# Patient Record
Sex: FEMALE | Race: WHITE | NOT HISPANIC OR LATINO | Employment: FULL TIME | URBAN - METROPOLITAN AREA
[De-identification: names, ages, dates, MRNs, and addresses within clinical notes are randomized per-mention and may not be internally consistent; named-entity substitution may affect disease eponyms.]

---

## 2017-05-12 ENCOUNTER — OFFICE VISIT (OUTPATIENT)
Dept: ENDOCRINOLOGY | Facility: CLINIC | Age: 62
End: 2017-05-12

## 2017-05-12 ENCOUNTER — TELEPHONE (OUTPATIENT)
Dept: ENDOCRINOLOGY | Facility: CLINIC | Age: 62
End: 2017-05-12

## 2017-05-12 VITALS
BODY MASS INDEX: 33.46 KG/M2 | DIASTOLIC BLOOD PRESSURE: 80 MMHG | SYSTOLIC BLOOD PRESSURE: 158 MMHG | HEART RATE: 81 BPM | WEIGHT: 196 LBS | HEIGHT: 64 IN | OXYGEN SATURATION: 98 %

## 2017-05-12 DIAGNOSIS — I10 BENIGN ESSENTIAL HYPERTENSION: Primary | ICD-10-CM

## 2017-05-12 DIAGNOSIS — E78.00 HYPERCHOLESTEROLEMIA: ICD-10-CM

## 2017-05-12 DIAGNOSIS — N76.0 VAGINITIS AND VULVOVAGINITIS: ICD-10-CM

## 2017-05-12 DIAGNOSIS — I10 ESSENTIAL HYPERTENSION: Primary | ICD-10-CM

## 2017-05-12 LAB
25(OH)D3 SERPL-MCNC: 26.8 NG/ML
ALBUMIN SERPL-MCNC: 4.4 G/DL (ref 3.2–4.8)
ALBUMIN/GLOB SERPL: 1.5 G/DL (ref 1.5–2.5)
ALP SERPL-CCNC: 56 U/L (ref 25–100)
ALT SERPL W P-5'-P-CCNC: 21 U/L (ref 7–40)
ANION GAP SERPL CALCULATED.3IONS-SCNC: 4 MMOL/L (ref 3–11)
ARTICHOKE IGE QN: 116 MG/DL (ref 0–130)
AST SERPL-CCNC: 28 U/L (ref 0–33)
BILIRUB SERPL-MCNC: 0.7 MG/DL (ref 0.3–1.2)
BUN BLD-MCNC: 17 MG/DL (ref 9–23)
BUN/CREAT SERPL: 21.3 (ref 7–25)
CALCIUM SPEC-SCNC: 10.4 MG/DL (ref 8.7–10.4)
CHLORIDE SERPL-SCNC: 107 MMOL/L (ref 99–109)
CHOLEST SERPL-MCNC: 199 MG/DL (ref 0–200)
CO2 SERPL-SCNC: 29 MMOL/L (ref 20–31)
CREAT BLD-MCNC: 0.8 MG/DL (ref 0.6–1.3)
GFR SERPL CREATININE-BSD FRML MDRD: 73 ML/MIN/1.73
GLOBULIN UR ELPH-MCNC: 3 GM/DL
GLUCOSE BLD-MCNC: 92 MG/DL (ref 70–100)
HDLC SERPL-MCNC: 65 MG/DL (ref 40–60)
POTASSIUM BLD-SCNC: 4.2 MMOL/L (ref 3.5–5.5)
PROT SERPL-MCNC: 7.4 G/DL (ref 5.7–8.2)
SODIUM BLD-SCNC: 140 MMOL/L (ref 132–146)
T4 FREE SERPL-MCNC: 1.17 NG/DL (ref 0.89–1.76)
TRIGL SERPL-MCNC: 132 MG/DL (ref 0–150)
TSH SERPL DL<=0.05 MIU/L-ACNC: 0.74 MIU/ML (ref 0.35–5.35)

## 2017-05-12 PROCEDURE — 80053 COMPREHEN METABOLIC PANEL: CPT | Performed by: INTERNAL MEDICINE

## 2017-05-12 PROCEDURE — 82306 VITAMIN D 25 HYDROXY: CPT | Performed by: INTERNAL MEDICINE

## 2017-05-12 PROCEDURE — 99214 OFFICE O/P EST MOD 30 MIN: CPT | Performed by: INTERNAL MEDICINE

## 2017-05-12 PROCEDURE — 84443 ASSAY THYROID STIM HORMONE: CPT | Performed by: INTERNAL MEDICINE

## 2017-05-12 PROCEDURE — 80061 LIPID PANEL: CPT | Performed by: INTERNAL MEDICINE

## 2017-05-12 PROCEDURE — 84439 ASSAY OF FREE THYROXINE: CPT | Performed by: INTERNAL MEDICINE

## 2017-05-12 RX ORDER — FLUCONAZOLE 150 MG/1
150 TABLET ORAL DAILY
Qty: 10 TABLET | Refills: 3 | Status: SHIPPED | OUTPATIENT
Start: 2017-05-12 | End: 2017-11-21 | Stop reason: SDUPTHER

## 2017-05-12 RX ORDER — TRIMETHOPRIM 100 MG/1
100 TABLET ORAL DAILY
Qty: 90 TABLET | Refills: 1 | Status: SHIPPED | OUTPATIENT
Start: 2017-05-12 | End: 2017-11-21 | Stop reason: SDUPTHER

## 2017-05-12 RX ORDER — LEVOTHYROXINE SODIUM 0.03 MG/1
25 TABLET ORAL DAILY
Qty: 90 TABLET | Refills: 1 | Status: SHIPPED | OUTPATIENT
Start: 2017-05-12 | End: 2017-11-21 | Stop reason: SDUPTHER

## 2017-05-27 ENCOUNTER — TELEPHONE (OUTPATIENT)
Dept: ENDOCRINOLOGY | Facility: CLINIC | Age: 62
End: 2017-05-27

## 2017-09-12 ENCOUNTER — TELEPHONE (OUTPATIENT)
Dept: ENDOCRINOLOGY | Facility: CLINIC | Age: 62
End: 2017-09-12

## 2017-11-11 PROBLEM — C50.419 MALIGNANT NEOPLASM OF UPPER-OUTER QUADRANT OF BREAST IN FEMALE, ESTROGEN RECEPTOR POSITIVE (HCC): Status: ACTIVE | Noted: 2017-11-11

## 2017-11-11 PROBLEM — Z17.0 MALIGNANT NEOPLASM OF UPPER-OUTER QUADRANT OF BREAST IN FEMALE, ESTROGEN RECEPTOR POSITIVE: Status: ACTIVE | Noted: 2017-11-11

## 2017-11-21 ENCOUNTER — OFFICE VISIT (OUTPATIENT)
Dept: ENDOCRINOLOGY | Facility: CLINIC | Age: 62
End: 2017-11-21

## 2017-11-21 VITALS
SYSTOLIC BLOOD PRESSURE: 144 MMHG | BODY MASS INDEX: 32.95 KG/M2 | HEART RATE: 83 BPM | DIASTOLIC BLOOD PRESSURE: 82 MMHG | OXYGEN SATURATION: 98 % | WEIGHT: 193 LBS | HEIGHT: 64 IN

## 2017-11-21 DIAGNOSIS — N76.0 VAGINITIS AND VULVOVAGINITIS: ICD-10-CM

## 2017-11-21 DIAGNOSIS — I10 BENIGN ESSENTIAL HYPERTENSION: Primary | ICD-10-CM

## 2017-11-21 DIAGNOSIS — E03.9 ACQUIRED HYPOTHYROIDISM: ICD-10-CM

## 2017-11-21 DIAGNOSIS — E78.00 HYPERCHOLESTEROLEMIA: ICD-10-CM

## 2017-11-21 DIAGNOSIS — Z12.11 ENCOUNTER FOR SCREENING COLONOSCOPY: ICD-10-CM

## 2017-11-21 LAB
25(OH)D3 SERPL-MCNC: 34.2 NG/ML
ALBUMIN SERPL-MCNC: 4.3 G/DL (ref 3.2–4.8)
ALBUMIN/GLOB SERPL: 1.6 G/DL (ref 1.5–2.5)
ALP SERPL-CCNC: 72 U/L (ref 25–100)
ALT SERPL W P-5'-P-CCNC: 30 U/L (ref 7–40)
ANION GAP SERPL CALCULATED.3IONS-SCNC: 6 MMOL/L (ref 3–11)
ARTICHOKE IGE QN: 88 MG/DL (ref 0–130)
AST SERPL-CCNC: 30 U/L (ref 0–33)
BASOPHILS # BLD AUTO: 0.04 10*3/MM3 (ref 0–0.2)
BASOPHILS NFR BLD AUTO: 0.5 % (ref 0–1)
BILIRUB SERPL-MCNC: 0.3 MG/DL (ref 0.3–1.2)
BUN BLD-MCNC: 14 MG/DL (ref 9–23)
BUN/CREAT SERPL: 14 (ref 7–25)
CALCIUM SPEC-SCNC: 9.9 MG/DL (ref 8.7–10.4)
CHLORIDE SERPL-SCNC: 104 MMOL/L (ref 99–109)
CHOLEST SERPL-MCNC: 172 MG/DL (ref 0–200)
CO2 SERPL-SCNC: 32 MMOL/L (ref 20–31)
CREAT BLD-MCNC: 1 MG/DL (ref 0.6–1.3)
DEPRECATED RDW RBC AUTO: 45.1 FL (ref 37–54)
EOSINOPHIL # BLD AUTO: 0.21 10*3/MM3 (ref 0–0.3)
EOSINOPHIL NFR BLD AUTO: 2.8 % (ref 0–3)
ERYTHROCYTE [DISTWIDTH] IN BLOOD BY AUTOMATED COUNT: 12.9 % (ref 11.3–14.5)
FSH SERPL-ACNC: 55.1 MIU/ML
GFR SERPL CREATININE-BSD FRML MDRD: 56 ML/MIN/1.73
GLOBULIN UR ELPH-MCNC: 2.7 GM/DL
GLUCOSE BLD-MCNC: 93 MG/DL (ref 70–100)
HCT VFR BLD AUTO: 36.7 % (ref 34.5–44)
HCV AB SER DONR QL: NORMAL
HDLC SERPL-MCNC: 45 MG/DL (ref 40–60)
HGB BLD-MCNC: 11.8 G/DL (ref 11.5–15.5)
IMM GRANULOCYTES # BLD: 0.03 10*3/MM3 (ref 0–0.03)
IMM GRANULOCYTES NFR BLD: 0.4 % (ref 0–0.6)
LYMPHOCYTES # BLD AUTO: 2.28 10*3/MM3 (ref 0.6–4.8)
LYMPHOCYTES NFR BLD AUTO: 30.4 % (ref 24–44)
MCH RBC QN AUTO: 30.7 PG (ref 27–31)
MCHC RBC AUTO-ENTMCNC: 32.2 G/DL (ref 32–36)
MCV RBC AUTO: 95.6 FL (ref 80–99)
MONOCYTES # BLD AUTO: 0.7 10*3/MM3 (ref 0–1)
MONOCYTES NFR BLD AUTO: 9.3 % (ref 0–12)
NEUTROPHILS # BLD AUTO: 4.25 10*3/MM3 (ref 1.5–8.3)
NEUTROPHILS NFR BLD AUTO: 56.6 % (ref 41–71)
PLATELET # BLD AUTO: 289 10*3/MM3 (ref 150–450)
PMV BLD AUTO: 10.5 FL (ref 6–12)
POTASSIUM BLD-SCNC: 4.1 MMOL/L (ref 3.5–5.5)
PROT SERPL-MCNC: 7 G/DL (ref 5.7–8.2)
RBC # BLD AUTO: 3.84 10*6/MM3 (ref 3.89–5.14)
SODIUM BLD-SCNC: 142 MMOL/L (ref 132–146)
TRIGL SERPL-MCNC: 243 MG/DL (ref 0–150)
TSH SERPL DL<=0.05 MIU/L-ACNC: 0.7 MIU/ML (ref 0.35–5.35)
WBC NRBC COR # BLD: 7.51 10*3/MM3 (ref 3.5–10.8)

## 2017-11-21 PROCEDURE — 80053 COMPREHEN METABOLIC PANEL: CPT | Performed by: INTERNAL MEDICINE

## 2017-11-21 PROCEDURE — 86803 HEPATITIS C AB TEST: CPT | Performed by: INTERNAL MEDICINE

## 2017-11-21 PROCEDURE — 85025 COMPLETE CBC W/AUTO DIFF WBC: CPT | Performed by: INTERNAL MEDICINE

## 2017-11-21 PROCEDURE — 84443 ASSAY THYROID STIM HORMONE: CPT | Performed by: INTERNAL MEDICINE

## 2017-11-21 PROCEDURE — 83001 ASSAY OF GONADOTROPIN (FSH): CPT | Performed by: INTERNAL MEDICINE

## 2017-11-21 PROCEDURE — 99214 OFFICE O/P EST MOD 30 MIN: CPT | Performed by: INTERNAL MEDICINE

## 2017-11-21 PROCEDURE — 80061 LIPID PANEL: CPT | Performed by: INTERNAL MEDICINE

## 2017-11-21 PROCEDURE — 82306 VITAMIN D 25 HYDROXY: CPT | Performed by: INTERNAL MEDICINE

## 2017-11-21 RX ORDER — FLUCONAZOLE 150 MG/1
150 TABLET ORAL DAILY
Qty: 10 TABLET | Refills: 3 | Status: SHIPPED | OUTPATIENT
Start: 2017-11-21 | End: 2018-05-17 | Stop reason: SDUPTHER

## 2017-11-21 RX ORDER — TRIMETHOPRIM 100 MG/1
100 TABLET ORAL DAILY
Qty: 90 TABLET | Refills: 1 | Status: SHIPPED | OUTPATIENT
Start: 2017-11-21 | End: 2018-05-17 | Stop reason: SDUPTHER

## 2017-11-21 RX ORDER — INFLUENZA A VIRUS A/SINGAPORE/GP1908/2015 IVR-180 (H1N1) ANTIGEN (MDCK CELL DERIVED, PROPIOLACTONE INACTIVATED), INFLUENZA A VIRUS A/NORTH CAROLINA/04/2016 (H3N2) HEMAGGLUTININ ANTIGEN (MDCK CELL DERIVED, PROPIOLACTONE INACTIVATED), INFLUENZA B VIRUS B/IOWA/06/2017 HEMAGGLUTININ ANTIGEN (MDCK CELL DERIVED, PROPIOLACTONE INACTIVATED), INFLUENZA B VIRUS B/SINGAPORE/INFTT-16-0610/2016 HEMAGGLUTININ ANTIGEN (MDCK CELL DERIVED, PROPIOLACTONE INACTIVATED) 15; 15; 15; 15 UG/.5ML; UG/.5ML; UG/.5ML; UG/.5ML
INJECTION, SUSPENSION INTRAMUSCULAR
Refills: 0 | COMMUNITY
Start: 2017-10-12 | End: 2021-05-25

## 2017-11-21 RX ORDER — IBUPROFEN 200 MG
1 CAPSULE ORAL 2 TIMES DAILY
Qty: 60 TABLET | Refills: 5
Start: 2017-11-21 | End: 2018-05-17 | Stop reason: ALTCHOICE

## 2017-11-21 RX ORDER — CHOLECALCIFEROL (VITAMIN D3) 125 MCG
1 TABLET ORAL DAILY
Qty: 30 TABLET | Refills: 5
Start: 2017-11-21 | End: 2020-05-19

## 2017-11-21 RX ORDER — LEVOTHYROXINE SODIUM 0.03 MG/1
25 TABLET ORAL DAILY
Qty: 90 TABLET | Refills: 1 | Status: SHIPPED | OUTPATIENT
Start: 2017-11-21 | End: 2018-05-17 | Stop reason: SDUPTHER

## 2017-11-21 RX ORDER — EXEMESTANE 25 MG/1
TABLET ORAL DAILY
COMMUNITY
End: 2018-05-17

## 2017-11-21 NOTE — PROGRESS NOTES
Siria JACQUES Radha 61 y.o.  CC:Follow-up; Hypertension; Hypothyroidism; and Heartburn    [place]:Follow-up; Hypertension; Hypothyroidism; and Heartburn    bp is good   Energy good overall   bp is good at home   Higher in MD office  Healthy overall   Had lumpectomy and radiation for breast cancer - did not have chemo  Did get onco dx test (about 4000$)  Saw Dr Fofana oncologist (looks at cells)- score was 33- recommended chemo and she is still considering this   Is on aromasin and recommended avoid soy   Stopped hormones   Lizette Delgado MD is oncologist  Also saw Dr South for podiatry - dx tarsal tunnel and had therapy   Also has soft tissue damage lateral foot dx Dr Vergara    Allergies   Allergen Reactions   • Terbinafine Itching       Current Outpatient Prescriptions:   •  exemestane (AROMASIN) 25 MG chemo tablet, Take  by mouth Daily., Disp: , Rfl:   •  Glucosamine-Chondroitin (MOVE FREE PO), Take  by mouth Daily., Disp: , Rfl:   •  levothyroxine (SYNTHROID, LEVOTHROID) 25 MCG tablet, Take 1 tablet by mouth Daily., Disp: 90 tablet, Rfl: 1  •  loperamide (IMODIUM) 1 MG/5ML solution, Take  by mouth. Use as directed, Disp: , Rfl:   •  loratadine (CLARITIN) 10 MG tablet, Take 10 mg by mouth daily., Disp: , Rfl:   •  FLUARIX QUADRIVALENT 0.5 ML suspension prefilled syringe injection, inject 0.5 milliliter intramuscularly, Disp: , Rfl: 0  •  FLUCELVAX QUADRIVALENT 0.5 ML suspension prefilled syringe injection, ADM 0.5ML IM UTD, Disp: , Rfl: 0  •  fluconazole (DIFLUCAN) 150 MG tablet, Take 1 tablet by mouth Daily., Disp: 10 tablet, Rfl: 3  •  trimethoprim (TRIMPEX) 100 MG tablet, Take 1 tablet by mouth Daily., Disp: 90 tablet, Rfl: 1  •  ZOSTAVAX 72237 UNT/0.65ML injection, inject contents of 1 vial subcutaneously, Disp: , Rfl: 0  Patient Active Problem List    Diagnosis   • Malignant neoplasm of upper-outer quadrant of breast in female, estrogen receptor positive [C50.419, Z17.0]   • Vaginitis and vulvovaginitis [N76.0]   •  Sore throat [J02.9]   • Acute low back pain [M54.5]   • Benign essential hypertension [I10]     Overview Note:     Impression: 11/24/2015 - bp is good  Impression: 05/08/2015 - bp is good  bp is good at home as well - continue to monitor  Impression: 05/08/2015 - bp is good  bp is good at home as well - continue to test  Impression: 11/25/2014 - bp is good overall  Impression: 05/20/2014 - bp is higher here, better at home;      • Gastroesophageal reflux disease [K21.9]   • Fatigue [R53.83]   • Foot pain [M79.673]     Overview Note:     Impression: 11/24/2015 - continue hard soled shoe- ok topical and likely arthritis;      • Hypercholesterolemia [E78.00]     Overview Note:     Impression: 11/24/2015 - check flp  Impression: 05/08/2015 - check flp today  Impression: 11/25/2014 - check flp  Impression: 05/20/2014 - check flp;      • Hypothyroidism [E03.9]     Overview Note:     Impression: 11/24/2015 - check tfts  Impression: 05/08/2015 - check tsh  Impression: 11/25/2014 - update tft  Impression: 05/20/2014 - check tsh;      • Knee pain [M25.569]     Overview Note:     Impression: 11/24/2015 - ibuprofen 600 mg bid    knee brace   ortho referral when she is ready  Impression: 11/25/2014 - twisted left knee- swelling- better now- continue nsaid and try brace;      • Osteoarthritis of hand [M19.049]     Overview Note:     Impression: 05/08/2015 - continue ibuprofen- cautioned her about overlapping alleve and ibuprofen  Impression: 11/25/2014 - taking advil, also knee pain;      • Candidiasis of vagina [B37.3]     Overview Note:     Impression: 11/24/2015 - refill diflucan    rx provided per patient request;        Review of Systems   Constitutional: Negative for activity change, appetite change, chills, diaphoresis, fatigue, fever and unexpected weight change.   HENT: Negative for congestion, dental problem, drooling, ear discharge, ear pain, facial swelling, hearing loss, mouth sores, nosebleeds, postnasal drip,  rhinorrhea, sinus pressure, sneezing, sore throat, tinnitus, trouble swallowing and voice change.    Eyes: Negative for photophobia, pain, discharge, redness, itching and visual disturbance.   Respiratory: Negative for apnea, cough, choking, chest tightness, shortness of breath, wheezing and stridor.    Cardiovascular: Negative for chest pain, palpitations and leg swelling.   Gastrointestinal: Negative for abdominal distention, abdominal pain, anal bleeding, blood in stool, constipation, diarrhea, nausea, rectal pain and vomiting.   Endocrine: Negative for cold intolerance, heat intolerance, polydipsia, polyphagia and polyuria.   Genitourinary: Negative for decreased urine volume, difficulty urinating, dysuria, enuresis, flank pain, frequency, genital sores, hematuria and urgency.        Breast cancer s/p lumpectomy    Musculoskeletal: Negative for arthralgias, back pain, gait problem, joint swelling, myalgias, neck pain and neck stiffness.        Right lateral foot pain    Skin: Negative for color change, pallor, rash and wound.   Allergic/Immunologic: Negative for environmental allergies, food allergies and immunocompromised state.   Neurological: Negative for dizziness, tremors, seizures, syncope, facial asymmetry, speech difficulty, weakness, light-headedness, numbness and headaches.   Hematological: Negative for adenopathy. Does not bruise/bleed easily.   Psychiatric/Behavioral: Negative for agitation, behavioral problems, confusion, decreased concentration, dysphoric mood, hallucinations, self-injury, sleep disturbance and suicidal ideas. The patient is not nervous/anxious and is not hyperactive.      Social History     Social History   • Marital status:      Spouse name: N/A   • Number of children: N/A   • Years of education: N/A     Occupational History   • Not on file.     Social History Main Topics   • Smoking status: Never Smoker   • Smokeless tobacco: Never Used   • Alcohol use No   • Drug use: No  "  • Sexual activity: Not on file     Other Topics Concern   • Not on file     Social History Narrative     Family History   Problem Relation Age of Onset   • Diabetes Father    • Hypertension Father    • Breast cancer Mother      /82  Pulse 83  Ht 63.5\" (161.3 cm)  Wt 193 lb (87.5 kg)  SpO2 98%  BMI 33.65 kg/m2  Physical Exam   Constitutional: She is oriented to person, place, and time. She appears well-developed and well-nourished.   HENT:   Head: Normocephalic and atraumatic.   Nose: Nose normal.   Mouth/Throat: Oropharynx is clear and moist.   Eyes: Conjunctivae, EOM and lids are normal. Pupils are equal, round, and reactive to light.   Neck: Trachea normal and normal range of motion. Neck supple. Carotid bruit is not present. No tracheal deviation present. No thyroid mass and no thyromegaly present.   Cardiovascular: Normal rate, regular rhythm, normal heart sounds and intact distal pulses.  Exam reveals no gallop and no friction rub.    No murmur heard.  Pulmonary/Chest: Effort normal and breath sounds normal. No respiratory distress. She has no wheezes.   Musculoskeletal: Normal range of motion. She exhibits no edema or deformity.   Lymphadenopathy:     She has no cervical adenopathy.   Neurological: She is alert and oriented to person, place, and time. She has normal reflexes. She displays normal reflexes. No cranial nerve deficit.   Skin: Skin is warm and dry. No rash noted. No cyanosis or erythema. Nails show no clubbing.   Psychiatric: She has a normal mood and affect. Her speech is normal and behavior is normal. Judgment and thought content normal. Cognition and memory are normal.   Nursing note and vitals reviewed.    Results for orders placed or performed in visit on 05/12/17   TSH   Result Value Ref Range    TSH 0.739 0.350 - 5.350 mIU/mL   T4, Free   Result Value Ref Range    Free T4 1.17 0.89 - 1.76 ng/dL   Comprehensive Metabolic Panel   Result Value Ref Range    Glucose 92 70 - 100 mg/dL "    BUN 17 9 - 23 mg/dL    Creatinine 0.80 0.60 - 1.30 mg/dL    Sodium 140 132 - 146 mmol/L    Potassium 4.2 3.5 - 5.5 mmol/L    Chloride 107 99 - 109 mmol/L    CO2 29.0 20.0 - 31.0 mmol/L    Calcium 10.4 8.7 - 10.4 mg/dL    Total Protein 7.4 5.7 - 8.2 g/dL    Albumin 4.40 3.20 - 4.80 g/dL    ALT (SGPT) 21 7 - 40 U/L    AST (SGOT) 28 0 - 33 U/L    Alkaline Phosphatase 56 25 - 100 U/L    Total Bilirubin 0.7 0.3 - 1.2 mg/dL    eGFR Non African Amer 73 >60 mL/min/1.73    Globulin 3.0 gm/dL    A/G Ratio 1.5 1.5 - 2.5 g/dL    BUN/Creatinine Ratio 21.3 7.0 - 25.0    Anion Gap 4.0 3.0 - 11.0 mmol/L   Lipid Panel   Result Value Ref Range    Total Cholesterol 199 0 - 200 mg/dL    Triglycerides 132 0 - 150 mg/dL    HDL Cholesterol 65 (H) 40 - 60 mg/dL    LDL Cholesterol  116 0 - 130 mg/dL   Vitamin D 25 Hydroxy   Result Value Ref Range    25 Hydroxy, Vitamin D 26.8 ng/ml     Problem List Items Addressed This Visit        Cardiovascular and Mediastinum    Benign essential hypertension - Primary     bp is good   Continue current medications          Relevant Orders    Comprehensive Metabolic Panel (Completed)    CBC & Differential (Completed)    Vitamin D 25 Hydroxy (Completed)    Hepatitis C Antibody (Completed)    CBC Auto Differential (Completed)    Hypercholesterolemia     Check flp          Relevant Orders    Comprehensive Metabolic Panel (Completed)    Lipid Panel (Completed)    Vitamin D 25 Hydroxy (Completed)    Follicle Stimulating Hormone (Completed)    Hepatitis C Antibody (Completed)       Endocrine    Hypothyroidism     Check tfts          Relevant Medications    levothyroxine (SYNTHROID, LEVOTHROID) 25 MCG tablet    Other Relevant Orders    TSH (Completed)    Comprehensive Metabolic Panel (Completed)    Vitamin D 25 Hydroxy (Completed)    Follicle Stimulating Hormone (Completed)    Hepatitis C Antibody (Completed)       Genitourinary    Vaginitis and vulvovaginitis     Stable without recurrent symptoms           Relevant Medications    fluconazole (DIFLUCAN) 150 MG tablet    Other Relevant Orders    Follicle Stimulating Hormone (Completed)    Hepatitis C Antibody (Completed)      Other Visit Diagnoses     Encounter for screening colonoscopy        Relevant Orders    Ambulatory Referral to Gastroenterology        Return in about 6 months (around 5/21/2018) for Recheck 30 min .    Reviewed preventive care  Discussed colonoscopy - Dr Bolton recommended by Dr Weiss and I agreed   Lashell Saucedo MA  Signed Sofia Hong MD

## 2017-11-28 ENCOUNTER — TELEPHONE (OUTPATIENT)
Dept: INTERNAL MEDICINE | Facility: CLINIC | Age: 62
End: 2017-11-28

## 2018-01-30 PROBLEM — D36.9 TUBULAR ADENOMA: Status: ACTIVE | Noted: 2018-01-30

## 2018-05-17 ENCOUNTER — OFFICE VISIT (OUTPATIENT)
Dept: ENDOCRINOLOGY | Facility: CLINIC | Age: 63
End: 2018-05-17

## 2018-05-17 VITALS — OXYGEN SATURATION: 99 % | HEIGHT: 64 IN | BODY MASS INDEX: 32.1 KG/M2 | WEIGHT: 188 LBS | HEART RATE: 78 BPM

## 2018-05-17 DIAGNOSIS — B37.31 VAGINAL YEAST INFECTION: ICD-10-CM

## 2018-05-17 DIAGNOSIS — E03.9 ACQUIRED HYPOTHYROIDISM: ICD-10-CM

## 2018-05-17 DIAGNOSIS — N76.0 VAGINITIS AND VULVOVAGINITIS: ICD-10-CM

## 2018-05-17 DIAGNOSIS — E78.00 HYPERCHOLESTEROLEMIA: Primary | ICD-10-CM

## 2018-05-17 DIAGNOSIS — I10 BENIGN ESSENTIAL HYPERTENSION: ICD-10-CM

## 2018-05-17 LAB
25(OH)D3 SERPL-MCNC: 25.1 NG/ML
ALBUMIN SERPL-MCNC: 4.6 G/DL (ref 3.2–4.8)
ALBUMIN/GLOB SERPL: 1.5 G/DL (ref 1.5–2.5)
ALP SERPL-CCNC: 91 U/L (ref 25–100)
ALT SERPL W P-5'-P-CCNC: 27 U/L (ref 7–40)
ANION GAP SERPL CALCULATED.3IONS-SCNC: 11 MMOL/L (ref 3–11)
ARTICHOKE IGE QN: 106 MG/DL (ref 0–130)
AST SERPL-CCNC: 28 U/L (ref 0–33)
BILIRUB SERPL-MCNC: 0.4 MG/DL (ref 0.3–1.2)
BUN BLD-MCNC: 15 MG/DL (ref 9–23)
BUN/CREAT SERPL: 13.6 (ref 7–25)
CALCIUM SPEC-SCNC: 10.2 MG/DL (ref 8.7–10.4)
CHLORIDE SERPL-SCNC: 106 MMOL/L (ref 99–109)
CHOLEST SERPL-MCNC: 192 MG/DL (ref 0–200)
CO2 SERPL-SCNC: 28 MMOL/L (ref 20–31)
CREAT BLD-MCNC: 1.1 MG/DL (ref 0.6–1.3)
GFR SERPL CREATININE-BSD FRML MDRD: 50 ML/MIN/1.73
GLOBULIN UR ELPH-MCNC: 3 GM/DL
GLUCOSE BLD-MCNC: 104 MG/DL (ref 70–100)
HDLC SERPL-MCNC: 54 MG/DL (ref 40–60)
POTASSIUM BLD-SCNC: 4.8 MMOL/L (ref 3.5–5.5)
PROT SERPL-MCNC: 7.6 G/DL (ref 5.7–8.2)
SODIUM BLD-SCNC: 145 MMOL/L (ref 132–146)
TRIGL SERPL-MCNC: 176 MG/DL (ref 0–150)
TSH SERPL DL<=0.05 MIU/L-ACNC: 0.34 MIU/ML (ref 0.35–5.35)

## 2018-05-17 PROCEDURE — 80053 COMPREHEN METABOLIC PANEL: CPT | Performed by: INTERNAL MEDICINE

## 2018-05-17 PROCEDURE — 80061 LIPID PANEL: CPT | Performed by: INTERNAL MEDICINE

## 2018-05-17 PROCEDURE — 84443 ASSAY THYROID STIM HORMONE: CPT | Performed by: INTERNAL MEDICINE

## 2018-05-17 PROCEDURE — 82306 VITAMIN D 25 HYDROXY: CPT | Performed by: INTERNAL MEDICINE

## 2018-05-17 PROCEDURE — 99214 OFFICE O/P EST MOD 30 MIN: CPT | Performed by: INTERNAL MEDICINE

## 2018-05-17 RX ORDER — TRIMETHOPRIM 100 MG/1
100 TABLET ORAL DAILY
Qty: 30 TABLET | Refills: 1 | Status: SHIPPED | OUTPATIENT
Start: 2018-05-17 | End: 2019-05-21 | Stop reason: SDUPTHER

## 2018-05-17 RX ORDER — FLUCONAZOLE 150 MG/1
150 TABLET ORAL DAILY
Qty: 10 TABLET | Refills: 3 | Status: SHIPPED | OUTPATIENT
Start: 2018-05-17 | End: 2019-05-21 | Stop reason: SDUPTHER

## 2018-05-17 RX ORDER — LEVOTHYROXINE SODIUM 0.03 MG/1
25 TABLET ORAL DAILY
Qty: 90 TABLET | Refills: 1 | Status: SHIPPED | OUTPATIENT
Start: 2018-05-17 | End: 2019-05-21 | Stop reason: SDUPTHER

## 2018-05-17 RX ORDER — PHENOL 1.4 %
600 AEROSOL, SPRAY (ML) MUCOUS MEMBRANE 2 TIMES DAILY WITH MEALS
COMMUNITY
End: 2019-11-26

## 2018-05-17 NOTE — PROGRESS NOTES
Siria JACQUES Radha 62 y.o.  CC:Follow-up; Hypertension; Heartburn; and Hypothyroidism    [place]: Follow-up; Hypertension; Heartburn; and Hypothyroidism    bp is good today - 128/64  gerd is stable   Energy is good overall   Is utd with preventive care- on aromasin for breast cancer   Printed prescriptions for refills   She is due for lab work today   Gums bother her with a lot of acidic fluids  Has seen dentist   bp at home has been good overall- 2 systolic bp over 150     Allergies   Allergen Reactions   • Terbinafine Itching       Current Outpatient Prescriptions:   •  calcium carbonate (OS-NICK) 600 MG tablet, Take 600 mg by mouth 2 (Two) Times a Day With Meals., Disp: , Rfl:   •  Exemestane (AROMASIN PO), Take 25 mg by mouth Daily., Disp: , Rfl:   •  Glucos-Chond-Hyal Ac-Ca Fructo (MOVE FREE JOINT HEALTH ADVANCE PO), Take  by mouth., Disp: , Rfl:   •  Glucosamine-Chondroitin (MOVE FREE PO), Take  by mouth., Disp: , Rfl:   •  Loperamide HCl (IMODIUM PO), Take 1 tablet by mouth 2 (Two) Times a Day As Needed., Disp: , Rfl:   •  Ergocalciferol (VITAMIN D2) 2000 units tablet, Take 1 tablet by mouth Daily., Disp: 30 tablet, Rfl: 5  •  FLUARIX QUADRIVALENT 0.5 ML suspension prefilled syringe injection, inject 0.5 milliliter intramuscularly, Disp: , Rfl: 0  •  FLUCELVAX QUADRIVALENT 0.5 ML suspension prefilled syringe injection, ADM 0.5ML IM UTD, Disp: , Rfl: 0  •  fluconazole (DIFLUCAN) 150 MG tablet, Take 1 tablet by mouth Daily., Disp: 10 tablet, Rfl: 3  •  Glucosamine-Chondroitin (MOVE FREE PO), Take  by mouth Daily., Disp: , Rfl:   •  levothyroxine (SYNTHROID, LEVOTHROID) 25 MCG tablet, Take 1 tablet by mouth Daily., Disp: 90 tablet, Rfl: 1  •  loratadine (CLARITIN) 10 MG tablet, Take 10 mg by mouth daily., Disp: , Rfl:   •  trimethoprim (TRIMPEX) 100 MG tablet, Take 1 tablet by mouth Daily. (Patient taking differently: Take 100 mg by mouth Daily. PRN), Disp: 90 tablet, Rfl: 1  •  ZOSTAVAX 25251 UNT/0.65ML injection, inject  contents of 1 vial subcutaneously, Disp: , Rfl: 0  Patient Active Problem List    Diagnosis   • Tubular adenoma [D36.9]     Overview Note:     2018- repeat 5 years      • Malignant neoplasm of upper-outer quadrant of breast in female, estrogen receptor positive [C50.419, Z17.0]   • Vaginitis and vulvovaginitis [N76.0]   • Sore throat [J02.9]   • Acute low back pain [M54.5]   • Benign essential hypertension [I10]     Overview Note:     Impression: 11/24/2015 - bp is good  Impression: 05/08/2015 - bp is good  bp is good at home as well - continue to monitor  Impression: 05/08/2015 - bp is good  bp is good at home as well - continue to test  Impression: 11/25/2014 - bp is good overall  Impression: 05/20/2014 - bp is higher here, better at home;      • Gastroesophageal reflux disease [K21.9]   • Fatigue [R53.83]   • Foot pain [M79.673]     Overview Note:     Impression: 11/24/2015 - continue hard soled shoe- ok topical and likely arthritis;      • Hypercholesterolemia [E78.00]     Overview Note:     Impression: 11/24/2015 - check flp  Impression: 05/08/2015 - check flp today  Impression: 11/25/2014 - check flp  Impression: 05/20/2014 - check flp;      • Hypothyroidism [E03.9]     Overview Note:     Impression: 11/24/2015 - check tfts  Impression: 05/08/2015 - check tsh  Impression: 11/25/2014 - update tft  Impression: 05/20/2014 - check tsh;      • Knee pain [M25.569]     Overview Note:     Impression: 11/24/2015 - ibuprofen 600 mg bid    knee brace   ortho referral when she is ready  Impression: 11/25/2014 - twisted left knee- swelling- better now- continue nsaid and try brace;      • Osteoarthritis of hand [M19.049]     Overview Note:     Impression: 05/08/2015 - continue ibuprofen- cautioned her about overlapping alleve and ibuprofen  Impression: 11/25/2014 - taking advil, also knee pain;      • Candidiasis of vagina [B37.3]     Overview Note:     Impression: 11/24/2015 - refill diflucan    rx provided per patient  request;        Review of Systems   Constitutional: Negative for activity change, appetite change, chills, diaphoresis, fatigue, fever and unexpected weight change.   HENT: Negative for congestion, dental problem, drooling, ear discharge, ear pain, facial swelling, hearing loss, mouth sores, nosebleeds, postnasal drip, rhinorrhea, sinus pressure, sneezing, sore throat, tinnitus, trouble swallowing and voice change.    Eyes: Negative for photophobia, pain, discharge, redness, itching and visual disturbance.   Respiratory: Negative for apnea, cough, choking, chest tightness, shortness of breath, wheezing and stridor.    Cardiovascular: Negative for chest pain, palpitations and leg swelling.   Gastrointestinal: Negative for abdominal distention, abdominal pain, anal bleeding, blood in stool, constipation, diarrhea, nausea, rectal pain and vomiting.   Endocrine: Negative for cold intolerance, heat intolerance, polydipsia, polyphagia and polyuria.   Genitourinary: Negative for decreased urine volume, difficulty urinating, dysuria, enuresis, flank pain, frequency, genital sores, hematuria and urgency.   Musculoskeletal: Negative for arthralgias, back pain, gait problem, joint swelling, myalgias, neck pain and neck stiffness.   Skin: Negative for color change, pallor, rash and wound.   Allergic/Immunologic: Negative for environmental allergies, food allergies and immunocompromised state.   Neurological: Negative for dizziness, tremors, seizures, syncope, facial asymmetry, speech difficulty, weakness, light-headedness, numbness and headaches.   Hematological: Negative for adenopathy. Does not bruise/bleed easily.   Psychiatric/Behavioral: Negative for agitation, behavioral problems, confusion, decreased concentration, dysphoric mood, hallucinations, self-injury, sleep disturbance and suicidal ideas. The patient is not nervous/anxious and is not hyperactive.      Social History     Social History   • Marital status:   "    Spouse name: N/A   • Number of children: N/A   • Years of education: N/A     Occupational History   • Not on file.     Social History Main Topics   • Smoking status: Never Smoker   • Smokeless tobacco: Never Used   • Alcohol use No   • Drug use: No   • Sexual activity: Not on file     Other Topics Concern   • Not on file     Social History Narrative   • No narrative on file     Family History   Problem Relation Age of Onset   • Diabetes Father    • Hypertension Father    • Breast cancer Mother      Pulse 78   Ht 161.3 cm (63.5\")   Wt 85.3 kg (188 lb)   SpO2 99%   BMI 32.78 kg/m²   Physical Exam   Constitutional: She is oriented to person, place, and time. She appears well-developed and well-nourished.   HENT:   Head: Normocephalic and atraumatic.   Nose: Nose normal.   Mouth/Throat: Oropharynx is clear and moist.   Eyes: Conjunctivae, EOM and lids are normal. Pupils are equal, round, and reactive to light.   Neck: Trachea normal and normal range of motion. Neck supple. Carotid bruit is not present. No tracheal deviation present. No thyroid mass and no thyromegaly present.   Cardiovascular: Normal rate, regular rhythm, normal heart sounds and intact distal pulses.  Exam reveals no gallop and no friction rub.    No murmur heard.  Pulmonary/Chest: Effort normal and breath sounds normal. No respiratory distress. She has no wheezes.   Musculoskeletal: Normal range of motion. She exhibits no edema or deformity.   Lymphadenopathy:     She has no cervical adenopathy.   Neurological: She is alert and oriented to person, place, and time. She has normal reflexes. She displays normal reflexes. No cranial nerve deficit.   Skin: Skin is warm and dry. No rash noted. No cyanosis or erythema. Nails show no clubbing.   Psychiatric: She has a normal mood and affect. Her speech is normal and behavior is normal. Judgment and thought content normal. Cognition and memory are normal.   Nursing note and vitals reviewed.    Results " for orders placed or performed in visit on 11/21/17   TSH   Result Value Ref Range    TSH 0.703 0.350 - 5.350 mIU/mL   Comprehensive Metabolic Panel   Result Value Ref Range    Glucose 93 70 - 100 mg/dL    BUN 14 9 - 23 mg/dL    Creatinine 1.00 0.60 - 1.30 mg/dL    Sodium 142 132 - 146 mmol/L    Potassium 4.1 3.5 - 5.5 mmol/L    Chloride 104 99 - 109 mmol/L    CO2 32.0 (H) 20.0 - 31.0 mmol/L    Calcium 9.9 8.7 - 10.4 mg/dL    Total Protein 7.0 5.7 - 8.2 g/dL    Albumin 4.30 3.20 - 4.80 g/dL    ALT (SGPT) 30 7 - 40 U/L    AST (SGOT) 30 0 - 33 U/L    Alkaline Phosphatase 72 25 - 100 U/L    Total Bilirubin 0.3 0.3 - 1.2 mg/dL    eGFR Non African Amer 56 (L) >60 mL/min/1.73    Globulin 2.7 gm/dL    A/G Ratio 1.6 1.5 - 2.5 g/dL    BUN/Creatinine Ratio 14.0 7.0 - 25.0    Anion Gap 6.0 3.0 - 11.0 mmol/L   Lipid Panel   Result Value Ref Range    Total Cholesterol 172 0 - 200 mg/dL    Triglycerides 243 (H) 0 - 150 mg/dL    HDL Cholesterol 45 40 - 60 mg/dL    LDL Cholesterol  88 0 - 130 mg/dL   Vitamin D 25 Hydroxy   Result Value Ref Range    25 Hydroxy, Vitamin D 34.2 ng/ml   Follicle Stimulating Hormone   Result Value Ref Range    FSH 55.10 mIU/mL   Hepatitis C Antibody   Result Value Ref Range    Hepatitis C Ab Non-Reactive Non-Reactive   CBC Auto Differential   Result Value Ref Range    WBC 7.51 3.50 - 10.80 10*3/mm3    RBC 3.84 (L) 3.89 - 5.14 10*6/mm3    Hemoglobin 11.8 11.5 - 15.5 g/dL    Hematocrit 36.7 34.5 - 44.0 %    MCV 95.6 80.0 - 99.0 fL    MCH 30.7 27.0 - 31.0 pg    MCHC 32.2 32.0 - 36.0 g/dL    RDW 12.9 11.3 - 14.5 %    RDW-SD 45.1 37.0 - 54.0 fl    MPV 10.5 6.0 - 12.0 fL    Platelets 289 150 - 450 10*3/mm3    Neutrophil % 56.6 41.0 - 71.0 %    Lymphocyte % 30.4 24.0 - 44.0 %    Monocyte % 9.3 0.0 - 12.0 %    Eosinophil % 2.8 0.0 - 3.0 %    Basophil % 0.5 0.0 - 1.0 %    Immature Grans % 0.4 0.0 - 0.6 %    Neutrophils, Absolute 4.25 1.50 - 8.30 10*3/mm3    Lymphocytes, Absolute 2.28 0.60 - 4.80 10*3/mm3     Monocytes, Absolute 0.70 0.00 - 1.00 10*3/mm3    Eosinophils, Absolute 0.21 0.00 - 0.30 10*3/mm3    Basophils, Absolute 0.04 0.00 - 0.20 10*3/mm3    Immature Grans, Absolute 0.03 0.00 - 0.03 10*3/mm3     Problem List Items Addressed This Visit        Cardiovascular and Mediastinum    Hypercholesterolemia - Primary     Check flp          Relevant Orders    Comprehensive Metabolic Panel    Lipid Panel    Vitamin D 25 Hydroxy    Benign essential hypertension     bp is good today -continue to monitor          Relevant Orders    Vitamin D 25 Hydroxy       Endocrine    Hypothyroidism     Check tfts          Relevant Medications    levothyroxine (SYNTHROID, LEVOTHROID) 25 MCG tablet    Other Relevant Orders    TSH    Vitamin D 25 Hydroxy       Genitourinary    Vaginitis and vulvovaginitis     Refill diflucan          Relevant Medications    fluconazole (DIFLUCAN) 150 MG tablet    Vaginal yeast infection    Relevant Medications    fluconazole (DIFLUCAN) 150 MG tablet        Return in about 6 months (around 11/17/2018) for Recheck 30 min .    Lashell Saucedo MA  Signed Sofia Hong MD

## 2018-11-20 ENCOUNTER — OFFICE VISIT (OUTPATIENT)
Dept: ENDOCRINOLOGY | Facility: CLINIC | Age: 63
End: 2018-11-20

## 2018-11-20 VITALS
BODY MASS INDEX: 32.66 KG/M2 | DIASTOLIC BLOOD PRESSURE: 80 MMHG | WEIGHT: 191.3 LBS | OXYGEN SATURATION: 98 % | SYSTOLIC BLOOD PRESSURE: 158 MMHG | HEIGHT: 64 IN | HEART RATE: 83 BPM

## 2018-11-20 DIAGNOSIS — E03.9 ACQUIRED HYPOTHYROIDISM: ICD-10-CM

## 2018-11-20 DIAGNOSIS — M19.041 PRIMARY OSTEOARTHRITIS OF BOTH HANDS: ICD-10-CM

## 2018-11-20 DIAGNOSIS — E55.9 VITAMIN D DEFICIENCY: ICD-10-CM

## 2018-11-20 DIAGNOSIS — R73.09 ELEVATED GLUCOSE: ICD-10-CM

## 2018-11-20 DIAGNOSIS — E78.00 HYPERCHOLESTEROLEMIA: ICD-10-CM

## 2018-11-20 DIAGNOSIS — I10 BENIGN ESSENTIAL HYPERTENSION: Primary | ICD-10-CM

## 2018-11-20 DIAGNOSIS — M19.042 PRIMARY OSTEOARTHRITIS OF BOTH HANDS: ICD-10-CM

## 2018-11-20 LAB
25(OH)D3 SERPL-MCNC: 34.3 NG/ML
ALBUMIN SERPL-MCNC: 4.6 G/DL (ref 3.2–4.8)
ALBUMIN/GLOB SERPL: 2 G/DL (ref 1.5–2.5)
ALP SERPL-CCNC: 88 U/L (ref 25–100)
ALT SERPL W P-5'-P-CCNC: 60 U/L (ref 7–40)
ANION GAP SERPL CALCULATED.3IONS-SCNC: 9 MMOL/L (ref 3–11)
ARTICHOKE IGE QN: 91 MG/DL (ref 0–130)
AST SERPL-CCNC: 84 U/L (ref 0–33)
BILIRUB SERPL-MCNC: 0.5 MG/DL (ref 0.3–1.2)
BUN BLD-MCNC: 15 MG/DL (ref 9–23)
BUN/CREAT SERPL: 15.3 (ref 7–25)
CALCIUM SPEC-SCNC: 9.8 MG/DL (ref 8.7–10.4)
CHLORIDE SERPL-SCNC: 104 MMOL/L (ref 99–109)
CHOLEST SERPL-MCNC: 181 MG/DL (ref 0–200)
CO2 SERPL-SCNC: 28 MMOL/L (ref 20–31)
CREAT BLD-MCNC: 0.98 MG/DL (ref 0.6–1.3)
GFR SERPL CREATININE-BSD FRML MDRD: 57 ML/MIN/1.73
GLOBULIN UR ELPH-MCNC: 2.3 GM/DL
GLUCOSE BLD-MCNC: 89 MG/DL (ref 70–100)
HBA1C MFR BLD: 6.2 % (ref 4.8–5.6)
HDLC SERPL-MCNC: 55 MG/DL (ref 40–60)
POTASSIUM BLD-SCNC: 4.9 MMOL/L (ref 3.5–5.5)
PROT SERPL-MCNC: 6.9 G/DL (ref 5.7–8.2)
SODIUM BLD-SCNC: 141 MMOL/L (ref 132–146)
T4 FREE SERPL-MCNC: 1.23 NG/DL (ref 0.89–1.76)
TRIGL SERPL-MCNC: 216 MG/DL (ref 0–150)
TSH SERPL DL<=0.05 MIU/L-ACNC: 0.53 MIU/ML (ref 0.35–5.35)

## 2018-11-20 PROCEDURE — 80061 LIPID PANEL: CPT | Performed by: INTERNAL MEDICINE

## 2018-11-20 PROCEDURE — 80053 COMPREHEN METABOLIC PANEL: CPT | Performed by: INTERNAL MEDICINE

## 2018-11-20 PROCEDURE — 84443 ASSAY THYROID STIM HORMONE: CPT | Performed by: INTERNAL MEDICINE

## 2018-11-20 PROCEDURE — 84439 ASSAY OF FREE THYROXINE: CPT | Performed by: INTERNAL MEDICINE

## 2018-11-20 PROCEDURE — 82306 VITAMIN D 25 HYDROXY: CPT | Performed by: INTERNAL MEDICINE

## 2018-11-20 PROCEDURE — 83036 HEMOGLOBIN GLYCOSYLATED A1C: CPT | Performed by: INTERNAL MEDICINE

## 2018-11-20 PROCEDURE — 99214 OFFICE O/P EST MOD 30 MIN: CPT | Performed by: INTERNAL MEDICINE

## 2018-11-20 RX ORDER — EXEMESTANE 25 MG/1
25 TABLET ORAL DAILY
COMMUNITY
Start: 2018-11-14

## 2018-11-20 RX ORDER — HEPATITIS A VACCINE 1440 [IU]/ML
INJECTION, SUSPENSION INTRAMUSCULAR
Refills: 0 | COMMUNITY
Start: 2018-09-20 | End: 2021-05-25

## 2018-11-20 NOTE — PROGRESS NOTES
Siria Garcia 62 y.o.  CC:Follow-up; Hypertension; Hypothyroidism; and Heartburn      [place]: Follow-up; Hypertension; Hypothyroidism; and Heartburn    Is monitoring bp at home- levels good  bp here is higher- recheck   Left wrist splint- carpal tunnel   Options for treatment discussed  Is on aromasin for br cancer  Some oa hands  Discussed bone density 9/17 normal dexa- seeing Dr Weiss    Allergies   Allergen Reactions   • Terbinafine Itching       Current Outpatient Medications:   •  Multiple Vitamins-Minerals (YARI MULTIVITAMIN FOR WOMEN PO), Take  by mouth., Disp: , Rfl:   •  calcium carbonate (OS-NICK) 600 MG tablet, Take 600 mg by mouth 2 (Two) Times a Day With Meals., Disp: , Rfl:   •  Ergocalciferol (VITAMIN D2) 2000 units tablet, Take 1 tablet by mouth Daily., Disp: 30 tablet, Rfl: 5  •  Exemestane (AROMASIN PO), Take 25 mg by mouth Daily., Disp: , Rfl:   •  FLUARIX QUADRIVALENT 0.5 ML suspension prefilled syringe injection, inject 0.5 milliliter intramuscularly, Disp: , Rfl: 0  •  FLUCELVAX QUADRIVALENT 0.5 ML suspension prefilled syringe injection, ADM 0.5ML IM UTD, Disp: , Rfl: 0  •  fluconazole (DIFLUCAN) 150 MG tablet, Take 1 tablet by mouth Daily., Disp: 10 tablet, Rfl: 3  •  Glucos-Chond-Hyal Ac-Ca Fructo (MOVE FREE JOINT HEALTH ADVANCE PO), Take  by mouth., Disp: , Rfl:   •  levothyroxine (SYNTHROID, LEVOTHROID) 25 MCG tablet, Take 1 tablet by mouth Daily. (Patient taking differently: Take 12.5 mcg by mouth Daily.), Disp: 90 tablet, Rfl: 1  •  Loperamide HCl (IMODIUM PO), Take 1 tablet by mouth 2 (Two) Times a Day As Needed., Disp: , Rfl:   •  loratadine (CLARITIN) 10 MG tablet, Take 10 mg by mouth daily., Disp: , Rfl:   •  trimethoprim (TRIMPEX) 100 MG tablet, Take 1 tablet by mouth Daily. PRN, Disp: 30 tablet, Rfl: 1  •  ZOSTAVAX 34269 UNT/0.65ML injection, inject contents of 1 vial subcutaneously, Disp: , Rfl: 0  Patient Active Problem List    Diagnosis   • Tubular adenoma [D36.9]   • Malignant  neoplasm of upper-outer quadrant of breast in female, estrogen receptor positive (CMS/HCC) [C50.419, Z17.0]   • Vaginitis and vulvovaginitis [N76.0]   • Sore throat [J02.9]   • Acute low back pain [M54.5]   • Benign essential hypertension [I10]   • Gastroesophageal reflux disease [K21.9]   • Fatigue [R53.83]   • Foot pain [M79.673]   • Hypercholesterolemia [E78.00]   • Hypothyroidism [E03.9]   • Knee pain [M25.569]   • Osteoarthritis of hand [M19.049]   • Vaginal yeast infection [B37.3]     Review of Systems   Constitutional: Negative for activity change, appetite change, chills, diaphoresis, fatigue, fever and unexpected weight change.   HENT: Negative for congestion, dental problem, drooling, ear discharge, ear pain, facial swelling, hearing loss, mouth sores, nosebleeds, postnasal drip, rhinorrhea, sinus pressure, sneezing, sore throat, tinnitus, trouble swallowing and voice change.    Eyes: Negative for photophobia, pain, discharge, redness, itching and visual disturbance.   Respiratory: Negative for apnea, cough, choking, chest tightness, shortness of breath, wheezing and stridor.    Cardiovascular: Negative for chest pain, palpitations and leg swelling.   Gastrointestinal: Negative for abdominal distention, abdominal pain, anal bleeding, blood in stool, constipation, diarrhea, nausea, rectal pain and vomiting.   Endocrine: Negative for cold intolerance, heat intolerance, polydipsia, polyphagia and polyuria.   Genitourinary: Negative for decreased urine volume, difficulty urinating, dysuria, enuresis, flank pain, frequency, genital sores, hematuria and urgency.   Musculoskeletal: Negative for arthralgias, back pain, gait problem, joint swelling, myalgias, neck pain and neck stiffness.   Skin: Negative for color change, pallor, rash and wound.   Allergic/Immunologic: Negative for environmental allergies, food allergies and immunocompromised state.   Neurological: Negative for dizziness, tremors, seizures,  "syncope, facial asymmetry, speech difficulty, weakness, light-headedness, numbness and headaches.   Hematological: Negative for adenopathy. Does not bruise/bleed easily.   Psychiatric/Behavioral: Negative for agitation, behavioral problems, confusion, decreased concentration, dysphoric mood, hallucinations, self-injury, sleep disturbance and suicidal ideas. The patient is not nervous/anxious and is not hyperactive.      Social History     Socioeconomic History   • Marital status:      Spouse name: Not on file   • Number of children: Not on file   • Years of education: Not on file   • Highest education level: Not on file   Social Needs   • Financial resource strain: Not on file   • Food insecurity - worry: Not on file   • Food insecurity - inability: Not on file   • Transportation needs - medical: Not on file   • Transportation needs - non-medical: Not on file   Occupational History   • Not on file   Tobacco Use   • Smoking status: Never Smoker   • Smokeless tobacco: Never Used   Substance and Sexual Activity   • Alcohol use: No   • Drug use: No   • Sexual activity: Not on file   Other Topics Concern   • Not on file   Social History Narrative   • Not on file     Family History   Problem Relation Age of Onset   • Diabetes Father    • Hypertension Father    • Breast cancer Mother      /80   Pulse 83   Ht 162.6 cm (64\")   Wt 86.8 kg (191 lb 4.8 oz)   SpO2 98%   BMI 32.84 kg/m²   Physical Exam   Constitutional: She is oriented to person, place, and time. She appears well-developed and well-nourished.   HENT:   Head: Normocephalic and atraumatic.   Nose: Nose normal.   Mouth/Throat: Oropharynx is clear and moist.   Eyes: Conjunctivae, EOM and lids are normal. Pupils are equal, round, and reactive to light.   Neck: Trachea normal and normal range of motion. Neck supple. Carotid bruit is not present. No tracheal deviation present. No thyroid mass and no thyromegaly present.   Cardiovascular: Normal rate, " regular rhythm, normal heart sounds and intact distal pulses. Exam reveals no gallop and no friction rub.   No murmur heard.  Pulmonary/Chest: Effort normal and breath sounds normal. No respiratory distress. She has no wheezes.   Musculoskeletal: Normal range of motion. She exhibits no edema or deformity.   Lymphadenopathy:     She has no cervical adenopathy.   Neurological: She is alert and oriented to person, place, and time. She has normal reflexes. She displays normal reflexes. No cranial nerve deficit.   Skin: Skin is warm and dry. No rash noted. No cyanosis or erythema. Nails show no clubbing.   Psychiatric: She has a normal mood and affect. Her speech is normal and behavior is normal. Judgment and thought content normal. Cognition and memory are normal.   Nursing note and vitals reviewed.    Results for orders placed or performed in visit on 05/17/18   Comprehensive Metabolic Panel   Result Value Ref Range    Glucose 104 (H) 70 - 100 mg/dL    BUN 15 9 - 23 mg/dL    Creatinine 1.10 0.60 - 1.30 mg/dL    Sodium 145 132 - 146 mmol/L    Potassium 4.8 3.5 - 5.5 mmol/L    Chloride 106 99 - 109 mmol/L    CO2 28.0 20.0 - 31.0 mmol/L    Calcium 10.2 8.7 - 10.4 mg/dL    Total Protein 7.6 5.7 - 8.2 g/dL    Albumin 4.60 3.20 - 4.80 g/dL    ALT (SGPT) 27 7 - 40 U/L    AST (SGOT) 28 0 - 33 U/L    Alkaline Phosphatase 91 25 - 100 U/L    Total Bilirubin 0.4 0.3 - 1.2 mg/dL    eGFR Non African Amer 50 (L) >60 mL/min/1.73    Globulin 3.0 gm/dL    A/G Ratio 1.5 1.5 - 2.5 g/dL    BUN/Creatinine Ratio 13.6 7.0 - 25.0    Anion Gap 11.0 3.0 - 11.0 mmol/L   TSH   Result Value Ref Range    TSH 0.341 (L) 0.350 - 5.350 mIU/mL   Lipid Panel   Result Value Ref Range    Total Cholesterol 192 0 - 200 mg/dL    Triglycerides 176 (H) 0 - 150 mg/dL    HDL Cholesterol 54 40 - 60 mg/dL    LDL Cholesterol  106 0 - 130 mg/dL   Vitamin D 25 Hydroxy   Result Value Ref Range    25 Hydroxy, Vitamin D 25.1 ng/ml     Problem List Items Addressed This  Visit        Cardiovascular and Mediastinum    Hypercholesterolemia     Check flp          Relevant Orders    Lipid Panel (Completed)    TSH (Completed)    T4, Free (Completed)    Benign essential hypertension - Primary     bp is high- recheck 144/78  Is testing at home and bp are normal   Continue current measures and monitoring          Relevant Orders    Comprehensive Metabolic Panel (Completed)       Endocrine    Hypothyroidism       Musculoskeletal and Integument    Osteoarthritis of hand     Continue prn nsaid             Other    Elevated glucose     Blood sugar and 90 day average sugar ordered  Will monitor- known blood sugar elevation - should have a1c q 3 years if normal today          Relevant Orders    Hemoglobin A1c (Completed)      Other Visit Diagnoses     Vitamin D deficiency        Relevant Orders    Vitamin D 25 Hydroxy (Completed)        Return in about 1 year (around 11/20/2019) for Recheck 30 min .    Lashell Saucedo MA  Signed Sofia Hong MD

## 2018-11-21 NOTE — ASSESSMENT & PLAN NOTE
bp is high- recheck 144/78  Is testing at home and bp are normal   Continue current measures and monitoring

## 2019-05-21 ENCOUNTER — OFFICE VISIT (OUTPATIENT)
Dept: ENDOCRINOLOGY | Facility: CLINIC | Age: 64
End: 2019-05-21

## 2019-05-21 VITALS
HEART RATE: 78 BPM | OXYGEN SATURATION: 95 % | DIASTOLIC BLOOD PRESSURE: 74 MMHG | BODY MASS INDEX: 29.81 KG/M2 | SYSTOLIC BLOOD PRESSURE: 138 MMHG | WEIGHT: 174.6 LBS | HEIGHT: 64 IN

## 2019-05-21 DIAGNOSIS — I10 BENIGN ESSENTIAL HYPERTENSION: Primary | ICD-10-CM

## 2019-05-21 DIAGNOSIS — N76.0 VAGINITIS AND VULVOVAGINITIS: ICD-10-CM

## 2019-05-21 DIAGNOSIS — R73.09 ELEVATED GLUCOSE: ICD-10-CM

## 2019-05-21 DIAGNOSIS — E03.9 ACQUIRED HYPOTHYROIDISM: ICD-10-CM

## 2019-05-21 DIAGNOSIS — E78.00 HYPERCHOLESTEROLEMIA: ICD-10-CM

## 2019-05-21 PROCEDURE — 99214 OFFICE O/P EST MOD 30 MIN: CPT | Performed by: INTERNAL MEDICINE

## 2019-05-21 PROCEDURE — 84443 ASSAY THYROID STIM HORMONE: CPT | Performed by: INTERNAL MEDICINE

## 2019-05-21 PROCEDURE — 83036 HEMOGLOBIN GLYCOSYLATED A1C: CPT | Performed by: INTERNAL MEDICINE

## 2019-05-21 PROCEDURE — 84439 ASSAY OF FREE THYROXINE: CPT | Performed by: INTERNAL MEDICINE

## 2019-05-21 PROCEDURE — 80053 COMPREHEN METABOLIC PANEL: CPT | Performed by: INTERNAL MEDICINE

## 2019-05-21 PROCEDURE — 80061 LIPID PANEL: CPT | Performed by: INTERNAL MEDICINE

## 2019-05-21 RX ORDER — LEVOTHYROXINE SODIUM 0.03 MG/1
12.5 TABLET ORAL DAILY
Qty: 45 TABLET | Refills: 1 | Status: SHIPPED | OUTPATIENT
Start: 2019-05-21 | End: 2019-11-26 | Stop reason: SDUPTHER

## 2019-05-21 RX ORDER — TRIMETHOPRIM 100 MG/1
100 TABLET ORAL DAILY
Qty: 30 TABLET | Refills: 1 | Status: SHIPPED | OUTPATIENT
Start: 2019-05-21 | End: 2019-11-26 | Stop reason: SDUPTHER

## 2019-05-21 RX ORDER — FLUCONAZOLE 150 MG/1
150 TABLET ORAL DAILY
Qty: 10 TABLET | Refills: 3 | Status: SHIPPED | OUTPATIENT
Start: 2019-05-21 | End: 2019-11-26 | Stop reason: SDUPTHER

## 2019-05-21 NOTE — PROGRESS NOTES
Siria GEORGIE Garcia 63 y.o.  CC:Follow-up; Hypertension; Hypothyroidism; and Heartburn      [place]: Follow-up; Hypertension; Hypothyroidism; and Heartburn    bp is good   Is on low fat diet   GERD- stable   On aromasin for breast cancer- dexa normal 5/8/17 via Dr Weiss   Energy good on current thyroid supplement  Occ shaky if skip meal   Is checking bp at home - occ systolic < 100   No dizziness or lightheadedness  Has lost about 17 lbs  Was walking on treadmill  Problems with plantar fasciitis- is not walking now - discussed stationary bicycle or swimming   Has not made appt with [place] or Dr Haddad yet  Noting heberdens nodes hands- soaking hands with epsom salt      Allergies   Allergen Reactions   • Terbinafine Itching       Current Outpatient Medications:   •  Omega-3 Fatty Acids (OMEGA-3 1450 PO), Take  by mouth Daily., Disp: , Rfl:   •  calcium carbonate (OS-NICK) 600 MG tablet, Take 600 mg by mouth 2 (Two) Times a Day With Meals., Disp: , Rfl:   •  Ergocalciferol (VITAMIN D2) 2000 units tablet, Take 1 tablet by mouth Daily., Disp: 30 tablet, Rfl: 5  •  exemestane (AROMASIN) 25 MG chemo tablet, , Disp: , Rfl:   •  FLUARIX QUADRIVALENT 0.5 ML suspension prefilled syringe injection, inject 0.5 milliliter intramuscularly, Disp: , Rfl: 0  •  FLUCELVAX QUADRIVALENT 0.5 ML suspension prefilled syringe injection, ADM 0.5ML IM UTD, Disp: , Rfl: 0  •  fluconazole (DIFLUCAN) 150 MG tablet, Take 1 tablet by mouth Daily., Disp: 10 tablet, Rfl: 3  •  Glucos-Chond-Hyal Ac-Ca Fructo (MOVE FREE JOINT HEALTH ADVANCE PO), Take  by mouth., Disp: , Rfl:   •  HAVRIX 1440 EL U/ML vaccine, ADM 1ML IM UTD, Disp: , Rfl: 0  •  levothyroxine (SYNTHROID, LEVOTHROID) 25 MCG tablet, Take 1 tablet by mouth Daily. (Patient taking differently: Take 12.5 mcg by mouth Daily.), Disp: 90 tablet, Rfl: 1  •  Loperamide HCl (IMODIUM PO), Take 1 tablet by mouth 2 (Two) Times a Day As Needed., Disp: , Rfl:   •  loratadine (CLARITIN) 10 MG tablet,  Take 10 mg by mouth daily., Disp: , Rfl:   •  Multiple Vitamins-Minerals (YARI MULTIVITAMIN FOR WOMEN PO), Take  by mouth., Disp: , Rfl:   •  trimethoprim (TRIMPEX) 100 MG tablet, Take 1 tablet by mouth Daily. PRN, Disp: 30 tablet, Rfl: 1  •  ZOSTAVAX 15079 UNT/0.65ML injection, inject contents of 1 vial subcutaneously, Disp: , Rfl: 0  Patient Active Problem List    Diagnosis   • Elevated glucose [R73.09]   • Tubular adenoma [D36.9]   • Malignant neoplasm of upper-outer quadrant of breast in female, estrogen receptor positive (CMS/HCC) [C50.419, Z17.0]   • Vaginitis and vulvovaginitis [N76.0]   • Sore throat [J02.9]   • Acute low back pain [M54.5]   • Benign essential hypertension [I10]   • Gastroesophageal reflux disease [K21.9]   • Fatigue [R53.83]   • Foot pain [M79.673]   • Hypercholesterolemia [E78.00]   • Hypothyroidism [E03.9]   • Knee pain [M25.569]   • Osteoarthritis of hand [M19.049]   • Vaginal yeast infection [B37.3]     Review of Systems   Constitutional: Negative for activity change, appetite change, chills, diaphoresis, fatigue, fever and unexpected weight change.   HENT: Negative for congestion, dental problem, drooling, ear discharge, ear pain, facial swelling, hearing loss, mouth sores, nosebleeds, postnasal drip, rhinorrhea, sinus pressure, sneezing, sore throat, tinnitus, trouble swallowing and voice change.    Eyes: Negative for photophobia, pain, discharge, redness, itching and visual disturbance.   Respiratory: Negative for apnea, cough, choking, chest tightness, shortness of breath, wheezing and stridor.    Cardiovascular: Negative for chest pain, palpitations and leg swelling.   Gastrointestinal: Negative for abdominal distention, abdominal pain, anal bleeding, blood in stool, constipation, diarrhea, nausea, rectal pain and vomiting.   Endocrine: Negative for cold intolerance, heat intolerance, polydipsia, polyphagia and polyuria.   Genitourinary: Negative for decreased urine volume,  "difficulty urinating, dysuria, enuresis, flank pain, frequency, genital sores, hematuria and urgency.   Musculoskeletal: Negative for arthralgias, back pain, gait problem, joint swelling, myalgias, neck pain and neck stiffness.   Skin: Negative for color change, pallor, rash and wound.   Allergic/Immunologic: Negative for environmental allergies, food allergies and immunocompromised state.   Neurological: Negative for dizziness, tremors, seizures, syncope, facial asymmetry, speech difficulty, weakness, light-headedness, numbness and headaches.   Hematological: Negative for adenopathy. Does not bruise/bleed easily.   Psychiatric/Behavioral: Negative for agitation, behavioral problems, confusion, decreased concentration, dysphoric mood, hallucinations, self-injury, sleep disturbance and suicidal ideas. The patient is not nervous/anxious and is not hyperactive.      Social History     Socioeconomic History   • Marital status:      Spouse name: Not on file   • Number of children: Not on file   • Years of education: Not on file   • Highest education level: Not on file   Tobacco Use   • Smoking status: Never Smoker   • Smokeless tobacco: Never Used   Substance and Sexual Activity   • Alcohol use: No   • Drug use: No     Family History   Problem Relation Age of Onset   • Diabetes Father    • Hypertension Father    • Breast cancer Mother      /74   Pulse 78   Ht 162.6 cm (64\")   Wt 79.2 kg (174 lb 9.6 oz)   SpO2 95%   Breastfeeding? No   BMI 29.97 kg/m²   Physical Exam   Constitutional: She is oriented to person, place, and time. She appears well-developed and well-nourished.   HENT:   Head: Normocephalic and atraumatic.   Nose: Nose normal.   Mouth/Throat: Oropharynx is clear and moist.   Eyes: Conjunctivae, EOM and lids are normal. Pupils are equal, round, and reactive to light.   Neck: Trachea normal and normal range of motion. Neck supple. Carotid bruit is not present. No tracheal deviation present. " No thyroid mass and no thyromegaly present.   Cardiovascular: Normal rate, regular rhythm, normal heart sounds and intact distal pulses. Exam reveals no gallop and no friction rub.   No murmur heard.  Pulmonary/Chest: Effort normal and breath sounds normal. No respiratory distress. She has no wheezes.   Musculoskeletal: Normal range of motion. She exhibits no edema or deformity.   Lymphadenopathy:     She has no cervical adenopathy.   Neurological: She is alert and oriented to person, place, and time. She has normal reflexes. She displays normal reflexes. No cranial nerve deficit.   Skin: Skin is warm and dry. No rash noted. No cyanosis or erythema. Nails show no clubbing.   Psychiatric: She has a normal mood and affect. Her speech is normal and behavior is normal. Judgment and thought content normal. Cognition and memory are normal.   Nursing note and vitals reviewed.    Results for orders placed or performed in visit on 11/20/18   Comprehensive Metabolic Panel   Result Value Ref Range    Glucose 89 70 - 100 mg/dL    BUN 15 9 - 23 mg/dL    Creatinine 0.98 0.60 - 1.30 mg/dL    Sodium 141 132 - 146 mmol/L    Potassium 4.9 3.5 - 5.5 mmol/L    Chloride 104 99 - 109 mmol/L    CO2 28.0 20.0 - 31.0 mmol/L    Calcium 9.8 8.7 - 10.4 mg/dL    Total Protein 6.9 5.7 - 8.2 g/dL    Albumin 4.60 3.20 - 4.80 g/dL    ALT (SGPT) 60 (H) 7 - 40 U/L    AST (SGOT) 84 (H) 0 - 33 U/L    Alkaline Phosphatase 88 25 - 100 U/L    Total Bilirubin 0.5 0.3 - 1.2 mg/dL    eGFR Non African Amer 57 (L) >60 mL/min/1.73    Globulin 2.3 gm/dL    A/G Ratio 2.0 1.5 - 2.5 g/dL    BUN/Creatinine Ratio 15.3 7.0 - 25.0    Anion Gap 9.0 3.0 - 11.0 mmol/L   Lipid Panel   Result Value Ref Range    Total Cholesterol 181 0 - 200 mg/dL    Triglycerides 216 (H) 0 - 150 mg/dL    HDL Cholesterol 55 40 - 60 mg/dL    LDL Cholesterol  91 0 - 130 mg/dL   TSH   Result Value Ref Range    TSH 0.527 0.350 - 5.350 mIU/mL   T4, Free   Result Value Ref Range    Free T4 1.23  0.89 - 1.76 ng/dL   Vitamin D 25 Hydroxy   Result Value Ref Range    25 Hydroxy, Vitamin D 34.3 ng/ml   Hemoglobin A1c   Result Value Ref Range    Hemoglobin A1C 6.20 (H) 4.80 - 5.60 %     Problem List Items Addressed This Visit        Cardiovascular and Mediastinum    Hypercholesterolemia     Check flp          Relevant Orders    Lipid Panel (Completed)    Benign essential hypertension - Primary     Current bp is well controlled   Continue medications and monitoring          Relevant Orders    Comprehensive Metabolic Panel (Completed)       Endocrine    Hypothyroidism     Update tfts          Relevant Medications    levothyroxine (SYNTHROID, LEVOTHROID) 25 MCG tablet    Other Relevant Orders    TSH (Completed)    T4, Free (Completed)       Genitourinary    Vaginitis and vulvovaginitis     Diflucan rx sent   Also recurrent uti- uses tmp prn symptoms for 5 days   Gets yeast infection with antibiotics as well          Relevant Medications    fluconazole (DIFLUCAN) 150 MG tablet       Other    Elevated glucose     Continue to follow a low carb diet, stay active  Alternatives to walking discussed   Check hgn a1c today   F/u 6 months or sooner prn          Relevant Orders    Hemoglobin A1c (Completed)        Return in about 6 months (around 11/21/2019).    Lashell Saucedo MA  Signed Sofia Hong MD

## 2019-05-22 LAB
ALBUMIN SERPL-MCNC: 4.9 G/DL (ref 3.5–5.2)
ALBUMIN/GLOB SERPL: 1.7 G/DL
ALP SERPL-CCNC: 101 U/L (ref 39–117)
ALT SERPL W P-5'-P-CCNC: 25 U/L (ref 1–33)
ANION GAP SERPL CALCULATED.3IONS-SCNC: 13.4 MMOL/L
AST SERPL-CCNC: 33 U/L (ref 1–32)
BILIRUB SERPL-MCNC: 0.3 MG/DL (ref 0.2–1.2)
BUN BLD-MCNC: 20 MG/DL (ref 8–23)
BUN/CREAT SERPL: 18.7 (ref 7–25)
CALCIUM SPEC-SCNC: 10.6 MG/DL (ref 8.6–10.5)
CHLORIDE SERPL-SCNC: 97 MMOL/L (ref 98–107)
CHOLEST SERPL-MCNC: 161 MG/DL (ref 0–200)
CO2 SERPL-SCNC: 26.6 MMOL/L (ref 22–29)
CREAT BLD-MCNC: 1.07 MG/DL (ref 0.57–1)
GFR SERPL CREATININE-BSD FRML MDRD: 52 ML/MIN/1.73
GLOBULIN UR ELPH-MCNC: 2.9 GM/DL
GLUCOSE BLD-MCNC: 101 MG/DL (ref 65–99)
HBA1C MFR BLD: 5.4 % (ref 4.8–5.6)
HDLC SERPL-MCNC: 50 MG/DL (ref 40–60)
LDLC SERPL CALC-MCNC: 84 MG/DL (ref 0–100)
LDLC/HDLC SERPL: 1.68 {RATIO}
POTASSIUM BLD-SCNC: 4.4 MMOL/L (ref 3.5–5.2)
PROT SERPL-MCNC: 7.8 G/DL (ref 6–8.5)
SODIUM BLD-SCNC: 137 MMOL/L (ref 136–145)
T4 FREE SERPL-MCNC: 1.49 NG/DL (ref 0.93–1.7)
TRIGL SERPL-MCNC: 135 MG/DL (ref 0–150)
TSH SERPL DL<=0.05 MIU/L-ACNC: 0.43 MIU/ML (ref 0.27–4.2)
VLDLC SERPL-MCNC: 27 MG/DL (ref 5–40)

## 2019-05-23 NOTE — ASSESSMENT & PLAN NOTE
Diflucan rx sent   Also recurrent uti- uses tmp prn symptoms for 5 days   Gets yeast infection with antibiotics as well

## 2019-05-23 NOTE — ASSESSMENT & PLAN NOTE
Continue to follow a low carb diet, stay active  Alternatives to walking discussed   Check hgn a1c today   F/u 6 months or sooner prn

## 2019-05-31 ENCOUNTER — TELEPHONE (OUTPATIENT)
Dept: ENDOCRINOLOGY | Facility: CLINIC | Age: 64
End: 2019-05-31

## 2019-06-03 NOTE — TELEPHONE ENCOUNTER
She can continue vitamin D but calcium supplements have not been proven to prevent bone loss beyond the recommended daily allowance of 1200 mg daily

## 2019-06-03 NOTE — TELEPHONE ENCOUNTER
Patient was advised and states she has been told by the oncologist she has to take the calcium because the aromasin can cause bone loss.  She is taking about 1300 mg per day.  She was advised to call the oncologist to ask if the calcium can be stopped due to high abnormal level and to call back with the response

## 2019-06-05 NOTE — TELEPHONE ENCOUNTER
PATIENT WAS INFORMED BY PA,  IT WAS OK TO STOP THE CALCIUM, WHICH SHE STOPPED TODAY. SHE WAS TOLD THAT SHE HAS A BONE DENSITY TEST IN September, THEY WILL REEVALUATE THEN. SHE WANTED TO CORRECT THE DOSE OF CALCIUM TAKEN, SHE WAS TAKING 1450 MG TO 1700MG PER DAY. IS IT OK TO CONTINUE TAKING HER VITAMIN THAT  MG IN IT. PLEASE CALL BACK [phone]

## 2019-06-05 NOTE — TELEPHONE ENCOUNTER
PT is taking a multivitamin that has 250 mg calcium she is supposed to be taking two daily. She would like to know if she should continue or cut down to once daily or should she not be taking any supplements with calcium

## 2019-06-07 NOTE — TELEPHONE ENCOUNTER
Ok to get the recommended daily allowance of calcium (1000 mg daily) either via diet or supplement  Also study shows vitamin d supplement helps prevent bone loss but not calcium (above the recommended daily allowance).  Thanks, sahil

## 2019-09-10 ENCOUNTER — TELEPHONE (OUTPATIENT)
Dept: INTERNAL MEDICINE | Facility: CLINIC | Age: 64
End: 2019-09-10

## 2019-09-10 NOTE — TELEPHONE ENCOUNTER
Whooping cough vaccine is usually given as a tetanus booster.  It is ok for her to have this if she has not had updated tetanus vaccine and it is ok for her to get this along with her flu shot.  Thanks, jony

## 2019-09-10 NOTE — TELEPHONE ENCOUNTER
PATIENT WAS GOING TO GET A WHOOPING COUGH SHOT AND THE FLU SHOT AT THE SAME TIME BUT SHE WOULD LIKE TO KNOW IF IT IS OKAY. PLEASE CONTACT [phone]

## 2019-09-10 NOTE — TELEPHONE ENCOUNTER
Patient would like to know if you recommend for her to get the whooping cough shot.    Dr. Hong, please advice.  Thank you.

## 2019-09-30 ENCOUNTER — TELEPHONE (OUTPATIENT)
Dept: INTERNAL MEDICINE | Facility: CLINIC | Age: 64
End: 2019-09-30

## 2019-11-26 ENCOUNTER — OFFICE VISIT (OUTPATIENT)
Dept: ENDOCRINOLOGY | Facility: CLINIC | Age: 64
End: 2019-11-26

## 2019-11-26 VITALS
SYSTOLIC BLOOD PRESSURE: 132 MMHG | DIASTOLIC BLOOD PRESSURE: 76 MMHG | HEIGHT: 64 IN | WEIGHT: 176.6 LBS | HEART RATE: 87 BPM | OXYGEN SATURATION: 98 % | BODY MASS INDEX: 30.15 KG/M2

## 2019-11-26 DIAGNOSIS — E03.9 ACQUIRED HYPOTHYROIDISM: ICD-10-CM

## 2019-11-26 DIAGNOSIS — I10 BENIGN ESSENTIAL HYPERTENSION: ICD-10-CM

## 2019-11-26 DIAGNOSIS — N76.0 VAGINITIS AND VULVOVAGINITIS: ICD-10-CM

## 2019-11-26 DIAGNOSIS — E78.00 HYPERCHOLESTEROLEMIA: Primary | ICD-10-CM

## 2019-11-26 DIAGNOSIS — R73.09 ELEVATED GLUCOSE: ICD-10-CM

## 2019-11-26 PROCEDURE — 84439 ASSAY OF FREE THYROXINE: CPT | Performed by: INTERNAL MEDICINE

## 2019-11-26 PROCEDURE — 83970 ASSAY OF PARATHORMONE: CPT | Performed by: INTERNAL MEDICINE

## 2019-11-26 PROCEDURE — 82306 VITAMIN D 25 HYDROXY: CPT | Performed by: INTERNAL MEDICINE

## 2019-11-26 PROCEDURE — 80061 LIPID PANEL: CPT | Performed by: INTERNAL MEDICINE

## 2019-11-26 PROCEDURE — 84443 ASSAY THYROID STIM HORMONE: CPT | Performed by: INTERNAL MEDICINE

## 2019-11-26 PROCEDURE — 99214 OFFICE O/P EST MOD 30 MIN: CPT | Performed by: INTERNAL MEDICINE

## 2019-11-26 PROCEDURE — 83036 HEMOGLOBIN GLYCOSYLATED A1C: CPT | Performed by: INTERNAL MEDICINE

## 2019-11-26 PROCEDURE — 82330 ASSAY OF CALCIUM: CPT | Performed by: INTERNAL MEDICINE

## 2019-11-26 PROCEDURE — 80053 COMPREHEN METABOLIC PANEL: CPT | Performed by: INTERNAL MEDICINE

## 2019-11-26 RX ORDER — TETANUS TOXOID, REDUCED DIPHTHERIA TOXOID AND ACELLULAR PERTUSSIS VACCINE, ADSORBED 5; 2.5; 8; 8; 2.5 [IU]/.5ML; [IU]/.5ML; UG/.5ML; UG/.5ML; UG/.5ML
SUSPENSION INTRAMUSCULAR
Refills: 0 | COMMUNITY
Start: 2019-09-12 | End: 2021-11-22

## 2019-11-26 RX ORDER — LEVOTHYROXINE SODIUM 0.03 MG/1
12.5 TABLET ORAL DAILY
Qty: 45 TABLET | Refills: 1 | Status: SHIPPED | OUTPATIENT
Start: 2019-11-26 | End: 2020-05-19 | Stop reason: SDUPTHER

## 2019-11-26 RX ORDER — TRIMETHOPRIM 100 MG/1
100 TABLET ORAL DAILY
Qty: 30 TABLET | Refills: 1 | Status: SHIPPED | OUTPATIENT
Start: 2019-11-26 | End: 2020-05-19 | Stop reason: SDUPTHER

## 2019-11-26 RX ORDER — FLUCONAZOLE 150 MG/1
150 TABLET ORAL DAILY
Qty: 10 TABLET | Refills: 3 | Status: SHIPPED | OUTPATIENT
Start: 2019-11-26 | End: 2020-05-19 | Stop reason: SDUPTHER

## 2019-11-26 NOTE — PROGRESS NOTES
Siria GEORGIE Garcia 63 y.o.  CC:Follow-up; Hypertension; Hypothyroidism; and Heartburn      [place]: Follow-up; Hypertension; Hypothyroidism; and Heartburn    bp is good   Is on synthroid 12.5 mcg daily   GERD- on no ppi for now   Low bp - no medication currently   Working on weight loss  Has not been on treadmill since May- has been in boot, seeing podiatry (Dr South)   Left jaw popped and swollen- some calcification (told antibiotic would help, recommended smaller bites and chewing slowly)      Allergies   Allergen Reactions   • Terbinafine Itching       Current Outpatient Medications:   •  BOOSTRIX 5-2.5-18.5 LF-MCG/0.5 injection, ADM 0.5ML IM UTD, Disp: , Rfl: 0  •  calcium carbonate (OS-NICK) 600 MG tablet, Take 600 mg by mouth 2 (Two) Times a Day With Meals., Disp: , Rfl:   •  diclofenac (VOLTAREN) 1 % gel gel, , Disp: , Rfl:   •  Ergocalciferol (VITAMIN D2) 2000 units tablet, Take 1 tablet by mouth Daily., Disp: 30 tablet, Rfl: 5  •  exemestane (AROMASIN) 25 MG chemo tablet, , Disp: , Rfl:   •  FLUARIX QUADRIVALENT 0.5 ML suspension prefilled syringe injection, inject 0.5 milliliter intramuscularly, Disp: , Rfl: 0  •  FLUCELVAX QUADRIVALENT 0.5 ML suspension prefilled syringe injection, ADM 0.5ML IM UTD, Disp: , Rfl: 0  •  fluconazole (DIFLUCAN) 150 MG tablet, Take 1 tablet by mouth Daily., Disp: 10 tablet, Rfl: 3  •  Glucos-Chond-Hyal Ac-Ca Fructo (MOVE FREE JOINT HEALTH ADVANCE PO), Take  by mouth., Disp: , Rfl:   •  HAVRIX 1440 EL U/ML vaccine, ADM 1ML IM UTD, Disp: , Rfl: 0  •  levothyroxine (SYNTHROID, LEVOTHROID) 25 MCG tablet, Take 0.5 tablets by mouth Daily., Disp: 45 tablet, Rfl: 1  •  Loperamide HCl (IMODIUM PO), Take 1 tablet by mouth 2 (Two) Times a Day As Needed., Disp: , Rfl:   •  loratadine (CLARITIN) 10 MG tablet, Take 10 mg by mouth daily., Disp: , Rfl:   •  Multiple Vitamins-Minerals (YARI MULTIVITAMIN FOR WOMEN PO), Take  by mouth., Disp: , Rfl:   •  Omega-3 Fatty Acids (OMEGA-3 1450 PO), Take  by  mouth Daily., Disp: , Rfl:   •  trimethoprim (TRIMPEX) 100 MG tablet, Take 1 tablet by mouth Daily. PRN, Disp: 30 tablet, Rfl: 1  •  ZOSTAVAX 49006 UNT/0.65ML injection, inject contents of 1 vial subcutaneously, Disp: , Rfl: 0  Patient Active Problem List    Diagnosis   • Elevated glucose [R73.09]   • Tubular adenoma [D36.9]   • Malignant neoplasm of upper-outer quadrant of breast in female, estrogen receptor positive (CMS/HCC) [C50.419, Z17.0]   • Vaginitis and vulvovaginitis [N76.0]   • Sore throat [J02.9]   • Acute low back pain [M54.5]   • Benign essential hypertension [I10]   • Gastroesophageal reflux disease [K21.9]   • Fatigue [R53.83]   • Foot pain [M79.673]   • Hypercholesterolemia [E78.00]   • Hypothyroidism [E03.9]   • Knee pain [M25.569]   • Osteoarthritis of hand [M19.049]   • Vaginal yeast infection [B37.3]     Review of Systems   Constitutional: Positive for activity change. Negative for appetite change, chills, diaphoresis, fatigue, fever and unexpected weight change.   HENT: Negative for congestion, dental problem, drooling, ear discharge, ear pain, facial swelling, hearing loss, mouth sores, nosebleeds, postnasal drip, rhinorrhea, sinus pressure, sneezing, sore throat, tinnitus, trouble swallowing and voice change.    Eyes: Negative for photophobia, pain, discharge, redness, itching and visual disturbance.   Respiratory: Negative for apnea, cough, choking, chest tightness, shortness of breath, wheezing and stridor.    Cardiovascular: Negative for chest pain, palpitations and leg swelling.   Gastrointestinal: Negative for abdominal distention, abdominal pain, anal bleeding, blood in stool, constipation, diarrhea, nausea, rectal pain and vomiting.   Endocrine: Negative for cold intolerance, heat intolerance, polydipsia, polyphagia and polyuria.   Genitourinary: Negative for decreased urine volume, difficulty urinating, dysuria, enuresis, flank pain, frequency, genital sores, hematuria and urgency.  "  Musculoskeletal: Positive for arthralgias. Negative for back pain, gait problem, joint swelling, myalgias, neck pain and neck stiffness.   Skin: Negative for color change, pallor, rash and wound.   Allergic/Immunologic: Negative for environmental allergies, food allergies and immunocompromised state.   Neurological: Negative for dizziness, tremors, seizures, syncope, facial asymmetry, speech difficulty, weakness, light-headedness, numbness and headaches.   Hematological: Negative for adenopathy. Does not bruise/bleed easily.   Psychiatric/Behavioral: Negative for agitation, behavioral problems, confusion, decreased concentration, dysphoric mood, hallucinations, self-injury, sleep disturbance and suicidal ideas. The patient is not nervous/anxious and is not hyperactive.      Social History     Socioeconomic History   • Marital status:      Spouse name: Not on file   • Number of children: Not on file   • Years of education: Not on file   • Highest education level: Not on file   Tobacco Use   • Smoking status: Never Smoker   • Smokeless tobacco: Never Used   Substance and Sexual Activity   • Alcohol use: No   • Drug use: No     Family History   Problem Relation Age of Onset   • Diabetes Father    • Hypertension Father    • Breast cancer Mother      /76   Pulse 87   Ht 162.6 cm (64\")   Wt 80.1 kg (176 lb 9.6 oz)   SpO2 98%   BMI 30.31 kg/m²   Physical Exam   Constitutional: She is oriented to person, place, and time. She appears well-developed and well-nourished.   HENT:   Head: Normocephalic and atraumatic.   Nose: Nose normal.   Mouth/Throat: Oropharynx is clear and moist.   Eyes: Conjunctivae, EOM and lids are normal. Pupils are equal, round, and reactive to light.   Neck: Trachea normal and normal range of motion. Neck supple. Carotid bruit is not present. No tracheal deviation present. No thyroid mass and no thyromegaly present.   Cardiovascular: Normal rate, regular rhythm, normal heart sounds " and intact distal pulses. Exam reveals no gallop and no friction rub.   No murmur heard.  Pulmonary/Chest: Effort normal and breath sounds normal. No respiratory distress. She has no wheezes.   Musculoskeletal: Normal range of motion. She exhibits no edema or deformity.   Lymphadenopathy:     She has no cervical adenopathy.   Neurological: She is alert and oriented to person, place, and time. She has normal reflexes. She displays normal reflexes. No cranial nerve deficit.   Skin: Skin is warm and dry. No rash noted. No cyanosis or erythema. Nails show no clubbing.   Psychiatric: She has a normal mood and affect. Her speech is normal and behavior is normal. Judgment and thought content normal. Cognition and memory are normal.   Nursing note and vitals reviewed.    Results for orders placed or performed in visit on 05/21/19   Comprehensive Metabolic Panel   Result Value Ref Range    Glucose 101 (H) 65 - 99 mg/dL    BUN 20 8 - 23 mg/dL    Creatinine 1.07 (H) 0.57 - 1.00 mg/dL    Sodium 137 136 - 145 mmol/L    Potassium 4.4 3.5 - 5.2 mmol/L    Chloride 97 (L) 98 - 107 mmol/L    CO2 26.6 22.0 - 29.0 mmol/L    Calcium 10.6 (H) 8.6 - 10.5 mg/dL    Total Protein 7.8 6.0 - 8.5 g/dL    Albumin 4.90 3.50 - 5.20 g/dL    ALT (SGPT) 25 1 - 33 U/L    AST (SGOT) 33 (H) 1 - 32 U/L    Alkaline Phosphatase 101 39 - 117 U/L    Total Bilirubin 0.3 0.2 - 1.2 mg/dL    eGFR Non African Amer 52 (L) >60 mL/min/1.73    Globulin 2.9 gm/dL    A/G Ratio 1.7 g/dL    BUN/Creatinine Ratio 18.7 7.0 - 25.0    Anion Gap 13.4 mmol/L   Lipid Panel   Result Value Ref Range    Total Cholesterol 161 0 - 200 mg/dL    Triglycerides 135 0 - 150 mg/dL    HDL Cholesterol 50 40 - 60 mg/dL    LDL Cholesterol  84 0 - 100 mg/dL    VLDL Cholesterol 27 5 - 40 mg/dL    LDL/HDL Ratio 1.68    TSH   Result Value Ref Range    TSH 0.429 0.270 - 4.200 mIU/mL   T4, Free   Result Value Ref Range    Free T4 1.49 0.93 - 1.70 ng/dL   Hemoglobin A1c   Result Value Ref Range     Hemoglobin A1C 5.40 4.80 - 5.60 %     Problem List Items Addressed This Visit        Cardiovascular and Mediastinum    Hypercholesterolemia - Primary     Is on low fat diet  Add exercise            Relevant Orders    Comprehensive Metabolic Panel    Lipid Panel    Benign essential hypertension     bp is good - continue monitoring and medication          Relevant Orders    Calcium, Ionized    PTH, Intact    Vitamin D 25 Hydroxy       Endocrine    Hypothyroidism     Check tfts   No missed doses or problems with dosing          Relevant Medications    levothyroxine (SYNTHROID, LEVOTHROID) 25 MCG tablet    Other Relevant Orders    TSH    T4, Free       Genitourinary    Vaginitis and vulvovaginitis     Refill fluconazole          Relevant Medications    fluconazole (DIFLUCAN) 150 MG tablet       Other    Elevated glucose     Update average sugar   Continue monitoring diet, medication and add exercise (in boot so we discussed stationary bicycle, etc)  Check yearly          Relevant Orders    Hemoglobin A1c        Return in about 6 months (around 5/26/2020) for Recheck 30 min .    Lashell Saucedo MA  Signed Sofia Hong MD

## 2019-11-27 LAB
25(OH)D3 SERPL-MCNC: 45.7 NG/ML (ref 30–100)
ALBUMIN SERPL-MCNC: 4.7 G/DL (ref 3.5–5.2)
ALBUMIN/GLOB SERPL: 1.5 G/DL
ALP SERPL-CCNC: 108 U/L (ref 39–117)
ALT SERPL W P-5'-P-CCNC: 24 U/L (ref 1–33)
ANION GAP SERPL CALCULATED.3IONS-SCNC: 11.9 MMOL/L (ref 5–15)
AST SERPL-CCNC: 28 U/L (ref 1–32)
BILIRUB SERPL-MCNC: 0.2 MG/DL (ref 0.2–1.2)
BUN BLD-MCNC: 21 MG/DL (ref 8–23)
BUN/CREAT SERPL: 20.6 (ref 7–25)
CA-I BLD-MCNC: 5.6 MG/DL (ref 4.6–5.4)
CA-I SERPL ISE-MCNC: 1.4 MMOL/L (ref 1.15–1.35)
CALCIUM SPEC-SCNC: 9.6 MG/DL (ref 8.6–10.5)
CHLORIDE SERPL-SCNC: 106 MMOL/L (ref 98–107)
CHOLEST SERPL-MCNC: 168 MG/DL (ref 0–200)
CO2 SERPL-SCNC: 25.1 MMOL/L (ref 22–29)
CREAT BLD-MCNC: 1.02 MG/DL (ref 0.57–1)
GFR SERPL CREATININE-BSD FRML MDRD: 55 ML/MIN/1.73
GLOBULIN UR ELPH-MCNC: 3.1 GM/DL
GLUCOSE BLD-MCNC: 98 MG/DL (ref 65–99)
HBA1C MFR BLD: 6 % (ref 4.8–5.6)
HDLC SERPL-MCNC: 51 MG/DL (ref 40–60)
LDLC SERPL CALC-MCNC: 93 MG/DL (ref 0–100)
LDLC/HDLC SERPL: 1.83 {RATIO}
POTASSIUM BLD-SCNC: 4.9 MMOL/L (ref 3.5–5.2)
PROT SERPL-MCNC: 7.8 G/DL (ref 6–8.5)
PTH-INTACT SERPL-MCNC: 29.7 PG/ML (ref 15–65)
SODIUM BLD-SCNC: 143 MMOL/L (ref 136–145)
T4 FREE SERPL-MCNC: 1.41 NG/DL (ref 0.93–1.7)
TRIGL SERPL-MCNC: 119 MG/DL (ref 0–150)
TSH SERPL DL<=0.05 MIU/L-ACNC: 0.47 UIU/ML (ref 0.27–4.2)
VLDLC SERPL-MCNC: 23.8 MG/DL (ref 5–40)

## 2019-11-27 NOTE — ASSESSMENT & PLAN NOTE
Update average sugar   Continue monitoring diet, medication and add exercise (in boot so we discussed stationary bicycle, etc)  Check yearly

## 2020-05-19 ENCOUNTER — OFFICE VISIT (OUTPATIENT)
Dept: ENDOCRINOLOGY | Facility: CLINIC | Age: 65
End: 2020-05-19

## 2020-05-19 VITALS
WEIGHT: 169.4 LBS | SYSTOLIC BLOOD PRESSURE: 134 MMHG | BODY MASS INDEX: 28.92 KG/M2 | HEART RATE: 80 BPM | HEIGHT: 64 IN | DIASTOLIC BLOOD PRESSURE: 80 MMHG | OXYGEN SATURATION: 99 %

## 2020-05-19 DIAGNOSIS — R73.09 ELEVATED GLUCOSE: ICD-10-CM

## 2020-05-19 DIAGNOSIS — N76.0 VAGINITIS AND VULVOVAGINITIS: ICD-10-CM

## 2020-05-19 DIAGNOSIS — I10 BENIGN ESSENTIAL HYPERTENSION: Primary | ICD-10-CM

## 2020-05-19 DIAGNOSIS — E78.00 HYPERCHOLESTEROLEMIA: ICD-10-CM

## 2020-05-19 DIAGNOSIS — E03.9 ACQUIRED HYPOTHYROIDISM: ICD-10-CM

## 2020-05-19 PROCEDURE — 99214 OFFICE O/P EST MOD 30 MIN: CPT | Performed by: INTERNAL MEDICINE

## 2020-05-19 RX ORDER — ASCORBIC ACID 500 MG
1000 TABLET ORAL DAILY
COMMUNITY
End: 2021-11-22 | Stop reason: SINTOL

## 2020-05-19 RX ORDER — TRIMETHOPRIM 100 MG/1
100 TABLET ORAL DAILY
Qty: 30 TABLET | Refills: 1 | Status: SHIPPED | OUTPATIENT
Start: 2020-05-19 | End: 2020-11-24 | Stop reason: SDUPTHER

## 2020-05-19 RX ORDER — FLUCONAZOLE 150 MG/1
150 TABLET ORAL DAILY PRN
Qty: 30 TABLET | Refills: 3 | Status: SHIPPED | OUTPATIENT
Start: 2020-05-19 | End: 2021-05-25 | Stop reason: SDUPTHER

## 2020-05-19 RX ORDER — LEVOTHYROXINE SODIUM 0.03 MG/1
12.5 TABLET ORAL DAILY
Qty: 45 TABLET | Refills: 3 | Status: SHIPPED | OUTPATIENT
Start: 2020-05-19 | End: 2021-05-25 | Stop reason: SDUPTHER

## 2020-05-19 NOTE — PROGRESS NOTES
Siria Garcia 64 y.o.  CC:Follow-up; Hypertension; Hypothyroidism; and Heartburn      [place]: Follow-up; Hypertension; Hypothyroidism; and Heartburn    bp is good   Is on synthroid 25 mcg daily - 1/2 daily   Is on vitamin D supplement   Reviewed recent lab work   GERD stable on current medications     Allergies   Allergen Reactions   • Terbinafine Itching       Current Outpatient Medications:   •  exemestane (AROMASIN) 25 MG chemo tablet, , Disp: , Rfl:   •  fluconazole (DIFLUCAN) 150 MG tablet, Take 1 tablet by mouth Daily., Disp: 10 tablet, Rfl: 3  •  Glucos-Chond-Hyal Ac-Ca Fructo (MOVE FREE JOINT HEALTH ADVANCE PO), Take  by mouth., Disp: , Rfl:   •  levothyroxine (SYNTHROID, LEVOTHROID) 25 MCG tablet, Take 0.5 tablets by mouth Daily., Disp: 45 tablet, Rfl: 1  •  Loperamide HCl (IMODIUM PO), Take 1 tablet by mouth 2 (Two) Times a Day As Needed., Disp: , Rfl:   •  loratadine (CLARITIN) 10 MG tablet, Take 10 mg by mouth daily., Disp: , Rfl:   •  Multiple Vitamins-Minerals (YARI MULTIVITAMIN FOR WOMEN PO), Take  by mouth., Disp: , Rfl:   •  Omega-3 Fatty Acids (OMEGA-3 1450 PO), Take  by mouth Daily., Disp: , Rfl:   •  trimethoprim (TRIMPEX) 100 MG tablet, Take 1 tablet by mouth Daily. PRN, Disp: 30 tablet, Rfl: 1  •  vitamin C (ASCORBIC ACID) 500 MG tablet, Take 500 mg by mouth Daily., Disp: , Rfl:   •  BOOSTRIX 5-2.5-18.5 LF-MCG/0.5 injection, ADM 0.5ML IM UTD, Disp: , Rfl: 0  •  diclofenac (VOLTAREN) 1 % gel gel, , Disp: , Rfl:   •  FLUARIX QUADRIVALENT 0.5 ML suspension prefilled syringe injection, inject 0.5 milliliter intramuscularly, Disp: , Rfl: 0  •  FLUCELVAX QUADRIVALENT 0.5 ML suspension prefilled syringe injection, ADM 0.5ML IM UTD, Disp: , Rfl: 0  •  HAVRIX 1440 EL U/ML vaccine, ADM 1ML IM UTD, Disp: , Rfl: 0  •  ZOSTAVAX 43169 UNT/0.65ML injection, inject contents of 1 vial subcutaneously, Disp: , Rfl: 0  Patient Active Problem List    Diagnosis   • Elevated glucose [R73.09]   • Tubular adenoma [D36.9]    • Malignant neoplasm of upper-outer quadrant of breast in female, estrogen receptor positive (CMS/HCC) [C50.419, Z17.0]   • Vaginitis and vulvovaginitis [N76.0]   • Sore throat [J02.9]   • Acute low back pain [M54.5]   • Benign essential hypertension [I10]   • Gastroesophageal reflux disease [K21.9]   • Fatigue [R53.83]   • Foot pain [M79.673]   • Hypercholesterolemia [E78.00]   • Hypothyroidism [E03.9]   • Knee pain [M25.569]   • Osteoarthritis of hand [M19.049]   • Vaginal yeast infection [B37.3]     Review of Systems   Constitutional: Negative for activity change, appetite change, chills, diaphoresis, fatigue, fever and unexpected weight change.   HENT: Negative for congestion, dental problem, drooling, ear discharge, ear pain, facial swelling, hearing loss, mouth sores, nosebleeds, postnasal drip, rhinorrhea, sinus pressure, sneezing, sore throat, tinnitus, trouble swallowing and voice change.    Eyes: Negative for photophobia, pain, discharge, redness, itching and visual disturbance.   Respiratory: Negative for apnea, cough, choking, chest tightness, shortness of breath, wheezing and stridor.    Cardiovascular: Negative for chest pain, palpitations and leg swelling.   Gastrointestinal: Negative for abdominal distention, abdominal pain, anal bleeding, blood in stool, constipation, diarrhea, nausea, rectal pain and vomiting.   Endocrine: Negative for cold intolerance, heat intolerance, polydipsia, polyphagia and polyuria.   Genitourinary: Negative for decreased urine volume, difficulty urinating, dysuria, enuresis, flank pain, frequency, genital sores, hematuria and urgency.   Musculoskeletal: Negative for arthralgias, back pain, gait problem, joint swelling, myalgias, neck pain and neck stiffness.   Skin: Negative for color change, pallor, rash and wound.   Allergic/Immunologic: Negative for environmental allergies, food allergies and immunocompromised state.   Neurological: Negative for dizziness, tremors,  "seizures, syncope, facial asymmetry, speech difficulty, weakness, light-headedness, numbness and headaches.   Hematological: Negative for adenopathy. Does not bruise/bleed easily.   Psychiatric/Behavioral: Negative for agitation, behavioral problems, confusion, decreased concentration, dysphoric mood, hallucinations, self-injury, sleep disturbance and suicidal ideas. The patient is not nervous/anxious and is not hyperactive.      Social History     Socioeconomic History   • Marital status:      Spouse name: Not on file   • Number of children: Not on file   • Years of education: Not on file   • Highest education level: Not on file   Tobacco Use   • Smoking status: Never Smoker   • Smokeless tobacco: Never Used   Substance and Sexual Activity   • Alcohol use: No   • Drug use: No     Family History   Problem Relation Age of Onset   • Diabetes Father    • Hypertension Father    • Breast cancer Mother      /80   Pulse 80   Ht 162.6 cm (64\")   Wt 76.8 kg (169 lb 6.4 oz)   SpO2 99%   BMI 29.08 kg/m²   Physical Exam   Constitutional: She is oriented to person, place, and time. She appears well-developed and well-nourished.   HENT:   Head: Normocephalic and atraumatic.   Nose: Nose normal.   Mouth/Throat: Oropharynx is clear and moist.   Eyes: Pupils are equal, round, and reactive to light. Conjunctivae, EOM and lids are normal.   Neck: Trachea normal and normal range of motion. Neck supple. Carotid bruit is not present. No tracheal deviation present. No thyroid mass and no thyromegaly present.   Cardiovascular: Normal rate, regular rhythm, normal heart sounds and intact distal pulses. Exam reveals no gallop and no friction rub.   No murmur heard.  Pulmonary/Chest: Effort normal and breath sounds normal. No respiratory distress. She has no wheezes.   Musculoskeletal: Normal range of motion. She exhibits no edema or deformity.   Lymphadenopathy:     She has no cervical adenopathy.   Neurological: She is " alert and oriented to person, place, and time. She has normal reflexes. She displays normal reflexes. No cranial nerve deficit.   Skin: Skin is warm and dry. No rash noted. No cyanosis or erythema. Nails show no clubbing.   Psychiatric: She has a normal mood and affect. Her speech is normal and behavior is normal. Judgment and thought content normal. Cognition and memory are normal.   Nursing note and vitals reviewed.    Results for orders placed or performed in visit on 11/26/19   Comprehensive Metabolic Panel   Result Value Ref Range    Glucose 98 65 - 99 mg/dL    BUN 21 8 - 23 mg/dL    Creatinine 1.02 (H) 0.57 - 1.00 mg/dL    Sodium 143 136 - 145 mmol/L    Potassium 4.9 3.5 - 5.2 mmol/L    Chloride 106 98 - 107 mmol/L    CO2 25.1 22.0 - 29.0 mmol/L    Calcium 9.6 8.6 - 10.5 mg/dL    Total Protein 7.8 6.0 - 8.5 g/dL    Albumin 4.70 3.50 - 5.20 g/dL    ALT (SGPT) 24 1 - 33 U/L    AST (SGOT) 28 1 - 32 U/L    Alkaline Phosphatase 108 39 - 117 U/L    Total Bilirubin 0.2 0.2 - 1.2 mg/dL    eGFR Non African Amer 55 (L) >60 mL/min/1.73    Globulin 3.1 gm/dL    A/G Ratio 1.5 g/dL    BUN/Creatinine Ratio 20.6 7.0 - 25.0    Anion Gap 11.9 5.0 - 15.0 mmol/L   Lipid Panel   Result Value Ref Range    Total Cholesterol 168 0 - 200 mg/dL    Triglycerides 119 0 - 150 mg/dL    HDL Cholesterol 51 40 - 60 mg/dL    LDL Cholesterol  93 0 - 100 mg/dL    VLDL Cholesterol 23.8 5 - 40 mg/dL    LDL/HDL Ratio 1.83    TSH   Result Value Ref Range    TSH 0.469 0.270 - 4.200 uIU/mL   T4, Free   Result Value Ref Range    Free T4 1.41 0.93 - 1.70 ng/dL   Calcium, Ionized   Result Value Ref Range    Ionized Calcium 1.40 (H) 1.15 - 1.35 mmol/L    Ionized Calcium 5.6 (H) 4.6 - 5.4 mg/dL   PTH, Intact   Result Value Ref Range    PTH, Intact 29.7 15.0 - 65.0 pg/mL   Vitamin D 25 Hydroxy   Result Value Ref Range    25 Hydroxy, Vitamin D 45.7 30.0 - 100.0 ng/ml   Hemoglobin A1c   Result Value Ref Range    Hemoglobin A1C 6.00 (H) 4.80 - 5.60 %      Problem List Items Addressed This Visit        Cardiovascular and Mediastinum    Hypercholesterolemia     Is on fatty acids   Check flp          Relevant Orders    Lipid Panel    Benign essential hypertension - Primary     bp is good overall   No changes recommended          Relevant Orders    Comprehensive Metabolic Panel    Calcium, Ionized       Endocrine    Hypothyroidism     Update tfts          Relevant Medications    levothyroxine (SYNTHROID, LEVOTHROID) 25 MCG tablet    Other Relevant Orders    TSH       Genitourinary    Vaginitis and vulvovaginitis     Refill fluconazole         Relevant Medications    fluconazole (DIFLUCAN) 150 MG tablet       Other    Elevated glucose     Update hgn a1c          Relevant Orders    Hemoglobin A1c        Return in about 6 months (around 11/19/2020) for Recheck.    Lashell Saucedo MA  Signed Sofia Hong MD

## 2020-05-27 ENCOUNTER — APPOINTMENT (OUTPATIENT)
Dept: LAB | Facility: HOSPITAL | Age: 65
End: 2020-05-27

## 2020-05-27 LAB
ALBUMIN SERPL-MCNC: 4.7 G/DL (ref 3.5–5.2)
ALBUMIN/GLOB SERPL: 1.7 G/DL
ALP SERPL-CCNC: 113 U/L (ref 39–117)
ALT SERPL W P-5'-P-CCNC: 25 U/L (ref 1–33)
ANION GAP SERPL CALCULATED.3IONS-SCNC: 9.1 MMOL/L (ref 5–15)
AST SERPL-CCNC: 24 U/L (ref 1–32)
BILIRUB SERPL-MCNC: 0.3 MG/DL (ref 0.2–1.2)
BUN BLD-MCNC: 27 MG/DL (ref 8–23)
BUN/CREAT SERPL: 31.8 (ref 7–25)
CA-I BLD-MCNC: 5.6 MG/DL (ref 4.6–5.4)
CA-I SERPL ISE-MCNC: 1.39 MMOL/L (ref 1.15–1.35)
CALCIUM SPEC-SCNC: 9.9 MG/DL (ref 8.6–10.5)
CHLORIDE SERPL-SCNC: 103 MMOL/L (ref 98–107)
CHOLEST SERPL-MCNC: 167 MG/DL (ref 0–200)
CO2 SERPL-SCNC: 25.9 MMOL/L (ref 22–29)
CREAT BLD-MCNC: 0.85 MG/DL (ref 0.57–1)
GFR SERPL CREATININE-BSD FRML MDRD: 67 ML/MIN/1.73
GLOBULIN UR ELPH-MCNC: 2.7 GM/DL
GLUCOSE BLD-MCNC: 99 MG/DL (ref 65–99)
HBA1C MFR BLD: 5.8 % (ref 4.8–5.6)
HDLC SERPL-MCNC: 48 MG/DL (ref 40–60)
LDLC SERPL CALC-MCNC: 98 MG/DL (ref 0–100)
LDLC/HDLC SERPL: 2.05 {RATIO}
POTASSIUM BLD-SCNC: 4.5 MMOL/L (ref 3.5–5.2)
PROT SERPL-MCNC: 7.4 G/DL (ref 6–8.5)
SODIUM BLD-SCNC: 138 MMOL/L (ref 136–145)
TRIGL SERPL-MCNC: 103 MG/DL (ref 0–150)
VLDLC SERPL-MCNC: 20.6 MG/DL (ref 5–40)

## 2020-05-27 PROCEDURE — 84443 ASSAY THYROID STIM HORMONE: CPT | Performed by: INTERNAL MEDICINE

## 2020-05-27 PROCEDURE — 82330 ASSAY OF CALCIUM: CPT | Performed by: INTERNAL MEDICINE

## 2020-05-27 PROCEDURE — 80053 COMPREHEN METABOLIC PANEL: CPT | Performed by: INTERNAL MEDICINE

## 2020-05-27 PROCEDURE — 80061 LIPID PANEL: CPT | Performed by: INTERNAL MEDICINE

## 2020-05-27 PROCEDURE — 83036 HEMOGLOBIN GLYCOSYLATED A1C: CPT | Performed by: INTERNAL MEDICINE

## 2020-05-28 LAB — TSH SERPL DL<=0.05 MIU/L-ACNC: 0.32 UIU/ML (ref 0.27–4.2)

## 2020-10-05 ENCOUNTER — TELEPHONE (OUTPATIENT)
Dept: ENDOCRINOLOGY | Facility: CLINIC | Age: 65
End: 2020-10-05

## 2020-10-06 ENCOUNTER — TELEPHONE (OUTPATIENT)
Dept: ENDOCRINOLOGY | Facility: CLINIC | Age: 65
End: 2020-10-06

## 2020-10-16 ENCOUNTER — TELEPHONE (OUTPATIENT)
Dept: ENDOCRINOLOGY | Facility: CLINIC | Age: 65
End: 2020-10-16

## 2020-10-16 NOTE — TELEPHONE ENCOUNTER
Pt called concerned, she recently had the flu-shot at [facility] on 10/8/2020. She is now experiencing a low grade fever and she hasn't been sleeping well. She is concerned and was needing consultation on what she should do and if any nurses recommend any clinics?

## 2020-10-16 NOTE — TELEPHONE ENCOUNTER
"patient states she had a low grade fever, but took tylenol and ibuprofen and it is now 96.3.  Patient states that is the only symptom she had today.     Patient states in the past days \"I felt kind of flushed.\"  But has no fever.    Patient states, today she took her temperature under her arm.    1pm - 99.5  1:24 pm - 99  1:34pm - 100.3  1:44 pm - 100.6    Patient took Tylenol Ibuprofen and at 2:49pm took her temp.  It was 98.6    Patient took her temp at 3:37 pm whiled we were taking and her temp was 96.3.    Recommended to patient to go to the [facility] if her fever comes back and taking Tylenol and Ibuprofen does not help, or if she develops any other symptoms.  Informed patient of [facility] on [facility], as an example.    Patient voiced understanding.  "

## 2020-10-19 ENCOUNTER — TELEPHONE (OUTPATIENT)
Dept: ENDOCRINOLOGY | Facility: CLINIC | Age: 65
End: 2020-10-19

## 2020-10-19 DIAGNOSIS — R50.9 FEVER, UNSPECIFIED FEVER CAUSE: Primary | ICD-10-CM

## 2020-10-19 PROCEDURE — U0003 INFECTIOUS AGENT DETECTION BY NUCLEIC ACID (DNA OR RNA); SEVERE ACUTE RESPIRATORY SYNDROME CORONAVIRUS 2 (SARS-COV-2) (CORONAVIRUS DISEASE [COVID-19]), AMPLIFIED PROBE TECHNIQUE, MAKING USE OF HIGH THROUGHPUT TECHNOLOGIES AS DESCRIBED BY CMS-2020-01-R: HCPCS | Performed by: INTERNAL MEDICINE

## 2020-10-19 NOTE — TELEPHONE ENCOUNTER
Pt states she ran a fever on Friday for about 12-15 hours and now today feels warm again and has not felt well all weekend and she takes her dog to the vet weekly for laser treatments and thinks it would be best to have a COVID 10 test  She called [facility] and they advised her to have the order put in so she can do a drive through

## 2020-10-19 NOTE — TELEPHONE ENCOUNTER
Pt said she contacted the [facility] and they recommended she get tested for COVID but needs Dr. Hong to put an order in for her. Please give pt a call.

## 2020-11-24 ENCOUNTER — LAB (OUTPATIENT)
Dept: LAB | Facility: HOSPITAL | Age: 65
End: 2020-11-24

## 2020-11-24 ENCOUNTER — OFFICE VISIT (OUTPATIENT)
Dept: ENDOCRINOLOGY | Facility: CLINIC | Age: 65
End: 2020-11-24

## 2020-11-24 VITALS
DIASTOLIC BLOOD PRESSURE: 80 MMHG | HEART RATE: 78 BPM | HEIGHT: 64 IN | BODY MASS INDEX: 28.68 KG/M2 | OXYGEN SATURATION: 98 % | SYSTOLIC BLOOD PRESSURE: 140 MMHG | WEIGHT: 168 LBS

## 2020-11-24 DIAGNOSIS — I10 BENIGN ESSENTIAL HYPERTENSION: Primary | ICD-10-CM

## 2020-11-24 DIAGNOSIS — R73.09 ELEVATED GLUCOSE: ICD-10-CM

## 2020-11-24 DIAGNOSIS — E78.00 HYPERCHOLESTEROLEMIA: ICD-10-CM

## 2020-11-24 DIAGNOSIS — E83.52 HYPERCALCEMIA: ICD-10-CM

## 2020-11-24 PROCEDURE — 83036 HEMOGLOBIN GLYCOSYLATED A1C: CPT | Performed by: INTERNAL MEDICINE

## 2020-11-24 PROCEDURE — 83970 ASSAY OF PARATHORMONE: CPT | Performed by: INTERNAL MEDICINE

## 2020-11-24 PROCEDURE — 80053 COMPREHEN METABOLIC PANEL: CPT | Performed by: INTERNAL MEDICINE

## 2020-11-24 PROCEDURE — 82306 VITAMIN D 25 HYDROXY: CPT | Performed by: INTERNAL MEDICINE

## 2020-11-24 PROCEDURE — 80061 LIPID PANEL: CPT | Performed by: INTERNAL MEDICINE

## 2020-11-24 PROCEDURE — 82330 ASSAY OF CALCIUM: CPT | Performed by: INTERNAL MEDICINE

## 2020-11-24 PROCEDURE — 99214 OFFICE O/P EST MOD 30 MIN: CPT | Performed by: INTERNAL MEDICINE

## 2020-11-24 PROCEDURE — 84443 ASSAY THYROID STIM HORMONE: CPT | Performed by: INTERNAL MEDICINE

## 2020-11-24 RX ORDER — MENTHOL AND METHYL SALICYLATE 10; 30 G/100G; G/100G
CREAM TOPICAL 2 TIMES DAILY PRN
COMMUNITY

## 2020-11-24 RX ORDER — TRIMETHOPRIM 100 MG/1
100 TABLET ORAL DAILY
Qty: 30 TABLET | Refills: 1 | Status: SHIPPED | OUTPATIENT
Start: 2020-11-24 | End: 2020-11-30 | Stop reason: SDUPTHER

## 2020-11-24 NOTE — PROGRESS NOTES
Siria JACQUES Radha 64 y.o.  CC: Follow-up, Hypothyroidism, Hypertension, and Hyperglycemia      [place]: Follow-up, Hypothyroidism, Hypertension, and Hyperglycemia    Blood pressure is good  Is testing at home and levels are good  Seeing chiropracter for lower back pain - scoliosis  Having more pain   Is on low fat diet   Energy is good overall   Is on synthroid 25 mcg daily   Is considering surgery for lower back pain   Chronic pain without focal deficit   Getting regular treatment   occ sciatica radiating down legs   No weakness   Is watching diet - has lost about 8 lbs  A lot more lifting and pulling with caring for dog     Allergies   Allergen Reactions   • Terbinafine Itching       Current Outpatient Medications:   •  BOOSTRIX 5-2.5-18.5 LF-MCG/0.5 injection, ADM 0.5ML IM UTD, Disp: , Rfl: 0  •  diclofenac (VOLTAREN) 1 % gel gel, , Disp: , Rfl:   •  exemestane (AROMASIN) 25 MG chemo tablet, , Disp: , Rfl:   •  FLUARIX QUADRIVALENT 0.5 ML suspension prefilled syringe injection, inject 0.5 milliliter intramuscularly, Disp: , Rfl: 0  •  FLUCELVAX QUADRIVALENT 0.5 ML suspension prefilled syringe injection, ADM 0.5ML IM UTD, Disp: , Rfl: 0  •  fluconazole (DIFLUCAN) 150 MG tablet, Take 1 tablet by mouth Daily As Needed (yeast symptoms)., Disp: 30 tablet, Rfl: 3  •  Glucos-Chond-Hyal Ac-Ca Fructo (MOVE FREE JOINT HEALTH ADVANCE PO), Take  by mouth., Disp: , Rfl:   •  HAVRIX 1440 EL U/ML vaccine, ADM 1ML IM UTD, Disp: , Rfl: 0  •  Histamine Dihydrochloride (AUSTRALIAN DREAM ARTHRITIS EX), Apply  topically 2 (two) times a day., Disp: , Rfl:   •  levothyroxine (SYNTHROID, LEVOTHROID) 25 MCG tablet, Take 0.5 tablets by mouth Daily., Disp: 45 tablet, Rfl: 3  •  Loperamide HCl (IMODIUM PO), Take 1 tablet by mouth 2 (Two) Times a Day As Needed., Disp: , Rfl:   •  loratadine (CLARITIN) 10 MG tablet, Take 10 mg by mouth daily., Disp: , Rfl:   •  Multiple Vitamins-Minerals (YARI MULTIVITAMIN FOR WOMEN PO), Take  by mouth., Disp: , Rfl:    •  Omega-3 Fatty Acids (OMEGA-3 1450 PO), Take  by mouth Daily., Disp: , Rfl:   •  sore muscle (ICY HOT EXTRA STRENGTH) 10-30 % cream cream, Apply  topically to the appropriate area as directed 2 (Two) Times a Day As Needed. With lido, Disp: , Rfl:   •  trimethoprim (TRIMPEX) 100 MG tablet, Take 1 tablet by mouth Daily. PRN, Disp: 30 tablet, Rfl: 1  •  vitamin C (ASCORBIC ACID) 500 MG tablet, Take 1,000 mg by mouth Daily., Disp: , Rfl:   •  ZOSTAVAX 09933 UNT/0.65ML injection, inject contents of 1 vial subcutaneously, Disp: , Rfl: 0  Patient Active Problem List    Diagnosis   • Elevated glucose [R73.09]   • Tubular adenoma [D36.9]   • Malignant neoplasm of upper-outer quadrant of breast in female, estrogen receptor positive (CMS/HCC) [C50.419, Z17.0]   • Vaginitis and vulvovaginitis [N76.0]   • Sore throat [J02.9]   • Acute low back pain [M54.5]   • Benign essential hypertension [I10]   • Gastroesophageal reflux disease [K21.9]   • Fatigue [R53.83]   • Foot pain [M79.673]   • Hypercholesterolemia [E78.00]   • Hypothyroidism [E03.9]   • Knee pain [M25.569]   • Osteoarthritis of hand [M19.049]   • Vaginal yeast infection [B37.3]     Review of Systems   Constitutional: Negative for activity change, appetite change, chills, diaphoresis, fatigue, fever and unexpected weight change.   HENT: Negative for congestion, dental problem, drooling, ear discharge, ear pain, facial swelling, hearing loss, mouth sores, nosebleeds, postnasal drip, rhinorrhea, sinus pressure, sneezing, sore throat, tinnitus, trouble swallowing and voice change.    Eyes: Negative for photophobia, pain, discharge, redness, itching and visual disturbance.   Respiratory: Negative for apnea, cough, choking, chest tightness, shortness of breath, wheezing and stridor.    Cardiovascular: Negative for chest pain, palpitations and leg swelling.   Gastrointestinal: Negative for abdominal distention, abdominal pain, anal bleeding, blood in stool, constipation,  "diarrhea, nausea, rectal pain and vomiting.   Endocrine: Negative for cold intolerance, heat intolerance, polydipsia, polyphagia and polyuria.   Genitourinary: Negative for decreased urine volume, difficulty urinating, dysuria, enuresis, flank pain, frequency, genital sores, hematuria and urgency.   Musculoskeletal: Positive for arthralgias and back pain. Negative for gait problem, joint swelling, myalgias, neck pain and neck stiffness.   Skin: Negative for color change, pallor, rash and wound.   Allergic/Immunologic: Negative for environmental allergies, food allergies and immunocompromised state.   Neurological: Negative for dizziness, tremors, seizures, syncope, facial asymmetry, speech difficulty, weakness, light-headedness, numbness and headaches.   Hematological: Negative for adenopathy. Does not bruise/bleed easily.   Psychiatric/Behavioral: Negative for agitation, behavioral problems, confusion, decreased concentration, dysphoric mood, hallucinations, self-injury, sleep disturbance and suicidal ideas. The patient is not nervous/anxious and is not hyperactive.      Social History     Socioeconomic History   • Marital status:      Spouse name: Not on file   • Number of children: Not on file   • Years of education: Not on file   • Highest education level: Not on file   Tobacco Use   • Smoking status: Never Smoker   • Smokeless tobacco: Never Used   Substance and Sexual Activity   • Alcohol use: No   • Drug use: No     Family History   Problem Relation Age of Onset   • Diabetes Father    • Hypertension Father    • Breast cancer Mother      /80   Pulse 78   Ht 162.6 cm (64\")   Wt 76.2 kg (168 lb)   SpO2 98%   BMI 28.84 kg/m²   Physical Exam  Vitals signs and nursing note reviewed.   Constitutional:       Appearance: Normal appearance. She is well-developed.   HENT:      Head: Normocephalic and atraumatic.   Eyes:      General: Lids are normal.      Extraocular Movements: Extraocular movements " intact.      Conjunctiva/sclera: Conjunctivae normal.      Pupils: Pupils are equal, round, and reactive to light.   Neck:      Musculoskeletal: Normal range of motion and neck supple.      Thyroid: No thyroid mass or thyromegaly.      Vascular: No carotid bruit.      Trachea: Trachea normal. No tracheal deviation.   Cardiovascular:      Rate and Rhythm: Normal rate and regular rhythm.      Heart sounds: Normal heart sounds. No murmur. No friction rub. No gallop.    Pulmonary:      Effort: Pulmonary effort is normal. No respiratory distress.      Breath sounds: Normal breath sounds. No wheezing.   Musculoskeletal: Normal range of motion.         General: No deformity.   Lymphadenopathy:      Cervical: No cervical adenopathy.   Skin:     General: Skin is warm and dry.      Findings: No erythema or rash.      Nails: There is no clubbing.     Neurological:      Mental Status: She is alert and oriented to person, place, and time.      Cranial Nerves: No cranial nerve deficit.      Deep Tendon Reflexes: Reflexes are normal and symmetric. Reflexes normal.   Psychiatric:         Speech: Speech normal.         Behavior: Behavior normal.         Thought Content: Thought content normal.         Judgment: Judgment normal.       Results for orders placed or performed during the hospital encounter of 10/19/20   COVID-19,[facility] ROUTINE, NP/OP SWAB IN TRANSPORT MEDIA OR ESWAB 72 HR TAT - Swab, Nasopharynx    Specimen: Nasopharynx; Swab   Result Value Ref Range    SARS-CoV-2, PADMINI Not Detected Not Detected     Diagnoses and all orders for this visit:    1. Benign essential hypertension (Primary)  -     Comprehensive Metabolic Panel    2. Hypercholesterolemia  -     Lipid Panel  -     TSH    3. Elevated glucose  -     Hemoglobin A1c    4. Hypercalcemia  -     Calcium, Ionized  -     PTH, Intact  -     Vitamin D 25 Hydroxy      Problems Addressed this Visit        Cardiovascular and Mediastinum    Hypercholesterolemia     Is on low  fat diet and omega 3 fa  No change         Relevant Orders    Lipid Panel (Completed)    TSH (Completed)    Benign essential hypertension - Primary     bp is good  Continue monitoring at home and call if over 145/85         Relevant Orders    Comprehensive Metabolic Panel (Completed)       Other    Hypercalcemia     Update ion Ca, vitamin D and PTH          Relevant Orders    Calcium, Ionized (Completed)    PTH, Intact (Completed)    Vitamin D 25 Hydroxy (Completed)    Elevated glucose     Check 90 day average sugar   Continue to stay active, watch diet          Relevant Orders    Hemoglobin A1c (Completed)      Diagnoses       Codes Comments    Benign essential hypertension    -  Primary ICD-10-CM: I10  ICD-9-CM: 401.1     Hypercholesterolemia     ICD-10-CM: E78.00  ICD-9-CM: 272.0     Elevated glucose     ICD-10-CM: R73.09  ICD-9-CM: 790.29     Hypercalcemia     ICD-10-CM: E83.52  ICD-9-CM: 275.42         Return in about 6 months (around 5/24/2021) for Recheck.    Sofia Hong MD  Signed Sofia Hong MD

## 2020-11-25 LAB
25(OH)D3 SERPL-MCNC: 35.4 NG/ML (ref 30–100)
ALBUMIN SERPL-MCNC: 4.7 G/DL (ref 3.5–5.2)
ALBUMIN/GLOB SERPL: 1.6 G/DL
ALP SERPL-CCNC: 112 U/L (ref 39–117)
ALT SERPL W P-5'-P-CCNC: 23 U/L (ref 1–33)
ANION GAP SERPL CALCULATED.3IONS-SCNC: 12.2 MMOL/L (ref 5–15)
AST SERPL-CCNC: 27 U/L (ref 1–32)
BILIRUB SERPL-MCNC: 0.2 MG/DL (ref 0–1.2)
BUN SERPL-MCNC: 29 MG/DL (ref 8–23)
BUN/CREAT SERPL: 33.7 (ref 7–25)
CA-I BLD-MCNC: 5.3 MG/DL (ref 4.6–5.4)
CA-I SERPL ISE-MCNC: 1.32 MMOL/L (ref 1.15–1.35)
CALCIUM SPEC-SCNC: 9.8 MG/DL (ref 8.6–10.5)
CHLORIDE SERPL-SCNC: 104 MMOL/L (ref 98–107)
CHOLEST SERPL-MCNC: 177 MG/DL (ref 0–200)
CO2 SERPL-SCNC: 24.8 MMOL/L (ref 22–29)
CREAT SERPL-MCNC: 0.86 MG/DL (ref 0.57–1)
GFR SERPL CREATININE-BSD FRML MDRD: 66 ML/MIN/1.73
GLOBULIN UR ELPH-MCNC: 2.9 GM/DL
GLUCOSE SERPL-MCNC: 90 MG/DL (ref 65–99)
HBA1C MFR BLD: 5.4 % (ref 4.8–5.6)
HDLC SERPL-MCNC: 57 MG/DL (ref 40–60)
LDLC SERPL CALC-MCNC: 103 MG/DL (ref 0–100)
LDLC/HDLC SERPL: 1.78 {RATIO}
POTASSIUM SERPL-SCNC: 4.3 MMOL/L (ref 3.5–5.2)
PROT SERPL-MCNC: 7.6 G/DL (ref 6–8.5)
PTH-INTACT SERPL-MCNC: 35.8 PG/ML (ref 15–65)
SODIUM SERPL-SCNC: 141 MMOL/L (ref 136–145)
TRIGL SERPL-MCNC: 92 MG/DL (ref 0–150)
TSH SERPL DL<=0.05 MIU/L-ACNC: 0.43 UIU/ML (ref 0.27–4.2)
VLDLC SERPL-MCNC: 17 MG/DL (ref 5–40)

## 2020-11-30 ENCOUNTER — TELEPHONE (OUTPATIENT)
Dept: ENDOCRINOLOGY | Facility: CLINIC | Age: 65
End: 2020-11-30

## 2020-11-30 PROBLEM — E83.52 HYPERCALCEMIA: Status: ACTIVE | Noted: 2020-11-30

## 2020-11-30 RX ORDER — TRIMETHOPRIM 100 MG/1
100 TABLET ORAL DAILY
Qty: 30 TABLET | Refills: 5 | Status: SHIPPED | OUTPATIENT
Start: 2020-11-30 | End: 2021-05-25 | Stop reason: SDUPTHER

## 2020-11-30 RX ORDER — TRIMETHOPRIM 100 MG/1
100 TABLET ORAL DAILY
Qty: 30 TABLET | Refills: 5 | Status: SHIPPED | OUTPATIENT
Start: 2020-11-30 | End: 2020-11-30 | Stop reason: SDUPTHER

## 2021-01-11 PROCEDURE — U0004 COV-19 TEST NON-CDC HGH THRU: HCPCS | Performed by: FAMILY MEDICINE

## 2021-01-13 ENCOUNTER — TELEPHONE (OUTPATIENT)
Dept: URGENT CARE | Facility: CLINIC | Age: 66
End: 2021-01-13

## 2021-01-13 NOTE — TELEPHONE ENCOUNTER
Pt notified she is COVID postive. Pt is very surprised she has COVID and Strep. Pt is doing ok and was not having any respiratory distress. Her meds for Strep is helping her feel better. Pt was informed to monitor symptoms for any respiratory distress or disorientation, to go to ER. Pt was told Health Dept will f/u with her. Pt understood.    ----- Message from ANGIE Weber sent at 1/13/2021  8:47 AM EST -----  Patient positive for COVID-19.  Please send paperwork to health department, notify patient and educate them on CDC guidelines for COVID-19.

## 2021-01-15 ENCOUNTER — TELEPHONE (OUTPATIENT)
Dept: ENDOCRINOLOGY | Facility: CLINIC | Age: 66
End: 2021-01-15

## 2021-01-15 NOTE — TELEPHONE ENCOUNTER
Informed patient of Dr. Hong recommendations, regarding covid and strep.  patient voiced understanding, and states she still coughing, shaky and feels like her throat is raw.    Patient states she will finish her course of antibiotics she was given.

## 2021-01-15 NOTE — TELEPHONE ENCOUNTER
PT STATED THAT SHE HAS TESTED POSITIVE FOR COVID AND STREP AND WAS PRESCRIBED    amoxicillin (AMOXIL) 875 MG tablet   PT IS CONCERNED THAT SHE HAS BEEN TAKING IT SINCE 01/11/2021 AND IS NOT FEELING ANY BETTER. PT WOULD LIKE TO REQUEST A DIFFERENT ANTIBIOTIC TO HELP WITH EXISTING SYMPTOMS WITH NO NEW SYMPTOMS PRESENT.    CALL BACK [phone]    [employer] [id number] [address] - [phone] [facility] [phone]   [phone]

## 2021-01-15 NOTE — TELEPHONE ENCOUNTER
Symptoms are likely covid related and I would recommend she complete the course of amoxil to treat the strep but would expect continued cough and congestion  I would recommend purchasing a pulse oximeter to monitor oxygen levels and would go back to hospital if oxygen saturation below 92%  If she is worse she could also go back to ER to see if she can get an infusion of redesmevir (drug used to treat mild to mod covid) along with steroids  She may feel bad for months due to covid virus and there is no other specific treatment for it

## 2021-01-19 ENCOUNTER — TELEPHONE (OUTPATIENT)
Dept: ENDOCRINOLOGY | Facility: CLINIC | Age: 66
End: 2021-01-19

## 2021-01-19 NOTE — TELEPHONE ENCOUNTER
The amoxicillan is working for strep  Her symptoms are likely due to the covid  Symptoms may last for 1-2 months   I would use tylenol or motrin for body aches and be sure she stays well hydrated  She can go back to [facility] if persistent and she would like to have infusion (redesmivir)   Thanks, [facility]

## 2021-01-19 NOTE — TELEPHONE ENCOUNTER
Pt tested positive for Strep and Covid-19. Rx Amoxicillin 875 MG twice a day are not working for her strep. She was wondering if there is any other recommendations? Please advise

## 2021-02-02 PROCEDURE — 87081 CULTURE SCREEN ONLY: CPT | Performed by: NURSE PRACTITIONER

## 2021-02-02 PROCEDURE — 87086 URINE CULTURE/COLONY COUNT: CPT | Performed by: NURSE PRACTITIONER

## 2021-05-25 ENCOUNTER — LAB (OUTPATIENT)
Dept: LAB | Facility: HOSPITAL | Age: 66
End: 2021-05-25

## 2021-05-25 ENCOUNTER — OFFICE VISIT (OUTPATIENT)
Dept: ENDOCRINOLOGY | Facility: CLINIC | Age: 66
End: 2021-05-25

## 2021-05-25 VITALS
WEIGHT: 168.8 LBS | HEIGHT: 64 IN | SYSTOLIC BLOOD PRESSURE: 162 MMHG | BODY MASS INDEX: 28.82 KG/M2 | OXYGEN SATURATION: 98 % | HEART RATE: 82 BPM | DIASTOLIC BLOOD PRESSURE: 80 MMHG

## 2021-05-25 DIAGNOSIS — E03.9 ACQUIRED HYPOTHYROIDISM: ICD-10-CM

## 2021-05-25 DIAGNOSIS — I10 BENIGN ESSENTIAL HYPERTENSION: ICD-10-CM

## 2021-05-25 DIAGNOSIS — R73.09 ELEVATED GLUCOSE: Primary | ICD-10-CM

## 2021-05-25 DIAGNOSIS — E78.00 HYPERCHOLESTEROLEMIA: ICD-10-CM

## 2021-05-25 DIAGNOSIS — N76.0 VAGINITIS AND VULVOVAGINITIS: ICD-10-CM

## 2021-05-25 LAB
EXPIRATION DATE: NORMAL
EXPIRATION DATE: NORMAL
GLUCOSE BLDC GLUCOMTR-MCNC: 112 MG/DL (ref 70–130)
HBA1C MFR BLD: 5.8 %
Lab: NORMAL
Lab: NORMAL

## 2021-05-25 PROCEDURE — 82947 ASSAY GLUCOSE BLOOD QUANT: CPT | Performed by: INTERNAL MEDICINE

## 2021-05-25 PROCEDURE — 80061 LIPID PANEL: CPT | Performed by: INTERNAL MEDICINE

## 2021-05-25 PROCEDURE — 83036 HEMOGLOBIN GLYCOSYLATED A1C: CPT | Performed by: INTERNAL MEDICINE

## 2021-05-25 PROCEDURE — 99214 OFFICE O/P EST MOD 30 MIN: CPT | Performed by: INTERNAL MEDICINE

## 2021-05-25 PROCEDURE — 80053 COMPREHEN METABOLIC PANEL: CPT | Performed by: INTERNAL MEDICINE

## 2021-05-25 PROCEDURE — 84443 ASSAY THYROID STIM HORMONE: CPT | Performed by: INTERNAL MEDICINE

## 2021-05-25 RX ORDER — TRIMETHOPRIM 100 MG/1
100 TABLET ORAL DAILY
Qty: 30 TABLET | Refills: 5 | Status: SHIPPED | OUTPATIENT
Start: 2021-05-25 | End: 2021-11-22 | Stop reason: SDUPTHER

## 2021-05-25 RX ORDER — FLUCONAZOLE 150 MG/1
150 TABLET ORAL DAILY PRN
Qty: 10 TABLET | Refills: 3 | Status: SHIPPED | OUTPATIENT
Start: 2021-05-25 | End: 2022-05-17 | Stop reason: SDUPTHER

## 2021-05-25 RX ORDER — LEVOTHYROXINE SODIUM 0.03 MG/1
12.5 TABLET ORAL DAILY
Qty: 45 TABLET | Refills: 3 | Status: SHIPPED | OUTPATIENT
Start: 2021-05-25 | End: 2022-05-17 | Stop reason: SDUPTHER

## 2021-05-25 RX ORDER — TRIMETHOPRIM 100 MG/1
100 TABLET ORAL DAILY
Qty: 30 TABLET | Refills: 5 | Status: CANCELLED | OUTPATIENT
Start: 2021-05-25

## 2021-05-25 RX ORDER — FLUCONAZOLE 150 MG/1
150 TABLET ORAL DAILY PRN
Qty: 30 TABLET | Refills: 3 | Status: CANCELLED | OUTPATIENT
Start: 2021-05-25

## 2021-05-25 RX ORDER — LEVOTHYROXINE SODIUM 0.03 MG/1
12.5 TABLET ORAL DAILY
Qty: 45 TABLET | Refills: 3 | Status: CANCELLED | OUTPATIENT
Start: 2021-05-25

## 2021-05-25 NOTE — PROGRESS NOTES
Siria GEORGIE Garcia 65 y.o.  CC: Follow-up, Hypothyroidism, Hypertension, and Hyperglycemia      [place]: Follow-up, Hypothyroidism, Hypertension, and Hyperglycemia    Is on low fat diet   Is on synthroid 25 mcg - 1/2 pill daily   Is on fish oil   bp is higher- recheck 148/82  Having problems with tarsal tunnel right foot- seeing podiatry   Recurrent uti- cannot take estrogen (br cancer)  On exemestine   Has dexa in September via Dr Weiss    Results for orders placed or performed in visit on 05/25/21   POC Glucose, Blood    Specimen: Blood   Result Value Ref Range    Glucose 112 70 - 130 mg/dL    Lot Number [id number]     Expiration Date 12/16/2021    POC Glycosylated Hemoglobin (Hb A1C)    Specimen: Blood   Result Value Ref Range    Hemoglobin A1C 5.8 %    Lot Number [id number]     Expiration Date 03/05/2023        Allergies   Allergen Reactions   • Bactrim [Sulfamethoxazole-Trimethoprim] Itching   • Terbinafine Itching       Current Outpatient Medications:   •  Glucos-Chond-Hyal Ac-Ca Fructo (MOVE FREE JOINT HEALTH ADVANCE PO), Take  by mouth., Disp: , Rfl:   •  BOOSTRIX 5-2.5-18.5 LF-MCG/0.5 injection, ADM 0.5ML IM UTD, Disp: , Rfl: 0  •  diclofenac (VOLTAREN) 1 % gel gel, , Disp: , Rfl:   •  exemestane (AROMASIN) 25 MG chemo tablet, Take 25 mg by mouth Daily., Disp: , Rfl:   •  fluconazole (DIFLUCAN) 150 MG tablet, Take 1 tablet by mouth Daily As Needed (yeast symptoms)., Disp: 10 tablet, Rfl: 3  •  levothyroxine (SYNTHROID, LEVOTHROID) 25 MCG tablet, Take 0.5 tablets by mouth Daily., Disp: 45 tablet, Rfl: 3  •  Loperamide HCl (IMODIUM PO), Take 1 tablet by mouth 2 (Two) Times a Day As Needed., Disp: , Rfl:   •  loratadine (CLARITIN) 10 MG tablet, Take 10 mg by mouth daily., Disp: , Rfl:   •  Multiple Vitamins-Minerals (YARI MULTIVITAMIN FOR WOMEN PO), Take  by mouth., Disp: , Rfl:   •  Omega-3 Fatty Acids (OMEGA-3 1450 PO), Take  by mouth Daily., Disp: , Rfl:   •  sore muscle (ICY HOT EXTRA STRENGTH) 10-30 % cream  cream, Apply  topically to the appropriate area as directed 2 (Two) Times a Day As Needed. With lido, Disp: , Rfl:   •  trimethoprim (TRIMPEX) 100 MG tablet, Take 1 tablet by mouth Daily. PRN, Disp: 30 tablet, Rfl: 5  •  vitamin C (ASCORBIC ACID) 500 MG tablet, Take 1,000 mg by mouth Daily., Disp: , Rfl:   Patient Active Problem List    Diagnosis    • Hypercalcemia [E83.52]    • Elevated glucose [R73.09]    • Tubular adenoma [D36.9]    • Malignant neoplasm of upper-outer quadrant of breast in female, estrogen receptor positive (CMS/HCC) [C50.419, Z17.0]    • Vaginitis and vulvovaginitis [N76.0]    • Sore throat [J02.9]    • Acute low back pain [M54.5]    • Benign essential hypertension [I10]    • Gastroesophageal reflux disease [K21.9]    • Fatigue [R53.83]    • Foot pain [M79.673]    • Hypercholesterolemia [E78.00]    • Hypothyroidism [E03.9]    • Knee pain [M25.569]    • Osteoarthritis of hand [M19.049]    • Vaginal yeast infection [B37.3]      Review of Systems   Constitutional: Negative for activity change, appetite change and unexpected weight change.   HENT: Negative for congestion and rhinorrhea.    Eyes: Negative for visual disturbance.   Respiratory: Negative for cough and shortness of breath.    Cardiovascular: Negative for palpitations and leg swelling.   Gastrointestinal: Negative for constipation, diarrhea and nausea.   Genitourinary: Negative for hematuria.   Musculoskeletal: Negative for arthralgias, back pain, gait problem, joint swelling and myalgias.   Skin: Negative for color change, rash and wound.   Allergic/Immunologic: Negative for environmental allergies, food allergies and immunocompromised state.   Neurological: Negative for dizziness, weakness and light-headedness.   Psychiatric/Behavioral: Negative for confusion, decreased concentration, dysphoric mood and sleep disturbance. The patient is not nervous/anxious.      Social History     Socioeconomic History   • Marital status:       "Spouse name: Not on file   • Number of children: Not on file   • Years of education: Not on file   • Highest education level: Not on file   Tobacco Use   • Smoking status: Never Smoker   • Smokeless tobacco: Never Used   Substance and Sexual Activity   • Alcohol use: No   • Drug use: No     Family History   Problem Relation Age of Onset   • Diabetes Father    • Hypertension Father    • Breast cancer Mother      /80   Pulse 82   Ht 162.6 cm (64\")   Wt 76.6 kg (168 lb 12.8 oz)   SpO2 98%   BMI 28.97 kg/m²   Physical Exam  Vitals and nursing note reviewed.   Constitutional:       Appearance: Normal appearance. She is well-developed.   HENT:      Head: Normocephalic and atraumatic.   Eyes:      General: Lids are normal.      Extraocular Movements: Extraocular movements intact.      Conjunctiva/sclera: Conjunctivae normal.      Pupils: Pupils are equal, round, and reactive to light.   Neck:      Thyroid: No thyroid mass or thyromegaly.      Vascular: No carotid bruit.      Trachea: Trachea normal. No tracheal deviation.   Cardiovascular:      Rate and Rhythm: Normal rate and regular rhythm.      Pulses: Normal pulses.      Heart sounds: Normal heart sounds. No murmur heard.   No friction rub. No gallop.    Pulmonary:      Effort: Pulmonary effort is normal. No respiratory distress.      Breath sounds: Normal breath sounds. No wheezing.   Musculoskeletal:         General: No deformity. Normal range of motion.      Cervical back: Normal range of motion and neck supple.   Lymphadenopathy:      Cervical: No cervical adenopathy.   Skin:     General: Skin is warm and dry.      Findings: No erythema or rash.      Nails: There is no clubbing.   Neurological:      Mental Status: She is alert and oriented to person, place, and time.      Cranial Nerves: No cranial nerve deficit.      Deep Tendon Reflexes: Reflexes are normal and symmetric. Reflexes normal.   Psychiatric:         Speech: Speech normal.         Behavior: " Behavior normal.         Thought Content: Thought content normal.         Judgment: Judgment normal.       Results for orders placed or performed in visit on 05/25/21   POC Glucose, Blood    Specimen: Blood   Result Value Ref Range    Glucose 112 70 - 130 mg/dL    Lot Number [id number]     Expiration Date 12/16/2021    POC Glycosylated Hemoglobin (Hb A1C)    Specimen: Blood   Result Value Ref Range    Hemoglobin A1C 5.8 %    Lot Number [id number]     Expiration Date 03/05/2023      Diagnoses and all orders for this visit:    1. Elevated glucose (Primary)  Assessment & Plan:  Blood sugar and 90 day average sugar reviewed  Results for orders placed or performed in visit on 05/25/21   POC Glucose, Blood    Specimen: Blood   Result Value Ref Range    Glucose 112 70 - 130 mg/dL    Lot Number [id number]     Expiration Date 12/16/2021    POC Glycosylated Hemoglobin (Hb A1C)    Specimen: Blood   Result Value Ref Range    Hemoglobin A1C 5.8 %    Lot Number [id number]     Expiration Date 03/05/2023      Average sugar is 110   This is in prediabetic range   Continue to stay active and watch diet   F/u 3-4 months     Orders:  -     POC Glucose, Blood  -     POC Glycosylated Hemoglobin (Hb A1C)    2. Vaginitis and vulvovaginitis  Assessment & Plan:  Refill fluconazole     Orders:  -     fluconazole (DIFLUCAN) 150 MG tablet; Take 1 tablet by mouth Daily As Needed (yeast symptoms).  Dispense: 10 tablet; Refill: 3    3. Acquired hypothyroidism  Assessment & Plan:  Update tfts- continue current supplement     Orders:  -     Comprehensive Metabolic Panel  -     TSH    4. Hypercholesterolemia  Assessment & Plan:  On low fat diet and fish oil   Check flp     Orders:  -     Lipid Panel    5. Benign essential hypertension  Assessment & Plan:  bp is high here but good at home  Reviewed home monitoring       Other orders  -     Cancel: levothyroxine (SYNTHROID, LEVOTHROID) 25 MCG tablet; Take 0.5 tablets by mouth Daily.  Dispense: 45 tablet;  Refill: 3  -     Cancel: trimethoprim (TRIMPEX) 100 MG tablet; Take 1 tablet by mouth Daily. PRN  Dispense: 30 tablet; Refill: 5  -     Cancel: fluconazole (DIFLUCAN) 150 MG tablet; Take 1 tablet by mouth Daily As Needed (yeast symptoms).  Dispense: 30 tablet; Refill: 3  -     levothyroxine (SYNTHROID, LEVOTHROID) 25 MCG tablet; Take 0.5 tablets by mouth Daily.  Dispense: 45 tablet; Refill: 3  -     trimethoprim (TRIMPEX) 100 MG tablet; Take 1 tablet by mouth Daily. PRN  Dispense: 30 tablet; Refill: 5  Return in about 6 months (around 11/25/2021) for Recheck.    Sofia Hong MD  Signed Sofia Hong MD

## 2021-05-26 LAB
ALBUMIN SERPL-MCNC: 4.7 G/DL (ref 3.5–5.2)
ALBUMIN/GLOB SERPL: 1.6 G/DL
ALP SERPL-CCNC: 105 U/L (ref 39–117)
ALT SERPL W P-5'-P-CCNC: 20 U/L (ref 1–33)
ANION GAP SERPL CALCULATED.3IONS-SCNC: 10.4 MMOL/L (ref 5–15)
AST SERPL-CCNC: 24 U/L (ref 1–32)
BILIRUB SERPL-MCNC: 0.2 MG/DL (ref 0–1.2)
BUN SERPL-MCNC: 26 MG/DL (ref 8–23)
BUN/CREAT SERPL: 26.3 (ref 7–25)
CALCIUM SPEC-SCNC: 10.4 MG/DL (ref 8.6–10.5)
CHLORIDE SERPL-SCNC: 107 MMOL/L (ref 98–107)
CHOLEST SERPL-MCNC: 196 MG/DL (ref 0–200)
CO2 SERPL-SCNC: 24.6 MMOL/L (ref 22–29)
CREAT SERPL-MCNC: 0.99 MG/DL (ref 0.57–1)
GFR SERPL CREATININE-BSD FRML MDRD: 56 ML/MIN/1.73
GLOBULIN UR ELPH-MCNC: 3 GM/DL
GLUCOSE SERPL-MCNC: 101 MG/DL (ref 65–99)
HDLC SERPL-MCNC: 55 MG/DL (ref 40–60)
LDLC SERPL CALC-MCNC: 118 MG/DL (ref 0–100)
LDLC/HDLC SERPL: 2.09 {RATIO}
POTASSIUM SERPL-SCNC: 4.9 MMOL/L (ref 3.5–5.2)
PROT SERPL-MCNC: 7.7 G/DL (ref 6–8.5)
SODIUM SERPL-SCNC: 142 MMOL/L (ref 136–145)
TRIGL SERPL-MCNC: 131 MG/DL (ref 0–150)
TSH SERPL DL<=0.05 MIU/L-ACNC: 0.47 UIU/ML (ref 0.27–4.2)
VLDLC SERPL-MCNC: 23 MG/DL (ref 5–40)

## 2021-05-26 NOTE — ASSESSMENT & PLAN NOTE
Blood sugar and 90 day average sugar reviewed  Results for orders placed or performed in visit on 05/25/21   POC Glucose, Blood    Specimen: Blood   Result Value Ref Range    Glucose 112 70 - 130 mg/dL    Lot Number [id number]     Expiration Date 12/16/2021    POC Glycosylated Hemoglobin (Hb A1C)    Specimen: Blood   Result Value Ref Range    Hemoglobin A1C 5.8 %    Lot Number [id number]     Expiration Date 03/05/2023      Average sugar is 110   This is in prediabetic range   Continue to stay active and watch diet   F/u 3-4 months

## 2021-10-26 ENCOUNTER — TELEPHONE (OUTPATIENT)
Dept: ENDOCRINOLOGY | Facility: CLINIC | Age: 66
End: 2021-10-26

## 2021-10-26 NOTE — TELEPHONE ENCOUNTER
Pt is calling to get advice on when to get the covid booster for patients 65 and older.     Pt is considering waiting 7-8 months instead of the recommended 6.     Pt had second Pfizer vaccine on 04/09.

## 2021-10-26 NOTE — TELEPHONE ENCOUNTER
Pt was advised it will be ok to have the COVID booster anywhere from 6-8 months post the last vaccine  Pt voiced understanding

## 2021-11-22 ENCOUNTER — LAB (OUTPATIENT)
Dept: LAB | Facility: HOSPITAL | Age: 66
End: 2021-11-22

## 2021-11-22 ENCOUNTER — OFFICE VISIT (OUTPATIENT)
Dept: ENDOCRINOLOGY | Facility: CLINIC | Age: 66
End: 2021-11-22

## 2021-11-22 VITALS
BODY MASS INDEX: 29.06 KG/M2 | DIASTOLIC BLOOD PRESSURE: 80 MMHG | WEIGHT: 170.2 LBS | HEIGHT: 64 IN | OXYGEN SATURATION: 99 % | HEART RATE: 105 BPM | SYSTOLIC BLOOD PRESSURE: 148 MMHG

## 2021-11-22 DIAGNOSIS — I10 BENIGN ESSENTIAL HYPERTENSION: ICD-10-CM

## 2021-11-22 DIAGNOSIS — E03.9 ACQUIRED HYPOTHYROIDISM: ICD-10-CM

## 2021-11-22 DIAGNOSIS — E78.00 HYPERCHOLESTEROLEMIA: ICD-10-CM

## 2021-11-22 DIAGNOSIS — E83.52 HYPERCALCEMIA: ICD-10-CM

## 2021-11-22 DIAGNOSIS — R73.09 ELEVATED GLUCOSE: Primary | ICD-10-CM

## 2021-11-22 LAB
BASOPHILS # BLD AUTO: 0.05 10*3/MM3 (ref 0–0.2)
BASOPHILS NFR BLD AUTO: 0.7 % (ref 0–1.5)
DEPRECATED RDW RBC AUTO: 40.5 FL (ref 37–54)
EOSINOPHIL # BLD AUTO: 0.06 10*3/MM3 (ref 0–0.4)
EOSINOPHIL NFR BLD AUTO: 0.9 % (ref 0.3–6.2)
ERYTHROCYTE [DISTWIDTH] IN BLOOD BY AUTOMATED COUNT: 12.2 % (ref 12.3–15.4)
EXPIRATION DATE: NORMAL
EXPIRATION DATE: NORMAL
GLUCOSE BLDC GLUCOMTR-MCNC: 112 MG/DL (ref 70–130)
HBA1C MFR BLD: 5.8 %
HCT VFR BLD AUTO: 40.2 % (ref 34–46.6)
HGB BLD-MCNC: 13.6 G/DL (ref 12–15.9)
IMM GRANULOCYTES # BLD AUTO: 0.01 10*3/MM3 (ref 0–0.05)
IMM GRANULOCYTES NFR BLD AUTO: 0.1 % (ref 0–0.5)
LYMPHOCYTES # BLD AUTO: 2.13 10*3/MM3 (ref 0.7–3.1)
LYMPHOCYTES NFR BLD AUTO: 31.9 % (ref 19.6–45.3)
Lab: NORMAL
Lab: NORMAL
MCH RBC QN AUTO: 30.6 PG (ref 26.6–33)
MCHC RBC AUTO-ENTMCNC: 33.8 G/DL (ref 31.5–35.7)
MCV RBC AUTO: 90.5 FL (ref 79–97)
MONOCYTES # BLD AUTO: 0.49 10*3/MM3 (ref 0.1–0.9)
MONOCYTES NFR BLD AUTO: 7.3 % (ref 5–12)
NEUTROPHILS NFR BLD AUTO: 3.94 10*3/MM3 (ref 1.7–7)
NEUTROPHILS NFR BLD AUTO: 59.1 % (ref 42.7–76)
NRBC BLD AUTO-RTO: 0 /100 WBC (ref 0–0.2)
PLATELET # BLD AUTO: 334 10*3/MM3 (ref 140–450)
PMV BLD AUTO: 10.3 FL (ref 6–12)
RBC # BLD AUTO: 4.44 10*6/MM3 (ref 3.77–5.28)
WBC NRBC COR # BLD: 6.68 10*3/MM3 (ref 3.4–10.8)

## 2021-11-22 PROCEDURE — 83036 HEMOGLOBIN GLYCOSYLATED A1C: CPT | Performed by: INTERNAL MEDICINE

## 2021-11-22 PROCEDURE — 82306 VITAMIN D 25 HYDROXY: CPT | Performed by: INTERNAL MEDICINE

## 2021-11-22 PROCEDURE — 80061 LIPID PANEL: CPT | Performed by: INTERNAL MEDICINE

## 2021-11-22 PROCEDURE — 99214 OFFICE O/P EST MOD 30 MIN: CPT | Performed by: INTERNAL MEDICINE

## 2021-11-22 PROCEDURE — 84443 ASSAY THYROID STIM HORMONE: CPT | Performed by: INTERNAL MEDICINE

## 2021-11-22 PROCEDURE — 3044F HG A1C LEVEL LT 7.0%: CPT | Performed by: INTERNAL MEDICINE

## 2021-11-22 PROCEDURE — 85025 COMPLETE CBC W/AUTO DIFF WBC: CPT | Performed by: INTERNAL MEDICINE

## 2021-11-22 PROCEDURE — 80053 COMPREHEN METABOLIC PANEL: CPT | Performed by: INTERNAL MEDICINE

## 2021-11-22 PROCEDURE — 82947 ASSAY GLUCOSE BLOOD QUANT: CPT | Performed by: INTERNAL MEDICINE

## 2021-11-22 RX ORDER — TRIMETHOPRIM 100 MG/1
100 TABLET ORAL DAILY
Qty: 30 TABLET | Refills: 5 | Status: SHIPPED | OUTPATIENT
Start: 2021-11-22 | End: 2022-02-25 | Stop reason: SDUPTHER

## 2021-11-22 NOTE — PROGRESS NOTES
Siria Garcia 65 y.o.  CC:Follow-up, Hypothyroidism, Hypertension, and Hyperglycemia      [place]: Follow-up, Hypothyroidism, Hypertension, and Hyperglycemia    Blood sugar and 90 day average sugar reviewed  Results for orders placed or performed in visit on 11/22/21   Comprehensive Metabolic Panel    Specimen: Blood   Result Value Ref Range    Glucose 107 (H) 65 - 99 mg/dL    BUN 24 (H) 8 - 23 mg/dL    Creatinine 0.93 0.57 - 1.00 mg/dL    Sodium 142 136 - 145 mmol/L    Potassium 4.4 3.5 - 5.2 mmol/L    Chloride 106 98 - 107 mmol/L    CO2 24.0 22.0 - 29.0 mmol/L    Calcium 10.1 8.6 - 10.5 mg/dL    Total Protein 8.1 6.0 - 8.5 g/dL    Albumin 5.10 3.50 - 5.20 g/dL    ALT (SGPT) 23 1 - 33 U/L    AST (SGOT) 27 1 - 32 U/L    Alkaline Phosphatase 102 39 - 117 U/L    Total Bilirubin 0.3 0.0 - 1.2 mg/dL    eGFR Non African Amer 60 (L) >60 mL/min/1.73    Globulin 3.0 gm/dL    A/G Ratio 1.7 g/dL    BUN/Creatinine Ratio 25.8 (H) 7.0 - 25.0    Anion Gap 12.0 5.0 - 15.0 mmol/L   CBC Auto Differential    Specimen: Blood   Result Value Ref Range    WBC 6.68 3.40 - 10.80 10*3/mm3    RBC 4.44 3.77 - 5.28 10*6/mm3    Hemoglobin 13.6 12.0 - 15.9 g/dL    Hematocrit 40.2 34.0 - 46.6 %    MCV 90.5 79.0 - 97.0 fL    MCH 30.6 26.6 - 33.0 pg    MCHC 33.8 31.5 - 35.7 g/dL    RDW 12.2 (L) 12.3 - 15.4 %    RDW-SD 40.5 37.0 - 54.0 fl    MPV 10.3 6.0 - 12.0 fL    Platelets 334 140 - 450 10*3/mm3    Neutrophil % 59.1 42.7 - 76.0 %    Lymphocyte % 31.9 19.6 - 45.3 %    Monocyte % 7.3 5.0 - 12.0 %    Eosinophil % 0.9 0.3 - 6.2 %    Basophil % 0.7 0.0 - 1.5 %    Immature Grans % 0.1 0.0 - 0.5 %    Neutrophils, Absolute 3.94 1.70 - 7.00 10*3/mm3    Lymphocytes, Absolute 2.13 0.70 - 3.10 10*3/mm3    Monocytes, Absolute 0.49 0.10 - 0.90 10*3/mm3    Eosinophils, Absolute 0.06 0.00 - 0.40 10*3/mm3    Basophils, Absolute 0.05 0.00 - 0.20 10*3/mm3    Immature Grans, Absolute 0.01 0.00 - 0.05 10*3/mm3    nRBC 0.0 0.0 - 0.2 /100 WBC   Lipid Panel    Specimen:  Blood   Result Value Ref Range    Total Cholesterol 201 (H) 0 - 200 mg/dL    Triglycerides 144 0 - 150 mg/dL    HDL Cholesterol 56 40 - 60 mg/dL    LDL Cholesterol  120 (H) 0 - 100 mg/dL    VLDL Cholesterol 25 5 - 40 mg/dL    LDL/HDL Ratio 2.08    TSH    Specimen: Blood   Result Value Ref Range    TSH 0.317 0.270 - 4.200 uIU/mL   Vitamin D 25 Hydroxy    Specimen: Blood   Result Value Ref Range    25 Hydroxy, Vitamin D 36.3 30.0 - 100.0 ng/ml   POC Glycosylated Hemoglobin (Hb A1C)    Specimen: Blood   Result Value Ref Range    Hemoglobin A1C 5.8 %    Lot Number [id number]     Expiration Date 06/28/2023    POC Glucose, Blood    Specimen: Blood   Result Value Ref Range    Glucose 112 70 - 130 mg/dL    Lot Number [id number]     Expiration Date 05/14/2022      New c/o pulled muscles in arms and shoulders  - due to overuse - bilateral shoulders  Cannot reach and extend shoulder  Using icy hot, heat and ice  Is trying to rest   bp high- recheck 136/74  Treated for UTI - no help with keflex or macrobid    Allergies   Allergen Reactions   • Bactrim [Sulfamethoxazole-Trimethoprim] Itching   • Terbinafine Itching       Current Outpatient Medications:   •  CRANBERRY CONCENTRATE PO, Take  by mouth., Disp: , Rfl:   •  Histamine Dihydrochloride (Australian Dream Arthritis) 0.025 % cream, Apply  topically. Apply topically TID, Disp: , Rfl:   •  exemestane (AROMASIN) 25 MG chemo tablet, Take 25 mg by mouth Daily., Disp: , Rfl:   •  fluconazole (DIFLUCAN) 150 MG tablet, Take 1 tablet by mouth Daily As Needed (yeast symptoms)., Disp: 10 tablet, Rfl: 3  •  Glucos-Chond-Hyal Ac-Ca Fructo (MOVE FREE JOINT HEALTH ADVANCE PO), Take  by mouth., Disp: , Rfl:   •  levothyroxine (SYNTHROID, LEVOTHROID) 25 MCG tablet, Take 0.5 tablets by mouth Daily., Disp: 45 tablet, Rfl: 3  •  Loperamide HCl (IMODIUM PO), Take 1 tablet by mouth 2 (Two) Times a Day As Needed., Disp: , Rfl:   •  loratadine (CLARITIN) 10 MG tablet, Take 10 mg by mouth daily.,  Disp: , Rfl:   •  Multiple Vitamins-Minerals (YARI MULTIVITAMIN FOR WOMEN PO), Take  by mouth., Disp: , Rfl:   •  Omega-3 Fatty Acids (OMEGA-3 1450 PO), Take  by mouth Daily., Disp: , Rfl:   •  sore muscle (ICY HOT EXTRA STRENGTH) 10-30 % cream cream, Apply  topically to the appropriate area as directed 2 (Two) Times a Day As Needed. With lido, Disp: , Rfl:   •  trimethoprim (TRIMPEX) 100 MG tablet, Take 1 tablet by mouth Daily., Disp: 30 tablet, Rfl: 5  Patient Active Problem List    Diagnosis    • Hypercalcemia [E83.52]    • Elevated glucose [R73.09]    • Tubular adenoma [D36.9]    • Malignant neoplasm of upper-outer quadrant of breast in female, estrogen receptor positive (HCC) [C50.419, Z17.0]    • Vaginitis and vulvovaginitis [N76.0]    • Sore throat [J02.9]    • Acute low back pain [M54.50]    • Benign essential hypertension [I10]    • Gastroesophageal reflux disease [K21.9]    • Fatigue [R53.83]    • Foot pain [M79.673]    • Hypercholesterolemia [E78.00]    • Hypothyroidism [E03.9]    • Knee pain [M25.569]    • Osteoarthritis of hand [M19.049]    • Vaginal yeast infection [B37.3]      Review of Systems   Constitutional: Negative for activity change, appetite change and unexpected weight change.   HENT: Negative for congestion and rhinorrhea.    Eyes: Negative for visual disturbance.   Respiratory: Negative for cough and shortness of breath.    Cardiovascular: Negative for palpitations and leg swelling.   Gastrointestinal: Negative for constipation, diarrhea and nausea.   Genitourinary: Negative for hematuria.   Musculoskeletal: Negative for arthralgias, back pain, gait problem, joint swelling and myalgias.   Skin: Negative for color change, rash and wound.   Allergic/Immunologic: Negative for environmental allergies, food allergies and immunocompromised state.   Neurological: Negative for dizziness, weakness and light-headedness.   Psychiatric/Behavioral: Negative for confusion, decreased concentration,  "dysphoric mood and sleep disturbance. The patient is not nervous/anxious.      Social History     Socioeconomic History   • Marital status:    Tobacco Use   • Smoking status: Never Smoker   • Smokeless tobacco: Never Used   Substance and Sexual Activity   • Alcohol use: No   • Drug use: No     Family History   Problem Relation Age of Onset   • Diabetes Father    • Hypertension Father    • Breast cancer Mother      /80   Pulse 105   Ht 162.6 cm (64\")   Wt 77.2 kg (170 lb 3.2 oz)   SpO2 99%   BMI 29.21 kg/m²   Physical Exam  Vitals and nursing note reviewed.   Constitutional:       Appearance: Normal appearance. She is well-developed.   HENT:      Head: Normocephalic and atraumatic.   Eyes:      General: Lids are normal.      Extraocular Movements: Extraocular movements intact.      Conjunctiva/sclera: Conjunctivae normal.      Pupils: Pupils are equal, round, and reactive to light.   Neck:      Thyroid: No thyroid mass or thyromegaly.      Vascular: No carotid bruit.      Trachea: Trachea normal. No tracheal deviation.   Cardiovascular:      Rate and Rhythm: Normal rate and regular rhythm.      Pulses: Normal pulses.      Heart sounds: Normal heart sounds. No murmur heard.  No friction rub. No gallop.    Pulmonary:      Effort: Pulmonary effort is normal. No respiratory distress.      Breath sounds: Normal breath sounds. No wheezing.   Musculoskeletal:         General: No deformity. Normal range of motion.      Cervical back: Normal range of motion and neck supple.   Lymphadenopathy:      Cervical: No cervical adenopathy.   Skin:     General: Skin is warm and dry.      Findings: No erythema or rash.      Nails: There is no clubbing.   Neurological:      General: No focal deficit present.      Mental Status: She is alert and oriented to person, place, and time.      Cranial Nerves: No cranial nerve deficit.      Deep Tendon Reflexes: Reflexes are normal and symmetric. Reflexes normal.   Psychiatric:  "        Mood and Affect: Mood normal.         Speech: Speech normal.         Behavior: Behavior normal.         Thought Content: Thought content normal.         Judgment: Judgment normal.       Results for orders placed or performed in visit on 11/22/21   Comprehensive Metabolic Panel    Specimen: Blood   Result Value Ref Range    Glucose 107 (H) 65 - 99 mg/dL    BUN 24 (H) 8 - 23 mg/dL    Creatinine 0.93 0.57 - 1.00 mg/dL    Sodium 142 136 - 145 mmol/L    Potassium 4.4 3.5 - 5.2 mmol/L    Chloride 106 98 - 107 mmol/L    CO2 24.0 22.0 - 29.0 mmol/L    Calcium 10.1 8.6 - 10.5 mg/dL    Total Protein 8.1 6.0 - 8.5 g/dL    Albumin 5.10 3.50 - 5.20 g/dL    ALT (SGPT) 23 1 - 33 U/L    AST (SGOT) 27 1 - 32 U/L    Alkaline Phosphatase 102 39 - 117 U/L    Total Bilirubin 0.3 0.0 - 1.2 mg/dL    eGFR Non African Amer 60 (L) >60 mL/min/1.73    Globulin 3.0 gm/dL    A/G Ratio 1.7 g/dL    BUN/Creatinine Ratio 25.8 (H) 7.0 - 25.0    Anion Gap 12.0 5.0 - 15.0 mmol/L   CBC Auto Differential    Specimen: Blood   Result Value Ref Range    WBC 6.68 3.40 - 10.80 10*3/mm3    RBC 4.44 3.77 - 5.28 10*6/mm3    Hemoglobin 13.6 12.0 - 15.9 g/dL    Hematocrit 40.2 34.0 - 46.6 %    MCV 90.5 79.0 - 97.0 fL    MCH 30.6 26.6 - 33.0 pg    MCHC 33.8 31.5 - 35.7 g/dL    RDW 12.2 (L) 12.3 - 15.4 %    RDW-SD 40.5 37.0 - 54.0 fl    MPV 10.3 6.0 - 12.0 fL    Platelets 334 140 - 450 10*3/mm3    Neutrophil % 59.1 42.7 - 76.0 %    Lymphocyte % 31.9 19.6 - 45.3 %    Monocyte % 7.3 5.0 - 12.0 %    Eosinophil % 0.9 0.3 - 6.2 %    Basophil % 0.7 0.0 - 1.5 %    Immature Grans % 0.1 0.0 - 0.5 %    Neutrophils, Absolute 3.94 1.70 - 7.00 10*3/mm3    Lymphocytes, Absolute 2.13 0.70 - 3.10 10*3/mm3    Monocytes, Absolute 0.49 0.10 - 0.90 10*3/mm3    Eosinophils, Absolute 0.06 0.00 - 0.40 10*3/mm3    Basophils, Absolute 0.05 0.00 - 0.20 10*3/mm3    Immature Grans, Absolute 0.01 0.00 - 0.05 10*3/mm3    nRBC 0.0 0.0 - 0.2 /100 WBC   Lipid Panel    Specimen: Blood    Result Value Ref Range    Total Cholesterol 201 (H) 0 - 200 mg/dL    Triglycerides 144 0 - 150 mg/dL    HDL Cholesterol 56 40 - 60 mg/dL    LDL Cholesterol  120 (H) 0 - 100 mg/dL    VLDL Cholesterol 25 5 - 40 mg/dL    LDL/HDL Ratio 2.08    TSH    Specimen: Blood   Result Value Ref Range    TSH 0.317 0.270 - 4.200 uIU/mL   Vitamin D 25 Hydroxy    Specimen: Blood   Result Value Ref Range    25 Hydroxy, Vitamin D 36.3 30.0 - 100.0 ng/ml   POC Glycosylated Hemoglobin (Hb A1C)    Specimen: Blood   Result Value Ref Range    Hemoglobin A1C 5.8 %    Lot Number [id number]     Expiration Date 06/28/2023    POC Glucose, Blood    Specimen: Blood   Result Value Ref Range    Glucose 112 70 - 130 mg/dL    Lot Number [id number]     Expiration Date 05/14/2022      Diagnoses and all orders for this visit:    1. Elevated glucose (Primary)  Assessment & Plan:  Blood sugar and 90 day average sugar reviewed  Results for orders placed or performed in visit on 11/22/21   Comprehensive Metabolic Panel    Specimen: Blood   Result Value Ref Range    Glucose 107 (H) 65 - 99 mg/dL    BUN 24 (H) 8 - 23 mg/dL    Creatinine 0.93 0.57 - 1.00 mg/dL    Sodium 142 136 - 145 mmol/L    Potassium 4.4 3.5 - 5.2 mmol/L    Chloride 106 98 - 107 mmol/L    CO2 24.0 22.0 - 29.0 mmol/L    Calcium 10.1 8.6 - 10.5 mg/dL    Total Protein 8.1 6.0 - 8.5 g/dL    Albumin 5.10 3.50 - 5.20 g/dL    ALT (SGPT) 23 1 - 33 U/L    AST (SGOT) 27 1 - 32 U/L    Alkaline Phosphatase 102 39 - 117 U/L    Total Bilirubin 0.3 0.0 - 1.2 mg/dL    eGFR Non African Amer 60 (L) >60 mL/min/1.73    Globulin 3.0 gm/dL    A/G Ratio 1.7 g/dL    BUN/Creatinine Ratio 25.8 (H) 7.0 - 25.0    Anion Gap 12.0 5.0 - 15.0 mmol/L   CBC Auto Differential    Specimen: Blood   Result Value Ref Range    WBC 6.68 3.40 - 10.80 10*3/mm3    RBC 4.44 3.77 - 5.28 10*6/mm3    Hemoglobin 13.6 12.0 - 15.9 g/dL    Hematocrit 40.2 34.0 - 46.6 %    MCV 90.5 79.0 - 97.0 fL    MCH 30.6 26.6 - 33.0 pg    MCHC 33.8 31.5  - 35.7 g/dL    RDW 12.2 (L) 12.3 - 15.4 %    RDW-SD 40.5 37.0 - 54.0 fl    MPV 10.3 6.0 - 12.0 fL    Platelets 334 140 - 450 10*3/mm3    Neutrophil % 59.1 42.7 - 76.0 %    Lymphocyte % 31.9 19.6 - 45.3 %    Monocyte % 7.3 5.0 - 12.0 %    Eosinophil % 0.9 0.3 - 6.2 %    Basophil % 0.7 0.0 - 1.5 %    Immature Grans % 0.1 0.0 - 0.5 %    Neutrophils, Absolute 3.94 1.70 - 7.00 10*3/mm3    Lymphocytes, Absolute 2.13 0.70 - 3.10 10*3/mm3    Monocytes, Absolute 0.49 0.10 - 0.90 10*3/mm3    Eosinophils, Absolute 0.06 0.00 - 0.40 10*3/mm3    Basophils, Absolute 0.05 0.00 - 0.20 10*3/mm3    Immature Grans, Absolute 0.01 0.00 - 0.05 10*3/mm3    nRBC 0.0 0.0 - 0.2 /100 WBC   Lipid Panel    Specimen: Blood   Result Value Ref Range    Total Cholesterol 201 (H) 0 - 200 mg/dL    Triglycerides 144 0 - 150 mg/dL    HDL Cholesterol 56 40 - 60 mg/dL    LDL Cholesterol  120 (H) 0 - 100 mg/dL    VLDL Cholesterol 25 5 - 40 mg/dL    LDL/HDL Ratio 2.08    TSH    Specimen: Blood   Result Value Ref Range    TSH 0.317 0.270 - 4.200 uIU/mL   Vitamin D 25 Hydroxy    Specimen: Blood   Result Value Ref Range    25 Hydroxy, Vitamin D 36.3 30.0 - 100.0 ng/ml   POC Glycosylated Hemoglobin (Hb A1C)    Specimen: Blood   Result Value Ref Range    Hemoglobin A1C 5.8 %    Lot Number [id number]     Expiration Date 06/28/2023    POC Glucose, Blood    Specimen: Blood   Result Value Ref Range    Glucose 112 70 - 130 mg/dL    Lot Number [id number]     Expiration Date 05/14/2022      Average sugar is prediabetic- continue to stay active, watch diet     Orders:  -     POC Glycosylated Hemoglobin (Hb A1C)  -     POC Glucose, Blood    2. Benign essential hypertension  Assessment & Plan:  Higher bp - lower at home (home readings reviewed)  Continue monitoring   Call if over 145/85 at home     Orders:  -     Comprehensive Metabolic Panel  -     CBC Auto Differential    3. Acquired hypothyroidism  Assessment & Plan:  Taking synthroid 25 mcg dailiy   Check tsh      Orders:  -     TSH    4. Hypercholesterolemia  Assessment & Plan:  Is on low fat diet, fish oil  Check flp     Orders:  -     Lipid Panel    5. Hypercalcemia  Assessment & Plan:  Update total calcium, vitamin D level     Orders:  -     Vitamin D 25 Hydroxy    Other orders  -     trimethoprim (TRIMPEX) 100 MG tablet; Take 1 tablet by mouth Daily.  Dispense: 30 tablet; Refill: 5  Return in about 6 months (around 5/22/2022) for Recheck.  Discussed tmp use and she was encouraged to reestablish with urology   Options provided, discussed long term urinary suppression   Would not advise using trimethoprim for urinary infections - would advised ov for ua for diagnosis and use of separate agent discussed  Return in about 6 months (around 5/22/2022) for Recheck.    Sofia Hong MD  Signed Sofia Hong MD

## 2021-11-23 LAB
25(OH)D3 SERPL-MCNC: 36.3 NG/ML (ref 30–100)
ALBUMIN SERPL-MCNC: 5.1 G/DL (ref 3.5–5.2)
ALBUMIN/GLOB SERPL: 1.7 G/DL
ALP SERPL-CCNC: 102 U/L (ref 39–117)
ALT SERPL W P-5'-P-CCNC: 23 U/L (ref 1–33)
ANION GAP SERPL CALCULATED.3IONS-SCNC: 12 MMOL/L (ref 5–15)
AST SERPL-CCNC: 27 U/L (ref 1–32)
BILIRUB SERPL-MCNC: 0.3 MG/DL (ref 0–1.2)
BUN SERPL-MCNC: 24 MG/DL (ref 8–23)
BUN/CREAT SERPL: 25.8 (ref 7–25)
CALCIUM SPEC-SCNC: 10.1 MG/DL (ref 8.6–10.5)
CHLORIDE SERPL-SCNC: 106 MMOL/L (ref 98–107)
CHOLEST SERPL-MCNC: 201 MG/DL (ref 0–200)
CO2 SERPL-SCNC: 24 MMOL/L (ref 22–29)
CREAT SERPL-MCNC: 0.93 MG/DL (ref 0.57–1)
GFR SERPL CREATININE-BSD FRML MDRD: 60 ML/MIN/1.73
GLOBULIN UR ELPH-MCNC: 3 GM/DL
GLUCOSE SERPL-MCNC: 107 MG/DL (ref 65–99)
HDLC SERPL-MCNC: 56 MG/DL (ref 40–60)
LDLC SERPL CALC-MCNC: 120 MG/DL (ref 0–100)
LDLC/HDLC SERPL: 2.08 {RATIO}
POTASSIUM SERPL-SCNC: 4.4 MMOL/L (ref 3.5–5.2)
PROT SERPL-MCNC: 8.1 G/DL (ref 6–8.5)
SODIUM SERPL-SCNC: 142 MMOL/L (ref 136–145)
TRIGL SERPL-MCNC: 144 MG/DL (ref 0–150)
TSH SERPL DL<=0.05 MIU/L-ACNC: 0.32 UIU/ML (ref 0.27–4.2)
VLDLC SERPL-MCNC: 25 MG/DL (ref 5–40)

## 2021-11-23 NOTE — ASSESSMENT & PLAN NOTE
Higher bp - lower at home (home readings reviewed)  Continue monitoring   Call if over 145/85 at home

## 2021-11-23 NOTE — ASSESSMENT & PLAN NOTE
Blood sugar and 90 day average sugar reviewed  Results for orders placed or performed in visit on 11/22/21   Comprehensive Metabolic Panel    Specimen: Blood   Result Value Ref Range    Glucose 107 (H) 65 - 99 mg/dL    BUN 24 (H) 8 - 23 mg/dL    Creatinine 0.93 0.57 - 1.00 mg/dL    Sodium 142 136 - 145 mmol/L    Potassium 4.4 3.5 - 5.2 mmol/L    Chloride 106 98 - 107 mmol/L    CO2 24.0 22.0 - 29.0 mmol/L    Calcium 10.1 8.6 - 10.5 mg/dL    Total Protein 8.1 6.0 - 8.5 g/dL    Albumin 5.10 3.50 - 5.20 g/dL    ALT (SGPT) 23 1 - 33 U/L    AST (SGOT) 27 1 - 32 U/L    Alkaline Phosphatase 102 39 - 117 U/L    Total Bilirubin 0.3 0.0 - 1.2 mg/dL    eGFR Non African Amer 60 (L) >60 mL/min/1.73    Globulin 3.0 gm/dL    A/G Ratio 1.7 g/dL    BUN/Creatinine Ratio 25.8 (H) 7.0 - 25.0    Anion Gap 12.0 5.0 - 15.0 mmol/L   CBC Auto Differential    Specimen: Blood   Result Value Ref Range    WBC 6.68 3.40 - 10.80 10*3/mm3    RBC 4.44 3.77 - 5.28 10*6/mm3    Hemoglobin 13.6 12.0 - 15.9 g/dL    Hematocrit 40.2 34.0 - 46.6 %    MCV 90.5 79.0 - 97.0 fL    MCH 30.6 26.6 - 33.0 pg    MCHC 33.8 31.5 - 35.7 g/dL    RDW 12.2 (L) 12.3 - 15.4 %    RDW-SD 40.5 37.0 - 54.0 fl    MPV 10.3 6.0 - 12.0 fL    Platelets 334 140 - 450 10*3/mm3    Neutrophil % 59.1 42.7 - 76.0 %    Lymphocyte % 31.9 19.6 - 45.3 %    Monocyte % 7.3 5.0 - 12.0 %    Eosinophil % 0.9 0.3 - 6.2 %    Basophil % 0.7 0.0 - 1.5 %    Immature Grans % 0.1 0.0 - 0.5 %    Neutrophils, Absolute 3.94 1.70 - 7.00 10*3/mm3    Lymphocytes, Absolute 2.13 0.70 - 3.10 10*3/mm3    Monocytes, Absolute 0.49 0.10 - 0.90 10*3/mm3    Eosinophils, Absolute 0.06 0.00 - 0.40 10*3/mm3    Basophils, Absolute 0.05 0.00 - 0.20 10*3/mm3    Immature Grans, Absolute 0.01 0.00 - 0.05 10*3/mm3    nRBC 0.0 0.0 - 0.2 /100 WBC   Lipid Panel    Specimen: Blood   Result Value Ref Range    Total Cholesterol 201 (H) 0 - 200 mg/dL    Triglycerides 144 0 - 150 mg/dL    HDL Cholesterol 56 40 - 60 mg/dL    LDL  Cholesterol  120 (H) 0 - 100 mg/dL    VLDL Cholesterol 25 5 - 40 mg/dL    LDL/HDL Ratio 2.08    TSH    Specimen: Blood   Result Value Ref Range    TSH 0.317 0.270 - 4.200 uIU/mL   Vitamin D 25 Hydroxy    Specimen: Blood   Result Value Ref Range    25 Hydroxy, Vitamin D 36.3 30.0 - 100.0 ng/ml   POC Glycosylated Hemoglobin (Hb A1C)    Specimen: Blood   Result Value Ref Range    Hemoglobin A1C 5.8 %    Lot Number [id number]     Expiration Date 06/28/2023    POC Glucose, Blood    Specimen: Blood   Result Value Ref Range    Glucose 112 70 - 130 mg/dL    Lot Number [id number]     Expiration Date 05/14/2022      Average sugar is prediabetic- continue to stay active, watch diet

## 2022-02-25 RX ORDER — TRIMETHOPRIM 100 MG/1
100 TABLET ORAL DAILY
Qty: 30 TABLET | Refills: 5 | Status: SHIPPED | OUTPATIENT
Start: 2022-02-25 | End: 2022-02-25 | Stop reason: SDUPTHER

## 2022-02-25 RX ORDER — TRIMETHOPRIM 100 MG/1
100 TABLET ORAL DAILY
Qty: 30 TABLET | Refills: 5 | Status: SHIPPED | OUTPATIENT
Start: 2022-02-25 | End: 2022-05-17 | Stop reason: SDUPTHER

## 2022-05-17 ENCOUNTER — OFFICE VISIT (OUTPATIENT)
Dept: ENDOCRINOLOGY | Facility: CLINIC | Age: 67
End: 2022-05-17

## 2022-05-17 ENCOUNTER — LAB (OUTPATIENT)
Dept: LAB | Facility: HOSPITAL | Age: 67
End: 2022-05-17

## 2022-05-17 VITALS
DIASTOLIC BLOOD PRESSURE: 78 MMHG | BODY MASS INDEX: 31.21 KG/M2 | SYSTOLIC BLOOD PRESSURE: 138 MMHG | OXYGEN SATURATION: 100 % | HEIGHT: 62 IN | WEIGHT: 169.6 LBS | HEART RATE: 98 BPM

## 2022-05-17 DIAGNOSIS — R73.9 HYPERGLYCEMIA: Primary | ICD-10-CM

## 2022-05-17 DIAGNOSIS — E83.52 HYPERCALCEMIA: ICD-10-CM

## 2022-05-17 DIAGNOSIS — I10 BENIGN ESSENTIAL HYPERTENSION: ICD-10-CM

## 2022-05-17 DIAGNOSIS — E03.9 ACQUIRED HYPOTHYROIDISM: ICD-10-CM

## 2022-05-17 DIAGNOSIS — N76.0 VAGINITIS AND VULVOVAGINITIS: ICD-10-CM

## 2022-05-17 LAB
25(OH)D3 SERPL-MCNC: 44.4 NG/ML (ref 30–100)
ALBUMIN SERPL-MCNC: 5 G/DL (ref 3.5–5.2)
ALBUMIN/GLOB SERPL: 1.9 G/DL
ALP SERPL-CCNC: 88 U/L (ref 39–117)
ALT SERPL W P-5'-P-CCNC: 23 U/L (ref 1–33)
ANION GAP SERPL CALCULATED.3IONS-SCNC: 15 MMOL/L (ref 5–15)
AST SERPL-CCNC: 26 U/L (ref 1–32)
BILIRUB SERPL-MCNC: 0.2 MG/DL (ref 0–1.2)
BUN SERPL-MCNC: 19 MG/DL (ref 8–23)
BUN/CREAT SERPL: 21.3 (ref 7–25)
CALCIUM SPEC-SCNC: 9.9 MG/DL (ref 8.6–10.5)
CHLORIDE SERPL-SCNC: 105 MMOL/L (ref 98–107)
CHOLEST SERPL-MCNC: 207 MG/DL (ref 0–200)
CO2 SERPL-SCNC: 21 MMOL/L (ref 22–29)
CREAT SERPL-MCNC: 0.89 MG/DL (ref 0.57–1)
EGFRCR SERPLBLD CKD-EPI 2021: 71.2 ML/MIN/1.73
EXPIRATION DATE: NORMAL
EXPIRATION DATE: NORMAL
GLOBULIN UR ELPH-MCNC: 2.6 GM/DL
GLUCOSE BLDC GLUCOMTR-MCNC: 105 MG/DL (ref 70–130)
GLUCOSE SERPL-MCNC: 103 MG/DL (ref 65–99)
HBA1C MFR BLD: 5.7 %
HDLC SERPL-MCNC: 53 MG/DL (ref 40–60)
LDLC SERPL CALC-MCNC: 125 MG/DL (ref 0–100)
LDLC/HDLC SERPL: 2.28 {RATIO}
Lab: NORMAL
Lab: NORMAL
POTASSIUM SERPL-SCNC: 4.6 MMOL/L (ref 3.5–5.2)
PROT SERPL-MCNC: 7.6 G/DL (ref 6–8.5)
SODIUM SERPL-SCNC: 141 MMOL/L (ref 136–145)
T4 FREE SERPL-MCNC: 1.3 NG/DL (ref 0.93–1.7)
TRIGL SERPL-MCNC: 167 MG/DL (ref 0–150)
TSH SERPL DL<=0.05 MIU/L-ACNC: 0.34 UIU/ML (ref 0.27–4.2)
VLDLC SERPL-MCNC: 29 MG/DL (ref 5–40)

## 2022-05-17 PROCEDURE — 80061 LIPID PANEL: CPT | Performed by: INTERNAL MEDICINE

## 2022-05-17 PROCEDURE — 84439 ASSAY OF FREE THYROXINE: CPT | Performed by: INTERNAL MEDICINE

## 2022-05-17 PROCEDURE — 3044F HG A1C LEVEL LT 7.0%: CPT | Performed by: INTERNAL MEDICINE

## 2022-05-17 PROCEDURE — 82947 ASSAY GLUCOSE BLOOD QUANT: CPT | Performed by: INTERNAL MEDICINE

## 2022-05-17 PROCEDURE — 82306 VITAMIN D 25 HYDROXY: CPT | Performed by: INTERNAL MEDICINE

## 2022-05-17 PROCEDURE — 83036 HEMOGLOBIN GLYCOSYLATED A1C: CPT | Performed by: INTERNAL MEDICINE

## 2022-05-17 PROCEDURE — 80053 COMPREHEN METABOLIC PANEL: CPT | Performed by: INTERNAL MEDICINE

## 2022-05-17 PROCEDURE — 84443 ASSAY THYROID STIM HORMONE: CPT | Performed by: INTERNAL MEDICINE

## 2022-05-17 PROCEDURE — 99214 OFFICE O/P EST MOD 30 MIN: CPT | Performed by: INTERNAL MEDICINE

## 2022-05-17 RX ORDER — TRIMETHOPRIM 100 MG/1
100 TABLET ORAL DAILY
Qty: 90 TABLET | Refills: 1 | Status: SHIPPED | OUTPATIENT
Start: 2022-05-17 | End: 2022-05-17 | Stop reason: SDUPTHER

## 2022-05-17 RX ORDER — TRIMETHOPRIM 100 MG/1
100 TABLET ORAL DAILY
Qty: 90 TABLET | Refills: 1 | Status: SHIPPED | OUTPATIENT
Start: 2022-05-17

## 2022-05-17 RX ORDER — LEVOTHYROXINE SODIUM 0.03 MG/1
12.5 TABLET ORAL DAILY
Qty: 45 TABLET | Refills: 3 | Status: SHIPPED | OUTPATIENT
Start: 2022-05-17 | End: 2022-12-06 | Stop reason: SDUPTHER

## 2022-05-17 RX ORDER — CHOLECALCIFEROL (VITAMIN D3) 50 MCG
2000 TABLET ORAL DAILY
COMMUNITY

## 2022-05-17 RX ORDER — FLUCONAZOLE 150 MG/1
150 TABLET ORAL DAILY PRN
Qty: 10 TABLET | Refills: 3 | Status: SHIPPED | OUTPATIENT
Start: 2022-05-17

## 2022-05-17 NOTE — ASSESSMENT & PLAN NOTE
With recurrent yeast  a1c prediabetes  Not on sglt-2  Does take daily trimpex for UTI prevention (urology)

## 2022-05-17 NOTE — PROGRESS NOTES
Siria GEORGIE Garcia 66 y.o.  CC:Follow-up, Hypothyroidism, Hypertension, and Hyperglycemia      Craig: Follow-up, Hypothyroidism, Hypertension, and Hyperglycemia    bp is good  Is taking synthroid 25 mcg daily   Energy is good overall   Blood sugar and 90 day average sugar reviewed  Results for orders placed or performed in visit on 05/17/22   POC Glycosylated Hemoglobin (Hb A1C)    Specimen: Blood   Result Value Ref Range    Hemoglobin A1C 5.7 %    Lot Number [id number]     Expiration Date 02/04/2024    POC Glucose, Blood    Specimen: Blood   Result Value Ref Range    Glucose 105 70 - 130 mg/dL    Lot Number [id number]     Expiration Date 11/10/2022      Average sugar is 110- in prediabetic range  bp log reviewed  C/o fungal nail infection     Allergies   Allergen Reactions   • Bactrim [Sulfamethoxazole-Trimethoprim] Itching   • Terbinafine Itching       Current Outpatient Medications:   •  fluconazole (DIFLUCAN) 150 MG tablet, Take 1 tablet by mouth Daily As Needed (yeast symptoms)., Disp: 10 tablet, Rfl: 3  •  levothyroxine (SYNTHROID, LEVOTHROID) 25 MCG tablet, Take 0.5 tablets by mouth Daily., Disp: 45 tablet, Rfl: 3  •  trimethoprim (TRIMPEX) 100 MG tablet, Take 1 tablet by mouth Daily., Disp: 90 tablet, Rfl: 1  •  Cholecalciferol (Vitamin D) 50 MCG (2000 UT) tablet, Take 2,000 Units by mouth Daily., Disp: , Rfl:   •  CRANBERRY CONCENTRATE PO, Take  by mouth., Disp: , Rfl:   •  exemestane (AROMASIN) 25 MG chemo tablet, Take 25 mg by mouth Daily., Disp: , Rfl:   •  Glucos-Chond-Hyal Ac-Ca Fructo (MOVE FREE JOINT HEALTH ADVANCE PO), Take  by mouth., Disp: , Rfl:   •  Histamine Dihydrochloride (Australian Dream Arthritis) 0.025 % cream, Apply  topically. Apply topically TID, Disp: , Rfl:   •  Loperamide HCl (IMODIUM PO), Take 1 tablet by mouth 2 (Two) Times a Day As Needed., Disp: , Rfl:   •  loratadine (CLARITIN) 10 MG tablet, Take 10 mg by mouth daily., Disp: , Rfl:   •  Multiple Vitamins-Minerals (YARI MULTIVITAMIN FOR  WOMEN PO), Take  by mouth., Disp: , Rfl:   •  Omega-3 Fatty Acids (OMEGA-3 1450 PO), Take  by mouth Daily., Disp: , Rfl:   •  sore muscle (ICY HOT EXTRA STRENGTH) 10-30 % cream cream, Apply  topically to the appropriate area as directed 2 (Two) Times a Day As Needed. With lido, Disp: , Rfl:   Patient Active Problem List    Diagnosis    • Hypercalcemia [E83.52]    • Elevated glucose [R73.09]    • Tubular adenoma [D36.9]    • Malignant neoplasm of upper-outer quadrant of breast in female, estrogen receptor positive (HCC) [C50.419, Z17.0]    • Vaginitis and vulvovaginitis [N76.0]    • Sore throat [J02.9]    • Acute low back pain [M54.50]    • Benign essential hypertension [I10]    • Gastroesophageal reflux disease [K21.9]    • Fatigue [R53.83]    • Foot pain [M79.673]    • Hypercholesterolemia [E78.00]    • Hypothyroidism [E03.9]    • Knee pain [M25.569]    • Osteoarthritis of hand [M19.049]    • Vaginal yeast infection [B37.3]      Review of Systems   Constitutional: Negative for activity change, appetite change and unexpected weight change.   HENT: Negative for congestion and rhinorrhea.    Eyes: Negative for visual disturbance.   Respiratory: Negative for cough and shortness of breath.    Cardiovascular: Negative for palpitations and leg swelling.   Gastrointestinal: Negative for constipation, diarrhea and nausea.   Genitourinary: Negative for hematuria.   Musculoskeletal: Negative for arthralgias, back pain, gait problem, joint swelling and myalgias.   Skin: Negative for color change, rash and wound.   Allergic/Immunologic: Negative for environmental allergies, food allergies and immunocompromised state.   Neurological: Negative for dizziness, weakness and light-headedness.   Psychiatric/Behavioral: Negative for confusion, decreased concentration, dysphoric mood and sleep disturbance. The patient is not nervous/anxious.      Social History     Socioeconomic History   • Marital status:    Tobacco Use   •  "Smoking status: Never Smoker   • Smokeless tobacco: Never Used   Substance and Sexual Activity   • Alcohol use: No   • Drug use: No     Family History   Problem Relation Age of Onset   • Diabetes Father    • Hypertension Father    • Breast cancer Mother      /78   Pulse 98   Ht 157.5 cm (62\")   Wt 76.9 kg (169 lb 9.6 oz)   SpO2 100%   BMI 31.02 kg/m²   Physical Exam  Vitals and nursing note reviewed.   Constitutional:       Appearance: Normal appearance. She is well-developed.   HENT:      Head: Normocephalic and atraumatic.      Mouth/Throat:      Comments: Geographic tongue   Eyes:      General: Lids are normal.      Extraocular Movements: Extraocular movements intact.      Conjunctiva/sclera: Conjunctivae normal.      Pupils: Pupils are equal, round, and reactive to light.   Neck:      Thyroid: No thyroid mass or thyromegaly.      Vascular: No carotid bruit.      Trachea: Trachea normal. No tracheal deviation.   Cardiovascular:      Rate and Rhythm: Normal rate and regular rhythm.      Pulses: Normal pulses.      Heart sounds: Normal heart sounds. No murmur heard.    No friction rub. No gallop.   Pulmonary:      Effort: Pulmonary effort is normal. No respiratory distress.      Breath sounds: Normal breath sounds. No wheezing.   Musculoskeletal:         General: No deformity. Normal range of motion.      Cervical back: Normal range of motion and neck supple.   Lymphadenopathy:      Cervical: No cervical adenopathy.   Skin:     General: Skin is warm and dry.      Findings: No erythema or rash.      Nails: There is no clubbing.   Neurological:      General: No focal deficit present.      Mental Status: She is alert and oriented to person, place, and time.      Cranial Nerves: No cranial nerve deficit.      Deep Tendon Reflexes: Reflexes are normal and symmetric. Reflexes normal.   Psychiatric:         Mood and Affect: Mood normal.         Speech: Speech normal.         Behavior: Behavior normal.         " Thought Content: Thought content normal.         Judgment: Judgment normal.       Results for orders placed or performed in visit on 05/17/22   POC Glycosylated Hemoglobin (Hb A1C)    Specimen: Blood   Result Value Ref Range    Hemoglobin A1C 5.7 %    Lot Number [id number]     Expiration Date 02/04/2024    POC Glucose, Blood    Specimen: Blood   Result Value Ref Range    Glucose 105 70 - 130 mg/dL    Lot Number [id number]     Expiration Date 11/10/2022      Diagnoses and all orders for this visit:    1. Hyperglycemia (Primary)  -     POC Glycosylated Hemoglobin (Hb A1C)  -     POC Glucose, Blood  -     trimethoprim (TRIMPEX) 100 MG tablet; Take 1 tablet by mouth Daily.  Dispense: 90 tablet; Refill: 1    2. Vaginitis and vulvovaginitis  Assessment & Plan:  With recurrent yeast  a1c prediabetes  Not on sglt-2  Does take daily trimpex for UTI prevention (urology)      Orders:  -     fluconazole (DIFLUCAN) 150 MG tablet; Take 1 tablet by mouth Daily As Needed (yeast symptoms).  Dispense: 10 tablet; Refill: 3    3. Benign essential hypertension  Assessment & Plan:  bp controlled  bp log reviewed and are normal   Continue monitoring     Orders:  -     Comprehensive Metabolic Panel  -     Lipid Panel    4. Acquired hypothyroidism  Assessment & Plan:  Continue synthroid 25 mcg daily   Check tsh    Orders:  -     TSH  -     T4, Free    5. Hypercalcemia  Assessment & Plan:  Check cmp, vitamin D level     Orders:  -     Vitamin D 25 Hydroxy    Other orders  -     levothyroxine (SYNTHROID, LEVOTHROID) 25 MCG tablet; Take 0.5 tablets by mouth Daily.  Dispense: 45 tablet; Refill: 3  -     Discontinue: trimethoprim (TRIMPEX) 100 MG tablet; Take 1 tablet by mouth Daily.  Dispense: 90 tablet; Refill: 1  try vinegar for fungal nail (soak cotton ball and apply daily   Return in about 6 months (around 11/17/2022) for Recheck.    Sofia Hong MD  Signed Sofia Hong MD

## 2022-06-02 ENCOUNTER — TELEPHONE (OUTPATIENT)
Dept: ENDOCRINOLOGY | Facility: CLINIC | Age: 67
End: 2022-06-02

## 2022-06-02 NOTE — TELEPHONE ENCOUNTER
PATIENT IS REQUESTING A RETURN CALL. SHE HAS QUESTIONS REGARDING COVID-19 BOOSTER. SHE WOULD ALSO LIKE TO KNOW IF DR MULLIGAN HAS ANY RECOMMENDATIONS ABOUT MILD DIVERTICULITIS. SHE HAS AN APPT WITH [facility] IN AUGUST.     CALL BACK [phone] WILL BE OUT OF TOWN TOMORROW

## 2022-06-02 NOTE — TELEPHONE ENCOUNTER
Spoke to pt-she doesn't want to bother dr coronado.  I told her she would be back in office Tuesday-we can let her know what she thinks.  Ms leal will be away until Wednesday or Thursday.can contact her then

## 2022-06-06 NOTE — TELEPHONE ENCOUNTER
I do recommend covid booster  What symptoms does she have that cause her to think she has diverticulitis?  If she is having abdominal pain she should to go ER or [facility] for imaging  Thanksjony

## 2022-06-06 NOTE — TELEPHONE ENCOUNTER
LILIAM pt. She states she remembered when she had a colonoscopy in 2018 they told her she had mild diverticulitis on the left side. Her symptoms are cramping in lower abdomen area, not severe pain almost like menstrual cramping. Normal bowel movements. She has increased/decreased fiber and not really any changes in symptoms. She doesn't know if she should cut things out food wise or medication she can take? She is scheduled to see GI in 8/22. alex

## 2022-06-07 NOTE — TELEPHONE ENCOUNTER
I would recommend urgent care or ER if she develops worsening pain, fever  Prior colonoscopy showed diverticulosis (not diverticulitis).  Symptoms are suggestive but would also be explained by constipation, urinary infection, ovarian cyst, etc and would require evaluation prior to treatment  ThanksRajiv MD

## 2022-11-22 ENCOUNTER — LAB (OUTPATIENT)
Dept: LAB | Facility: HOSPITAL | Age: 67
End: 2022-11-22

## 2022-11-22 ENCOUNTER — OFFICE VISIT (OUTPATIENT)
Dept: ENDOCRINOLOGY | Facility: CLINIC | Age: 67
End: 2022-11-22

## 2022-11-22 VITALS
SYSTOLIC BLOOD PRESSURE: 128 MMHG | BODY MASS INDEX: 29.66 KG/M2 | HEART RATE: 101 BPM | DIASTOLIC BLOOD PRESSURE: 70 MMHG | WEIGHT: 167.4 LBS | HEIGHT: 63 IN | OXYGEN SATURATION: 98 %

## 2022-11-22 DIAGNOSIS — E78.00 HYPERCHOLESTEROLEMIA: ICD-10-CM

## 2022-11-22 DIAGNOSIS — E83.52 HYPERCALCEMIA: ICD-10-CM

## 2022-11-22 DIAGNOSIS — R73.09 ELEVATED GLUCOSE: Primary | ICD-10-CM

## 2022-11-22 DIAGNOSIS — E03.9 ACQUIRED HYPOTHYROIDISM: ICD-10-CM

## 2022-11-22 LAB
25(OH)D3 SERPL-MCNC: 42.6 NG/ML (ref 30–100)
ALBUMIN SERPL-MCNC: 4.6 G/DL (ref 3.5–5.2)
ALBUMIN/GLOB SERPL: 1.4 G/DL
ALP SERPL-CCNC: 101 U/L (ref 39–117)
ALT SERPL W P-5'-P-CCNC: 19 U/L (ref 1–33)
ANION GAP SERPL CALCULATED.3IONS-SCNC: 8.5 MMOL/L (ref 5–15)
AST SERPL-CCNC: 22 U/L (ref 1–32)
BILIRUB SERPL-MCNC: <0.2 MG/DL (ref 0–1.2)
BUN SERPL-MCNC: 25 MG/DL (ref 8–23)
BUN/CREAT SERPL: 28.1 (ref 7–25)
CALCIUM SPEC-SCNC: 10 MG/DL (ref 8.6–10.5)
CHLORIDE SERPL-SCNC: 104 MMOL/L (ref 98–107)
CHOLEST SERPL-MCNC: 178 MG/DL (ref 0–200)
CO2 SERPL-SCNC: 24.5 MMOL/L (ref 22–29)
CREAT SERPL-MCNC: 0.89 MG/DL (ref 0.57–1)
EGFRCR SERPLBLD CKD-EPI 2021: 71.2 ML/MIN/1.73
EXPIRATION DATE: NORMAL
EXPIRATION DATE: NORMAL
GLOBULIN UR ELPH-MCNC: 3.3 GM/DL
GLUCOSE BLDC GLUCOMTR-MCNC: 129 MG/DL (ref 70–130)
GLUCOSE SERPL-MCNC: 87 MG/DL (ref 65–99)
HBA1C MFR BLD: 5.6 %
HDLC SERPL-MCNC: 54 MG/DL (ref 40–60)
LDLC SERPL CALC-MCNC: 99 MG/DL (ref 0–100)
LDLC/HDLC SERPL: 1.77 {RATIO}
Lab: NORMAL
Lab: NORMAL
POTASSIUM SERPL-SCNC: 4.6 MMOL/L (ref 3.5–5.2)
PROT SERPL-MCNC: 7.9 G/DL (ref 6–8.5)
SODIUM SERPL-SCNC: 137 MMOL/L (ref 136–145)
TRIGL SERPL-MCNC: 143 MG/DL (ref 0–150)
VLDLC SERPL-MCNC: 25 MG/DL (ref 5–40)

## 2022-11-22 PROCEDURE — 3044F HG A1C LEVEL LT 7.0%: CPT | Performed by: INTERNAL MEDICINE

## 2022-11-22 PROCEDURE — 84443 ASSAY THYROID STIM HORMONE: CPT | Performed by: INTERNAL MEDICINE

## 2022-11-22 PROCEDURE — 99214 OFFICE O/P EST MOD 30 MIN: CPT | Performed by: INTERNAL MEDICINE

## 2022-11-22 PROCEDURE — 83036 HEMOGLOBIN GLYCOSYLATED A1C: CPT | Performed by: INTERNAL MEDICINE

## 2022-11-22 PROCEDURE — 80053 COMPREHEN METABOLIC PANEL: CPT | Performed by: INTERNAL MEDICINE

## 2022-11-22 PROCEDURE — 80061 LIPID PANEL: CPT | Performed by: INTERNAL MEDICINE

## 2022-11-22 PROCEDURE — 84439 ASSAY OF FREE THYROXINE: CPT | Performed by: INTERNAL MEDICINE

## 2022-11-22 PROCEDURE — 82306 VITAMIN D 25 HYDROXY: CPT | Performed by: INTERNAL MEDICINE

## 2022-11-22 PROCEDURE — 82947 ASSAY GLUCOSE BLOOD QUANT: CPT | Performed by: INTERNAL MEDICINE

## 2022-11-22 NOTE — PROGRESS NOTES
Siria Garcia 66 y.o.  CC:Follow-up, Hypothyroidism, Hypertension, and Hyperglycemia      [place] ([place]): Follow-up, Hypothyroidism, Hypertension, and Hyperglycemia    bp is good   Is taking synthroid 12.5 mcg daily   Is taking vitamin D supplement  Would like refill for bactrim prn UTI - discussed needs UA with this- fact that other issues like atrophic vaginitis can mimic symptoms of UTI, need for completion of course of antibiotics with each spell reviewed.  Discussed getting UA locally - she can call with symptoms and we will fax order  Also would like diflucan and she will discuss with Dr Weiss- recent visit  Yeast under breasts- will send topical cream for treatment     Allergies   Allergen Reactions   • Bactrim [Sulfamethoxazole-Trimethoprim] Itching   • Terbinafine Itching       Current Outpatient Medications:   •  Cholecalciferol (Vitamin D) 50 MCG (2000 UT) tablet, Take 2,000 Units by mouth Daily., Disp: , Rfl:   •  CRANBERRY CONCENTRATE PO, Take  by mouth., Disp: , Rfl:   •  exemestane (AROMASIN) 25 MG chemo tablet, Take 25 mg by mouth Daily., Disp: , Rfl:   •  fluconazole (DIFLUCAN) 150 MG tablet, Take 1 tablet by mouth Daily As Needed (yeast symptoms)., Disp: 10 tablet, Rfl: 3  •  Glucos-Chond-Hyal Ac-Ca Fructo (MOVE FREE JOINT HEALTH ADVANCE PO), Take  by mouth., Disp: , Rfl:   •  Histamine Dihydrochloride (Australian Dream Arthritis) 0.025 % cream, Apply  topically. Apply topically TID, Disp: , Rfl:   •  levothyroxine (SYNTHROID, LEVOTHROID) 25 MCG tablet, Take 0.5 tablets by mouth Daily., Disp: 45 tablet, Rfl: 3  •  Loperamide HCl (IMODIUM PO), Take 1 tablet by mouth 2 (Two) Times a Day As Needed., Disp: , Rfl:   •  loratadine (CLARITIN) 10 MG tablet, Take 10 mg by mouth daily., Disp: , Rfl:   •  Multiple Vitamins-Minerals (YARI MULTIVITAMIN FOR WOMEN PO), Take  by mouth., Disp: , Rfl:   •  Omega-3 Fatty Acids (OMEGA-3 1450 PO), Take  by mouth Daily., Disp: , Rfl:   •  sore muscle (ICY HOT EXTRA STRENGTH)  10-30 % cream cream, Apply  topically to the appropriate area as directed 2 (Two) Times a Day As Needed. With lido, Disp: , Rfl:   •  trimethoprim (TRIMPEX) 100 MG tablet, Take 1 tablet by mouth Daily., Disp: 90 tablet, Rfl: 1  Patient Active Problem List    Diagnosis    • Hypercalcemia [E83.52]    • Elevated glucose [R73.09]    • Tubular adenoma [D36.9]    • Malignant neoplasm of upper-outer quadrant of breast in female, estrogen receptor positive (HCC) [C50.419, Z17.0]    • Vaginitis and vulvovaginitis [N76.0]    • Sore throat [J02.9]    • Acute low back pain [M54.50]    • Benign essential hypertension [I10]    • Gastroesophageal reflux disease [K21.9]    • Fatigue [R53.83]    • Foot pain [M79.673]    • Hypercholesterolemia [E78.00]    • Hypothyroidism [E03.9]    • Knee pain [M25.569]    • Osteoarthritis of hand [M19.049]    • Vaginal yeast infection [B37.31]      Review of Systems   Constitutional: Positive for activity change. Negative for appetite change and unexpected weight change.   HENT: Negative for congestion and rhinorrhea.    Eyes: Negative for visual disturbance.   Respiratory: Negative for cough and shortness of breath.    Cardiovascular: Negative for palpitations and leg swelling.   Gastrointestinal: Negative for constipation, diarrhea and nausea.   Genitourinary: Negative for hematuria.   Musculoskeletal: Positive for arthralgias, back pain and gait problem. Negative for joint swelling and myalgias.   Skin: Negative for color change, rash and wound.   Allergic/Immunologic: Negative for environmental allergies, food allergies and immunocompromised state.   Neurological: Negative for dizziness, weakness and light-headedness.   Psychiatric/Behavioral: Negative for confusion, decreased concentration, dysphoric mood and sleep disturbance. The patient is not nervous/anxious.      Social History     Socioeconomic History   • Marital status:    Tobacco Use   • Smoking status: Never   • Smokeless  "tobacco: Never   Substance and Sexual Activity   • Alcohol use: No   • Drug use: No     Family History   Problem Relation Age of Onset   • Diabetes Father    • Hypertension Father    • Breast cancer Mother      /70   Pulse 101   Ht 160 cm (63\")   Wt 75.9 kg (167 lb 6.4 oz)   SpO2 98%   BMI 29.65 kg/m²   Physical Exam  Vitals and nursing note reviewed.   Constitutional:       Appearance: Normal appearance. She is well-developed.   HENT:      Head: Normocephalic and atraumatic.   Eyes:      General: Lids are normal.      Extraocular Movements: Extraocular movements intact.      Conjunctiva/sclera: Conjunctivae normal.      Pupils: Pupils are equal, round, and reactive to light.   Neck:      Thyroid: No thyroid mass or thyromegaly.      Vascular: No carotid bruit.      Trachea: Trachea normal. No tracheal deviation.   Cardiovascular:      Rate and Rhythm: Normal rate and regular rhythm.      Pulses: Normal pulses.      Heart sounds: Normal heart sounds. No murmur heard.    No friction rub. No gallop.   Pulmonary:      Effort: Pulmonary effort is normal. No respiratory distress.      Breath sounds: Normal breath sounds. No wheezing.   Musculoskeletal:         General: No deformity. Normal range of motion.      Cervical back: Normal range of motion and neck supple.   Lymphadenopathy:      Cervical: No cervical adenopathy.   Skin:     General: Skin is warm and dry.      Findings: No erythema or rash.      Nails: There is no clubbing.   Neurological:      General: No focal deficit present.      Mental Status: She is alert and oriented to person, place, and time.      Cranial Nerves: No cranial nerve deficit.      Deep Tendon Reflexes: Reflexes are normal and symmetric. Reflexes normal.   Psychiatric:         Mood and Affect: Mood normal.         Speech: Speech normal.         Behavior: Behavior normal.         Thought Content: Thought content normal.         Judgment: Judgment normal.       Results for orders " placed or performed in visit on 11/22/22   POC Glycosylated Hemoglobin (Hb A1C)    Specimen: Blood   Result Value Ref Range    Hemoglobin A1C 5.6 %    Lot Number [id number]     Expiration Date 09/14/2024    POC Glucose, Blood    Specimen: Blood   Result Value Ref Range    Glucose 129 70 - 130 mg/dL    Lot Number [id number]     Expiration Date 09/14/2024      Diagnoses and all orders for this visit:    1. Elevated glucose (Primary)  Assessment & Plan:  Blood sugar and 90 day average sugar reviewed  Results for orders placed or performed in visit on 11/22/22   POC Glycosylated Hemoglobin (Hb A1C)    Specimen: Blood   Result Value Ref Range    Hemoglobin A1C 5.6 %    Lot Number [id number]     Expiration Date 09/14/2024    POC Glucose, Blood    Specimen: Blood   Result Value Ref Range    Glucose 129 70 - 130 mg/dL    Lot Number [id number]     Expiration Date 09/14/2024      Average sugar is good   Continue diet, activity     Orders:  -     POC Glycosylated Hemoglobin (Hb A1C)  -     POC Glucose, Blood  -     Comprehensive Metabolic Panel    2. Hypercholesterolemia  Assessment & Plan:  Is taking fish oil   Check ldl     Orders:  -     Lipid Panel    3. Acquired hypothyroidism  Assessment & Plan:  Taking levothyroxine 12.5 mcg daily   Check tfts     Orders:  -     TSH  -     T4, Free    4. Hypercalcemia  Assessment & Plan:  Repeat calcium normal with normal D and PTH   Total calcium normal with recent lab work     Orders:  -     Vitamin D,25-Hydroxy  Return in about 6 months (around 5/22/2023) for Recheck.    Sofia Hong MD  Signed Sofia Hong MD

## 2022-11-23 LAB
T4 FREE SERPL-MCNC: 1.27 NG/DL (ref 0.93–1.7)
TSH SERPL DL<=0.05 MIU/L-ACNC: 0.46 UIU/ML (ref 0.27–4.2)

## 2022-11-23 NOTE — ASSESSMENT & PLAN NOTE
Blood sugar and 90 day average sugar reviewed  Results for orders placed or performed in visit on 11/22/22   POC Glycosylated Hemoglobin (Hb A1C)    Specimen: Blood   Result Value Ref Range    Hemoglobin A1C 5.6 %    Lot Number [id number]     Expiration Date 09/14/2024    POC Glucose, Blood    Specimen: Blood   Result Value Ref Range    Glucose 129 70 - 130 mg/dL    Lot Number [id number]     Expiration Date 09/14/2024      Average sugar is good   Continue diet, activity

## 2022-12-02 NOTE — TELEPHONE ENCOUNTER
Pt called back returning Lashell call. Pt will call Lashell back on Tuesday pt not available on Monday. Please see pervious messages

## 2022-12-06 NOTE — TELEPHONE ENCOUNTER
Pt was inquiring about Dr Hong new office hours for 2023 and the fact she needs a 4:00 appointment because she has an elderly sick dog that requires meds and insulin several times per day and that is the only time she can come in for visits.  She was advised she can do a MyChart video visit every other OV but she states her cell phone will not support that.  She discussed changing providers that would accommodate her schedule but really hates to leave Dr Hong so she will think about the changes.  OV for May was changed from 4pm to 2:45 and she will call if that cannot be worked out for her.  She once again requested diflucan and trimethoprim be sent to keep on hand but I advised her she will have to call Dr Weiss or PCP for that from now on because DR Hong is limiting her practice to endocrinology.  She does need a refill for levothyroxine sent in

## 2022-12-07 RX ORDER — LEVOTHYROXINE SODIUM 0.03 MG/1
12.5 TABLET ORAL DAILY
Qty: 45 TABLET | Refills: 3 | Status: SHIPPED | OUTPATIENT
Start: 2022-12-07

## 2023-05-16 ENCOUNTER — OFFICE VISIT (OUTPATIENT)
Dept: ENDOCRINOLOGY | Facility: CLINIC | Age: 68
End: 2023-05-16
Payer: MEDICARE

## 2023-05-16 VITALS
WEIGHT: 173.6 LBS | DIASTOLIC BLOOD PRESSURE: 80 MMHG | HEART RATE: 75 BPM | OXYGEN SATURATION: 100 % | BODY MASS INDEX: 30.76 KG/M2 | SYSTOLIC BLOOD PRESSURE: 152 MMHG | HEIGHT: 63 IN

## 2023-05-16 DIAGNOSIS — E78.00 HYPERCHOLESTEROLEMIA: ICD-10-CM

## 2023-05-16 DIAGNOSIS — E03.9 ACQUIRED HYPOTHYROIDISM: ICD-10-CM

## 2023-05-16 DIAGNOSIS — R73.09 ELEVATED GLUCOSE: Primary | ICD-10-CM

## 2023-05-16 DIAGNOSIS — I10 BENIGN ESSENTIAL HYPERTENSION: ICD-10-CM

## 2023-05-16 DIAGNOSIS — E55.9 VITAMIN D DEFICIENCY: ICD-10-CM

## 2023-05-16 LAB
25(OH)D3 SERPL-MCNC: 40.8 NG/ML (ref 30–100)
ALBUMIN SERPL-MCNC: 4.7 G/DL (ref 3.5–5.2)
ALBUMIN/GLOB SERPL: 1.6 G/DL
ALP SERPL-CCNC: 79 U/L (ref 39–117)
ALT SERPL W P-5'-P-CCNC: 18 U/L (ref 1–33)
ANION GAP SERPL CALCULATED.3IONS-SCNC: 11.6 MMOL/L (ref 5–15)
AST SERPL-CCNC: 22 U/L (ref 1–32)
BASOPHILS # BLD AUTO: 0.09 10*3/MM3 (ref 0–0.2)
BASOPHILS NFR BLD AUTO: 1.6 % (ref 0–1.5)
BILIRUB SERPL-MCNC: 0.2 MG/DL (ref 0–1.2)
BUN SERPL-MCNC: 22 MG/DL (ref 8–23)
BUN/CREAT SERPL: 23.9 (ref 7–25)
CALCIUM SPEC-SCNC: 10.3 MG/DL (ref 8.6–10.5)
CHLORIDE SERPL-SCNC: 107 MMOL/L (ref 98–107)
CHOLEST SERPL-MCNC: 191 MG/DL (ref 0–200)
CO2 SERPL-SCNC: 24.4 MMOL/L (ref 22–29)
CREAT SERPL-MCNC: 0.92 MG/DL (ref 0.57–1)
DEPRECATED RDW RBC AUTO: 42.5 FL (ref 37–54)
EGFRCR SERPLBLD CKD-EPI 2021: 68 ML/MIN/1.73
EOSINOPHIL # BLD AUTO: 0.13 10*3/MM3 (ref 0–0.4)
EOSINOPHIL NFR BLD AUTO: 2.2 % (ref 0.3–6.2)
ERYTHROCYTE [DISTWIDTH] IN BLOOD BY AUTOMATED COUNT: 12.7 % (ref 12.3–15.4)
EXPIRATION DATE: NORMAL
EXPIRATION DATE: NORMAL
GLOBULIN UR ELPH-MCNC: 3 GM/DL
GLUCOSE BLDC GLUCOMTR-MCNC: 99 MG/DL (ref 70–130)
GLUCOSE SERPL-MCNC: 102 MG/DL (ref 65–99)
HBA1C MFR BLD: 5.8 %
HCT VFR BLD AUTO: 40.6 % (ref 34–46.6)
HDLC SERPL-MCNC: 52 MG/DL (ref 40–60)
HGB BLD-MCNC: 13.8 G/DL (ref 12–15.9)
IMM GRANULOCYTES # BLD AUTO: 0.04 10*3/MM3 (ref 0–0.05)
IMM GRANULOCYTES NFR BLD AUTO: 0.7 % (ref 0–0.5)
LDLC SERPL CALC-MCNC: 113 MG/DL (ref 0–100)
LDLC/HDLC SERPL: 2.1 {RATIO}
LYMPHOCYTES # BLD AUTO: 1.99 10*3/MM3 (ref 0.7–3.1)
LYMPHOCYTES NFR BLD AUTO: 34.4 % (ref 19.6–45.3)
Lab: NORMAL
Lab: NORMAL
MCH RBC QN AUTO: 31.5 PG (ref 26.6–33)
MCHC RBC AUTO-ENTMCNC: 34 G/DL (ref 31.5–35.7)
MCV RBC AUTO: 92.7 FL (ref 79–97)
MONOCYTES # BLD AUTO: 0.38 10*3/MM3 (ref 0.1–0.9)
MONOCYTES NFR BLD AUTO: 6.6 % (ref 5–12)
NEUTROPHILS NFR BLD AUTO: 3.15 10*3/MM3 (ref 1.7–7)
NEUTROPHILS NFR BLD AUTO: 54.5 % (ref 42.7–76)
NRBC BLD AUTO-RTO: 0 /100 WBC (ref 0–0.2)
PLATELET # BLD AUTO: 324 10*3/MM3 (ref 140–450)
PMV BLD AUTO: 10.9 FL (ref 6–12)
POTASSIUM SERPL-SCNC: 4.5 MMOL/L (ref 3.5–5.2)
PROT SERPL-MCNC: 7.7 G/DL (ref 6–8.5)
RBC # BLD AUTO: 4.38 10*6/MM3 (ref 3.77–5.28)
SODIUM SERPL-SCNC: 143 MMOL/L (ref 136–145)
T4 FREE SERPL-MCNC: 1.34 NG/DL (ref 0.93–1.7)
TRIGL SERPL-MCNC: 148 MG/DL (ref 0–150)
TSH SERPL DL<=0.05 MIU/L-ACNC: 0.36 UIU/ML (ref 0.27–4.2)
VLDLC SERPL-MCNC: 26 MG/DL (ref 5–40)
WBC NRBC COR # BLD: 5.78 10*3/MM3 (ref 3.4–10.8)

## 2023-05-16 PROCEDURE — 84439 ASSAY OF FREE THYROXINE: CPT | Performed by: INTERNAL MEDICINE

## 2023-05-16 PROCEDURE — 83036 HEMOGLOBIN GLYCOSYLATED A1C: CPT | Performed by: INTERNAL MEDICINE

## 2023-05-16 PROCEDURE — 80061 LIPID PANEL: CPT | Performed by: INTERNAL MEDICINE

## 2023-05-16 PROCEDURE — 84443 ASSAY THYROID STIM HORMONE: CPT | Performed by: INTERNAL MEDICINE

## 2023-05-16 PROCEDURE — 80053 COMPREHEN METABOLIC PANEL: CPT | Performed by: INTERNAL MEDICINE

## 2023-05-16 PROCEDURE — 3079F DIAST BP 80-89 MM HG: CPT | Performed by: INTERNAL MEDICINE

## 2023-05-16 PROCEDURE — 82947 ASSAY GLUCOSE BLOOD QUANT: CPT | Performed by: INTERNAL MEDICINE

## 2023-05-16 PROCEDURE — 99214 OFFICE O/P EST MOD 30 MIN: CPT | Performed by: INTERNAL MEDICINE

## 2023-05-16 PROCEDURE — 82306 VITAMIN D 25 HYDROXY: CPT | Performed by: INTERNAL MEDICINE

## 2023-05-16 PROCEDURE — 3044F HG A1C LEVEL LT 7.0%: CPT | Performed by: INTERNAL MEDICINE

## 2023-05-16 PROCEDURE — 36415 COLL VENOUS BLD VENIPUNCTURE: CPT | Performed by: INTERNAL MEDICINE

## 2023-05-16 PROCEDURE — 3077F SYST BP >= 140 MM HG: CPT | Performed by: INTERNAL MEDICINE

## 2023-05-16 PROCEDURE — 85025 COMPLETE CBC W/AUTO DIFF WBC: CPT | Performed by: INTERNAL MEDICINE

## 2023-05-16 NOTE — PROGRESS NOTES
Siria Garcia 67 y.o.  CC:Follow-up, Hypothyroidism, Hypertension, and Hyperglycemia    [place]: Follow-up, Hypothyroidism, Hypertension, and Hyperglycemia    Taking synthroid 25 mcg - 1/2 daily   bp is high today - at home 113-138/60-77  Is taking vitamin D supplement   Had discussed seeing pcp - saw [facility], then [facility]   Average sugar reviewed  Results for orders placed or performed in visit on 05/16/23   POC Glycosylated Hemoglobin (Hb A1C)    Specimen: Blood   Result Value Ref Range    Hemoglobin A1C 5.8 %    Lot Number [id number]     Expiration Date 01/24/2025    POC Glucose, Blood    Specimen: Blood   Result Value Ref Range    Glucose 99 70 - 130 mg/dL    Lot Number [id number]     Expiration Date 11/18/2023        Allergies   Allergen Reactions   • Bactrim [Sulfamethoxazole-Trimethoprim] Itching   • Terbinafine Itching       Current Outpatient Medications:   •  Cholecalciferol (Vitamin D) 50 MCG (2000 UT) tablet, Take 2,000 Units by mouth Daily., Disp: , Rfl:   •  CRANBERRY CONCENTRATE PO, Take  by mouth., Disp: , Rfl:   •  exemestane (AROMASIN) 25 MG chemo tablet, Take 25 mg by mouth Daily., Disp: , Rfl:   •  fluconazole (DIFLUCAN) 150 MG tablet, Take 1 tablet by mouth Daily As Needed (yeast symptoms)., Disp: 10 tablet, Rfl: 3  •  Glucos-Chond-Hyal Ac-Ca Fructo (MOVE FREE JOINT HEALTH ADVANCE PO), Take  by mouth., Disp: , Rfl:   •  Histamine Dihydrochloride (Australian Dream Arthritis) 0.025 % cream, Apply  topically. Apply topically TID, Disp: , Rfl:   •  levothyroxine (SYNTHROID, LEVOTHROID) 25 MCG tablet, Take 0.5 tablets by mouth Daily., Disp: 45 tablet, Rfl: 3  •  Loperamide HCl (IMODIUM PO), Take 1 tablet by mouth 2 (Two) Times a Day As Needed., Disp: , Rfl:   •  loratadine (CLARITIN) 10 MG tablet, Take 10 mg by mouth daily., Disp: , Rfl:   •  Multiple Vitamins-Minerals (YARI MULTIVITAMIN FOR WOMEN PO), Take  by mouth., Disp: , Rfl:   •  Omega-3 Fatty Acids (OMEGA-3 1450 PO), Take  by mouth Daily., Disp: , Rfl:    •  sore muscle (ICY HOT EXTRA STRENGTH) 10-30 % cream cream, Apply  topically to the appropriate area as directed 2 (Two) Times a Day As Needed. With lido, Disp: , Rfl:   •  trimethoprim (TRIMPEX) 100 MG tablet, Take 1 tablet by mouth Daily., Disp: 90 tablet, Rfl: 1  Patient Active Problem List    Diagnosis    • Vitamin D deficiency [E55.9]    • Hypercalcemia [E83.52]    • Elevated glucose [R73.09]    • Tubular adenoma [D36.9]    • Malignant neoplasm of upper-outer quadrant of breast in female, estrogen receptor positive [C50.419, Z17.0]    • Vaginitis and vulvovaginitis [N76.0]    • Sore throat [J02.9]    • Acute low back pain [M54.50]    • Benign essential hypertension [I10]    • Gastroesophageal reflux disease [K21.9]    • Fatigue [R53.83]    • Foot pain [M79.673]    • Hypercholesterolemia [E78.00]    • Hypothyroidism [E03.9]    • Knee pain [M25.569]    • Osteoarthritis of hand [M19.049]    • Vaginal yeast infection [B37.31]      Review of Systems   Constitutional: Negative for activity change, appetite change and unexpected weight change.   HENT: Negative for congestion and rhinorrhea.    Eyes: Negative for visual disturbance.   Respiratory: Negative for cough and shortness of breath.    Cardiovascular: Negative for palpitations and leg swelling.   Gastrointestinal: Negative for constipation, diarrhea and nausea.   Genitourinary: Negative for hematuria.   Musculoskeletal: Negative for arthralgias, back pain, gait problem, joint swelling and myalgias.   Skin: Negative for color change, rash and wound.   Allergic/Immunologic: Negative for environmental allergies, food allergies and immunocompromised state.   Neurological: Negative for dizziness, weakness and light-headedness.   Psychiatric/Behavioral: Negative for confusion, decreased concentration, dysphoric mood and sleep disturbance. The patient is not nervous/anxious.      Social History     Socioeconomic History   • Marital status:    Tobacco Use   •  "Smoking status: Never   • Smokeless tobacco: Never   Substance and Sexual Activity   • Alcohol use: No   • Drug use: No     Family History   Problem Relation Age of Onset   • Diabetes Father    • Hypertension Father    • Breast cancer Mother      /80   Pulse 75   Ht 160 cm (63\")   Wt 78.7 kg (173 lb 9.6 oz)   SpO2 100%   BMI 30.75 kg/m²   Physical Exam  Vitals and nursing note reviewed.   Constitutional:       Appearance: Normal appearance. She is well-developed.   HENT:      Head: Normocephalic and atraumatic.   Eyes:      General: Lids are normal.      Extraocular Movements: Extraocular movements intact.      Conjunctiva/sclera: Conjunctivae normal.      Pupils: Pupils are equal, round, and reactive to light.   Neck:      Thyroid: No thyroid mass or thyromegaly.      Vascular: No carotid bruit.      Trachea: Trachea normal. No tracheal deviation.   Cardiovascular:      Rate and Rhythm: Normal rate and regular rhythm.      Pulses: Normal pulses.      Heart sounds: Normal heart sounds. No murmur heard.    No friction rub. No gallop.   Pulmonary:      Effort: Pulmonary effort is normal. No respiratory distress.      Breath sounds: Normal breath sounds. No wheezing.   Musculoskeletal:         General: No deformity. Normal range of motion.      Cervical back: Normal range of motion and neck supple.   Lymphadenopathy:      Cervical: No cervical adenopathy.   Skin:     General: Skin is warm and dry.      Coloration: Skin is pale.      Findings: No erythema or rash.      Nails: There is no clubbing.   Neurological:      General: No focal deficit present.      Mental Status: She is alert and oriented to person, place, and time.      Cranial Nerves: No cranial nerve deficit.      Deep Tendon Reflexes: Reflexes are normal and symmetric. Reflexes normal.   Psychiatric:         Mood and Affect: Mood normal.         Speech: Speech normal.         Behavior: Behavior normal.         Thought Content: Thought content " normal.         Judgment: Judgment normal.       Results for orders placed or performed in visit on 05/16/23   POC Glycosylated Hemoglobin (Hb A1C)    Specimen: Blood   Result Value Ref Range    Hemoglobin A1C 5.8 %    Lot Number [id number]     Expiration Date 01/24/2025    POC Glucose, Blood    Specimen: Blood   Result Value Ref Range    Glucose 99 70 - 130 mg/dL    Lot Number [id number]     Expiration Date 11/18/2023      Diagnoses and all orders for this visit:    1. Elevated glucose (Primary)  Assessment & Plan:  Blood sugar discussed  Results for orders placed or performed in visit on 05/16/23   POC Glycosylated Hemoglobin (Hb A1C)    Specimen: Blood   Result Value Ref Range    Hemoglobin A1C 5.8 %    Lot Number [id number]     Expiration Date 01/24/2025    POC Glucose, Blood    Specimen: Blood   Result Value Ref Range    Glucose 99 70 - 130 mg/dL    Lot Number [id number]     Expiration Date 11/18/2023      90 day average sugar c/w prediabetes  Diet and exercise reviewed as primary management     Orders:  -     POC Glycosylated Hemoglobin (Hb A1C)  -     POC Glucose, Blood    2. Benign essential hypertension  Assessment & Plan:  bp is high - good at home  Continue monitoring at home   Increase activity    Orders:  -     Comprehensive Metabolic Panel; Future  -     CBC Auto Differential; Future  -     Comprehensive Metabolic Panel  -     CBC Auto Differential    3. Hypercholesterolemia  Assessment & Plan:  Is eating low fat diet   Check flp     Orders:  -     Lipid Panel; Future  -     Lipid Panel    4. Acquired hypothyroidism  Assessment & Plan:  Taking levothyroxine 25 mcg daily   Check tfts     Orders:  -     TSH; Future  -     T4, Free; Future  -     TSH  -     T4, Free    5. Vitamin D deficiency  -     Vitamin D,25-Hydroxy; Future  -     Vitamin D,25-Hydroxy  Return in about 6 months (around 11/16/2023) for Recheck.    Sofia Hong MD  Signed Sofia Hong MD       no

## 2023-05-16 NOTE — ASSESSMENT & PLAN NOTE
Blood sugar discussed  Results for orders placed or performed in visit on 05/16/23   POC Glycosylated Hemoglobin (Hb A1C)    Specimen: Blood   Result Value Ref Range    Hemoglobin A1C 5.8 %    Lot Number [id number]     Expiration Date 01/24/2025    POC Glucose, Blood    Specimen: Blood   Result Value Ref Range    Glucose 99 70 - 130 mg/dL    Lot Number [id number]     Expiration Date 11/18/2023      90 day average sugar c/w prediabetes  Diet and exercise reviewed as primary management

## 2023-05-24 ENCOUNTER — OFFICE VISIT (OUTPATIENT)
Dept: ORTHOPEDIC SURGERY | Facility: CLINIC | Age: 68
End: 2023-05-24
Payer: MEDICARE

## 2023-05-24 VITALS
SYSTOLIC BLOOD PRESSURE: 180 MMHG | DIASTOLIC BLOOD PRESSURE: 102 MMHG | BODY MASS INDEX: 30.19 KG/M2 | WEIGHT: 170.4 LBS | HEIGHT: 63 IN

## 2023-05-24 DIAGNOSIS — B35.1 ONYCHOMYCOSIS: ICD-10-CM

## 2023-05-24 DIAGNOSIS — M76.71 PERONEAL TENDINITIS OF LOWER LEG, RIGHT: ICD-10-CM

## 2023-05-24 DIAGNOSIS — M25.871 ANKLE IMPINGEMENT SYNDROME, RIGHT: ICD-10-CM

## 2023-05-24 DIAGNOSIS — M79.671 RIGHT FOOT PAIN: Primary | ICD-10-CM

## 2023-05-24 NOTE — PROGRESS NOTES
[facility] [place] [facility] Office Visit     Office Visit       Date: 05/24/2023   Patient Name: Siria Garcia  MRN: [id number]  YOB: 1955    Referring Physician: Referring, Self     Chief Complaint:   Chief Complaint   Patient presents with   • Right Foot - Pain     History of Present Illness:   Siria Garcia is a 67 y.o. female who presents with new problem of: right foot pain.  Onset: atraumatic and gradual in nature. The issue has been ongoing for 2.5 year(s). Pain is a 6/10 on the pain scale. Pain is described as aching and tingling. Associated symptoms include pain and swelling. The pain is worse with standing and sleeping; ice and pain medication and/or NSAID improve the pain. Previous treatments have included: bracing, physical therapy, Orthotics, Voltaren gel, and icy hot with lidocaine.    Subjective   Review of Systems: Review of Systems   Constitutional: Negative for chills, fever, unexpected weight gain and unexpected weight loss.   HENT: Negative for congestion, postnasal drip and rhinorrhea.    Eyes: Negative for blurred vision.   Respiratory: Negative for shortness of breath.    Cardiovascular: Negative for leg swelling.   Gastrointestinal: Negative for abdominal pain, nausea and vomiting.   Genitourinary: Negative for difficulty urinating.   Musculoskeletal: Positive for arthralgias. Negative for gait problem, joint swelling and myalgias.   Skin: Negative for skin lesions and wound.   Neurological: Negative for dizziness, weakness, light-headedness and numbness.   Hematological: Does not bruise/bleed easily.   Psychiatric/Behavioral: Negative for depressed mood.        I have reviewed the following portions of the patient's history:History of Present Illness and review of systems.    Past Medical History:   Past Medical History:   Diagnosis Date   • Arthritis    • Breast cancer 2017   • Colon polyp 2018   • Diverticulosis 01/2018   • Environmental  allergies    • Gall bladder stones    • GERD (gastroesophageal reflux disease)    • Hypertension    • Low back pain    • Plantar fasciitis    • Tarsal tunnel syndrome of both lower extremities    • UTI (urinary tract infection)        Past Surgical History:   Past Surgical History:   Procedure Laterality Date   • BREAST LUMPECTOMY  05/2017   • CHOLECYSTECTOMY     • COLONOSCOPY W/ BIOPSIES  01/25/2018       Family History:   Family History   Problem Relation Age of Onset   • Diabetes Father    • Hypertension Father    • Breast cancer Mother        Social History:   Social History     Socioeconomic History   • Marital status:    Tobacco Use   • Smoking status: Never   • Smokeless tobacco: Never   Substance and Sexual Activity   • Alcohol use: No   • Drug use: No       Medications:   Current Outpatient Medications:   •  Cholecalciferol (Vitamin D) 50 MCG (2000 UT) tablet, Take 2,000 Units by mouth Daily., Disp: , Rfl:   •  CRANBERRY CONCENTRATE PO, Take  by mouth., Disp: , Rfl:   •  exemestane (AROMASIN) 25 MG chemo tablet, Take 1 tablet by mouth Daily., Disp: , Rfl:   •  fluconazole (DIFLUCAN) 150 MG tablet, Take 1 tablet by mouth Daily As Needed (yeast symptoms)., Disp: 10 tablet, Rfl: 3  •  Glucos-Chond-Hyal Ac-Ca Fructo (MOVE FREE JOINT HEALTH ADVANCE PO), Take  by mouth., Disp: , Rfl:   •  Histamine Dihydrochloride (Australian Dream Arthritis) 0.025 % cream, Apply  topically. Apply topically TID, Disp: , Rfl:   •  levothyroxine (SYNTHROID, LEVOTHROID) 25 MCG tablet, Take 0.5 tablets by mouth Daily., Disp: 45 tablet, Rfl: 3  •  Loperamide HCl (IMODIUM PO), Take 1 tablet by mouth 2 (Two) Times a Day As Needed., Disp: , Rfl:   •  loratadine (CLARITIN) 10 MG tablet, Take 1 tablet by mouth Daily., Disp: , Rfl:   •  Multiple Vitamins-Minerals (YARI MULTIVITAMIN FOR WOMEN PO), Take  by mouth., Disp: , Rfl:   •  Omega-3 Fatty Acids (OMEGA-3 1450 PO), Take  by mouth Daily., Disp: , Rfl:   •  sore muscle (ICY HOT  "EXTRA STRENGTH) 10-30 % cream cream, Apply  topically to the appropriate area as directed 2 (Two) Times a Day As Needed. With lido, Disp: , Rfl:   •  trimethoprim (TRIMPEX) 100 MG tablet, Take 1 tablet by mouth Daily., Disp: 90 tablet, Rfl: 1    Allergies:   Allergies   Allergen Reactions   • Bactrim [Sulfamethoxazole-Trimethoprim] Itching   • Terbinafine Itching       I reviewed the patient's chief complaint, history of present illness, review of systems, past medical history, surgical history, family history, social history, medications and allergy list.     Objective    Vital Signs:   Vitals:    05/24/23 1531   BP: (!) 180/102   Weight: 77.3 kg (170 lb 6.4 oz)   Height: 159 cm (62.6\")     Body mass index is 30.57 kg/m².   BMI is >= 30 and <35. (Class 1 Obesity). The following options were offered after discussion;: exercise counseling/recommendations and nutrition counseling/recommendations     Patient reports that she is a non-smoker and has not ever been a smoker.  This behavior was applauded and she was encouraged to continue in smoking cessation.  We will continue to monitor at subsequent visits.    Ortho Exam:  Constitutional: General Appearance: healthy-appearing, NAD, and normal body habitus.   Psychiatric: Mood and Affect: normal mood and affect and active and alert.   Cardiovascular System: Arterial Pulses Right: posterior tibialis normal and dorsalis pedis normal. Edema Right: no edema. Varicosities Right: no varicosities and capillary refill test normal.   Gait and Station: Appearance: normal gait, no limp, and ambulating with no assistive devices.   Ankles and Feet: Inspection Right: no erythema, induration, swelling, warmth, or deformity and normal alignment. Bony Palpation of the Ankle/Foot Right: no tenderness of the sesamoids, the calcaneal tuberosity, the metatarsals, the navicular tuberosity, the tarsometatarsal joints, the dome of talus, the head of talus, the inferior tibiofibular joint, or " the achilles tendon insertion and tenderness of the gutter ankle. Soft Tissue Palpation of the Ankle/Foot Right: no tenderness of the tibialis posterior, the plantar fascia, the achilles tendon, the peroneus longus and brevis, the extensor hallucis longus, the sinus tarsi, the lateral anterior talofibular ligament, the anterior talofibular ligament, the calcaneofibular ligament, the posterior talofibular ligament,  the deltoid ligament, the spring ligament, or the retrocalcaneal bursae and tenderness of both the peroneal retinaculum and tibialis anterior. Active Range of Motion Right: great toe flexion normal and extension normal and plantar flexion normal, inversion normal, eversion normal, and dorsiflexion (5 deg.). Stability Right: anterior drawer negative and talar tilt negative. Strength Right: extensor digitorum longus (5/5) and brevis (5/5); extensor hallucis longus (5/5); peroneus longus (5/5) and brevis (5/5); and posterior tibialis (5/5), gastrocnemius (5/5), and tibialis anterior 4/5. Inspection of the Toes Right: no callus, claw toes, or hammer toes. Palpation and Stability of the Toes Right: no tenderness of the great toe, the second toe, the third toe, the fourth toe, or the fifth toe and anterior drawer negative.   Neurological System: Sensation on the Right: normal distal extremities.   Skin: Right Lower Extremity: normal.    Results Review:   Imaging Results (Last 24 Hours)     Procedure Component Value Units Date/Time    XR Foot 3+ View Right [[id number]] Resulted: 05/24/23 1548     Updated: 05/24/23 1549    Narrative:      Indication: Right foot pain    Views: AP lateral and oblique views of the feet are submitted.     Impression: There is no widening. There is no fracture subluxation or   dislocation. There are no acute changes.  Hallux valgus present.  There is   overlap of the second toe over the first.  Enthesophytes present in both   the Achilles and plantar fascia insertion of the  calcaneus.    Comparison: No additional images available for comparison review.           Procedures    Assessment / Plan    Assessment/Plan:   Diagnoses and all orders for this visit:    1. Right foot pain (Primary)  -     XR Foot 3+ View Right    2. Ankle impingement syndrome, right  -     Ambulatory Referral to Physical Therapy Evaluate and treat, Ortho; Electrotherapy; Iontophoresis, E-stim; Soft Tissue Mobilizaton; Strengthening, Stretching, ROM; Right; Full weight bearing    3. Peroneal tendinitis of lower leg, right  -     Ambulatory Referral to Physical Therapy Evaluate and treat, Ortho; Electrotherapy; Iontophoresis, E-stim; Soft Tissue Mobilizaton; Strengthening, Stretching, ROM; Right; Full weight bearing    4. Onychomycosis      Worsening ankle pain in a patient with chronic ankle pain over the last 13 years.  Symptoms have been flared over the last couple years.  She has had numerous issues with tarsal tunnel, plantar fasciitis, and global ankle and foot pain.  Recently, she has been tender in the anterior and lateral ankle.  She is tender through the peroneal tendon and this is made worse by movement of the ankle.  She is also extremely tender on the anterior ankle with dorsiflexion.  Her radiographs show mild osteoarthritis in the anterior tibiotalar joint.  Believe this is causing impingement and contributing to her pain in addition to the peroneal tendinitis.  She is on a combination of topical medications as well as ibuprofen and Tylenol.  I will keep her on these medications but refer her to physical therapy.  She was given a referral today.  I think the dexamethasone by diaphoresis will help her.  We will also have her go into a walking boot to offload.  Believe this will also help with her tendon pain as well.  She also has significant onychomycosis to the second toe of the right foot.  I recommend following with her primary care physician to consider long-term continuous treatment with an  antifungal.  We will start with these modalities and I will see her back in 4 weeks to monitor response and decide on additional treatment.    Previous laboratory results reviewed: 5/16/2023-hemoglobin A1c 5.8%, creatinine 0.92, EGFR 68.0, TSH 0.364.    Follow Up:   Return in about 4 weeks (around 6/21/2023) for Recheck.      Lico Millan MD  [facility] [place] [facility]

## 2023-08-14 ENCOUNTER — OFFICE VISIT (OUTPATIENT)
Dept: ORTHOPEDIC SURGERY | Facility: CLINIC | Age: 68
End: 2023-08-14
Payer: MEDICARE

## 2023-08-14 VITALS
HEIGHT: 63 IN | DIASTOLIC BLOOD PRESSURE: 80 MMHG | SYSTOLIC BLOOD PRESSURE: 138 MMHG | WEIGHT: 172.4 LBS | BODY MASS INDEX: 30.55 KG/M2

## 2023-08-14 DIAGNOSIS — M25.871 ANKLE IMPINGEMENT SYNDROME, RIGHT: ICD-10-CM

## 2023-08-14 DIAGNOSIS — M76.71 PERONEAL TENDINITIS OF LOWER LEG, RIGHT: Primary | ICD-10-CM

## 2023-08-14 NOTE — PROGRESS NOTES
[facility] [place] [facility] [place] Visit     Office Follow Up      Date: 08/14/2023   Patient Name: Siria Garcia  MRN: [id number]  YOB: 1955    Referring Physician: No ref. provider found     Chief Complaint:   Chief Complaint   Patient presents with    Follow-up     7 week follow up -- Ankle impingement syndrome, right       History of Present Illness: Siria Garcia is a 67 y.o. female who is here today for follow up on right foot and ankle pain.  Since last visit, she has been in physical therapy.  She has been out of the walking boot.  Her pain is down to a 4/10.  Overall, she states that she feels better.    Subjective   Review of Systems: Review of Systems   Constitutional:  Negative for chills, fever, unexpected weight gain and unexpected weight loss.   HENT:  Negative for congestion, postnasal drip and rhinorrhea.    Eyes:  Negative for blurred vision.   Respiratory:  Negative for shortness of breath.    Cardiovascular:  Negative for leg swelling.   Gastrointestinal:  Negative for abdominal pain, nausea and vomiting.   Genitourinary:  Negative for difficulty urinating.   Musculoskeletal:  Positive for arthralgias. Negative for gait problem, joint swelling and myalgias.   Skin:  Negative for skin lesions and wound.   Neurological:  Negative for dizziness, weakness, light-headedness and numbness.   Hematological:  Does not bruise/bleed easily.   Psychiatric/Behavioral:  Negative for depressed mood.       Medications:   Current Outpatient Medications:     Cholecalciferol (Vitamin D) 50 MCG (2000 UT) tablet, Take 1 tablet by mouth Daily., Disp: , Rfl:     CRANBERRY CONCENTRATE PO, Take  by mouth., Disp: , Rfl:     exemestane (AROMASIN) 25 MG chemo tablet, Take 1 tablet by mouth Daily., Disp: , Rfl:     fluconazole (DIFLUCAN) 150 MG tablet, Take 1 tablet by mouth Daily As Needed (yeast symptoms)., Disp: 10 tablet, Rfl: 3    Glucos-Chond-Hyal Ac-Ca Fructo  "(MOVE FREE JOINT HEALTH ADVANCE PO), Take  by mouth., Disp: , Rfl:     Histamine Dihydrochloride (Australian Dream Arthritis) 0.025 % cream, Apply  topically. Apply topically TID, Disp: , Rfl:     levothyroxine (SYNTHROID, LEVOTHROID) 25 MCG tablet, Take 0.5 tablets by mouth Daily., Disp: 45 tablet, Rfl: 3    Loperamide HCl (IMODIUM PO), Take 1 tablet by mouth 2 (Two) Times a Day As Needed., Disp: , Rfl:     loratadine (CLARITIN) 10 MG tablet, Take 1 tablet by mouth Daily., Disp: , Rfl:     Multiple Vitamins-Minerals (YARI MULTIVITAMIN FOR WOMEN PO), Take  by mouth., Disp: , Rfl:     Omega-3 Fatty Acids (OMEGA-3 1450 PO), Take  by mouth Daily., Disp: , Rfl:     sore muscle (ICY HOT EXTRA STRENGTH) 10-30 % cream cream, Apply  topically to the appropriate area as directed 2 (Two) Times a Day As Needed. With lido, Disp: , Rfl:     trimethoprim (TRIMPEX) 100 MG tablet, Take 1 tablet by mouth Daily., Disp: 90 tablet, Rfl: 1    Allergies:   Allergies   Allergen Reactions    Bactrim [Sulfamethoxazole-Trimethoprim] Itching    Terbinafine Itching       I have reviewed and updated the patient's chief complaint, history of present illness, review of systems, past medical history, surgical history, family history, social history, medications and allergy list as appropriate.     Objective    Vital Signs:   Vitals:    08/14/23 1535   BP: 138/80   Weight: 78.2 kg (172 lb 6.4 oz)   Height: 160 cm (63\")     Body mass index is 30.54 kg/mý. BMI is >= 30 and <35. (Class 1 Obesity). The following options were offered after discussion;: exercise counseling/recommendations and nutrition counseling/recommendations     Patient reports that she is a non-smoker and has not ever been a smoker.  This behavior was applauded and she was encouraged to continue in smoking cessation.  We will continue to monitor at subsequent visits.     Ortho Exam:  Right ankle: No erythema ecchymosis or significant swelling.  Minimally tender to palpation over the " anterior ankle gutter as well as laterally through the peroneal tendon.  Range of motion is full in the ankle today.  She has 5/5 strength in inversion eversion plantarflexion dorsiflexion.  2+ posterior tibial pulse.  Sensations intact to light touch.    Results Review:   Imaging Results (Last 24 Hours)       ** No results found for the last 24 hours. **            Procedures    Assessment / Plan    Assessment/Plan:   Diagnoses and all orders for this visit:    1. Peroneal tendinitis of lower leg, right (Primary)    2. Ankle impingement syndrome, right      Follow-up on right foot and ankle pain from impingement and peroneal tendinitis.  Symptoms have improved since last visit with use of physical therapy.  She is walking in a regular shoe without issue.  No problems weaning from the boot.  She has full range of motion and strength of the ankle without symptoms.  I recommend that she continue with physical therapy.  I am happy to see her back if symptoms worsen but at this time, follow-up will be as needed.    Follow Up:   Return if symptoms worsen or fail to improve.      Lico Millan MD  [facility] [place] [facility]

## 2023-08-15 ENCOUNTER — TELEPHONE (OUTPATIENT)
Dept: ORTHOPEDIC SURGERY | Facility: CLINIC | Age: 68
End: 2023-08-15
Payer: MEDICARE

## 2023-08-15 NOTE — TELEPHONE ENCOUNTER
Patient wants to know if she can start back on treadmill within the next month, also will treadmill make foot worse or better .     Patient also heard caffeine makes tendinitis worse, and wants to know if that's true.    Lastly, when Physical Therapy is completed and continued exercise at home patient wants to know if foot condition will improve or will remain in standstill state     Please advise    Siria  [phone]

## 2023-08-16 NOTE — TELEPHONE ENCOUNTER
May start going back on treadmill as tolerated with supportive shoe wear.     No known association with caffeine.     If she continues with therapy exercises at home should continue to improve.     Thank you,   Gail HAWK left message for patent.    Francie LICEA (R) ROT

## 2023-08-28 ENCOUNTER — ANESTHESIA (OUTPATIENT)
Dept: PERIOP | Facility: HOSPITAL | Age: 68
DRG: 853 | End: 2023-08-28
Payer: MEDICARE

## 2023-08-28 ENCOUNTER — ANESTHESIA EVENT (OUTPATIENT)
Dept: PERIOP | Facility: HOSPITAL | Age: 68
DRG: 853 | End: 2023-08-28
Payer: MEDICARE

## 2023-08-28 ENCOUNTER — APPOINTMENT (OUTPATIENT)
Dept: CT IMAGING | Facility: HOSPITAL | Age: 68
DRG: 853 | End: 2023-08-28
Payer: MEDICARE

## 2023-08-28 ENCOUNTER — HOSPITAL ENCOUNTER (INPATIENT)
Facility: HOSPITAL | Age: 68
LOS: 7 days | Discharge: HOME | DRG: 853 | End: 2023-09-05
Attending: EMERGENCY MEDICINE | Admitting: INTERNAL MEDICINE
Payer: MEDICARE

## 2023-08-28 DIAGNOSIS — Z86.79 HISTORY OF HYPERTENSION: ICD-10-CM

## 2023-08-28 DIAGNOSIS — K27.5 PERFORATED ULCER: ICD-10-CM

## 2023-08-28 DIAGNOSIS — A41.9 SEPSIS, DUE TO UNSPECIFIED ORGANISM, UNSPECIFIED WHETHER ACUTE ORGAN DYSFUNCTION PRESENT: ICD-10-CM

## 2023-08-28 DIAGNOSIS — K25.9 PYLORUS ULCER, UNSPECIFIED CHRONICITY: ICD-10-CM

## 2023-08-28 DIAGNOSIS — Z85.3 HISTORY OF BREAST CANCER: ICD-10-CM

## 2023-08-28 DIAGNOSIS — N39.0 ACUTE UTI: Primary | ICD-10-CM

## 2023-08-28 DIAGNOSIS — R53.83 OTHER FATIGUE: ICD-10-CM

## 2023-08-28 DIAGNOSIS — R10.9 ABDOMINAL PAIN: ICD-10-CM

## 2023-08-28 DIAGNOSIS — N13.5 URETEROPELVIC JUNCTION (UPJ) OBSTRUCTION: ICD-10-CM

## 2023-08-28 DIAGNOSIS — R18.8 FREE FLUID IN PELVIS: ICD-10-CM

## 2023-08-28 DIAGNOSIS — Z87.19 HISTORY OF GASTROESOPHAGEAL REFLUX (GERD): ICD-10-CM

## 2023-08-28 DIAGNOSIS — N17.9 ACUTE KIDNEY INJURY: ICD-10-CM

## 2023-08-28 PROBLEM — G89.29 CHRONIC LOW BACK PAIN: Status: ACTIVE | Noted: 2023-08-28

## 2023-08-28 PROBLEM — N13.9 OBSTRUCTIVE UROPATHY: Status: ACTIVE | Noted: 2023-08-28

## 2023-08-28 PROBLEM — N20.0 NEPHROLITHIASIS: Status: ACTIVE | Noted: 2023-08-28

## 2023-08-28 PROBLEM — M54.50 CHRONIC LOW BACK PAIN: Status: ACTIVE | Noted: 2023-08-28

## 2023-08-28 PROBLEM — K65.9 PERITONITIS: Status: ACTIVE | Noted: 2023-08-28

## 2023-08-28 LAB
ABO GROUP BLD: NORMAL
ABO GROUP BLD: NORMAL
ALBUMIN SERPL-MCNC: 4.3 G/DL (ref 3.5–5.2)
ALBUMIN/GLOB SERPL: 1.2 G/DL
ALP SERPL-CCNC: 93 U/L (ref 39–117)
ALT SERPL W P-5'-P-CCNC: 24 U/L (ref 1–33)
ANION GAP SERPL CALCULATED.3IONS-SCNC: 17 MMOL/L (ref 5–15)
AST SERPL-CCNC: 32 U/L (ref 1–32)
BACTERIA UR QL AUTO: ABNORMAL /HPF
BASOPHILS # BLD MANUAL: 0 10*3/MM3 (ref 0–0.2)
BASOPHILS NFR BLD MANUAL: 0 % (ref 0–1.5)
BILIRUB SERPL-MCNC: 0.4 MG/DL (ref 0–1.2)
BILIRUB UR QL STRIP: ABNORMAL
BLD GP AB SCN SERPL QL: NEGATIVE
BUN SERPL-MCNC: 29 MG/DL (ref 8–23)
BUN/CREAT SERPL: 16.9 (ref 7–25)
CALCIUM SPEC-SCNC: 10 MG/DL (ref 8.6–10.5)
CHLORIDE SERPL-SCNC: 100 MMOL/L (ref 98–107)
CLARITY UR: ABNORMAL
CO2 SERPL-SCNC: 23 MMOL/L (ref 22–29)
COLOR UR: ABNORMAL
CREAT SERPL-MCNC: 1.72 MG/DL (ref 0.57–1)
D-LACTATE SERPL-SCNC: 2 MMOL/L (ref 0.5–2)
D-LACTATE SERPL-SCNC: 3.6 MMOL/L (ref 0.5–2)
DEPRECATED RDW RBC AUTO: 48.6 FL (ref 37–54)
EGFRCR SERPLBLD CKD-EPI 2021: 32.1 ML/MIN/1.73
EOSINOPHIL # BLD MANUAL: 0 10*3/MM3 (ref 0–0.4)
EOSINOPHIL NFR BLD MANUAL: 0 % (ref 0.3–6.2)
ERYTHROCYTE [DISTWIDTH] IN BLOOD BY AUTOMATED COUNT: 14.2 % (ref 12.3–15.4)
GLOBULIN UR ELPH-MCNC: 3.7 GM/DL
GLUCOSE SERPL-MCNC: 137 MG/DL (ref 65–99)
GLUCOSE UR STRIP-MCNC: NEGATIVE MG/DL
HCT VFR BLD AUTO: 40.2 % (ref 34–46.6)
HGB BLD-MCNC: 13 G/DL (ref 12–15.9)
HGB UR QL STRIP.AUTO: ABNORMAL
HOLD SPECIMEN: NORMAL
HYALINE CASTS UR QL AUTO: ABNORMAL /LPF
KETONES UR QL STRIP: ABNORMAL
LEUKOCYTE ESTERASE UR QL STRIP.AUTO: ABNORMAL
LIPASE SERPL-CCNC: 79 U/L (ref 13–60)
LYMPHOCYTES # BLD MANUAL: 1.07 10*3/MM3 (ref 0.7–3.1)
LYMPHOCYTES NFR BLD MANUAL: 3 % (ref 5–12)
MCH RBC QN AUTO: 29.8 PG (ref 26.6–33)
MCHC RBC AUTO-ENTMCNC: 32.3 G/DL (ref 31.5–35.7)
MCV RBC AUTO: 92.2 FL (ref 79–97)
METAMYELOCYTES NFR BLD MANUAL: 3 % (ref 0–0)
MONOCYTES # BLD: 0.8 10*3/MM3 (ref 0.1–0.9)
NEUTROPHILS # BLD AUTO: 23.97 10*3/MM3 (ref 1.7–7)
NEUTROPHILS NFR BLD MANUAL: 63 % (ref 42.7–76)
NEUTS BAND NFR BLD MANUAL: 27 % (ref 0–5)
NITRITE UR QL STRIP: POSITIVE
PH UR STRIP.AUTO: 5.5 [PH] (ref 5–8)
PLATELET # BLD AUTO: 306 10*3/MM3 (ref 140–450)
PMV BLD AUTO: 9.5 FL (ref 6–12)
POTASSIUM SERPL-SCNC: 4.6 MMOL/L (ref 3.5–5.2)
PROCALCITONIN SERPL-MCNC: 23.71 NG/ML (ref 0–0.25)
PROT SERPL-MCNC: 8 G/DL (ref 6–8.5)
PROT UR QL STRIP: ABNORMAL
RBC # BLD AUTO: 4.36 10*6/MM3 (ref 3.77–5.28)
RBC # UR STRIP: ABNORMAL /HPF
RBC MORPH BLD: NORMAL
REF LAB TEST METHOD: ABNORMAL
RH BLD: POSITIVE
RH BLD: POSITIVE
SMALL PLATELETS BLD QL SMEAR: ADEQUATE
SODIUM SERPL-SCNC: 140 MMOL/L (ref 136–145)
SP GR UR STRIP: >=1.03 (ref 1–1.03)
SQUAMOUS #/AREA URNS HPF: ABNORMAL /HPF
T&S EXPIRATION DATE: NORMAL
UROBILINOGEN UR QL STRIP: ABNORMAL
VARIANT LYMPHS NFR BLD MANUAL: 4 % (ref 19.6–45.3)
WBC # UR STRIP: ABNORMAL /HPF
WBC MORPH BLD: NORMAL
WBC NRBC COR # BLD: 26.63 10*3/MM3 (ref 3.4–10.8)
WHOLE BLOOD HOLD COAG: NORMAL
WHOLE BLOOD HOLD SPECIMEN: NORMAL

## 2023-08-28 PROCEDURE — 87070 CULTURE OTHR SPECIMN AEROBIC: CPT | Performed by: SURGERY

## 2023-08-28 PROCEDURE — 83605 ASSAY OF LACTIC ACID: CPT | Performed by: EMERGENCY MEDICINE

## 2023-08-28 PROCEDURE — 87150 DNA/RNA AMPLIFIED PROBE: CPT | Performed by: PHYSICIAN ASSISTANT

## 2023-08-28 PROCEDURE — 25010000002 ONDANSETRON PER 1 MG: Performed by: ANESTHESIOLOGY

## 2023-08-28 PROCEDURE — P9041 ALBUMIN (HUMAN),5%, 50ML: HCPCS | Performed by: ANESTHESIOLOGY

## 2023-08-28 PROCEDURE — 25010000002 ALBUMIN HUMAN 25% PER 50 ML: Performed by: ANESTHESIOLOGY

## 2023-08-28 PROCEDURE — 88305 TISSUE EXAM BY PATHOLOGIST: CPT | Performed by: SURGERY

## 2023-08-28 PROCEDURE — 25010000002 CEFTRIAXONE PER 250 MG: Performed by: PHYSICIAN ASSISTANT

## 2023-08-28 PROCEDURE — 25010000002 ONDANSETRON PER 1 MG: Performed by: PHYSICIAN ASSISTANT

## 2023-08-28 PROCEDURE — 86901 BLOOD TYPING SEROLOGIC RH(D): CPT

## 2023-08-28 PROCEDURE — 87086 URINE CULTURE/COLONY COUNT: CPT | Performed by: PHYSICIAN ASSISTANT

## 2023-08-28 PROCEDURE — 87040 BLOOD CULTURE FOR BACTERIA: CPT | Performed by: PHYSICIAN ASSISTANT

## 2023-08-28 PROCEDURE — 87015 SPECIMEN INFECT AGNT CONCNTJ: CPT | Performed by: SURGERY

## 2023-08-28 PROCEDURE — 81001 URINALYSIS AUTO W/SCOPE: CPT

## 2023-08-28 PROCEDURE — 25010000002 FENTANYL CITRATE (PF) 50 MCG/ML SOLUTION: Performed by: ANESTHESIOLOGY

## 2023-08-28 PROCEDURE — 25010000002 PIPERACILLIN SOD-TAZOBACTAM PER 1 G: Performed by: SURGERY

## 2023-08-28 PROCEDURE — 36415 COLL VENOUS BLD VENIPUNCTURE: CPT

## 2023-08-28 PROCEDURE — 85007 BL SMEAR W/DIFF WBC COUNT: CPT | Performed by: EMERGENCY MEDICINE

## 2023-08-28 PROCEDURE — 83690 ASSAY OF LIPASE: CPT | Performed by: EMERGENCY MEDICINE

## 2023-08-28 PROCEDURE — 85025 COMPLETE CBC W/AUTO DIFF WBC: CPT | Performed by: EMERGENCY MEDICINE

## 2023-08-28 PROCEDURE — 87205 SMEAR GRAM STAIN: CPT | Performed by: SURGERY

## 2023-08-28 PROCEDURE — 86901 BLOOD TYPING SEROLOGIC RH(D): CPT | Performed by: SURGERY

## 2023-08-28 PROCEDURE — 99223 1ST HOSP IP/OBS HIGH 75: CPT | Performed by: INTERNAL MEDICINE

## 2023-08-28 PROCEDURE — 99285 EMERGENCY DEPT VISIT HI MDM: CPT

## 2023-08-28 PROCEDURE — 87116 MYCOBACTERIA CULTURE: CPT | Performed by: SURGERY

## 2023-08-28 PROCEDURE — 93005 ELECTROCARDIOGRAM TRACING: CPT | Performed by: EMERGENCY MEDICINE

## 2023-08-28 PROCEDURE — 25010000002 ALBUMIN HUMAN 5% PER 50 ML: Performed by: ANESTHESIOLOGY

## 2023-08-28 PROCEDURE — 25010000002 PIPERACILLIN SOD-TAZOBACTAM PER 1 G: Performed by: PHYSICIAN ASSISTANT

## 2023-08-28 PROCEDURE — 87206 SMEAR FLUORESCENT/ACID STAI: CPT | Performed by: SURGERY

## 2023-08-28 PROCEDURE — 25010000002 HYDROMORPHONE PER 4 MG: Performed by: EMERGENCY MEDICINE

## 2023-08-28 PROCEDURE — 74176 CT ABD & PELVIS W/O CONTRAST: CPT

## 2023-08-28 PROCEDURE — 87102 FUNGUS ISOLATION CULTURE: CPT | Performed by: SURGERY

## 2023-08-28 PROCEDURE — 86900 BLOOD TYPING SEROLOGIC ABO: CPT

## 2023-08-28 PROCEDURE — 86900 BLOOD TYPING SEROLOGIC ABO: CPT | Performed by: SURGERY

## 2023-08-28 PROCEDURE — 25010000002 SUGAMMADEX 200 MG/2ML SOLUTION: Performed by: ANESTHESIOLOGY

## 2023-08-28 PROCEDURE — 25010000002 HYDROMORPHONE 1 MG/ML SOLUTION: Performed by: EMERGENCY MEDICINE

## 2023-08-28 PROCEDURE — 87077 CULTURE AEROBIC IDENTIFY: CPT | Performed by: PHYSICIAN ASSISTANT

## 2023-08-28 PROCEDURE — 87186 SC STD MICRODIL/AGAR DIL: CPT | Performed by: PHYSICIAN ASSISTANT

## 2023-08-28 PROCEDURE — 86850 RBC ANTIBODY SCREEN: CPT | Performed by: SURGERY

## 2023-08-28 PROCEDURE — 25010000002 PROPOFOL 10 MG/ML EMULSION: Performed by: ANESTHESIOLOGY

## 2023-08-28 PROCEDURE — 80053 COMPREHEN METABOLIC PANEL: CPT | Performed by: EMERGENCY MEDICINE

## 2023-08-28 PROCEDURE — P9047 ALBUMIN (HUMAN), 25%, 50ML: HCPCS | Performed by: ANESTHESIOLOGY

## 2023-08-28 PROCEDURE — 0DU907Z SUPPLEMENT DUODENUM WITH AUTOLOGOUS TISSUE SUBSTITUTE, OPEN APPROACH: ICD-10-PCS | Performed by: SURGERY

## 2023-08-28 PROCEDURE — 87075 CULTR BACTERIA EXCEPT BLOOD: CPT | Performed by: SURGERY

## 2023-08-28 PROCEDURE — 0DQ70ZZ REPAIR STOMACH, PYLORUS, OPEN APPROACH: ICD-10-PCS | Performed by: SURGERY

## 2023-08-28 PROCEDURE — 84145 PROCALCITONIN (PCT): CPT | Performed by: PHYSICIAN ASSISTANT

## 2023-08-28 RX ORDER — PROPOFOL 10 MG/ML
VIAL (ML) INTRAVENOUS AS NEEDED
Status: DISCONTINUED | OUTPATIENT
Start: 2023-08-28 | End: 2023-08-28 | Stop reason: SURG

## 2023-08-28 RX ORDER — ONDANSETRON 2 MG/ML
4 INJECTION INTRAMUSCULAR; INTRAVENOUS ONCE
Status: COMPLETED | OUTPATIENT
Start: 2023-08-28 | End: 2023-08-28

## 2023-08-28 RX ORDER — EPHEDRINE SULFATE 50 MG/ML
INJECTION INTRAVENOUS AS NEEDED
Status: DISCONTINUED | OUTPATIENT
Start: 2023-08-28 | End: 2023-08-28 | Stop reason: SURG

## 2023-08-28 RX ORDER — ROCURONIUM BROMIDE 10 MG/ML
INJECTION, SOLUTION INTRAVENOUS AS NEEDED
Status: DISCONTINUED | OUTPATIENT
Start: 2023-08-28 | End: 2023-08-28 | Stop reason: SURG

## 2023-08-28 RX ORDER — SODIUM CHLORIDE, SODIUM LACTATE, POTASSIUM CHLORIDE, CALCIUM CHLORIDE 600; 310; 30; 20 MG/100ML; MG/100ML; MG/100ML; MG/100ML
INJECTION, SOLUTION INTRAVENOUS CONTINUOUS PRN
Status: DISCONTINUED | OUTPATIENT
Start: 2023-08-28 | End: 2023-08-28 | Stop reason: SURG

## 2023-08-28 RX ORDER — FENTANYL CITRATE 50 UG/ML
50 INJECTION, SOLUTION INTRAMUSCULAR; INTRAVENOUS
Status: DISCONTINUED | OUTPATIENT
Start: 2023-08-28 | End: 2023-08-29 | Stop reason: HOSPADM

## 2023-08-28 RX ORDER — AMOXICILLIN 250 MG
2 CAPSULE ORAL 2 TIMES DAILY
Status: DISCONTINUED | OUTPATIENT
Start: 2023-08-28 | End: 2023-08-28

## 2023-08-28 RX ORDER — ALBUMIN (HUMAN) 12.5 G/50ML
SOLUTION INTRAVENOUS CONTINUOUS PRN
Status: DISCONTINUED | OUTPATIENT
Start: 2023-08-28 | End: 2023-08-28 | Stop reason: SURG

## 2023-08-28 RX ORDER — SODIUM CHLORIDE 9 MG/ML
10 INJECTION INTRAVENOUS AS NEEDED
Status: DISCONTINUED | OUTPATIENT
Start: 2023-08-28 | End: 2023-09-05 | Stop reason: HOSPADM

## 2023-08-28 RX ORDER — SODIUM CHLORIDE 9 MG/ML
40 INJECTION, SOLUTION INTRAVENOUS AS NEEDED
Status: DISCONTINUED | OUTPATIENT
Start: 2023-08-28 | End: 2023-09-05 | Stop reason: HOSPADM

## 2023-08-28 RX ORDER — ALBUMIN, HUMAN INJ 5% 5 %
SOLUTION INTRAVENOUS CONTINUOUS PRN
Status: DISCONTINUED | OUTPATIENT
Start: 2023-08-28 | End: 2023-08-28 | Stop reason: SURG

## 2023-08-28 RX ORDER — LIDOCAINE HYDROCHLORIDE 10 MG/ML
INJECTION, SOLUTION EPIDURAL; INFILTRATION; INTRACAUDAL; PERINEURAL AS NEEDED
Status: DISCONTINUED | OUTPATIENT
Start: 2023-08-28 | End: 2023-08-28 | Stop reason: SURG

## 2023-08-28 RX ORDER — SODIUM CHLORIDE 0.9 % (FLUSH) 0.9 %
10 SYRINGE (ML) INJECTION AS NEEDED
Status: DISCONTINUED | OUTPATIENT
Start: 2023-08-28 | End: 2023-08-29

## 2023-08-28 RX ORDER — NITROGLYCERIN 0.4 MG/1
0.4 TABLET SUBLINGUAL
Status: DISCONTINUED | OUTPATIENT
Start: 2023-08-28 | End: 2023-08-29

## 2023-08-28 RX ORDER — SODIUM CHLORIDE, SODIUM LACTATE, POTASSIUM CHLORIDE, CALCIUM CHLORIDE 600; 310; 30; 20 MG/100ML; MG/100ML; MG/100ML; MG/100ML
125 INJECTION, SOLUTION INTRAVENOUS CONTINUOUS
Status: DISCONTINUED | OUTPATIENT
Start: 2023-08-28 | End: 2023-08-30

## 2023-08-28 RX ORDER — PHENYLEPHRINE HCL IN 0.9% NACL 1 MG/10 ML
SYRINGE (ML) INTRAVENOUS AS NEEDED
Status: DISCONTINUED | OUTPATIENT
Start: 2023-08-28 | End: 2023-08-28 | Stop reason: SURG

## 2023-08-28 RX ORDER — HYDROMORPHONE HYDROCHLORIDE 1 MG/ML
0.5 INJECTION, SOLUTION INTRAMUSCULAR; INTRAVENOUS; SUBCUTANEOUS
Status: DISCONTINUED | OUTPATIENT
Start: 2023-08-28 | End: 2023-08-29 | Stop reason: HOSPADM

## 2023-08-28 RX ORDER — LEVOTHYROXINE SODIUM 0.03 MG/1
12.5 TABLET ORAL
Status: DISCONTINUED | OUTPATIENT
Start: 2023-08-30 | End: 2023-08-29

## 2023-08-28 RX ORDER — POLYETHYLENE GLYCOL 3350 17 G/17G
17 POWDER, FOR SOLUTION ORAL DAILY PRN
Status: DISCONTINUED | OUTPATIENT
Start: 2023-08-28 | End: 2023-08-28

## 2023-08-28 RX ORDER — ONDANSETRON 2 MG/ML
INJECTION INTRAMUSCULAR; INTRAVENOUS AS NEEDED
Status: DISCONTINUED | OUTPATIENT
Start: 2023-08-28 | End: 2023-08-28 | Stop reason: SURG

## 2023-08-28 RX ORDER — SODIUM CHLORIDE 0.9 % (FLUSH) 0.9 %
10 SYRINGE (ML) INJECTION EVERY 12 HOURS SCHEDULED
Status: DISCONTINUED | OUTPATIENT
Start: 2023-08-28 | End: 2023-09-05 | Stop reason: HOSPADM

## 2023-08-28 RX ORDER — BISACODYL 5 MG/1
5 TABLET, DELAYED RELEASE ORAL DAILY PRN
Status: DISCONTINUED | OUTPATIENT
Start: 2023-08-28 | End: 2023-08-28

## 2023-08-28 RX ORDER — FENTANYL CITRATE 50 UG/ML
INJECTION, SOLUTION INTRAMUSCULAR; INTRAVENOUS AS NEEDED
Status: DISCONTINUED | OUTPATIENT
Start: 2023-08-28 | End: 2023-08-28 | Stop reason: SURG

## 2023-08-28 RX ORDER — BISACODYL 10 MG
10 SUPPOSITORY, RECTAL RECTAL DAILY PRN
Status: DISCONTINUED | OUTPATIENT
Start: 2023-08-28 | End: 2023-09-05 | Stop reason: HOSPADM

## 2023-08-28 RX ORDER — HYDROMORPHONE HYDROCHLORIDE 1 MG/ML
0.5 INJECTION, SOLUTION INTRAMUSCULAR; INTRAVENOUS; SUBCUTANEOUS ONCE
Status: COMPLETED | OUTPATIENT
Start: 2023-08-28 | End: 2023-08-28

## 2023-08-28 RX ADMIN — PIPERACILLIN SODIUM AND TAZOBACTAM SODIUM 3.38 G: 3; .375 INJECTION, SOLUTION INTRAVENOUS at 20:12

## 2023-08-28 RX ADMIN — ONDANSETRON 4 MG: 2 INJECTION INTRAMUSCULAR; INTRAVENOUS at 22:19

## 2023-08-28 RX ADMIN — ALBUMIN (HUMAN): 0.25 INJECTION, SOLUTION INTRAVENOUS at 21:48

## 2023-08-28 RX ADMIN — Medication 300 MCG: at 21:23

## 2023-08-28 RX ADMIN — SODIUM CHLORIDE, POTASSIUM CHLORIDE, SODIUM LACTATE AND CALCIUM CHLORIDE: 600; 310; 30; 20 INJECTION, SOLUTION INTRAVENOUS at 21:17

## 2023-08-28 RX ADMIN — LIDOCAINE HYDROCHLORIDE 40 MG: 10 INJECTION, SOLUTION EPIDURAL; INFILTRATION; INTRACAUDAL; PERINEURAL at 21:10

## 2023-08-28 RX ADMIN — PROPOFOL 180 MG: 10 INJECTION, EMULSION INTRAVENOUS at 21:10

## 2023-08-28 RX ADMIN — Medication 300 MCG: at 21:30

## 2023-08-28 RX ADMIN — ONDANSETRON 4 MG: 2 INJECTION INTRAMUSCULAR; INTRAVENOUS at 18:09

## 2023-08-28 RX ADMIN — PIPERACILLIN SODIUM AND TAZOBACTAM SODIUM 3.38 G: 3; .375 INJECTION, SOLUTION INTRAVENOUS at 22:10

## 2023-08-28 RX ADMIN — FENTANYL CITRATE 100 MCG: 50 INJECTION, SOLUTION INTRAMUSCULAR; INTRAVENOUS at 21:10

## 2023-08-28 RX ADMIN — ROCURONIUM BROMIDE 50 MG: 10 SOLUTION INTRAVENOUS at 21:10

## 2023-08-28 RX ADMIN — FENTANYL CITRATE 50 MCG: 50 INJECTION, SOLUTION INTRAMUSCULAR; INTRAVENOUS at 23:50

## 2023-08-28 RX ADMIN — SODIUM CHLORIDE 2000 MG: 900 INJECTION INTRAVENOUS at 18:39

## 2023-08-28 RX ADMIN — Medication 100 MCG: at 21:14

## 2023-08-28 RX ADMIN — EPHEDRINE SULFATE 10 MG: 50 INJECTION INTRAVENOUS at 21:30

## 2023-08-28 RX ADMIN — ALBUMIN (HUMAN): 12.5 INJECTION, SOLUTION INTRAVENOUS at 21:15

## 2023-08-28 RX ADMIN — SUGAMMADEX 200 MG: 100 INJECTION, SOLUTION INTRAVENOUS at 22:38

## 2023-08-28 RX ADMIN — SODIUM CHLORIDE 1000 ML: 9 INJECTION, SOLUTION INTRAVENOUS at 18:10

## 2023-08-28 RX ADMIN — HYDROMORPHONE HYDROCHLORIDE 1 MG: 1 INJECTION, SOLUTION INTRAMUSCULAR; INTRAVENOUS; SUBCUTANEOUS at 18:09

## 2023-08-28 RX ADMIN — HYDROMORPHONE HYDROCHLORIDE 0.5 MG: 1 INJECTION, SOLUTION INTRAMUSCULAR; INTRAVENOUS; SUBCUTANEOUS at 20:11

## 2023-08-28 NOTE — ANESTHESIA PREPROCEDURE EVALUATION
Anesthesia Evaluation     Patient summary reviewed and Nursing notes reviewed   no history of anesthetic complications:   NPO Solid Status: > 8 hours  NPO Liquid Status: > 2 hours           Airway   TM distance: >3 FB  Neck ROM: full  Possible difficult intubation  Dental      Pulmonary    (+) a smoker Former Abstained day of surgery,  Cardiovascular   Exercise tolerance: good (4-7 METS)    ECG reviewed  Rhythm: regular  Rate: normal    (+) hypertension, hyperlipidemia      Neuro/Psych  (+) numbness  GI/Hepatic/Renal/Endo    (+) obesity, GERD well controlled, thyroid problem hypothyroidism    Musculoskeletal     (+) neck pain  Abdominal   (+) obese    Abdomen: soft.   Substance History      OB/GYN          Other   arthritis,   history of cancer remission                  Anesthesia Plan    ASA 3 - emergent     general     intravenous induction     Anesthetic plan, risks, benefits, and alternatives have been provided, discussed and informed consent has been obtained with: patient.    CODE STATUS:

## 2023-08-29 PROBLEM — K27.5 PERFORATED ULCER: Status: ACTIVE | Noted: 2023-08-29

## 2023-08-29 LAB
ANION GAP SERPL CALCULATED.3IONS-SCNC: 10 MMOL/L (ref 5–15)
BACTERIA BLD CULT: ABNORMAL
BOTTLE TYPE: ABNORMAL
BUN SERPL-MCNC: 28 MG/DL (ref 8–23)
BUN/CREAT SERPL: 20.7 (ref 7–25)
CALCIUM SPEC-SCNC: 8 MG/DL (ref 8.6–10.5)
CHLORIDE SERPL-SCNC: 105 MMOL/L (ref 98–107)
CO2 SERPL-SCNC: 23 MMOL/L (ref 22–29)
CREAT SERPL-MCNC: 1.35 MG/DL (ref 0.57–1)
CREAT UR-MCNC: 44.3 MG/DL
D-LACTATE SERPL-SCNC: 2.3 MMOL/L (ref 0.5–2)
DEPRECATED RDW RBC AUTO: 49.4 FL (ref 37–54)
EGFRCR SERPLBLD CKD-EPI 2021: 42.9 ML/MIN/1.73
ERYTHROCYTE [DISTWIDTH] IN BLOOD BY AUTOMATED COUNT: 14.4 % (ref 12.3–15.4)
GLUCOSE BLDC GLUCOMTR-MCNC: 103 MG/DL (ref 70–130)
GLUCOSE BLDC GLUCOMTR-MCNC: 80 MG/DL (ref 70–130)
GLUCOSE BLDC GLUCOMTR-MCNC: 93 MG/DL (ref 70–130)
GLUCOSE SERPL-MCNC: 134 MG/DL (ref 65–99)
HBA1C MFR BLD: 5.8 % (ref 4.8–5.6)
HCT VFR BLD AUTO: 33.1 % (ref 34–46.6)
HGB BLD-MCNC: 10.9 G/DL (ref 12–15.9)
MAGNESIUM SERPL-MCNC: 1.8 MG/DL (ref 1.6–2.4)
MCH RBC QN AUTO: 30.4 PG (ref 26.6–33)
MCHC RBC AUTO-ENTMCNC: 32.9 G/DL (ref 31.5–35.7)
MCV RBC AUTO: 92.2 FL (ref 79–97)
PHOSPHATE SERPL-MCNC: 3.3 MG/DL (ref 2.5–4.5)
PLATELET # BLD AUTO: 225 10*3/MM3 (ref 140–450)
PMV BLD AUTO: 10 FL (ref 6–12)
POTASSIUM SERPL-SCNC: 4.5 MMOL/L (ref 3.5–5.2)
PROCALCITONIN SERPL-MCNC: 18.09 NG/ML (ref 0–0.25)
RBC # BLD AUTO: 3.59 10*6/MM3 (ref 3.77–5.28)
SODIUM SERPL-SCNC: 138 MMOL/L (ref 136–145)
SODIUM UR-SCNC: 40 MMOL/L
TSH SERPL DL<=0.05 MIU/L-ACNC: 0.41 UIU/ML (ref 0.27–4.2)
WBC NRBC COR # BLD: 24.25 10*3/MM3 (ref 3.4–10.8)

## 2023-08-29 PROCEDURE — 80048 BASIC METABOLIC PNL TOTAL CA: CPT | Performed by: INTERNAL MEDICINE

## 2023-08-29 PROCEDURE — 99232 SBSQ HOSP IP/OBS MODERATE 35: CPT | Performed by: INTERNAL MEDICINE

## 2023-08-29 PROCEDURE — 0T778DZ DILATION OF LEFT URETER WITH INTRALUMINAL DEVICE, VIA NATURAL OR ARTIFICIAL OPENING ENDOSCOPIC: ICD-10-PCS | Performed by: UROLOGY

## 2023-08-29 PROCEDURE — 82948 REAGENT STRIP/BLOOD GLUCOSE: CPT

## 2023-08-29 PROCEDURE — 97110 THERAPEUTIC EXERCISES: CPT

## 2023-08-29 PROCEDURE — 83605 ASSAY OF LACTIC ACID: CPT | Performed by: NURSE PRACTITIONER

## 2023-08-29 PROCEDURE — 83735 ASSAY OF MAGNESIUM: CPT | Performed by: INTERNAL MEDICINE

## 2023-08-29 PROCEDURE — 83036 HEMOGLOBIN GLYCOSYLATED A1C: CPT | Performed by: INTERNAL MEDICINE

## 2023-08-29 PROCEDURE — 97162 PT EVAL MOD COMPLEX 30 MIN: CPT

## 2023-08-29 PROCEDURE — 85027 COMPLETE CBC AUTOMATED: CPT | Performed by: INTERNAL MEDICINE

## 2023-08-29 PROCEDURE — 0 MAGNESIUM SULFATE 4 GM/100ML SOLUTION: Performed by: SURGERY

## 2023-08-29 PROCEDURE — 84443 ASSAY THYROID STIM HORMONE: CPT | Performed by: INTERNAL MEDICINE

## 2023-08-29 PROCEDURE — 82570 ASSAY OF URINE CREATININE: CPT | Performed by: INTERNAL MEDICINE

## 2023-08-29 PROCEDURE — 25010000002 HYDROMORPHONE PER 4 MG: Performed by: SURGERY

## 2023-08-29 PROCEDURE — 84100 ASSAY OF PHOSPHORUS: CPT | Performed by: INTERNAL MEDICINE

## 2023-08-29 PROCEDURE — 84300 ASSAY OF URINE SODIUM: CPT | Performed by: INTERNAL MEDICINE

## 2023-08-29 PROCEDURE — 97530 THERAPEUTIC ACTIVITIES: CPT

## 2023-08-29 PROCEDURE — 84145 PROCALCITONIN (PCT): CPT | Performed by: INTERNAL MEDICINE

## 2023-08-29 PROCEDURE — 25010000002 MEROPENEM PER 100 MG: Performed by: INTERNAL MEDICINE

## 2023-08-29 PROCEDURE — 25010000002 PIPERACILLIN SOD-TAZOBACTAM PER 1 G: Performed by: SURGERY

## 2023-08-29 PROCEDURE — 25010000002 FLUCONAZOLE PER 200 MG: Performed by: SURGERY

## 2023-08-29 RX ORDER — FLUTICASONE PROPIONATE 50 MCG
2 SPRAY, SUSPENSION (ML) NASAL DAILY
Status: DISCONTINUED | OUTPATIENT
Start: 2023-08-29 | End: 2023-09-05 | Stop reason: HOSPADM

## 2023-08-29 RX ORDER — FLUCONAZOLE 2 MG/ML
200 INJECTION, SOLUTION INTRAVENOUS
Status: COMPLETED | OUTPATIENT
Start: 2023-08-29 | End: 2023-09-01

## 2023-08-29 RX ORDER — SODIUM CHLORIDE 9 MG/ML
125 INJECTION, SOLUTION INTRAVENOUS CONTINUOUS
Status: DISCONTINUED | OUTPATIENT
Start: 2023-08-29 | End: 2023-08-29

## 2023-08-29 RX ORDER — ACETAMINOPHEN 650 MG/1
650 SUPPOSITORY RECTAL EVERY 4 HOURS PRN
Status: DISCONTINUED | OUTPATIENT
Start: 2023-08-29 | End: 2023-09-05 | Stop reason: HOSPADM

## 2023-08-29 RX ORDER — PANTOPRAZOLE SODIUM 40 MG/10ML
40 INJECTION, POWDER, LYOPHILIZED, FOR SOLUTION INTRAVENOUS
Status: DISCONTINUED | OUTPATIENT
Start: 2023-08-29 | End: 2023-09-05 | Stop reason: ALTCHOICE

## 2023-08-29 RX ORDER — LEVOTHYROXINE SODIUM 20 UG/ML
20 INJECTION, SOLUTION INTRAVENOUS
Status: DISCONTINUED | OUTPATIENT
Start: 2023-08-29 | End: 2023-09-02

## 2023-08-29 RX ORDER — ONDANSETRON 2 MG/ML
4 INJECTION INTRAMUSCULAR; INTRAVENOUS EVERY 6 HOURS PRN
Status: DISCONTINUED | OUTPATIENT
Start: 2023-08-29 | End: 2023-09-05 | Stop reason: HOSPADM

## 2023-08-29 RX ORDER — HYDROMORPHONE HYDROCHLORIDE 1 MG/ML
0.5 INJECTION, SOLUTION INTRAMUSCULAR; INTRAVENOUS; SUBCUTANEOUS
Status: DISPENSED | OUTPATIENT
Start: 2023-08-29 | End: 2023-09-05

## 2023-08-29 RX ORDER — MAGNESIUM SULFATE HEPTAHYDRATE 40 MG/ML
4 INJECTION, SOLUTION INTRAVENOUS ONCE
Status: COMPLETED | OUTPATIENT
Start: 2023-08-29 | End: 2023-08-29

## 2023-08-29 RX ADMIN — HYDROMORPHONE HYDROCHLORIDE 0.5 MG: 1 INJECTION, SOLUTION INTRAMUSCULAR; INTRAVENOUS; SUBCUTANEOUS at 14:19

## 2023-08-29 RX ADMIN — PANTOPRAZOLE SODIUM 40 MG: 40 INJECTION, POWDER, LYOPHILIZED, FOR SOLUTION INTRAVENOUS at 18:49

## 2023-08-29 RX ADMIN — FLUTICASONE PROPIONATE 2 SPRAY: 50 SPRAY, METERED NASAL at 12:01

## 2023-08-29 RX ADMIN — Medication 10 ML: at 08:24

## 2023-08-29 RX ADMIN — HYDROMORPHONE HYDROCHLORIDE 0.5 MG: 1 INJECTION, SOLUTION INTRAMUSCULAR; INTRAVENOUS; SUBCUTANEOUS at 05:44

## 2023-08-29 RX ADMIN — PHENOL 1 SPRAY: 1.5 LIQUID ORAL at 05:35

## 2023-08-29 RX ADMIN — MEROPENEM 1000 MG: 1 INJECTION, POWDER, FOR SOLUTION INTRAVENOUS at 12:00

## 2023-08-29 RX ADMIN — Medication 10 ML: at 00:41

## 2023-08-29 RX ADMIN — SODIUM CHLORIDE, POTASSIUM CHLORIDE, SODIUM LACTATE AND CALCIUM CHLORIDE 125 ML/HR: 600; 310; 30; 20 INJECTION, SOLUTION INTRAVENOUS at 14:19

## 2023-08-29 RX ADMIN — HYDROMORPHONE HYDROCHLORIDE 0.5 MG: 1 INJECTION, SOLUTION INTRAMUSCULAR; INTRAVENOUS; SUBCUTANEOUS at 21:22

## 2023-08-29 RX ADMIN — PHENOL 1 SPRAY: 1.5 LIQUID ORAL at 12:06

## 2023-08-29 RX ADMIN — MAGNESIUM SULFATE HEPTAHYDRATE 4 G: 40 INJECTION, SOLUTION INTRAVENOUS at 06:43

## 2023-08-29 RX ADMIN — SODIUM CHLORIDE, POTASSIUM CHLORIDE, SODIUM LACTATE AND CALCIUM CHLORIDE 125 ML/HR: 600; 310; 30; 20 INJECTION, SOLUTION INTRAVENOUS at 06:43

## 2023-08-29 RX ADMIN — PANTOPRAZOLE SODIUM 40 MG: 40 INJECTION, POWDER, LYOPHILIZED, FOR SOLUTION INTRAVENOUS at 10:17

## 2023-08-29 RX ADMIN — PIPERACILLIN SODIUM AND TAZOBACTAM SODIUM 4.5 G: 4; .5 INJECTION, SOLUTION INTRAVENOUS at 03:54

## 2023-08-29 RX ADMIN — SODIUM CHLORIDE 8 MG/HR: 900 INJECTION INTRAVENOUS at 01:03

## 2023-08-29 RX ADMIN — LEVOTHYROXINE SODIUM 20 MCG: 20 INJECTION, SOLUTION INTRAVENOUS at 18:49

## 2023-08-29 RX ADMIN — SODIUM CHLORIDE, POTASSIUM CHLORIDE, SODIUM LACTATE AND CALCIUM CHLORIDE 125 ML/HR: 600; 310; 30; 20 INJECTION, SOLUTION INTRAVENOUS at 23:13

## 2023-08-29 RX ADMIN — SODIUM CHLORIDE 8 MG/HR: 900 INJECTION INTRAVENOUS at 05:35

## 2023-08-29 RX ADMIN — SODIUM CHLORIDE, POTASSIUM CHLORIDE, SODIUM LACTATE AND CALCIUM CHLORIDE 125 ML/HR: 600; 310; 30; 20 INJECTION, SOLUTION INTRAVENOUS at 00:37

## 2023-08-29 RX ADMIN — PHENOL 1 SPRAY: 1.5 LIQUID ORAL at 01:04

## 2023-08-29 RX ADMIN — FLUCONAZOLE 200 MG: 200 INJECTION, SOLUTION INTRAVENOUS at 01:07

## 2023-08-29 RX ADMIN — FLUCONAZOLE 200 MG: 200 INJECTION, SOLUTION INTRAVENOUS at 20:19

## 2023-08-29 RX ADMIN — MEROPENEM 1000 MG: 1 INJECTION, POWDER, FOR SOLUTION INTRAVENOUS at 18:49

## 2023-08-29 NOTE — CONSULTS
General Surgery Consultation Note    Date of Service: 8/28/2023  Siria Garcia  [id number]  1955      Referring Provider: Eric Steward DO    Location of Consult: Emergency room     Reason for Consultation: Pneumoperitoneum       History of Present Illness:  I am seeing, Siria JACQUES Radha, in consultation for Eric Steward DO regarding pneumoperitoneum.  Patient is a pleasant 67-year-old lady with history of arthritis and chronic low back pain, breast cancer, diverticulosis, hypertension, gastroesophageal reflux disease and UTI.  She presented to the emergency room complaining of worsening abdominal pain.  She complained of abdominal pain, nausea and vomiting with chills last night.  The pain progressed.  No similar complaints.  Work-up in the emergency room was remarkable for white blood count of 26,000, lactic acid of 3.6, BUN of 29 and creatinine of 1.72 and procalcitonin of 23.71.  UA is concerning for UTI.  CT scan of the abdomen and pelvis is remarkable for free fluid and air within the upper abdomen with mild amount of free fluid in the lower pelvis.  1.3 cm stone noted at the left ureteropelvic junction.  Moderate obstructive uropathy is noted.  Patient has been taking ibuprofen for years especially in the last 2 weeks due to an ankle injury.  Abdominal surgeries include laparoscopic cholecystectomy in 2007.      Problems Addressed this Visit    None  Diagnoses    None.         Past Medical History:   Diagnosis Date    Arthritis     Breast cancer 2017    Colon polyp 2018    Diverticulosis 01/2018    Environmental allergies     Gall bladder stones     GERD (gastroesophageal reflux disease)     Hypertension     Low back pain     Plantar fasciitis     Tarsal tunnel syndrome of both lower extremities     UTI (urinary tract infection)        Past Surgical History:    BREAST LUMPECTOMY    CHOLECYSTECTOMY    COLONOSCOPY W/ BIOPSIES       Allergies   Allergen Reactions    Bactrim [Sulfamethoxazole-Trimethoprim]  Itching    Terbinafine Itching       No current facility-administered medications on file prior to encounter.     Current Outpatient Medications on File Prior to Encounter   Medication Sig Dispense Refill    Cholecalciferol (Vitamin D) 50 MCG (2000 UT) tablet Take 1 tablet by mouth Daily.      CRANBERRY CONCENTRATE PO Take  by mouth.      exemestane (AROMASIN) 25 MG chemo tablet Take 1 tablet by mouth Daily.      fluconazole (DIFLUCAN) 150 MG tablet Take 1 tablet by mouth Daily As Needed (yeast symptoms). 10 tablet 3    Glucos-Chond-Hyal Ac-Ca Fructo (MOVE FREE JOINT HEALTH ADVANCE PO) Take  by mouth.      Histamine Dihydrochloride (Australian Dream Arthritis) 0.025 % cream Apply  topically. Apply topically TID      levothyroxine (SYNTHROID, LEVOTHROID) 25 MCG tablet Take 0.5 tablets by mouth Daily. 45 tablet 3    Loperamide HCl (IMODIUM PO) Take 1 tablet by mouth 2 (Two) Times a Day As Needed.      loratadine (CLARITIN) 10 MG tablet Take 1 tablet by mouth Daily.      Multiple Vitamins-Minerals (YARI MULTIVITAMIN FOR WOMEN PO) Take  by mouth.      Omega-3 Fatty Acids (OMEGA-3 1450 PO) Take  by mouth Daily.      sore muscle (ICY HOT EXTRA STRENGTH) 10-30 % cream cream Apply  topically to the appropriate area as directed 2 (Two) Times a Day As Needed. With lido      trimethoprim (TRIMPEX) 100 MG tablet Take 1 tablet by mouth Daily. 90 tablet 1         Current Facility-Administered Medications:     Sodium Chloride (PF) 0.9 % 10 mL, 10 mL, Intravenous, PRN, Angie, Eric, DO    Current Outpatient Medications:     Cholecalciferol (Vitamin D) 50 MCG (2000 UT) tablet, Take 1 tablet by mouth Daily., Disp: , Rfl:     CRANBERRY CONCENTRATE PO, Take  by mouth., Disp: , Rfl:     exemestane (AROMASIN) 25 MG chemo tablet, Take 1 tablet by mouth Daily., Disp: , Rfl:     fluconazole (DIFLUCAN) 150 MG tablet, Take 1 tablet by mouth Daily As Needed (yeast symptoms)., Disp: 10 tablet, Rfl: 3    Glucos-Chond-Hyal Ac-Ca Fructo (MOVE FREE  "JOINT HEALTH ADVANCE PO), Take  by mouth., Disp: , Rfl:     Histamine Dihydrochloride (Australian Dream Arthritis) 0.025 % cream, Apply  topically. Apply topically TID, Disp: , Rfl:     levothyroxine (SYNTHROID, LEVOTHROID) 25 MCG tablet, Take 0.5 tablets by mouth Daily., Disp: 45 tablet, Rfl: 3    Loperamide HCl (IMODIUM PO), Take 1 tablet by mouth 2 (Two) Times a Day As Needed., Disp: , Rfl:     loratadine (CLARITIN) 10 MG tablet, Take 1 tablet by mouth Daily., Disp: , Rfl:     Multiple Vitamins-Minerals (YARI MULTIVITAMIN FOR WOMEN PO), Take  by mouth., Disp: , Rfl:     Omega-3 Fatty Acids (OMEGA-3 1450 PO), Take  by mouth Daily., Disp: , Rfl:     sore muscle (ICY HOT EXTRA STRENGTH) 10-30 % cream cream, Apply  topically to the appropriate area as directed 2 (Two) Times a Day As Needed. With lido, Disp: , Rfl:     trimethoprim (TRIMPEX) 100 MG tablet, Take 1 tablet by mouth Daily., Disp: 90 tablet, Rfl: 1    Family History   Problem Relation Age of Onset    Diabetes Father     Hypertension Father     Breast cancer Mother      Social History     Socioeconomic History    Marital status:    Tobacco Use    Smoking status: Never    Smokeless tobacco: Never   Vaping Use    Vaping Use: Never used   Substance and Sexual Activity    Alcohol use: No    Drug use: No       Review of Systems:  Review of Systems  As above  Otherwise the 12 point review of systems is negative.    /65   Pulse 100   Temp 97.8 °F (36.6 °C) (Oral)   Resp 18   Ht 160 cm (63\")   Wt 78.5 kg (173 lb)   SpO2 95%   BMI 30.65 kg/m²   Body mass index is 30.65 kg/m².    General: Alert and oriented x3 in apparent discomfort  HEENT: PER, no icterus, normal sclerae  Cardiac: regular rhythm,  no audible rubs  Pulmonary: bilateral breath sounds, nonlabored  Abdominal: Nondistended.  Soft with diffuse tenderness and guarding  Neurologic: awake, alert, no obvious focal deficits  Extremities: warm, no edema  Skin: no obvious rashes nor " worrisome lesions seen     CBC  Results from last 7 days   Lab Units 08/28/23  1652   WBC 10*3/mm3 26.63*   HEMOGLOBIN g/dL 13.0   HEMATOCRIT % 40.2   PLATELETS 10*3/mm3 306       CMP  Results from last 7 days   Lab Units 08/28/23  1652   SODIUM mmol/L 140   POTASSIUM mmol/L 4.6   CHLORIDE mmol/L 100   CO2 mmol/L 23.0   BUN mg/dL 29*   CREATININE mg/dL 1.72*   CALCIUM mg/dL 10.0   BILIRUBIN mg/dL 0.4   ALK PHOS U/L 93   ALT (SGPT) U/L 24   AST (SGOT) U/L 32   GLUCOSE mg/dL 137*       Radiology  Imaging Results (Last 72 Hours)       Procedure Component Value Units Date/Time    CT Abdomen Pelvis Without Contrast [[id number]] Collected: 08/28/23 1851     Updated: 08/28/23 1906    Narrative:      CT ABDOMEN PELVIS WO CONTRAST    Date of Exam: 8/28/2023 6:18 PM EDT    Indication: Abdominal pain with vomiting.    Comparison: None available.    Technique: Axial CT images were obtained of the abdomen and pelvis without the administration of contrast. Reconstructed coronal and sagittal images were also obtained. Automated exposure control and iterative construction methods were used.      Findings:  There is a 1.3 cm stone at the left ureteropelvic junction causing moderate obstructive uropathy. The left kidney is enlarged with perinephric soft tissue stranding and edema seen along the left-sided retroperitoneal fascial planes. There are also   clustered stones in the inferior pole calyces of the left kidney. There are no stones seen within the left ureter or urinary bladder. There are no right renal stones. There is free fluid and free air within the upper abdomen. I'm uncertain of the   clinical significance. If the patient has not had any surgical or interventional procedures, this could be secondary to a perforated viscus. The gallbladder is surgically absent. Unenhanced images of the liver, pancreas, spleen, and adrenal glands are   normal. There is also mild free fluid in the lower pelvis. There is colonic  diverticulosis without evidence of acute diverticulitis. There are no dilated loops of bowel to indicate an obstruction. There is no abnormal bowel wall thickening. The uterus   and adnexal structures appear unremarkable. There are no suspicious osteolytic or sclerotic lesions within the bony structures. There are degenerative changes involving the thoracolumbar spine with underlying dextroscoliotic curvature. There are also   degenerative changes of the hip joints. There is minor scarring versus subsegmental atelectasis in the lung bases.        Impression:      Impression:    1. 1.3 cm stone at the left uteropelvic junction causing moderate obstructive uropathy. There is also a cluster of stones in the inferior pole calyces of the left kidney.  2. There is free fluid and free air within the upper abdomen. There is also mild amount of free fluid in the lower pelvis. I'm uncertain of the clinical significance. If the patient has not had any surgical or interventional procedures, this could be   secondary to a perforated viscus.  3. Colonic diverticulosis without acute diverticulitis.  4. Additional incidental findings as noted above. The exam is limited by noncontrasted technique.  5. The abnormal results were discussed with BRENDA Kee by telephone.            Electronically Signed: Ray Saldana MD    8/28/2023 7:03 PM EDT    Workstation ID: [id number]              Assessment:  Ms. Garcia is a pleasant 67-year-old lady with history of chronic back pain and arthritis as well as breast cancer, hypertension and GERD.  She presents to the emergency room with worsening abdominal pain, nausea and vomiting.  Work-up is remarkable for white blood count of 26,000 and lactic acid of 3.6 with a procalcitonin of 23.  CT scan of the abdomen pelvis is remarkable for pneumoperitoneum and free fluid in the upper abdomen.  This is concerning for perforated peptic ulcer.  Patient has been on high-dose ibuprofen for  years.    Plan:  I reviewed her studies and discussed the findings with the patient and her family.  Plan to proceed with emergent abdominal exploration with a presumptive diagnosis of perforated peptic ulcer.  The risks of anesthesia, infection, bleeding, possible bowel injury as well as ostomy formation were discussed.  She understands and is willing to proceed with the surgery.  Patient also has 1.3 cm stone at the left ureteropelvic junction causing moderate obstructive uropathy.  Urology has been notified.  This can be addressed while patient is recovering from her surgery.  Patient received ceftriaxone and Zosyn.    Thank you for this consultation.      Gabriel Del Toro MD  08/28/23  20:29 EDT

## 2023-08-29 NOTE — BRIEF OP NOTE
LAPAROTOMY EXPLORATORY  Progress Note    Siria Garcia  8/28/2023    Pre-op Diagnosis:    Pneumoperitoneum       Post-Op Diagnosis Codes:     * Perforated peptic ulcer [K27.5]    Procedure/CPT® Codes:        Procedure(s):  LAPAROTOMY EXPLORATORY  Pyloroplasty and repair of perforated prepyloric ulcer with Rachid patch              Surgeon(s):  Gabriel Del Toro MD    Anesthesia: General    Staff:   Circulator: Mona Ayala RN  Scrub Person: Claudine Sheridan  Nursing Assistant: Bettye Rangel PCT         Estimated Blood Loss: minimal    Urine Voided: * No values recorded between 8/28/2023  8:56 PM and 8/28/2023 10:40 PM *    Specimens:                Specimens       ID Source Type Tests Collected By Collected At Frozen?    1 Abdominal Wall Body Fluid ANAEROBIC CULTURE   Gabriel Del Toro MD 8/28/23 2207     Description: abdominal fluid    This specimen was not marked as sent.    2 Abdominal Wall Body Fluid FUNGAL CULTURE  BODY FLUID CULTURE  AFB CULTURE   Gabriel Del Toro MD 8/28/23 2210     Description: abdominal fluid    This specimen was not marked as sent.    3 Abdominal Wall Body Fluid FUNGAL CULTURE  BODY FLUID CULTURE  AFB CULTURE   Gabriel Del Toro MD 8/28/23 2210     Description: abdominal fluid    This specimen was not marked as sent.    A Abdomen, Lower Tissue TISSUE PATHOLOGY EXAM   Gabriel Del Toro MD 8/28/23 2204     Description: abdominal mass    This specimen was not marked as sent.                  Drains:   Closed/Suction Drain 1 Inferior Abdomen 15 Fr. (Active)       Urethral Catheter Silicone 16 Fr. (Active)       Findings: Perforated prepyloric ulcer        Complications: None          Gabriel Del Toro MD     Date: 8/28/2023  Time: 22:43 EDT

## 2023-08-29 NOTE — PROGRESS NOTES
"Siria Garcia  1955  [id number]    Surgery Progress Note    Date of visit: 8/29/2023    Subjective: Sitting up in a chair  Family at bedside  Minimal abdominal pain    Objective:    /80   Pulse 98   Temp 98 °F (36.7 °C) (Oral)   Resp 16   Ht 160 cm (63\")   Wt 85.4 kg (188 lb 4.4 oz)   SpO2 94%   BMI 33.35 kg/m²     Intake/Output Summary (Last 24 hours) at 8/29/2023 1652  Last data filed at 8/29/2023 1200  Gross per 24 hour   Intake 5198.93 ml   Output 1025 ml   Net 4173.93 ml       CV: Regular rate and rhythm  L: normal air entry  Abd: Abdominal binder is intact  Dressing is intact  Clinton-Lopez with serosanguineous drainage      LABS:    Results from last 7 days   Lab Units 08/29/23  0540   WBC 10*3/mm3 24.25*   HEMOGLOBIN g/dL 10.9*   HEMATOCRIT % 33.1*   PLATELETS 10*3/mm3 225     Results from last 7 days   Lab Units 08/29/23  0540 08/28/23  1652   SODIUM mmol/L 138 140   POTASSIUM mmol/L 4.5 4.6   CHLORIDE mmol/L 105 100   CO2 mmol/L 23.0 23.0   BUN mg/dL 28* 29*   CREATININE mg/dL 1.35* 1.72*   CALCIUM mg/dL 8.0* 10.0   BILIRUBIN mg/dL  --  0.4   ALK PHOS U/L  --  93   ALT (SGPT) U/L  --  24   AST (SGOT) U/L  --  32   GLUCOSE mg/dL 134* 137*     Results from last 7 days   Lab Units 08/29/23  0540   SODIUM mmol/L 138   POTASSIUM mmol/L 4.5   CHLORIDE mmol/L 105   CO2 mmol/L 23.0   BUN mg/dL 28*   CREATININE mg/dL 1.35*   GLUCOSE mg/dL 134*   CALCIUM mg/dL 8.0*     Lab Results   Lab Value Date/Time    LIPASE 79 (H) 08/28/2023 1652         Assessment/ Plan: Stable course status post emergent abdominal exploration and repair of perforated prepyloric ulcer  Laboratory values reviewed.  Blood cultures and urine culture with E. coli.  Antibiotic switched to Merrem.  Continue with current management  Encourage pulmonary toilet and increase activity  Proceed with Hypaque upper GI in 2 days.  I discussed the plan at length with the patient, her family and Dr. Tatum    Problem List Items Addressed " This Visit          Other    Sepsis     Other Visit Diagnoses       Acute UTI    -  Primary    Relevant Medications    cefTRIAXone (ROCEPHIN) 2000 mg/100 mL 0.9% NS IVPB (MBP) (Completed)    piperacillin-tazobactam (ZOSYN) 3.375 g in iso-osmotic dextrose 50 ml (premix) (Completed)    piperacillin-tazobactam (ZOSYN) 3.375 g in iso-osmotic dextrose 50 ml (premix) (Completed)    Ureteropelvic junction (UPJ) obstruction        Free fluid in pelvis        History of gastroesophageal reflux (GERD)        History of hypertension        History of breast cancer        Acute kidney injury        Abdominal pain        Relevant Orders    Tissue Pathology Exam    Anaerobic Culture - Body Fluid, Abdominal Wall    Fungus Culture - Body Fluid, Abdominal Wall    Fungus Culture - Body Fluid, Abdominal Wall    Body Fluid Culture - Body Fluid, Abdominal Wall (Completed)    Body Fluid Culture - Body Fluid, Abdominal Wall (Completed)    AFB Culture - Body Fluid, Abdominal Wall (Completed)    AFB Culture - Body Fluid, Abdominal Wall (Completed)              Gabriel Del Toro MD  8/29/2023  16:52 EDT

## 2023-08-29 NOTE — ED PROVIDER NOTES
EMERGENCY DEPARTMENT ENCOUNTER    Pt Name: Siria Garcia  MRN: [id number]  Pt :   1955  Room Number:  [facility]/MAIN OR  Date of encounter:  2023  PCP: Sofia Hong MD  ED Provider: BRENDA Kee    Historian: Patient    HPI:  Chief Complaint: Abdominal pain    Context: Siria Garcia is a 67 y.o. female who presents to the ED c/o abdominal pain, nausea and vomiting.  Patient reports that her symptoms started on Saturday night.  Patient believes that her symptoms may be the result of food poisoning or eating too much cheese.  She states that at approximately 8 PM Saturday evening she started developing chills.  She shares that she has been treating her symptoms with Tylenol and ibuprofen.  Today she started to experience vomiting.  Patient denies anything specific that seems to make her symptoms better or worse.  She reports no additional associated symptoms including any urinary complaints, constipation or diarrhea.  No additional complaints on interview and exam.  HPI     REVIEW OF SYSTEMS  A chief complaint appropriate review of systems was completed and is negative except as noted in the HPI.     PAST MEDICAL HISTORY  Past Medical History:   Diagnosis Date    Arthritis     Breast cancer 2017    Colon polyp 2018    Diverticulosis 2018    Environmental allergies     Gall bladder stones     GERD (gastroesophageal reflux disease)     Hypertension     Low back pain     Plantar fasciitis     Tarsal tunnel syndrome of both lower extremities     UTI (urinary tract infection)        PAST SURGICAL HISTORY  Past Surgical History:   Procedure Laterality Date    BREAST LUMPECTOMY  2017    CHOLECYSTECTOMY      COLONOSCOPY W/ BIOPSIES  2018       FAMILY HISTORY  Family History   Problem Relation Age of Onset    Diabetes Father     Hypertension Father     Breast cancer Mother        SOCIAL HISTORY  Social History     Socioeconomic History    Marital status:    Tobacco Use    Smoking  status: Never    Smokeless tobacco: Never   Vaping Use    Vaping Use: Never used   Substance and Sexual Activity    Alcohol use: No    Drug use: No       ALLERGIES  Bactrim [sulfamethoxazole-trimethoprim] and Terbinafine    PHYSICAL EXAM  Physical Exam  Vitals and nursing note reviewed.   Constitutional:       General: She is not in acute distress.     Appearance: Normal appearance. She is ill-appearing. She is not toxic-appearing.   HENT:      Head: Normocephalic and atraumatic.      Nose: Nose normal.      Mouth/Throat:      Mouth: Mucous membranes are moist.   Eyes:      Extraocular Movements: Extraocular movements intact.   Cardiovascular:      Rate and Rhythm: Regular rhythm. Tachycardia present.      Heart sounds: Normal heart sounds.   Pulmonary:      Effort: Pulmonary effort is normal.      Breath sounds: Normal breath sounds.   Abdominal:      Palpations: Abdomen is soft.      Tenderness: There is generalized abdominal tenderness.   Musculoskeletal:         General: Normal range of motion.      Cervical back: Normal range of motion and neck supple.   Skin:     General: Skin is warm and dry.      Coloration: Skin is pale.   Neurological:      General: No focal deficit present.      Mental Status: She is alert.   Psychiatric:         Mood and Affect: Mood normal.         Behavior: Behavior normal.       LAB RESULTS  Results for orders placed or performed during the hospital encounter of 08/28/23   Comprehensive Metabolic Panel    Specimen: Blood   Result Value Ref Range    Glucose 137 (H) 65 - 99 mg/dL    BUN 29 (H) 8 - 23 mg/dL    Creatinine 1.72 (H) 0.57 - 1.00 mg/dL    Sodium 140 136 - 145 mmol/L    Potassium 4.6 3.5 - 5.2 mmol/L    Chloride 100 98 - 107 mmol/L    CO2 23.0 22.0 - 29.0 mmol/L    Calcium 10.0 8.6 - 10.5 mg/dL    Total Protein 8.0 6.0 - 8.5 g/dL    Albumin 4.3 3.5 - 5.2 g/dL    ALT (SGPT) 24 1 - 33 U/L    AST (SGOT) 32 1 - 32 U/L    Alkaline Phosphatase 93 39 - 117 U/L    Total Bilirubin 0.4  0.0 - 1.2 mg/dL    Globulin 3.7 gm/dL    A/G Ratio 1.2 g/dL    BUN/Creatinine Ratio 16.9 7.0 - 25.0    Anion Gap 17.0 (H) 5.0 - 15.0 mmol/L    eGFR 32.1 (L) >60.0 mL/min/1.73   Lipase    Specimen: Blood   Result Value Ref Range    Lipase 79 (H) 13 - 60 U/L   Urinalysis With Microscopic If Indicated (No Culture) - Urine, Clean Catch    Specimen: Urine, Clean Catch   Result Value Ref Range    Color, UA Dark Yellow (A) Yellow, Straw    Appearance, UA Turbid (A) Clear    pH, UA 5.5 5.0 - 8.0    Specific Gravity, UA >=1.030 1.001 - 1.030    Glucose, UA Negative Negative    Ketones, UA Trace (A) Negative    Bilirubin, UA Small (1+) (A) Negative    Blood, UA Moderate (2+) (A) Negative    Protein, UA >=300 mg/dL (3+) (A) Negative    Leuk Esterase, UA Small (1+) (A) Negative    Nitrite, UA Positive (A) Negative    Urobilinogen, UA 1.0 E.U./dL 0.2 - 1.0 E.U./dL   Lactic Acid, Plasma    Specimen: Blood   Result Value Ref Range    Lactate 3.6 (C) 0.5 - 2.0 mmol/L   CBC Auto Differential    Specimen: Blood   Result Value Ref Range    WBC 26.63 (H) 3.40 - 10.80 10*3/mm3    RBC 4.36 3.77 - 5.28 10*6/mm3    Hemoglobin 13.0 12.0 - 15.9 g/dL    Hematocrit 40.2 34.0 - 46.6 %    MCV 92.2 79.0 - 97.0 fL    MCH 29.8 26.6 - 33.0 pg    MCHC 32.3 31.5 - 35.7 g/dL    RDW 14.2 12.3 - 15.4 %    RDW-SD 48.6 37.0 - 54.0 fl    MPV 9.5 6.0 - 12.0 fL    Platelets 306 140 - 450 10*3/mm3   Urinalysis, Microscopic Only - Urine, Clean Catch    Specimen: Urine, Clean Catch   Result Value Ref Range    RBC, UA 13-20 (A) None Seen, 0-2 /HPF    WBC, UA Too Numerous to Count (A) None Seen, 0-2 /HPF    Bacteria, UA 4+ (A) None Seen, Trace /HPF    Squamous Epithelial Cells, UA 3-6 (A) None Seen, 0-2 /HPF    Hyaline Casts, UA 0-6 0 - 6 /LPF    Methodology Automated Microscopy    Procalcitonin    Specimen: Blood   Result Value Ref Range    Procalcitonin 23.71 (H) 0.00 - 0.25 ng/mL   Manual Differential    Specimen: Blood   Result Value Ref Range    Neutrophil %  63.0 42.7 - 76.0 %    Lymphocyte % 4.0 (L) 19.6 - 45.3 %    Monocyte % 3.0 (L) 5.0 - 12.0 %    Eosinophil % 0.0 (L) 0.3 - 6.2 %    Basophil % 0.0 0.0 - 1.5 %    Bands %  27.0 (H) 0.0 - 5.0 %    Metamyelocyte % 3.0 (H) 0.0 - 0.0 %    Neutrophils Absolute 23.97 (H) 1.70 - 7.00 10*3/mm3    Lymphocytes Absolute 1.07 0.70 - 3.10 10*3/mm3    Monocytes Absolute 0.80 0.10 - 0.90 10*3/mm3    Eosinophils Absolute 0.00 0.00 - 0.40 10*3/mm3    Basophils Absolute 0.00 0.00 - 0.20 10*3/mm3    RBC Morphology Normal Normal    WBC Morphology Normal Normal    Platelet Estimate Adequate Normal   STAT Lactic Acid, Reflex    Specimen: Blood   Result Value Ref Range    Lactate 2.0 0.5 - 2.0 mmol/L   ECG 12 Lead Pre-Op / Pre-Procedure   Result Value Ref Range    QT Interval 320 ms    QTC Interval 435 ms   Type & Screen    Specimen: Blood   Result Value Ref Range    ABO Type O     RH type Positive     Antibody Screen Negative     T&S Expiration Date 8/31/2023 11:59:59 PM    ABO RH Specimen Verification    Specimen: Blood   Result Value Ref Range    ABO Type O     RH type Positive    Green Top (Gel)   Result Value Ref Range    Extra Tube Hold for add-ons.    Lavender Top   Result Value Ref Range    Extra Tube hold for add-on    Gold Top - SST   Result Value Ref Range    Extra Tube Hold for add-ons.    Gray Top   Result Value Ref Range    Extra Tube Hold for add-ons.    Light Blue Top   Result Value Ref Range    Extra Tube Hold for add-ons.        If labs were ordered, I independently reviewed the results and considered them in treating the patient.    RADIOLOGY  CT Abdomen Pelvis Without Contrast   Final Result   Impression:      1. 1.3 cm stone at the left uteropelvic junction causing moderate obstructive uropathy. There is also a cluster of stones in the inferior pole calyces of the left kidney.   2. There is free fluid and free air within the upper abdomen. There is also mild amount of free fluid in the lower pelvis. I'm uncertain of the  clinical significance. If the patient has not had any surgical or interventional procedures, this could be    secondary to a perforated viscus.   3. Colonic diverticulosis without acute diverticulitis.   4. Additional incidental findings as noted above. The exam is limited by noncontrasted technique.   5. The abnormal results were discussed with BRENDA Kee by telephone.                  Electronically Signed: Ray Saldana MD     8/28/2023 7:03 PM EDT     Workstation ID: [id number]        [x] Radiologist's Report Reviewed:  I ordered and independently reviewed the above noted radiographic studies.  See radiologist's dictation for official interpretation.      PROCEDURES    Critical Care  Performed by: Axel Werner PA  Authorized by: Eric Steward DO     Critical care provider statement:     Critical care time (minutes):  35    Critical care time was exclusive of:  Separately billable procedures and treating other patients    Critical care was necessary to treat or prevent imminent or life-threatening deterioration of the following conditions:  Metabolic crisis, sepsis and shock    Critical care was time spent personally by me on the following activities:  Development of treatment plan with patient or surrogate, discussions with consultants, examination of patient, evaluation of patient's response to treatment, obtaining history from patient or surrogate, ordering and performing treatments and interventions, ordering and review of laboratory studies, ordering and review of radiographic studies, re-evaluation of patient's condition, pulse oximetry and review of old charts    Care discussed with: admitting provider      ECG 12 Lead Pre-Op / Pre-Procedure   Preliminary Result   Test Reason : Pre-Op / Pre-Procedure   Blood Pressure :   */*   mmHG   Vent. Rate : 111 BPM     Atrial Rate : 111 BPM      P-R Int : 120 ms          QRS Dur :  80 ms       QT Int : 320 ms       P-R-T Axes :  21 -25  30 degrees      QTc Int  : 435 ms      Sinus tachycardia   Anterior infarct , age undetermined   Abnormal ECG   No previous ECGs available      Referred By: EDMD           Confirmed By:           MEDICATIONS GIVEN IN ER    Medications   Sodium Chloride (PF) 0.9 % 10 mL ( Intravenous MAR Hold 8/28/23 2056)   sodium chloride 0.9 % bolus 1,000 mL (0 mL Intravenous Stopped 8/28/23 1955)   cefTRIAXone (ROCEPHIN) 2000 mg/100 mL 0.9% NS IVPB (MBP) (0 mg Intravenous Stopped 8/28/23 1948)   HYDROmorphone (DILAUDID) injection 1 mg (1 mg Intravenous Given 8/28/23 1809)   ondansetron (ZOFRAN) injection 4 mg (4 mg Intravenous Given 8/28/23 1809)   piperacillin-tazobactam (ZOSYN) 3.375 g in iso-osmotic dextrose 50 ml (premix) (3.375 g Intravenous New Bag 8/28/23 2012)   HYDROmorphone (DILAUDID) injection 0.5 mg (0.5 mg Intravenous Given 8/28/23 2011)       MEDICAL DECISION MAKING, PROGRESS, and CONSULTS   Medical Decision Making  Problems Addressed:  Acute kidney injury: acute illness or injury  Acute UTI: acute illness or injury  Free fluid in pelvis: acute illness or injury  History of breast cancer: chronic illness or injury  History of gastroesophageal reflux (GERD): chronic illness or injury  History of hypertension: chronic illness or injury  Sepsis, due to unspecified organism, unspecified whether acute organ dysfunction present: acute illness or injury  Ureteropelvic junction (UPJ) obstruction: acute illness or injury    Amount and/or Complexity of Data Reviewed  Labs: ordered.  Radiology: ordered.  ECG/medicine tests: ordered.    Risk  Prescription drug management.        All labs have been independently reviewed by me.  All radiology studies have been reviewed by me and the radiologist dictating the report.  All EKG's have been independently viewed by me.    [] Discussed with radiology regarding test interpretation:    Discussion below represents my analysis of pertinent findings related to patient's condition, differential diagnosis, treatment  plan and final disposition.    Differential diagnosis:  The differential diagnosis associated with the patient's presentation includes: Dyspepsia, viral gastroenteritis, constipation, medication side effects, irritable bowel syndrome, abdominal wall pain, gallbladder disease, pancreatitis, diverticulitis, appendicitis, kidney stone, UTI, hernia, gastroparesis, food poisoning, DKA, hepatitis, bowel obstruction, colitis and others.     Additional sources  Discussed/ obtained information from independent historians:   [] Spouse  [] Parent  [x] Family member  [] Friend  [] EMS   [] Other:  External (non-ED) record review:   [] Inpatient record:   [x] Office record: Personally reviewed outpatient records from hematology and oncology demonstrating history of breast cancer   [] Outpatient record:   [] Prior Outpatient labs:   [] Prior Outpatient radiology:   [] Primary Care record:   [] Outside ED record:   [] Other:   Patient's care impacted by:   [] Diabetes  [x] Hypertension  [] Hyperlipidemia  [] Hypothyroidism   [] Coronary Artery Disease   [] COPD   [] Cancer   [] Obesity  [x] GERD   [] Tobacco Abuse   [] Substance Abuse    [] Anxiety   [] Depression   [x] Other: Breast cancer  Care significantly affected by Social Determinants of Health (housing and economic circumstances, unemployment)    [] Yes     [x] No   If yes, Patient's care significantly limited by  Social Determinants of Health including:   [] Inadequate housing   [] Low income   [] Alcoholism and drug addiction in family   [] Problems related to primary support group   [] Unemployment   [] Problems related to employment   [] Other Social Determinants of Health:     Shared decision making:  I reviewed workup performed in ED including labs and imaging. Based on findings and recommendations from general surgery patient will be taken to the OR for exploratory laparotomy.    Orders placed during this visit:  Orders Placed This Encounter   Procedures    Critical  Care    Blood Culture - Blood,    Blood Culture - Blood,    Urine Culture - Urine,    CT Abdomen Pelvis Without Contrast    [place] Draw    Comprehensive Metabolic Panel    Lipase    Urinalysis With Microscopic If Indicated (No Culture) - Urine, Clean Catch    Lactic Acid, Plasma    CBC Auto Differential    Urinalysis, Microscopic Only - Urine, Clean Catch    Procalcitonin    Manual Differential    STAT Lactic Acid, Reflex    NPO Diet NPO Type: Strict NPO    Undress & Gown    Obtain Informed Consent    Place Sequential Compression Device    Maintain Sequential Compression Device    ECG 12 Lead Pre-Op / Pre-Procedure    Type & Screen    ABO RH Specimen Verification    Insert Peripheral IV    CBC & Differential    Green Top (Gel)    Lavender Top    Gold Top - SST    Gray Top    Light Blue Top     ED Course:    ED Course as of 08/28/23 2129   Mon Aug 28, 2023   1923 Urology paged [JG]   1925 I have spoken with Dr. Torres who was in agreement with plan of care and will follow patient on admission.  Plan will be for shockwave therapy with lithotripsy and a stent placement. [JG]   1928 General surgery paged [JG]   1938 I spoke with Dr. SWANSON with general surgery who recommended addition of Zosyn and will go to OR when available.  [JG]   2126 In summary this is a pleasant 67-year-old female who presents to the ED with complaints of nausea, vomiting abdominal pain.  UA findings consistent with urinary tract infection.  Patient is septic with elevated white blood cell count of 26.63, lactate of 3.6, procalcitonin of 23.71.  Blood and urine cultures obtained.  IV fluids and antibiotics initiated in ED.  CT scan of abdomen and pelvis demonstrated a 1.3 cm stone at left UPJ causing moderate obstructive uropathy.  In addition there was found to be free fluid and free air within patient's upper abdomen and a mild amount of free fluid in patient's lower pelvis.  Urology was consulted for patient's 1.3 cm stone.  General surgery  consulted for free fluid and free air in the upper abdomen as well as free fluid in the pelvis with recommendation made for addition of Zosyn and patient sent to the OR for exploratory laparotomy. [JG]      ED Course User Index  [JG] Axel Werner PA            DIAGNOSIS  Final diagnoses:   Acute UTI   Sepsis, due to unspecified organism, unspecified whether acute organ dysfunction present   Ureteropelvic junction (UPJ) obstruction   Free fluid in pelvis   History of gastroesophageal reflux (GERD)   History of hypertension   History of breast cancer   Acute kidney injury       DISPOSITION    ED Disposition       ED Disposition   Send to OR    Condition   --    Comment   --               Please note that portions of this document were completed with voice recognition software.        Axel Werner PA  08/28/23 1696

## 2023-08-29 NOTE — OP NOTE
Operative Note    Siria Garcia  [id number]   1955     Date of Surgery:  8/28/2023    Pre-Operative Diagnosis: Pneumoperitoneum    Post-Operative Diagnosis: Perforated prepyloric duodenal ulcer    Procedure: Abdominal exploration                      Pyloroplasty                      Repair of perforated prepyloric duodenal ulcer with Rachid's patch    Anesthesia:  General          Surgeon:  Gabriel Del Toro MD    Circulator: Mona Ayala RN  Scrub Person: Claudine Sheridan  Nursing Assistant: Bettye Rangel PCT          Estimated Blood Loss: Very minimal    Findings: Perforated prepyloric ulcer    Complications: None      Indication for Procedure: Ms. Garcia is a pleasant 67-year-old lady who presented to the emergency room complaining of vomiting and abdominal pain that started last night and progressed.  Work-up was remarkable for platelet count of 26,000, lactic acidosis and elevated procalcitonin.  CT scan of the abdomen and pelvis was remarkable for pneumoperitoneum with fluid in the upper abdomen as well as a 15 mm stone in the ureteropelvic area.  Patient is taken to the operating room urgently for abdominal exploration.    Procedure: Patient was taken to the operating by anesthesia and placed supine on the table.  Following induction of general endotracheal anesthesia, SCDs were placed.  She received Zosyn IV in the emergency room.  Anesthesia placed ultrasound-guided TAP block bilaterally.  A Cuevas catheter was anchored.  The abdomen was then prepped and draped in a sterile fashion.  Timeout was observed.  A vertical midline incision was made in the upper abdomen down to the umbilicus.  Dissection was carried through the subcutaneous tissue close to the fascia which was incised to enter the abdominal cavity.  Moderate amount of bile-stained fluid was noted.  This was cultured.  Upon exploration she was noted to have a perforated ulcer with drainage.  Nasogastric tube was anchored.  The  ulcer was noted to be prepyloric.  Inflammation was noted at the site.  Adhesions were noted in the duodenum to the liver bed due to a previous cholecystectomy.  These adhesions were taken down gently to mobilize the duodenum as much as possible without causing any injury.  We then proceeded by exploring the ulcer.  It was markedly thickened with inflammation.  We proceeded by performing a pyloroplasty by extending the incision transversely on the proximal duodenum through the pylorus.  The mucosa was healthy.  We then proceeded by approximating the pyloroplasty vertically with interrupted 2-0 Vicryl suture with no tension noted.  The hiatus was not compromised.  We then proceeded by irrigating the abdomen with 3 L of normal saline with clear return fluid noted.  Upon exploring the omentum we noted mass that was in close proximity to the transverse colon that was excised and sent to pathology for permanent section.  It was not attached to the colon.  We then mobilized the omentum as much as possible in the proximal transverse colon and use it as grams to cover the pyloroplasty with interrupted 3-0 silk suture.  15 [place] round Clinton-Lopez drain was then placed in the right upper quadrant and anchored under the liver.  It was attached to the skin with 3-0 silk suture.  The midline fascia was then approximated with running #1 looped PDS suture.  The wound was irrigated with Irrisept solution.  The skin incision was approximated with staples.  Telfa paco were placed.  Covaderm dressing was applied.  The patient tolerated the procedure well with no complications.  She was extubated and taken to the recovery room in a stable condition.  Sponge count and needle count were correct at the end of the procedure.            Gabriel Del Toro MD  08/28/23  22:48 EDT

## 2023-08-29 NOTE — CONSULTS
Urology Consult Note    Referring Provider: Axel Werner PA-C (ER)  Reason for Consultation: Left UPJ stone    Patient Care Team:  Sofia Hong MD as PCP - General (Endocrinology)    Chief complaint   Chief Complaint   Patient presents with    Abdominal Pain    Vomiting         Subjective .     History of present illness:    Siria Garcia is a 67 y.o. female who was admitted for Perforated ulcer [K27.5]. Patient was referred by SAIGE Werner for evaluation of  left UPJ stone, 1.3 cm, moderate hydronephrosis . Patient presented to the emergency department for evaluation and found to have above-mentioned ureteral stone but also pneumoperitoneum requiring exploratory laparotomy and found to have perforated prepyloric duodenal ulcer.  She is currently in the ICU.  Urology has been called from the ER prior to surgery.  She has no prior history of urolithiasis.  She has generalized abdominal discomfort this morning.  She denies prior history of gross hematuria or dysuria.  No current chest pain or shortness of air.  No fevers or chills.    Review of Systems  Pertinent items are noted in HPI, all other systems reviewed and negative    History  Past Medical History:   Diagnosis Date    Arthritis     Breast cancer 2017    Colon polyp 2018    Diverticulosis 01/2018    Environmental allergies     Gall bladder stones     GERD (gastroesophageal reflux disease)     Hypertension     Low back pain     Plantar fasciitis     Tarsal tunnel syndrome of both lower extremities     UTI (urinary tract infection)    ,   Past Surgical History:   Procedure Laterality Date    BREAST LUMPECTOMY  05/2017    CHOLECYSTECTOMY      COLONOSCOPY W/ BIOPSIES  01/25/2018   ,   Family History   Problem Relation Age of Onset    Diabetes Father     Hypertension Father     Breast cancer Mother    ,   Social History     Tobacco Use    Smoking status: Never    Smokeless tobacco: Never   Vaping Use    Vaping Use: Never used   Substance Use Topics     Alcohol use: No    Drug use: No   ,   Medications Prior to Admission   Medication Sig Dispense Refill Last Dose    trimethoprim (TRIMPEX) 100 MG tablet Take 1 tablet by mouth Daily. 90 tablet 1 Past Week    Cholecalciferol (Vitamin D) 50 MCG (2000 UT) tablet Take 1 tablet by mouth Daily.       CRANBERRY CONCENTRATE PO Take  by mouth.       exemestane (AROMASIN) 25 MG chemo tablet Take 1 tablet by mouth Daily.       fluconazole (DIFLUCAN) 150 MG tablet Take 1 tablet by mouth Daily As Needed (yeast symptoms). 10 tablet 3     Glucos-Chond-Hyal Ac-Ca Fructo (MOVE FREE JOINT HEALTH ADVANCE PO) Take  by mouth.       Histamine Dihydrochloride (Australian Dream Arthritis) 0.025 % cream Apply  topically. Apply topically TID       levothyroxine (SYNTHROID, LEVOTHROID) 25 MCG tablet Take 0.5 tablets by mouth Daily. 45 tablet 3     Loperamide HCl (IMODIUM PO) Take 1 tablet by mouth 2 (Two) Times a Day As Needed.       loratadine (CLARITIN) 10 MG tablet Take 1 tablet by mouth Daily.       Multiple Vitamins-Minerals (YARI MULTIVITAMIN FOR WOMEN PO) Take  by mouth.       Omega-3 Fatty Acids (OMEGA-3 1450 PO) Take  by mouth Daily.       sore muscle (ICY HOT EXTRA STRENGTH) 10-30 % cream cream Apply  topically to the appropriate area as directed 2 (Two) Times a Day As Needed. With lido      , Scheduled Meds:  fluconazole, 200 mg, Intravenous, Q24H  [START ON 8/30/2023] levothyroxine, 12.5 mcg, Oral, Q AM  magnesium sulfate, 4 g, Intravenous, Once  pantoprazole, 40 mg, Intravenous, BID AC  piperacillin-tazobactam, 4.5 g, Intravenous, Q8H  sodium chloride, 10 mL, Intravenous, Q12H    , Continuous Infusions:  lactated ringers, 125 mL/hr, Last Rate: 125 mL/hr (08/29/23 0643)    , PRN Meds:    acetaminophen    [DISCONTINUED] senna-docusate sodium **AND** [DISCONTINUED] polyethylene glycol **AND** [DISCONTINUED] bisacodyl **AND** bisacodyl    HYDROmorphone    ondansetron    phenol    Sodium Chloride (PF)    sodium chloride and Allergies:   "Bactrim [sulfamethoxazole-trimethoprim] and Terbinafine    Objective     Vital Signs   Temp:  [97 °F (36.1 °C)-98 °F (36.7 °C)] 97.7 °F (36.5 °C)  Heart Rate:  [] 102  Resp:  [12-18] 17  BP: (113-135)/(63-81) 123/72    Physical Exam:   General Appearance: alert, appears stated age, and cooperative  Head: normocephalic, without obvious abnormality and atraumatic  Lungs: respirations regular, respirations even, and respirations unlabored  Heart: regular rhythm & normal rate  Abdomen: no guarding and no rebound tenderness  Extremities: moves extremities well and no edema  Neurologic: Mental Status orientated to person, place, time and situation  Psych: normal        Results Review:   I reviewed the patient's new clinical results.  Lab Results (last 24 hours)       Procedure Component Value Units Date/Time    Procalcitonin [[id number]]  (Abnormal) Collected: 08/29/23 0540    Specimen: Blood Updated: 08/29/23 0815     Procalcitonin 18.09 ng/mL     Narrative:      As a Marker for Sepsis (Non-Neonates):    1. <0.5 ng/mL represents a low risk of severe sepsis and/or septic shock.  2. >2 ng/mL represents a high risk of severe sepsis and/or septic shock.    As a Marker for Lower Respiratory Tract Infections that require antibiotic therapy:    PCT on Admission    Antibiotic Therapy       6-12 Hrs later    >0.5                Strongly Recommended  >0.25 - <0.5        Recommended   0.1 - 0.25          Discouraged              Remeasure/reassess PCT  <0.1                Strongly Discouraged     Remeasure/reassess PCT    As 28 day mortality risk marker: \"Change in Procalcitonin Result\" (>80% or <=80%) if Day 0 (or Day 1) and Day 4 values are available. Refer to http://www.[employer]-pct-calculator.com    Change in PCT <=80%  A decrease of PCT levels below or equal to 80% defines a positive change in PCT test result representing a higher risk for 28-day all-cause mortality of patients diagnosed with severe sepsis for septic " shock.    Change in PCT >80%  A decrease of PCT levels of more than 80% defines a negative change in PCT result representing a lower risk for 28-day all-cause mortality of patients diagnosed with severe sepsis or septic shock.       Body Fluid Culture - Body Fluid, Abdominal Wall [[id number]] Collected: 08/28/23 2210    Specimen: Body Fluid from Abdominal Wall Updated: 08/29/23 0654     Gram Stain Many (4+) WBCs seen      No organisms seen    Body Fluid Culture - Body Fluid, Abdominal Wall [[id number]] Collected: 08/28/23 2210    Specimen: Body Fluid from Abdominal Wall Updated: 08/29/23 0653     Gram Stain Moderate (3+) WBCs seen      No organisms seen    Hemoglobin A1c [[id number]]  (Abnormal) Collected: 08/29/23 0540    Specimen: Blood Updated: 08/29/23 0644     Hemoglobin A1C 5.80 %     Narrative:      Hemoglobin A1C Ranges:    Increased Risk for Diabetes  5.7% to 6.4%  Diabetes                     >= 6.5%  Diabetic Goal                < 7.0%    Tissue Pathology Exam [[id number]] Collected: 08/28/23 2204    Specimen: Tissue from Abdomen, Lower Updated: 08/29/23 0636    CBC (No Diff) [[id number]]  (Abnormal) Collected: 08/29/23 0540    Specimen: Blood Updated: 08/29/23 0631     WBC 24.25 10*3/mm3      RBC 3.59 10*6/mm3      Hemoglobin 10.9 g/dL      Hematocrit 33.1 %      MCV 92.2 fL      MCH 30.4 pg      MCHC 32.9 g/dL      RDW 14.4 %      RDW-SD 49.4 fl      MPV 10.0 fL      Platelets 225 10*3/mm3     TSH [[id number]]  (Normal) Collected: 08/29/23 0540    Specimen: Blood Updated: 08/29/23 0631     TSH 0.415 uIU/mL     Lactic Acid, Plasma [[id number]]  (Abnormal) Collected: 08/29/23 0540    Specimen: Blood Updated: 08/29/23 0630     Lactate 2.3 mmol/L      Comment: Falsely depressed results may occur on samples drawn from patients receiving N-Acetylcysteine (NAC) or Metamizole.       Magnesium [[id number]]  (Normal) Collected: 08/29/23 0540    Specimen: Blood Updated: 08/29/23 0626     Magnesium 1.8 mg/dL     Basic  Metabolic Panel [[id number]]  (Abnormal) Collected: 08/29/23 0540    Specimen: Blood Updated: 08/29/23 0626     Glucose 134 mg/dL      BUN 28 mg/dL      Creatinine 1.35 mg/dL      Sodium 138 mmol/L      Potassium 4.5 mmol/L      Chloride 105 mmol/L      CO2 23.0 mmol/L      Calcium 8.0 mg/dL      BUN/Creatinine Ratio 20.7     Anion Gap 10.0 mmol/L      eGFR 42.9 mL/min/1.73     Narrative:      GFR Normal >60  Chronic Kidney Disease <60  Kidney Failure <15      Phosphorus [[id number]]  (Normal) Collected: 08/29/23 0540    Specimen: Blood Updated: 08/29/23 0626     Phosphorus 3.3 mg/dL     Sodium, Urine, Random - Urine, Clean Catch [[id number]] Collected: 08/29/23 0349    Specimen: Urine, Clean Catch Updated: 08/29/23 0415     Sodium, Urine 40 mmol/L     Narrative:      Reference intervals for random urine have not been established.  Clinical usage is dependent upon physician's interpretation in combination with other laboratory tests.       Creatinine Urine Random (kidney function) GFR component - Urine, Clean Catch [[id number]] Collected: 08/29/23 0349    Specimen: Urine, Clean Catch Updated: 08/29/23 0402    Anaerobic Culture - Body Fluid, Abdominal Wall [[id number]] Collected: 08/28/23 2207    Specimen: Body Fluid from Abdominal Wall Updated: 08/29/23 0157    Fungus Culture - Body Fluid, Abdominal Wall [[id number]] Collected: 08/28/23 2210    Specimen: Body Fluid from Abdominal Wall Updated: 08/29/23 0157    AFB Culture - Body Fluid, Abdominal Wall [[id number]] Collected: 08/28/23 2210    Specimen: Body Fluid from Abdominal Wall Updated: 08/29/23 0157    Fungus Culture - Body Fluid, Abdominal Wall [[id number]] Collected: 08/28/23 2210    Specimen: Body Fluid from Abdominal Wall Updated: 08/29/23 0157    AFB Culture - Body Fluid, Abdominal Wall [[id number]] Collected: 08/28/23 2210    Specimen: Body Fluid from Abdominal Wall Updated: 08/29/23 0157    [place] Draw [[id number]] Collected: 08/28/23 1652    Specimen: Blood  Updated: 08/28/23 2100    Narrative:      The following orders were created for panel order [place] Draw.  Procedure                               Abnormality         Status                     ---------                               -----------         ------                     Green Top (Gel)[[id number]]                                  Final result               Lavender Top[[id number]]                                     Final result               Gold Top - SST[[id number]]                                   Final result               Montero Top[[id number]]                                         Final result               Light Blue Top[[id number]]                                   Final result                 Please view results for these tests on the individual orders.    Gray Top [[id number]] Collected: 08/28/23 1652    Specimen: Blood Updated: 08/28/23 2100     Extra Tube Hold for add-ons.     Comment: Auto resulted.       STAT Lactic Acid, Reflex [[id number]]  (Normal) Collected: 08/1954    Specimen: Blood Updated: 08/28/23 2026     Lactate 2.0 mmol/L      Comment: Falsely depressed results may occur on samples drawn from patients receiving N-Acetylcysteine (NAC) or Metamizole.       Blood Culture - Blood, Arm, Right [[id number]] Collected: 08/28/23 1800    Specimen: Blood from Arm, Right Updated: 08/28/23 1940    Blood Culture - Blood, Arm, Left [[id number]] Collected: 08/28/23 1755    Specimen: Blood from Arm, Left Updated: 08/28/23 1940    Urine Culture - Urine, Urine, Clean Catch [[id number]] Collected: 08/28/23 1653    Specimen: Urine, Clean Catch Updated: 08/28/23 1804    Green Top (Gel) [[id number]] Collected: 08/28/23 1652    Specimen: Blood Updated: 08/28/23 1800     Extra Tube Hold for add-ons.     Comment: Auto resulted.       Lavender Top [[id number]] Collected: 08/28/23 1652    Specimen: Blood Updated: 08/28/23 1800     Extra Tube hold for add-on     Comment: Auto resulted       Light Blue Top  "[[id number]] Collected: 08/28/23 1652    Specimen: Blood Updated: 08/28/23 1800     Extra Tube Hold for add-ons.     Comment: Auto resulted       Gold Top - SST [[id number]] Collected: 08/28/23 1652    Specimen: Blood Updated: 08/28/23 1800     Extra Tube Hold for add-ons.     Comment: Auto resulted.       Procalcitonin [[id number]]  (Abnormal) Collected: 08/28/23 1652    Specimen: Blood Updated: 08/28/23 1744     Procalcitonin 23.71 ng/mL     Narrative:      As a Marker for Sepsis (Non-Neonates):    1. <0.5 ng/mL represents a low risk of severe sepsis and/or septic shock.  2. >2 ng/mL represents a high risk of severe sepsis and/or septic shock.    As a Marker for Lower Respiratory Tract Infections that require antibiotic therapy:    PCT on Admission    Antibiotic Therapy       6-12 Hrs later    >0.5                Strongly Recommended  >0.25 - <0.5        Recommended   0.1 - 0.25          Discouraged              Remeasure/reassess PCT  <0.1                Strongly Discouraged     Remeasure/reassess PCT    As 28 day mortality risk marker: \"Change in Procalcitonin Result\" (>80% or <=80%) if Day 0 (or Day 1) and Day 4 values are available. Refer to http://www.[employer]-pct-calculator.com    Change in PCT <=80%  A decrease of PCT levels below or equal to 80% defines a positive change in PCT test result representing a higher risk for 28-day all-cause mortality of patients diagnosed with severe sepsis for septic shock.    Change in PCT >80%  A decrease of PCT levels of more than 80% defines a negative change in PCT result representing a lower risk for 28-day all-cause mortality of patients diagnosed with severe sepsis or septic shock.       Lactic Acid, Plasma [[id number]]  (Abnormal) Collected: 08/28/23 1652    Specimen: Blood Updated: 08/28/23 1726     Lactate 3.6 mmol/L      Comment: Falsely depressed results may occur on samples drawn from patients receiving N-Acetylcysteine (NAC) or Metamizole.       CBC & Differential " [[id number]]  (Abnormal) Collected: 08/28/23 1652    Specimen: Blood Updated: 08/28/23 1720    Narrative:      The following orders were created for panel order CBC & Differential.  Procedure                               Abnormality         Status                     ---------                               -----------         ------                     CBC Auto Differential[[id number]]        Abnormal            Final result                 Please view results for these tests on the individual orders.    CBC Auto Differential [[id number]]  (Abnormal) Collected: 08/28/23 1652    Specimen: Blood Updated: 08/28/23 1720     WBC 26.63 10*3/mm3      RBC 4.36 10*6/mm3      Hemoglobin 13.0 g/dL      Hematocrit 40.2 %      MCV 92.2 fL      MCH 29.8 pg      MCHC 32.3 g/dL      RDW 14.2 %      RDW-SD 48.6 fl      MPV 9.5 fL      Platelets 306 10*3/mm3     Narrative:      The previously reported component NRBC is no longer being reported. Previous result was 0.0 /100 WBC (Reference Range: 0.0-0.2 /100 WBC) on 8/28/2023 at 1700 EDT.    Manual Differential [[id number]]  (Abnormal) Collected: 08/28/23 1652    Specimen: Blood Updated: 08/28/23 1720     Neutrophil % 63.0 %      Lymphocyte % 4.0 %      Monocyte % 3.0 %      Eosinophil % 0.0 %      Basophil % 0.0 %      Bands %  27.0 %      Metamyelocyte % 3.0 %      Neutrophils Absolute 23.97 10*3/mm3      Lymphocytes Absolute 1.07 10*3/mm3      Monocytes Absolute 0.80 10*3/mm3      Eosinophils Absolute 0.00 10*3/mm3      Basophils Absolute 0.00 10*3/mm3      RBC Morphology Normal     WBC Morphology Normal     Platelet Estimate Adequate    Comprehensive Metabolic Panel [[id number]]  (Abnormal) Collected: 08/28/23 1652    Specimen: Blood Updated: 08/28/23 1718     Glucose 137 mg/dL      BUN 29 mg/dL      Creatinine 1.72 mg/dL      Sodium 140 mmol/L      Potassium 4.6 mmol/L      Chloride 100 mmol/L      CO2 23.0 mmol/L      Calcium 10.0 mg/dL      Total Protein 8.0 g/dL      Albumin 4.3  g/dL      ALT (SGPT) 24 U/L      AST (SGOT) 32 U/L      Alkaline Phosphatase 93 U/L      Total Bilirubin 0.4 mg/dL      Globulin 3.7 gm/dL      Comment: Calculated Result        A/G Ratio 1.2 g/dL      BUN/Creatinine Ratio 16.9     Anion Gap 17.0 mmol/L      eGFR 32.1 mL/min/1.73     Narrative:      GFR Normal >60  Chronic Kidney Disease <60  Kidney Failure <15      Lipase [[id number]]  (Abnormal) Collected: 08/28/23 1652    Specimen: Blood Updated: 08/28/23 1718     Lipase 79 U/L     Urinalysis With Microscopic If Indicated (No Culture) - Urine, Clean Catch [[id number]]  (Abnormal) Collected: 08/28/23 1653    Specimen: Urine, Clean Catch Updated: 08/28/23 1705     Color, UA Dark Yellow     Appearance, UA Turbid     pH, UA 5.5     Specific Gravity, UA >=1.030     Glucose, UA Negative     Ketones, UA Trace     Bilirubin, UA Small (1+)     Blood, UA Moderate (2+)     Protein, UA >=300 mg/dL (3+)     Leuk Esterase, UA Small (1+)     Nitrite, UA Positive     Urobilinogen, UA 1.0 E.U./dL    Urinalysis, Microscopic Only - Urine, Clean Catch [[id number]]  (Abnormal) Collected: 08/28/23 1653    Specimen: Urine, Clean Catch Updated: 08/28/23 1705     RBC, UA 13-20 /HPF      WBC, UA Too Numerous to Count /HPF      Bacteria, UA 4+ /HPF      Squamous Epithelial Cells, UA 3-6 /HPF      Hyaline Casts, UA 0-6 /LPF      Methodology Automated Microscopy           Imaging Results (Last 24 Hours)       Procedure Component Value Units Date/Time    CT Abdomen Pelvis Without Contrast [[id number]] Collected: 08/28/23 1851     Updated: 08/28/23 1906    Narrative:      CT ABDOMEN PELVIS WO CONTRAST    Date of Exam: 8/28/2023 6:18 PM EDT    Indication: Abdominal pain with vomiting.    Comparison: None available.    Technique: Axial CT images were obtained of the abdomen and pelvis without the administration of contrast. Reconstructed coronal and sagittal images were also obtained. Automated exposure control and iterative construction methods  were used.      Findings:  There is a 1.3 cm stone at the left ureteropelvic junction causing moderate obstructive uropathy. The left kidney is enlarged with perinephric soft tissue stranding and edema seen along the left-sided retroperitoneal fascial planes. There are also   clustered stones in the inferior pole calyces of the left kidney. There are no stones seen within the left ureter or urinary bladder. There are no right renal stones. There is free fluid and free air within the upper abdomen. I'm uncertain of the   clinical significance. If the patient has not had any surgical or interventional procedures, this could be secondary to a perforated viscus. The gallbladder is surgically absent. Unenhanced images of the liver, pancreas, spleen, and adrenal glands are   normal. There is also mild free fluid in the lower pelvis. There is colonic diverticulosis without evidence of acute diverticulitis. There are no dilated loops of bowel to indicate an obstruction. There is no abnormal bowel wall thickening. The uterus   and adnexal structures appear unremarkable. There are no suspicious osteolytic or sclerotic lesions within the bony structures. There are degenerative changes involving the thoracolumbar spine with underlying dextroscoliotic curvature. There are also   degenerative changes of the hip joints. There is minor scarring versus subsegmental atelectasis in the lung bases.        Impression:      Impression:    1. 1.3 cm stone at the left uteropelvic junction causing moderate obstructive uropathy. There is also a cluster of stones in the inferior pole calyces of the left kidney.  2. There is free fluid and free air within the upper abdomen. There is also mild amount of free fluid in the lower pelvis. I'm uncertain of the clinical significance. If the patient has not had any surgical or interventional procedures, this could be   secondary to a perforated viscus.  3. Colonic diverticulosis without acute  diverticulitis.  4. Additional incidental findings as noted above. The exam is limited by noncontrasted technique.  5. The abnormal results were discussed with BRENDA Kee by telephone.            Electronically Signed: Ray Saldana MD    8/28/2023 7:03 PM EDT    Workstation ID: [id number]            Labs  Urine Microscopy:   Results from last 7 days   Lab Units 08/28/23  1653   RBC UA /HPF 13-20*   WBC UA /HPF Too Numerous to Count*   , Urinalysis:   Results from last 7 days   Lab Units 08/28/23  1653   PH, URINE  5.5   PROTEIN UA  >=300 mg/dL (3+)*   GLUCOSE UA  Negative   KETONES UA  Trace*   , Urine Culture:   , BMP:   Results from last 7 days   Lab Units 08/29/23  0540 08/28/23  1652   SODIUM mmol/L 138 140   POTASSIUM mmol/L 4.5 4.6   CALCIUM mg/dL 8.0* 10.0   CHLORIDE mmol/L 105 100   CO2 mmol/L 23.0 23.0   BUN mg/dL 28* 29*   CREATININE mg/dL 1.35* 1.72*   ALBUMIN g/dL  --  4.3   BILIRUBIN mg/dL  --  0.4   ALK PHOS U/L  --  93   , and CBC:   Results from last 7 days   Lab Units 08/29/23  0540   WBC 10*3/mm3 24.25*   RBC 10*6/mm3 3.59*   HEMOGLOBIN g/dL 10.9*   HEMATOCRIT % 33.1*   PLATELETS 10*3/mm3 225       Imaging  CT Abdomen: As per HPI, reviewed      Assessment   67 y.o. female with  left UPJ stone with associated hydronephrosis      Plan   Patient is status post exploratory laparotomy with pyloroplasty and repair of perforated duodenal ulcer.  She is still in the ICU.  Creatinine down to 1.35 from 1.72.  Plan for cystoscopy and ureteral stent placement later this week as patient has improved from recent surgery.      I discussed the patients findings and my recommendations with patient and nursing staff    Murray Torres MD  08/29/23  08:46 EDT

## 2023-08-29 NOTE — PROGRESS NOTES
INTENSIVIST   PROGRESS NOTE        SUBJECTIVE     Siria 67 y.o. female is followed for: Abdominal Pain and Vomiting       K25.9, Pyloric ulcer perf. - S/P repair 23    Benign essential hypertension    Gastroesophageal reflux disease    Hypercholesterolemia    Hypothyroidism    Chronic low back pain    SUJATA (acute kidney injury)    UTI (urinary tract infection)    Sepsis    Peritonitis    Nephrolithiasis    Obstructive uropathy    Perforated ulcer    As an Intensivist, we provide an integrated approach to the ICU patient and family, medical management of comorbid conditions, including but not limited to electrolytes, glycemic control, organ dysfunction, lead interdisciplinary rounds and coordinate the care with all other services, including those from other specialists.     Interval History:  POD: 1 Day Post-Op    Doing better.  Abdomen tender post-op.  BP better.  No pressors.    Temp  Min: 97 °F (36.1 °C)  Max: 98 °F (36.7 °C)       History     Last Reviewed by Renan Tatum MD on 2023 at  7:48 AM    Sections Reviewed    Medical, Family, Surgical, Tobacco, Alcohol, Drug Use, Sexual Activity,   Social Documentation    Problem list reviewed by Renan Tatum MD on 2023 at  7:48 AM  Problem list reviewed by Renan Tatum MD on 2023 at  7:48 AM  Medicines reviewed by Renan Tatum MD on 2023 at  7:48 AM  Allergies reviewed by Renan Tatum MD on 2023 at  7:48 AM       The patient's relevant past medical, surgical and social history were reviewed and updated in Epic as appropriate.        OBJECTIVE     Vitals:  Temp: 97.7 °F (36.5 °C) (23 0350) Temp  Min: 97 °F (36.1 °C)  Max: 98 °F (36.7 °C)   Temp core:      BP: 123/72 (23 0615) BP  Min: 113/68  Max: 135/70   MAP (non-invasive) Noninvasive MAP (mmHg): 92 (23 0615) Noninvasive MAP (mmHg)  Av.6  Min: 81  Max: 98   Pulse: 102 (23 0615) Pulse  Min: 96  Max: 105   Resp: 17 (23 0350) Resp  Min: 12  Max:  18   SpO2: 95 % (08/29/23 0615) SpO2  Min: 91 %  Max: 96 %   Device: nasal cannula with ETCO2 (08/29/23 0600)    Flow Rate: 2 (08/29/23 0600) Flow (L/min)  Min: 0  Max: 2         08/28/23  1633 08/28/23  2323   Weight: 78.5 kg (173 lb) 85.4 kg (188 lb 4.4 oz)        Intake/Ouptut 24 hrs (7:00AM - 6:59 AM)  Intake & Output (last 3 days)         08/26 0701 08/27 0700 08/27 0701 08/28 0700 08/28 0701 08/29 0700 08/29 0701 08/30 0700    I.V. (mL/kg)   2512.6 (29.4)     IV Piggyback   1700     Total Intake(mL/kg)   4212.6 (49.3)     Urine (mL/kg/hr)   600     Drains   90     Total Output   690     Net   +3522.6                     Medications (drips):  lactated ringers, Last Rate: 125 mL/hr (08/29/23 0643)        Physical Examination  Telemetry:  Rhythm: sinus tachycardia, normal sinus rhythm (08/29/23 0600)         Constitutional:  No acute distress.   Cardiovascular: RRR.    Respiratory: Normal breath sounds  No adventitious sounds   Abdominal:  Soft with no tenderness.   Extremities: No Edema   Neurological:   Alert, Oriented, Cooperative.  Best Eye Response: 4-->(E4) spontaneous (08/29/23 0600)  Best Motor Response: 6-->(M6) obeys commands (08/29/23 0600)  Best Verbal Response: 5-->(V5) oriented (08/29/23 0600)  [place] Coma Scale Score: 15 (08/29/23 0600)     Results Reviewed:  Laboratory  Microbiology  Radiology  Pathology    Hematology:  Results from last 7 days   Lab Units 08/29/23  0540 08/28/23  1652   WBC 10*3/mm3 24.25* 26.63*   HEMOGLOBIN g/dL 10.9* 13.0   MCV fL 92.2 92.2   PLATELETS 10*3/mm3 225 306     Results from last 7 days   Lab Units 08/28/23  1652   NEUTROS ABS 10*3/mm3 23.97*   EOS ABS 10*3/mm3 0.00   BASOS ABS 10*3/mm3 0.00     Chemistry:  Estimated Creatinine Clearance: 41.3 mL/min (A) (by C-G formula based on SCr of 1.35 mg/dL (H)).    Results from last 7 days   Lab Units 08/29/23  0540 08/28/23  1652   SODIUM mmol/L 138 140   POTASSIUM mmol/L 4.5 4.6   CHLORIDE mmol/L 105 100   CO2 mmol/L  23.0 23.0   BUN mg/dL 28* 29*   CREATININE mg/dL 1.35* 1.72*   GLUCOSE mg/dL 134* 137*     Results from last 7 days   Lab Units 08/29/23  0540 08/28/23  1652   CALCIUM mg/dL 8.0* 10.0   MAGNESIUM mg/dL 1.8  --    PHOSPHORUS mg/dL 3.3  --      Biomarkers:  Results from last 7 days   Lab Units 08/29/23  0540 08/1954 08/28/23  1652   LACTATE mmol/L 2.3* 2.0 3.6*   PROCALCITONIN ng/mL  --   --  23.71*     Images:  CT Abdomen Pelvis Without Contrast    Result Date: 8/28/2023  Impression: 1. 1.3 cm stone at the left uteropelvic junction causing moderate obstructive uropathy. There is also a cluster of stones in the inferior pole calyces of the left kidney. 2. There is free fluid and free air within the upper abdomen. There is also mild amount of free fluid in the lower pelvis. I'm uncertain of the clinical significance. If the patient has not had any surgical or interventional procedures, this could be secondary to a perforated viscus. 3. Colonic diverticulosis without acute diverticulitis. 4. Additional incidental findings as noted above. The exam is limited by noncontrasted technique. 5. The abnormal results were discussed with BRENDA Kee by telephone. Electronically Signed: Ray Saldana MD  8/28/2023 7:03 PM EDT  Workstation ID: [id number]       Results: Reviewed.  I reviewed the patient's new laboratory and imaging results.  I independently reviewed the patient's new images.    Medications: Reviewed.    Assessment   A/P     Hospital:  LOS: 0 days   ICU: 7h     Active Hospital Problems    Diagnosis  POA    **K25.9, Pyloric ulcer perf. - S/P repair 08/28/23 [K25.9]  Yes    Perforated ulcer [K27.5]  Yes    Chronic low back pain [M54.50, G89.29]  Yes    SUJATA (acute kidney injury) [N17.9]  Yes    UTI (urinary tract infection) [N39.0]  Yes    Sepsis [A41.9]  Yes    Peritonitis [K65.9]  Yes    Nephrolithiasis [N20.0]  Yes    Obstructive uropathy [N13.9]  Yes    Benign essential hypertension [I10]  Yes      Impression: 2015 - bp is good  Impression: 2015 - bp is good  bp is good at home as well - continue to monitor  Impression: 2015 - bp is good  bp is good at home as well - continue to test  Impression: 2014 - bp is good overall  Impression: 2014 - bp is higher here, better at home;       Gastroesophageal reflux disease [K21.9]  Yes    Hypercholesterolemia [E78.00]  Yes     Impression: 2015 - check flp  Impression: 2015 - check flp today  Impression: 2014 - check flp  Impression: 2014 - check flp;       Hypothyroidism [E03.9]  Yes     Impression: 2015 - check tfts  Impression: 2015 - check tsh  Impression: 2014 - update tft  Impression: 2014 - check tsh;        Siria is a 67 y.o. female admitted on 2023 with Perforated ulcer [K27.5]    Assessment/Management/Treatment Plan:    Perforated pyloric ulcer  Procedure(s) (LRB):  LAPAROTOMY EXPLORATORY (N/A)   Dr. Gabriel Del Toro (General Surgery)  23   Sepsis. Elevated PCT. BC and UC (+) for GNB. BCID2 PCR: E coli  Nephrolithiasis } Urology  Dyslipidemia   HTN (But I don't see any antihypertensive medication on her current home medication list).  Renal  SUJATA  Endocrine   Body mass index is 33.35 kg/m². Obese Class I: 30-34.9kg/m2  Hypothyroidism ? Treatment with levothyroxine    Lab Results   Component Value Date    TSH 0.415 2023     At risk for DM (pre-diabetes) based on her HbA1c. [HbA1C 5.7%-6.4%, eA-139 mg/dL].     Lab Results   Lab Value Date/Time    HGBA1C 5.80 (H) 2023 0540    HGBA1C 5.8 2023 1459    HGBA1C 5.6 2022 1658    HGBA1C 5.40 2020 1628             Diet: NPO Diet NPO Type: Strict NPO  No active supplement orders      Advance Directives: Code Status and Medical Interventions:   Ordered at: 23 3092     Code Status (Patient has no pulse and is not breathing):    CPR (Attempt to Resuscitate)     Medical Interventions (Patient  has pulse or is breathing):    Full Support        DVT prophylaxis:  Mechanical DVT prophylaxis orders are present.       In brief:  Continue levothyroxine  Change antibiotics to cover ESBL, just in case. Await final Cx + S.  Monitor Renal function  Cytoscopy and Ureteral stent later this week } As per Urology  Hold home antihypertensive for now.  PT/OT  Disposition: Keep in ICU.    Plan of care and goals reviewed during interdisciplinary rounds.  I discussed the patient's findings and my recommendations with patient and nursing staff      MDM:    Problem(s) High due to: Acute or Chronic illness or injury that may poses a threat to life or bodily function  Data: Moderate due to: Review of prior external records from each unique source, Review or results of each unique test, and Ordering of each unique test    Moderate    [x] Primary Attending Intensive Care Medicine - Nutrition Support   [] Consultant    Copied text in this note has been reviewed and is accurate as of 08/29/23

## 2023-08-29 NOTE — PLAN OF CARE
Goal Outcome Evaluation:           Progress: improving  Outcome Evaluation: PT/OT orderd. OOBTC. Pulmonary hygiene promoted. VSS on 2L NC. NG still to intermittent LWS ~ 250ml output. Minimal pain, IV dilaudid given x1. Protonix gtt changed to IVP. Mag replaced. BC +, abx adjusted.

## 2023-08-29 NOTE — ANESTHESIA PROCEDURE NOTES
Airway  Urgency: elective    Date/Time: 8/28/2023 9:12 PM  Airway not difficult    General Information and Staff    Patient location during procedure: OR    Indications and Patient Condition  Indications for airway management: airway protection    Preoxygenated: yes  MILS not maintained throughout  Mask difficulty assessment: 1 - vent by mask    Final Airway Details  Final airway type: endotracheal airway      Successful airway: ETT  Cuffed: yes   Successful intubation technique: direct laryngoscopy  Endotracheal tube insertion site: oral  Blade: Ruby  Blade size: 3  ETT size (mm): 7.0  Cormack-Lehane Classification: grade I - full view of glottis  Placement verified by: chest auscultation and capnometry   Measured from: lips  ETT/EBT  to lips (cm): 20  Number of attempts at approach: 1  Assessment: lips, teeth, and gum same as pre-op and atraumatic intubation    Additional Comments  Negative epigastric sounds, Breath sound equal bilaterally with symmetric chest rise and fall

## 2023-08-29 NOTE — THERAPY EVALUATION
Patient Name: Siria Garcia  : 1955    MRN: [id number]                              Today's Date: 2023       Admit Date: 2023    Visit Dx:     ICD-10-CM ICD-9-CM   1. Acute UTI  N39.0 599.0   2. Sepsis, due to unspecified organism, unspecified whether acute organ dysfunction present  A41.9 038.9     995.91   3. Ureteropelvic junction (UPJ) obstruction  N13.5 593.4   4. Free fluid in pelvis  R18.8 789.59   5. History of gastroesophageal reflux (GERD)  Z87.19 V12.79   6. History of hypertension  Z86.79 V12.59   7. History of breast cancer  Z85.3 V10.3   8. Acute kidney injury  N17.9 584.9   9. Abdominal pain  R10.9 789.00     Patient Active Problem List   Diagnosis    Acute low back pain    Benign essential hypertension    Gastroesophageal reflux disease    Fatigue    Foot pain    Hypercholesterolemia    Hypothyroidism    Knee pain    Osteoarthritis of hand    Vaginal yeast infection    Sore throat    Vaginitis and vulvovaginitis    Malignant neoplasm of upper-outer quadrant of breast in female, estrogen receptor positive    Tubular adenoma    Elevated glucose    Hypercalcemia    Vitamin D deficiency    K25.9, Pyloric ulcer perf. - S/P repair 23    Chronic low back pain    SUJATA (acute kidney injury)    UTI (urinary tract infection)    Sepsis    Peritonitis    Nephrolithiasis    Obstructive uropathy    Perforated ulcer     Past Medical History:   Diagnosis Date    Arthritis     Breast cancer 2017    Colon polyp 2018    Diverticulosis 2018    Environmental allergies     Gall bladder stones     GERD (gastroesophageal reflux disease)     Hypertension     Low back pain     Plantar fasciitis     Tarsal tunnel syndrome of both lower extremities     UTI (urinary tract infection)      Past Surgical History:   Procedure Laterality Date    BREAST LUMPECTOMY  2017    CHOLECYSTECTOMY      COLONOSCOPY W/ BIOPSIES  2018    EXPLORATORY LAPAROTOMY N/A 2023    Procedure: LAPAROTOMY EXPLORATORY;   Surgeon: Gabriel Del Toro MD;  Location: [facility];  Service: General;  Laterality: N/A;      General Information       Row Name 08/29/23 [facility]          Physical Therapy Time and Intention    Document Type evaluation  -AY     Mode of Treatment physical therapy  -AY       Row Name 08/29/23 [facility]          General Information    Patient Profile Reviewed yes  -AY     Prior Level of Function independent:;all household mobility;community mobility;gait;transfer;ADL's  was being seen at OP PT for h/o R hallux valgus and ankle fx/injuries. No mobility limitations. Indep with all mobility without DME.  -AY     Existing Precautions/Restrictions fall;oxygen therapy device and L/min;other (see comments)  NG; MYRNA; abdominal incision; binder for mobility  -AY     Barriers to Rehab medically complex;previous functional deficit  -AY       Row Name 08/29/23 [facility]          Living Environment    People in Home alone  plan to stay with son once d/c  -AY       Row Name 08/29/23 [facility]          Home Main Entrance    Number of Stairs, Main Entrance none  -AY       Row Name 08/29/23 [facility]          Stairs Within Home, Primary    Stairs, Within Home, Primary pt reported that son's home has no steps to enter or inside. Her home is a split level with 7+7 steps and unilateral handrail.  -AY     Number of Stairs, Within Home, Primary none  -AY       Row Name 08/29/23 [facility]          Cognition    Orientation Status (Cognition) oriented x 3  -AY       Row Name 08/29/23 [facility]          Safety Issues, Functional Mobility    Safety Issues Affecting Function (Mobility) insight into deficits/self-awareness;safety precautions follow-through/compliance;sequencing abilities;awareness of need for assistance;safety precaution awareness  -AY     Impairments Affecting Function (Mobility) balance;coordination;endurance/activity tolerance;strength;shortness of breath;postural/trunk control  -AY               User Key  (r) = Recorded By, (t) = Taken By, (c) = Cosigned  By      Initials Name Provider Type    AY Erika Madrigal, PT Physical Therapist                   Mobility       Row Name 08/29/23 1359          Bed Mobility    Bed Mobility supine-sit  -AY     Supine-Sit [place] (Bed Mobility) moderate assist (50% patient effort);2 person assist;verbal cues  -AY     Assistive Device (Bed Mobility) bed rails;draw sheet;head of bed elevated  -AY     Comment, (Bed Mobility) cueing for sequencing and abdominal bracing. pt imupulsive with standing, despite cueing for safety/line management  -AY       Row Name 08/29/23 1359          Bed-Chair Transfer    Bed-Chair [place] (Transfers) contact guard;verbal cues  -AY       Row Name 08/29/23 1359          Sit-Stand Transfer    Sit-Stand [place] (Transfers) contact guard  -AY       Row Name 08/29/23 1359          Gait/Stairs (Locomotion)    [place] Level (Gait) contact guard;1 person assist;1 person to manage equipment;verbal cues  -AY     Assistive Device (Gait) walker, front-wheeled  -AY     Distance in Feet (Gait) 100  -AY     Deviations/Abnormal Patterns (Gait) araceli decreased;festinating/shuffling;gait speed decreased;stride length decreased;weight shifting decreased;base of support, narrow  -AY     Bilateral Gait Deviations heel strike decreased;forward flexed posture  -AY     Comment, (Gait/Stairs) pt demonstrated step through gait pattern with decreased araceli and flexed posture. cueing for posture, increased stride length, and PLB required throughout. Gait distance limited by increased incisional pain.  -AY               User Key  (r) = Recorded By, (t) = Taken By, (c) = Cosigned By      Initials Name Provider Type    Erika Gary PT Physical Therapist                   Obj/Interventions       Row Name 08/29/23 1401          Range of Motion Comprehensive    General Range of Motion bilateral lower extremity ROM WFL  -AY       Row Name 08/29/23 1401          Strength Comprehensive (MMT)    General Manual  Muscle Testing (MMT) Assessment lower extremity strength deficits identified  -AY     Comment, General Manual Muscle Testing (MMT) Assessment BLE grossly 4-/5  -AY       Row Name 08/29/23 1401          Motor Skills    Therapeutic Exercise hip;knee  -AY       Row Name 08/29/23 1401          Hip (Therapeutic Exercise)    Hip (Therapeutic Exercise) strengthening exercise  -AY     Hip Strengthening (Therapeutic Exercise) bilateral;marching while seated;sitting;10 repetitions  -AY       Row Name 08/29/23 1401          Knee (Therapeutic Exercise)    Knee (Therapeutic Exercise) strengthening exercise  -AY     Knee Strengthening (Therapeutic Exercise) bilateral;LAQ (long arc quad);sitting;10 repetitions  -AY       Row Name 08/29/23 1401          Balance    Balance Assessment sitting static balance;sitting dynamic balance;sit to stand dynamic balance;standing static balance;standing dynamic balance  -AY     Static Sitting Balance supervision  -AY     Dynamic Sitting Balance supervision  -AY     Position, Sitting Balance unsupported;sitting in chair  -AY     Sit to Stand Dynamic Balance contact guard  -AY     Static Standing Balance contact guard  -AY     Dynamic Standing Balance contact guard  -AY     Position/Device Used, Standing Balance supported;walker, rolling  -AY       Row Name 08/29/23 1401          Sensory Assessment (Somatosensory)    Sensory Assessment (Somatosensory) LE sensation intact  -AY               User Key  (r) = Recorded By, (t) = Taken By, (c) = Cosigned By      Initials Name Provider Type    AY Erika Madrigal, PT Physical Therapist                   Goals/Plan       Row Name 08/29/23 1404          Bed Mobility Goal 1 (PT)    Activity/Assistive Device (Bed Mobility Goal 1, PT) sit to supine/supine to sit  -AY     [place] Level/Cues Needed (Bed Mobility Goal 1, PT) contact guard required  -AY     Time Frame (Bed Mobility Goal 1, PT) long term goal (LTG);10 days  -AY     Progress/Outcomes (Bed  Mobility Goal 1, PT) goal ongoing  -AY       Row Name 08/29/23 1404          Transfer Goal 1 (PT)    Activity/Assistive Device (Transfer Goal 1, PT) sit-to-stand/stand-to-sit;bed-to-chair/chair-to-bed  -AY     [place] Level/Cues Needed (Transfer Goal 1, PT) independent  -AY     Time Frame (Transfer Goal 1, PT) long term goal (LTG);10 days  -AY     Progress/Outcome (Transfer Goal 1, PT) goal ongoing  -AY       Row Name 08/29/23 1404          Gait Training Goal 1 (PT)    Activity/Assistive Device (Gait Training Goal 1, PT) gait (walking locomotion)  -AY     [place] Level (Gait Training Goal 1, PT) independent  -AY     Distance (Gait Training Goal 1, PT) 500  -AY     Time Frame (Gait Training Goal 1, PT) long term goal (LTG);10 days  -AY     Progress/Outcome (Gait Training Goal 1, PT) goal ongoing  -AY       Row Name 08/29/23 1404          Stairs Goal 1 (PT)    Activity/Assistive Device (Stairs Goal 1, PT) ascending stairs;descending stairs;using handrail, right  -AY     [place] Level/Cues Needed (Stairs Goal 1, PT) supervision required  -AY     Number of Stairs (Stairs Goal 1, PT) 7+7  -AY     Time Frame (Stairs Goal 1, PT) long term goal (LTG);10 days  -AY     Progress/Outcome (Stairs Goal 1, PT) goal ongoing  -AY       Row Name 08/29/23 1404          Therapy Assessment/Plan (PT)    Planned Therapy Interventions (PT) balance training;bed mobility training;gait training;home exercise program;postural re-education;transfer training;patient/family education;strengthening;stair training;neuromuscular re-education  -AY               User Key  (r) = Recorded By, (t) = Taken By, (c) = Cosigned By      Initials Name Provider Type    Erika Gary, PT Physical Therapist                   Clinical Impression       Row Name 08/29/23 1402          Pain    Pretreatment Pain Rating 4/10  -AY     Posttreatment Pain Rating 5/10  -AY     Pain Location incisional  -AY     Pain Location - abdomen  -AY     Pain  Intervention(s) Ambulation/increased activity;Repositioned  -AY     Additional Documentation Pain Scale: Numbers Pre/Post-Treatment (Group)  -AY       Row Name 08/29/23 1402          Plan of Care Review    Plan of Care Reviewed With patient  -AY     Progress improving  -AY     Outcome Evaluation Pt limited by incisional pain, balance deficit, and generalized weakness compared to baseline. Pt ambulated 100ft with CGAx1+1 and RWx, limited by incisional pain. Rec continued skilled PT to increase indep with mobility. d/c rec for IRF, but will monitor progress closely.  -AY       Row Name 08/29/23 1402          Therapy Assessment/Plan (PT)    Rehab Potential (PT) good, to achieve stated therapy goals  -AY     Criteria for Skilled Interventions Met (PT) yes;meets criteria;skilled treatment is necessary  -AY     Therapy Frequency (PT) daily  -AY       Row Name 08/29/23 1402          Vital Signs    Pre Systolic BP Rehab 130  -AY     Pre Treatment Diastolic BP 66  -AY     Post Systolic BP Rehab 142  -AY     Post Treatment Diastolic BP 68  -AY     Pretreatment Heart Rate (beats/min) 91  -AY     Posttreatment Heart Rate (beats/min) 112  -AY     Pre SpO2 (%) 97  -AY     O2 Delivery Pre Treatment nasal cannula  -AY     O2 Delivery Intra Treatment nasal cannula  -AY     Post SpO2 (%) 98  -AY     O2 Delivery Post Treatment nasal cannula  -AY     Pre Patient Position Supine  -AY     Intra Patient Position Standing  -AY     Post Patient Position Sitting  -AY       Row Name 08/29/23 1402          Positioning and Restraints    Pre-Treatment Position in bed  -AY     Post Treatment Position chair  -AY     In Chair notified nsg;reclined;sitting;encouraged to call for assist;call light within reach;exit alarm on;legs elevated;waffle cushion;LUE elevated;RUE elevated  -AY               User Key  (r) = Recorded By, (t) = Taken By, (c) = Cosigned By      Initials Name Provider Type    AY Erika Madrigal, PT Physical Therapist                    Outcome Measures       Row Name 08/29/23 1405          How much help from another person do you currently need...    Turning from your back to your side while in flat bed without using bedrails? 3  -AY     Moving from lying on back to sitting on the side of a flat bed without bedrails? 2  -AY     Moving to and from a bed to a chair (including a wheelchair)? 3  -AY     Standing up from a chair using your arms (e.g., wheelchair, bedside chair)? 3  -AY     Climbing 3-5 steps with a railing? 2  -AY     To walk in hospital room? 3  -AY     AM-PAC 6 Clicks Score (PT) 16  -AY     Highest level of mobility 5 --> Static standing  -AY       Row Name 08/29/23 1405          Functional Assessment    Outcome Measure Options AM-PAC 6 Clicks Basic Mobility (PT)  -AY               User Key  (r) = Recorded By, (t) = Taken By, (c) = Cosigned By      Initials Name Provider Type    AY Erika Madrigal PT Physical Therapist                                 Physical Therapy Education       Title: PT OT SLP Therapies (In Progress)       Topic: Physical Therapy (In Progress)       Point: Mobility training (In Progress)       Learning Progress Summary             Patient Acceptance, E, NR by AY at 8/29/2023 1405                         Point: Home exercise program (In Progress)       Learning Progress Summary             Patient Acceptance, E, NR by AY at 8/29/2023 1405                         Point: Body mechanics (In Progress)       Learning Progress Summary             Patient Acceptance, E, NR by AY at 8/29/2023 1405                         Point: Precautions (In Progress)       Learning Progress Summary             Patient Acceptance, E, NR by AY at 8/29/2023 1405                                         User Key       Initials Effective Dates Name Provider Type Discipline    AY 11/10/20 -  Erika Madrigal, LISA Physical Therapist PT                  PT Recommendation and Plan  Planned Therapy Interventions (PT): balance training, bed mobility  training, gait training, home exercise program, postural re-education, transfer training, patient/family education, strengthening, stair training, neuromuscular re-education  Plan of Care Reviewed With: patient  Progress: improving  Outcome Evaluation: Pt limited by incisional pain, balance deficit, and generalized weakness compared to baseline. Pt ambulated 100ft with CGAx1+1 and RWx, limited by incisional pain. Rec continued skilled PT to increase indep with mobility. d/c rec for IRF, but will monitor progress closely.     Time Calculation:   PT Evaluation Complexity  History, PT Evaluation Complexity: 1-2 personal factors and/or comorbidities  Examination of Body Systems (PT Eval Complexity): total of 3 or more elements  Clinical Presentation (PT Evaluation Complexity): evolving  Clinical Decision Making (PT Evaluation Complexity): moderate complexity  Overall Complexity (PT Evaluation Complexity): moderate complexity     PT Charges       Row Name 08/29/23 1405             Time Calculation    Start Time 1310  -AY      PT Received On 08/29/23  -AY      PT Goal Re-Cert Due Date 09/08/23  -AY         Timed Charges    60542 - PT Therapeutic Exercise Minutes 10  -AY      12087 - PT Therapeutic Activity Minutes 13  -AY         Untimed Charges    PT Eval/Re-eval Minutes 46  -AY         Total Minutes    Timed Charges Total Minutes 23  -AY      Untimed Charges Total Minutes 46  -AY       Total Minutes 69  -AY                User Key  (r) = Recorded By, (t) = Taken By, (c) = Cosigned By      Initials Name Provider Type    AY Erika Madrigal PT Physical Therapist                  Therapy Charges for Today       Code Description Service Date Service Provider Modifiers Qty    10268423541 HC PT EVAL MOD COMPLEXITY 4 8/29/2023 Erika Madrigal, PT GP 1    81172210940 HC PT THERAPEUTIC ACT EA 15 MIN 8/29/2023 Erika Madrigal, PT GP 1    73730858963 HC PT THER PROC EA 15 MIN 8/29/2023 Erika Madrigal, PT GP 1            PT  G-Codes  Outcome Measure Options: AM-PAC 6 Clicks Basic Mobility (PT)  AM-PAC 6 Clicks Score (PT): 16  PT Discharge Summary  Anticipated Discharge Disposition (PT): inpatient rehabilitation facility    Erika Madrigal PT  8/29/2023

## 2023-08-29 NOTE — PLAN OF CARE
Goal Outcome Evaluation:  Plan of Care Reviewed With: patient        Progress: improving  Outcome Evaluation: Pt limited by incisional pain, balance deficit, and generalized weakness compared to baseline. Pt ambulated 100ft with CGAx1+1 and RWx, limited by incisional pain. Rec continued skilled PT to increase indep with mobility. d/c rec for IRF, but will monitor progress closely.      Anticipated Discharge Disposition (PT): inpatient rehabilitation facility

## 2023-08-29 NOTE — CASE MANAGEMENT/SOCIAL WORK
Discharge Planning Assessment  [facility]     Patient Name: Siria Garcia  MRN: [id number]  Today's Date: 8/29/2023    Admit Date: 8/28/2023    Plan: Home   Discharge Needs Assessment       Row Name 08/29/23 1356       Living Environment    People in Home alone    Current Living Arrangements home    Primary Care Provided by self    Provides Primary Care For no one    Family Caregiver if Needed child(lisa), adult    Able to Return to Prior Arrangements yes       Transition Planning    Patient/Family Anticipates Transition to home    Transportation Anticipated family or friend will provide       Discharge Needs Assessment    Readmission Within the Last 30 Days no previous admission in last 30 days    Equipment Currently Used at Home none    Current Discharge Risk lives alone                   Discharge Plan       Row Name 08/29/23 1356       Plan    Plan Home    Patient/Family in Agreement with Plan yes    Plan Comments I met with Ms. Garcia at the bedside. She lives alone in [facility]. She is independent with mobility and activities of daily living. She drives herself when leaving the home and is not current with any home or outpatient services (has used [facility] previously for outpatient physical therapy). She has no medical equipment at home and denies any difficulty affording her medications. She anticipates going home with her son, Lenny, at the time of discharge and he will transport her at that time. CM will continue to follow.    Final Discharge Disposition Code 01 - home or self-care                  Continued Care and Services - Admitted Since 8/28/2023    Coordination has not been started for this encounter.       Expected Discharge Date and Time       Expected Discharge Date Expected Discharge Time    Sep 8, 2023            Demographic Summary       Row Name 08/29/23 1354       General Information    General Information Comments Confirmed PCP to be Sofia Lentz and Medicare and [employer]ARP to be insurers.                    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                   Psychosocial    No documentation.                  Abuse/Neglect    No documentation.                  Legal    No documentation.                  Substance Abuse    No documentation.                  Patient Forms    No documentation.                     Renato Black RN

## 2023-08-29 NOTE — ANESTHESIA POSTPROCEDURE EVALUATION
Patient: Siria Garcia    Procedure Summary       Date: 08/28/23 Room / [facility]:  [facility]    Anesthesia Start: 2105 Anesthesia Stop: 2300    Procedure: LAPAROTOMY EXPLORATORY (Abdomen) Diagnosis: Perforated peptic ulcer    Surgeons: Gabriel Del Toro MD Provider: Navid Sanchez MD    Anesthesia Type: general ASA Status: 3 - Emergent            Anesthesia Type: general    Vitals  Vitals Value Taken Time   /69 08/28/23 2258   Temp     Pulse 101 08/28/23 2259   Resp     SpO2 100 % 08/28/23 2259   Vitals shown include unvalidated device data.        Post Anesthesia Care and Evaluation    Patient location during evaluation: PACU  Patient participation: complete - patient participated  Level of consciousness: awake and alert  Pain management: adequate    Airway patency: patent  Anesthetic complications: No anesthetic complications  PONV Status: none  Cardiovascular status: hemodynamically stable and acceptable  Respiratory status: nonlabored ventilation, acceptable and nasal cannula  Hydration status: acceptable

## 2023-08-29 NOTE — H&P
Intensive Care Admission Note     Chief Complaint:  Pyloric ulcer    History of Present Illness     Siria Garcia is a 67 y.o. female who has a PMHX of hypothyroidism, chronic back pain, GERD, HTN, HLD, and right ankle impingement syndrome for which she sees Orthopedics.    Patient has been taking NSAIDS for right ankle/foot pain.      The patient presented to [facility] ED w/ N/V, abdominal pain, and chills starting the evening of 8/26 that have progressed through the day.  Workup revealed a lactic acidosis (3.6), SUJATA (Cr 1.72), leukocytosis (26), and elevated procalcitonin (23.71).  UA c/w UTI.  CT of abd/pelvis showed free air and fluid as well as a left UPJ 1.3 cm stone w/ moderate obstructive uropathy.    Dr. Del Toro was contacted and the patient was taken to the OR for an exploratory laparotomy which revealed a perforated prepyloric ulcer.  She was extubated successfully.  EBL minimal.  There was some intra-operative hypotension that resolved w/ 1 L 5% albumin.      Attending attestation:  On arrival to ICU patient is hemodynamically stable.  Pain overall fairly well controlled.  Denies any chest pain, nausea, fever, or chills.  No other acute issues.    Problem List, Surgical History, Family, Social History, and ROS     Past Medical History:   Diagnosis Date    Arthritis     Breast cancer 2017    Colon polyp 2018    Diverticulosis 01/2018    Environmental allergies     Gall bladder stones     GERD (gastroesophageal reflux disease)     Hypertension     Low back pain     Plantar fasciitis     Tarsal tunnel syndrome of both lower extremities     UTI (urinary tract infection)       Past Surgical History:   Procedure Laterality Date    BREAST LUMPECTOMY  05/2017    CHOLECYSTECTOMY      COLONOSCOPY W/ BIOPSIES  01/25/2018       Allergies   Allergen Reactions    Bactrim [Sulfamethoxazole-Trimethoprim] Itching    Terbinafine Itching     No current facility-administered medications on file prior to encounter.     Current  Outpatient Medications on File Prior to Encounter   Medication Sig    Cholecalciferol (Vitamin D) 50 MCG (2000 UT) tablet Take 1 tablet by mouth Daily.    CRANBERRY CONCENTRATE PO Take  by mouth.    exemestane (AROMASIN) 25 MG chemo tablet Take 1 tablet by mouth Daily.    fluconazole (DIFLUCAN) 150 MG tablet Take 1 tablet by mouth Daily As Needed (yeast symptoms).    Glucos-Chond-Hyal Ac-Ca Fructo (MOVE FREE JOINT HEALTH ADVANCE PO) Take  by mouth.    Histamine Dihydrochloride (Australian Dream Arthritis) 0.025 % cream Apply  topically. Apply topically TID    levothyroxine (SYNTHROID, LEVOTHROID) 25 MCG tablet Take 0.5 tablets by mouth Daily.    Loperamide HCl (IMODIUM PO) Take 1 tablet by mouth 2 (Two) Times a Day As Needed.    loratadine (CLARITIN) 10 MG tablet Take 1 tablet by mouth Daily.    Multiple Vitamins-Minerals (YARI MULTIVITAMIN FOR WOMEN PO) Take  by mouth.    Omega-3 Fatty Acids (OMEGA-3 1450 PO) Take  by mouth Daily.    sore muscle (ICY HOT EXTRA STRENGTH) 10-30 % cream cream Apply  topically to the appropriate area as directed 2 (Two) Times a Day As Needed. With lido    trimethoprim (TRIMPEX) 100 MG tablet Take 1 tablet by mouth Daily.     MEDICATION LIST AND ALLERGIES REVIEWED.    Family History   Problem Relation Age of Onset    Diabetes Father     Hypertension Father     Breast cancer Mother      Social History     Tobacco Use    Smoking status: Never    Smokeless tobacco: Never   Vaping Use    Vaping Use: Never used   Substance Use Topics    Alcohol use: No    Drug use: No     Social History     Social History Narrative    Not on file     FAMILY AND SOCIAL HISTORY REVIEWED.    Review of Systems   Constitutional:  Positive for chills, diaphoresis, fatigue and fever. Negative for activity change and appetite change.   HENT:  Negative for congestion, sore throat and voice change.    Eyes:  Negative for photophobia and visual disturbance.   Respiratory:  Negative for cough, shortness of breath and  "wheezing.    Cardiovascular:  Negative for chest pain, palpitations and leg swelling.   Gastrointestinal:  Positive for abdominal pain, nausea and vomiting. Negative for abdominal distention.   Genitourinary:  Positive for urgency. Negative for difficulty urinating and flank pain.   Musculoskeletal:  Negative for myalgias and neck stiffness.   Skin:  Negative for color change and rash.   Neurological:  Negative for dizziness, seizures and headaches.   Hematological:  Negative for adenopathy.   Psychiatric/Behavioral:  Negative for agitation, hallucinations and sleep disturbance.    ALL OTHER SYSTEMS REVIEWED AND ARE NEGATIVE.    Physical Exam and Clinical Information   /70   Pulse 100   Temp 97.4 °F (36.3 °C)   Resp 12   Ht 160 cm (63\")   Wt 78.5 kg (173 lb)   SpO2 95%   BMI 30.65 kg/m²   Physical Exam  Vitals and nursing note reviewed.   Constitutional:       General: She is not in acute distress.     Appearance: She is well-developed and normal weight. She is not ill-appearing or toxic-appearing.   HENT:      Head: Normocephalic and atraumatic.   Cardiovascular:      Rate and Rhythm: Normal rate and regular rhythm.      Pulses: Normal pulses.      Heart sounds: Normal heart sounds. No murmur heard.    No friction rub. No gallop.   Pulmonary:      Effort: Pulmonary effort is normal. No respiratory distress.      Breath sounds: Normal breath sounds. No wheezing, rhonchi or rales.   Musculoskeletal:      Right lower leg: No edema.      Left lower leg: No edema.   Skin:     General: Skin is warm and dry.   Neurological:      Mental Status: She is alert and oriented to person, place, and time.       Results from last 7 days   Lab Units 08/28/23  1652   WBC 10*3/mm3 26.63*   HEMOGLOBIN g/dL 13.0   PLATELETS 10*3/mm3 306     Results from last 7 days   Lab Units 08/28/23  1652   SODIUM mmol/L 140   POTASSIUM mmol/L 4.6   CO2 mmol/L 23.0   BUN mg/dL 29*   CREATININE mg/dL 1.72*   GLUCOSE mg/dL 137* "     Estimated Creatinine Clearance: 31 mL/min (A) (by C-G formula based on SCr of 1.72 mg/dL (H)).          Lab Results   Component Value Date    LACTATE 2.0 08/28/2023          I reviewed the patient's results/ images and I agree with the reports.     Impression     Pyloric ulcer perforation; s/p (Dr. Del Toro, 8/28/23)    SUJATA (acute kidney injury)    Sepsis    Peritonitis    UTI (urinary tract infection)    Benign essential hypertension    Gastroesophageal reflux disease    Hypercholesterolemia    Hypothyroidism    Chronic low back pain      Plan/Recommendations     67-year-old female with a past medical history significant for hypothyroidism, chronic back pain, GERD, hypertension, hyperlipidemia, and right ankle impingement syndrome.  Who presents to [facility] ICU on 8/28/2023 status post exploratory laparotomy found to have a prepyloric perforation repair by Dr. SWANSON.  Patient also found to have obstructive uropathy on evaluation in the ER with urology already consulted and plans to see the patient in the morning for eventual litho-[place] and stent placement when the patient is more stable.  Patient was brought to the ICU due to hypotension postsurgery.    -Admit patient to ICU  -Continue with warm resuscitation  -Pressors as needed to [place] MAP greater than 65  -Consult urology in the morning  -Follow-up culture data  -Zosyn and Diflucan for antibiotic coverage  -Protonix twice daily  -Mechanical DVT prophylaxis  -Diet orders per surgery  -A.m. labs    High level of risk due to:  decision regarding escalation of level of hospital care.      RAOUL Beltre DO  Pulmonary and Critical Care Medicine  08/28/23 23:17 EDT     CC: Sofia Hong MD      4 minutes of critical care provided by ANGIE Bass in addendum accordance with split/shared billing. This time excludes other billable procedures. Time does include preparation of documents, medical consultations, review of old  records, and direct bedside care. Patient is at high risk for life-threatening deterioration due to sepsis.   Electronically signed by ANGIE Lou, 08/28/23, 11:04 PM EDT.

## 2023-08-30 LAB
ANION GAP SERPL CALCULATED.3IONS-SCNC: 10 MMOL/L (ref 5–15)
BACTERIA SPEC AEROBE CULT: ABNORMAL
BASOPHILS # BLD MANUAL: 0 10*3/MM3 (ref 0–0.2)
BASOPHILS NFR BLD MANUAL: 0 % (ref 0–1.5)
BUN SERPL-MCNC: 31 MG/DL (ref 8–23)
BUN/CREAT SERPL: 26.5 (ref 7–25)
BURR CELLS BLD QL SMEAR: ABNORMAL
CALCIUM SPEC-SCNC: 8 MG/DL (ref 8.6–10.5)
CHLORIDE SERPL-SCNC: 105 MMOL/L (ref 98–107)
CO2 SERPL-SCNC: 24 MMOL/L (ref 22–29)
CREAT SERPL-MCNC: 1.17 MG/DL (ref 0.57–1)
CYTO UR: NORMAL
D-LACTATE SERPL-SCNC: 1.5 MMOL/L (ref 0.5–2)
DEPRECATED RDW RBC AUTO: 49.6 FL (ref 37–54)
DOHLE BODIES: PRESENT
EGFRCR SERPLBLD CKD-EPI 2021: 50.9 ML/MIN/1.73
EOSINOPHIL # BLD MANUAL: 0 10*3/MM3 (ref 0–0.4)
EOSINOPHIL NFR BLD MANUAL: 0 % (ref 0.3–6.2)
ERYTHROCYTE [DISTWIDTH] IN BLOOD BY AUTOMATED COUNT: 14.7 % (ref 12.3–15.4)
GLUCOSE BLDC GLUCOMTR-MCNC: 107 MG/DL (ref 70–130)
GLUCOSE BLDC GLUCOMTR-MCNC: 109 MG/DL (ref 70–130)
GLUCOSE BLDC GLUCOMTR-MCNC: 71 MG/DL (ref 70–130)
GLUCOSE SERPL-MCNC: 91 MG/DL (ref 65–99)
HCT VFR BLD AUTO: 32.4 % (ref 34–46.6)
HGB BLD-MCNC: 10.6 G/DL (ref 12–15.9)
LAB AP CASE REPORT: NORMAL
LAB AP CLINICAL INFORMATION: NORMAL
LYMPHOCYTES # BLD MANUAL: 1.36 10*3/MM3 (ref 0.7–3.1)
LYMPHOCYTES NFR BLD MANUAL: 3 % (ref 5–12)
MAGNESIUM SERPL-MCNC: 2.4 MG/DL (ref 1.6–2.4)
MCH RBC QN AUTO: 29.9 PG (ref 26.6–33)
MCHC RBC AUTO-ENTMCNC: 32.7 G/DL (ref 31.5–35.7)
MCV RBC AUTO: 91.3 FL (ref 79–97)
METAMYELOCYTES NFR BLD MANUAL: 1 % (ref 0–0)
MONOCYTES # BLD: 0.68 10*3/MM3 (ref 0.1–0.9)
NEUTROPHILS # BLD AUTO: 20.44 10*3/MM3 (ref 1.7–7)
NEUTROPHILS NFR BLD MANUAL: 62 % (ref 42.7–76)
NEUTS BAND NFR BLD MANUAL: 28 % (ref 0–5)
PATH REPORT.FINAL DX SPEC: NORMAL
PATH REPORT.GROSS SPEC: NORMAL
PLAT MORPH BLD: NORMAL
PLATELET # BLD AUTO: 208 10*3/MM3 (ref 140–450)
PMV BLD AUTO: 9.8 FL (ref 6–12)
POTASSIUM SERPL-SCNC: 4.8 MMOL/L (ref 3.5–5.2)
PROCALCITONIN SERPL-MCNC: 12.58 NG/ML (ref 0–0.25)
QT INTERVAL: 320 MS
QTC INTERVAL: 435 MS
RBC # BLD AUTO: 3.55 10*6/MM3 (ref 3.77–5.28)
SODIUM SERPL-SCNC: 139 MMOL/L (ref 136–145)
VARIANT LYMPHS NFR BLD MANUAL: 6 % (ref 19.6–45.3)
WBC NRBC COR # BLD: 22.71 10*3/MM3 (ref 3.4–10.8)

## 2023-08-30 PROCEDURE — 25010000002 HYDROMORPHONE PER 4 MG: Performed by: SURGERY

## 2023-08-30 PROCEDURE — 85025 COMPLETE CBC W/AUTO DIFF WBC: CPT | Performed by: INTERNAL MEDICINE

## 2023-08-30 PROCEDURE — 85007 BL SMEAR W/DIFF WBC COUNT: CPT | Performed by: INTERNAL MEDICINE

## 2023-08-30 PROCEDURE — 97530 THERAPEUTIC ACTIVITIES: CPT

## 2023-08-30 PROCEDURE — 80048 BASIC METABOLIC PNL TOTAL CA: CPT | Performed by: INTERNAL MEDICINE

## 2023-08-30 PROCEDURE — 82948 REAGENT STRIP/BLOOD GLUCOSE: CPT

## 2023-08-30 PROCEDURE — 25010000002 HYDROMORPHONE PER 4 MG: Performed by: INTERNAL MEDICINE

## 2023-08-30 PROCEDURE — 97166 OT EVAL MOD COMPLEX 45 MIN: CPT

## 2023-08-30 PROCEDURE — 83605 ASSAY OF LACTIC ACID: CPT | Performed by: INTERNAL MEDICINE

## 2023-08-30 PROCEDURE — 25010000002 FLUCONAZOLE PER 200 MG: Performed by: SURGERY

## 2023-08-30 PROCEDURE — 83735 ASSAY OF MAGNESIUM: CPT | Performed by: INTERNAL MEDICINE

## 2023-08-30 PROCEDURE — 97535 SELF CARE MNGMENT TRAINING: CPT

## 2023-08-30 PROCEDURE — 25010000002 MEROPENEM PER 100 MG: Performed by: INTERNAL MEDICINE

## 2023-08-30 PROCEDURE — 84145 PROCALCITONIN (PCT): CPT | Performed by: INTERNAL MEDICINE

## 2023-08-30 PROCEDURE — 99232 SBSQ HOSP IP/OBS MODERATE 35: CPT | Performed by: INTERNAL MEDICINE

## 2023-08-30 RX ORDER — DEXTROSE, SODIUM CHLORIDE, SODIUM LACTATE, POTASSIUM CHLORIDE, AND CALCIUM CHLORIDE 5; .6; .31; .03; .02 G/100ML; G/100ML; G/100ML; G/100ML; G/100ML
50 INJECTION, SOLUTION INTRAVENOUS CONTINUOUS
Status: DISCONTINUED | OUTPATIENT
Start: 2023-08-30 | End: 2023-09-02

## 2023-08-30 RX ADMIN — Medication 10 ML: at 08:21

## 2023-08-30 RX ADMIN — PANTOPRAZOLE SODIUM 40 MG: 40 INJECTION, POWDER, LYOPHILIZED, FOR SOLUTION INTRAVENOUS at 08:20

## 2023-08-30 RX ADMIN — HYDROMORPHONE HYDROCHLORIDE 0.5 MG: 1 INJECTION, SOLUTION INTRAMUSCULAR; INTRAVENOUS; SUBCUTANEOUS at 05:40

## 2023-08-30 RX ADMIN — HYDROMORPHONE HYDROCHLORIDE 0.5 MG: 1 INJECTION, SOLUTION INTRAMUSCULAR; INTRAVENOUS; SUBCUTANEOUS at 14:09

## 2023-08-30 RX ADMIN — MEROPENEM 1000 MG: 1 INJECTION, POWDER, FOR SOLUTION INTRAVENOUS at 01:36

## 2023-08-30 RX ADMIN — SODIUM CHLORIDE, SODIUM LACTATE, POTASSIUM CHLORIDE, CALCIUM CHLORIDE AND DEXTROSE MONOHYDRATE 125 ML/HR: 5; 600; 310; 30; 20 INJECTION, SOLUTION INTRAVENOUS at 05:37

## 2023-08-30 RX ADMIN — HYDROMORPHONE HYDROCHLORIDE 0.5 MG: 1 INJECTION, SOLUTION INTRAMUSCULAR; INTRAVENOUS; SUBCUTANEOUS at 19:43

## 2023-08-30 RX ADMIN — MEROPENEM 1000 MG: 1 INJECTION, POWDER, FOR SOLUTION INTRAVENOUS at 17:45

## 2023-08-30 RX ADMIN — LEVOTHYROXINE SODIUM 20 MCG: 20 INJECTION, SOLUTION INTRAVENOUS at 10:03

## 2023-08-30 RX ADMIN — PANTOPRAZOLE SODIUM 40 MG: 40 INJECTION, POWDER, LYOPHILIZED, FOR SOLUTION INTRAVENOUS at 17:45

## 2023-08-30 RX ADMIN — HYDROMORPHONE HYDROCHLORIDE 0.5 MG: 1 INJECTION, SOLUTION INTRAMUSCULAR; INTRAVENOUS; SUBCUTANEOUS at 23:57

## 2023-08-30 RX ADMIN — Medication 10 ML: at 20:33

## 2023-08-30 RX ADMIN — SODIUM CHLORIDE, SODIUM LACTATE, POTASSIUM CHLORIDE, CALCIUM CHLORIDE AND DEXTROSE MONOHYDRATE 100 ML/HR: 5; 600; 310; 30; 20 INJECTION, SOLUTION INTRAVENOUS at 14:25

## 2023-08-30 RX ADMIN — MEROPENEM 1000 MG: 1 INJECTION, POWDER, FOR SOLUTION INTRAVENOUS at 10:02

## 2023-08-30 RX ADMIN — FLUCONAZOLE 200 MG: 200 INJECTION, SOLUTION INTRAVENOUS at 20:33

## 2023-08-30 RX ADMIN — FLUTICASONE PROPIONATE 2 SPRAY: 50 SPRAY, METERED NASAL at 08:21

## 2023-08-30 NOTE — CONSULTS
INFECTIOUS DISEASE CONSULT/INITIAL HOSPITAL VISIT    Siria Garcia  1955  [id number]    Date of Consult: 8/30/2023    Admission Date: 8/28/2023      Requesting Provider: No ref. provider found  Evaluating Physician: Matthew Manuel MD    Reason for Consultation: peritonitis    History of present illness:    Patient is a 67 y.o. female with hypothyroidism, chronic back pain, GERD, hypertension, hyperlipidemia presents to [facility] emergency room with nausea vomiting abdominal pain chills starting 8/26.  Patient found to have lactic acidosis acute renal failure and leukocytosis and markedly elevated procalcitonin.  CT scan pelvis revealed free air and fluid as well as a left ureteropelvic junction kidney stone with obstructive uropathy.  Patient has been taken to operating room for exploratory laparotomy and was found to have a perforated prepyloric ulcer.  Patient has been started on broad-spectrum antibiotics.  Blood cultures have yielded E. coli.  Operative cultures are pending    Urology has seen patient and their plans for cystoscopy with ureteral stent placement upon further stability we are consulted for antibiotic management      Past Medical History:   Diagnosis Date    Arthritis     Breast cancer 2017    Colon polyp 2018    Diverticulosis 01/2018    Environmental allergies     Gall bladder stones     GERD (gastroesophageal reflux disease)     Hypertension     Low back pain     Plantar fasciitis     Tarsal tunnel syndrome of both lower extremities     UTI (urinary tract infection)        Past Surgical History:   Procedure Laterality Date    BREAST LUMPECTOMY  05/2017    CHOLECYSTECTOMY      COLONOSCOPY W/ BIOPSIES  01/25/2018    EXPLORATORY LAPAROTOMY N/A 8/28/2023    Procedure: LAPAROTOMY EXPLORATORY;  Surgeon: Gabriel Del Toro MD;  Location: [facility];  Service: General;  Laterality: N/A;       Family History   Problem Relation Age of Onset    Diabetes Father      Hypertension Father     Breast cancer Mother        Social History     Socioeconomic History    Marital status:    Tobacco Use    Smoking status: Never    Smokeless tobacco: Never   Vaping Use    Vaping Use: Never used   Substance and Sexual Activity    Alcohol use: No    Drug use: No    Sexual activity: Not Currently       Allergies   Allergen Reactions    Bactrim [Sulfamethoxazole-Trimethoprim] Itching    Terbinafine Itching         Medication:    Current Facility-Administered Medications:     acetaminophen (TYLENOL) suppository 650 mg, 650 mg, Rectal, Q4H PRN, Gabriel Del Toro MD    [DISCONTINUED] sennosides-docusate (PERICOLACE) 8.6-50 MG per tablet 2 tablet, 2 tablet, Oral, BID **AND** [DISCONTINUED] polyethylene glycol (MIRALAX) packet 17 g, 17 g, Oral, Daily PRN **AND** [DISCONTINUED] bisacodyl (DULCOLAX) EC tablet 5 mg, 5 mg, Oral, Daily PRN **AND** bisacodyl (DULCOLAX) suppository 10 mg, 10 mg, Rectal, Daily PRN, Jesu Beltre,     dextrose 5 % and lactated Ringer's infusion, 100 mL/hr, Intravenous, Continuous, Gabriel Del Toro MD, Last Rate: 100 mL/hr at 08/30/23 1425, 100 mL/hr at 08/30/23 1425    fluconazole (DIFLUCAN) IVPB 200 mg, 200 mg, Intravenous, Q24H, Gabriel Del Toro MD, Last Rate: 100 mL/hr at 08/29/23 2019, 200 mg at 08/29/23 2019    fluticasone (FLONASE) 50 MCG/ACT nasal spray 2 spray, 2 spray, Each Nare, Daily, Renan Tatum MD, 2 spray at 08/30/23 0821    HYDROmorphone (DILAUDID) injection 0.5 mg, 0.5 mg, Intravenous, Q2H PRN, Gabriel Del Toro MD, 0.5 mg at 08/30/23 1409    Levothyroxine Sodium injection 20 mcg, 20 mcg, Intravenous, Daily, Renan Tatum MD, 20 mcg at 08/30/23 1003    meropenem (MERREM) 1000 mg/100 mL 0.9% NS (mbp), 1,000 mg, Intravenous, Q8H, Renan Tatum MD, 1,000 mg at 08/30/23 1002    ondansetron (ZOFRAN) injection 4 mg, 4 mg, Intravenous, Q6H PRN, Gabriel Del Toro MD    pantoprazole (PROTONIX) injection 40 mg, 40 mg,  Intravenous, BID AC, Renan Tatum MD, 40 mg at 08/30/23 0820    phenol (CHLORASEPTIC) 1.4 % liquid 1 spray, 1 spray, Mouth/Throat, Q2H PRN, Jesu Beltre DO, 1 spray at 08/29/23 1206    Sodium Chloride (PF) 0.9 % 10 mL, 10 mL, Intravenous, PRN, Gabriel Del Toro MD    sodium chloride 0.9 % flush 10 mL, 10 mL, Intravenous, Q12H, Jesu Beltre DO, 10 mL at 08/30/23 0821    sodium chloride 0.9 % infusion 40 mL, 40 mL, Intravenous, PRN, Jesu Beltre DO    Antibiotics:  Anti-Infectives (From admission, onward)      Ordered     Dose/Rate Route Frequency Start Stop    08/29/23 1145  meropenem (MERREM) 1000 mg/100 mL 0.9% NS (mbp)        Ordering Provider: Renan Tatum MD    1,000 mg  over 3 Hours Intravenous Every 8 Hours 08/29/23 1800 09/05/23 1759    08/29/23 1145  meropenem (MERREM) 1000 mg/100 mL 0.9% NS (mbp)        Ordering Provider: Renan Tatum MD    1,000 mg  over 30 Minutes Intravenous Once 08/29/23 1245 08/29/23 1230    08/29/23 0022  fluconazole (DIFLUCAN) IVPB 200 mg        Ordering Provider: Gabriel Del Toro MD    200 mg  100 mL/hr over 60 Minutes Intravenous Every 24 Hours Scheduled 08/29/23 0115 09/02/23 2059 08/28/23 2153  piperacillin-tazobactam (ZOSYN) 3.375 g in iso-osmotic dextrose 50 ml (premix)        Ordering Provider: Gabriel Del Toro MD    3.375 g  100 mL/hr over 0.5 Hours Intravenous Once 08/28/23 2155 08/28/23 2210    08/28/23 1941  piperacillin-tazobactam (ZOSYN) 3.375 g in iso-osmotic dextrose 50 ml (premix)        Ordering Provider: Axel Werner PA    3.375 g Intravenous Once 08/28/23 2045 08/28/23 2012 08/28/23 1746  cefTRIAXone (ROCEPHIN) 2000 mg/100 mL 0.9% NS IVPB (MBP)        Ordering Provider: Axel Werner PA    2,000 mg  over 30 Minutes Intravenous Once 08/28/23 1802 08/28/23 1948              Review of Systems:  As reported chills fatigue fever abdominal pain nausea vomiting urinary urgency      Physical  Exam:   Vital Signs  Temp (24hrs), Av.9 °F (37.2 °C), Min:97.9 °F (36.6 °C), Max:99.6 °F (37.6 °C)    Temp  Min: 97.9 °F (36.6 °C)  Max: 99.6 °F (37.6 °C)  BP  Min: 127/78  Max: 163/75  Pulse  Min: 87  Max: 105  Resp  Min: 16  Max: 16  SpO2  Min: 92 %  Max: 97 %    GENERAL: Awake and alert, in no acute distress.   HEENT: Normocephalic, atraumatic.  PERRL. EOMI. No conjunctival injection. No icterus.  No external oral lesions    HEART: RRR; No murmur  LUNGS: Clear to auscultation bilaterally  ABDOMEN: abdominal wound c/d/i  EXT:  1+ edema    MSK: No joint effusions or erythema  SKIN: Warm and dry without cutaneous eruptions on Inspection/palpation.    NEURO: Oriented to PPT.  Motor 5/5 strength  PSYCHIATRIC: Normal insight and judgment. Cooperative with PE    Laboratory Data    Results from last 7 days   Lab Units 23  0423 23  0540 23  1652   WBC 10*3/mm3 22.71* 24.25* 26.63*   HEMOGLOBIN g/dL 10.6* 10.9* 13.0   HEMATOCRIT % 32.4* 33.1* 40.2   PLATELETS 10*3/mm3 208 225 306     Results from last 7 days   Lab Units 23  0538   SODIUM mmol/L 139   POTASSIUM mmol/L 4.8   CHLORIDE mmol/L 105   CO2 mmol/L 24.0   BUN mg/dL 31*   CREATININE mg/dL 1.17*   GLUCOSE mg/dL 91   CALCIUM mg/dL 8.0*     Results from last 7 days   Lab Units 23  1652   ALK PHOS U/L 93   BILIRUBIN mg/dL 0.4   ALT (SGPT) U/L 24   AST (SGOT) U/L 32             Results from last 7 days   Lab Units 23  0423   LACTATE mmol/L 1.5             Estimated Creatinine Clearance: 47.4 mL/min (A) (by C-G formula based on SCr of 1.17 mg/dL (H)).      Microbiology:  Blood Culture   Date Value Ref Range Status   2023 No growth at 24 hours  Preliminary     BCID, PCR   Date Value Ref Range Status   2023 Escherichia coli. Identification by BCID2 PCR. (A) Negative by BCID PCR. Culture to Follow. Final     No results found for: CULTURES, HSVCX, URCX  No results found for: EYECULTURE, GCCX, HSVCULTURE, LABHSV  No results  found for: LEGIONELLA, MRSACX, MUMPSCX, MYCOPLASCX  No results found for: NOCARDIACX, STOOLCX  Urine Culture   Date Value Ref Range Status   08/28/2023 >100,000 CFU/mL Escherichia coli (A)  Final     No results found for: VIRALCULTU, WOUNDCX        Radiology:  Imaging Results (Last 72 Hours)       Procedure Component Value Units Date/Time    CT Abdomen Pelvis Without Contrast [[id number]] Collected: 08/28/23 1851     Updated: 08/28/23 1906    Narrative:      CT ABDOMEN PELVIS WO CONTRAST    Date of Exam: 8/28/2023 6:18 PM EDT    Indication: Abdominal pain with vomiting.    Comparison: None available.    Technique: Axial CT images were obtained of the abdomen and pelvis without the administration of contrast. Reconstructed coronal and sagittal images were also obtained. Automated exposure control and iterative construction methods were used.      Findings:  There is a 1.3 cm stone at the left ureteropelvic junction causing moderate obstructive uropathy. The left kidney is enlarged with perinephric soft tissue stranding and edema seen along the left-sided retroperitoneal fascial planes. There are also   clustered stones in the inferior pole calyces of the left kidney. There are no stones seen within the left ureter or urinary bladder. There are no right renal stones. There is free fluid and free air within the upper abdomen. I'm uncertain of the   clinical significance. If the patient has not had any surgical or interventional procedures, this could be secondary to a perforated viscus. The gallbladder is surgically absent. Unenhanced images of the liver, pancreas, spleen, and adrenal glands are   normal. There is also mild free fluid in the lower pelvis. There is colonic diverticulosis without evidence of acute diverticulitis. There are no dilated loops of bowel to indicate an obstruction. There is no abnormal bowel wall thickening. The uterus   and adnexal structures appear unremarkable. There are no suspicious  osteolytic or sclerotic lesions within the bony structures. There are degenerative changes involving the thoracolumbar spine with underlying dextroscoliotic curvature. There are also   degenerative changes of the hip joints. There is minor scarring versus subsegmental atelectasis in the lung bases.        Impression:      Impression:    1. 1.3 cm stone at the left uteropelvic junction causing moderate obstructive uropathy. There is also a cluster of stones in the inferior pole calyces of the left kidney.  2. There is free fluid and free air within the upper abdomen. There is also mild amount of free fluid in the lower pelvis. I'm uncertain of the clinical significance. If the patient has not had any surgical or interventional procedures, this could be   secondary to a perforated viscus.  3. Colonic diverticulosis without acute diverticulitis.  4. Additional incidental findings as noted above. The exam is limited by noncontrasted technique.  5. The abnormal results were discussed with BRENDA Kee by telephone.            Electronically Signed: Ray Saldana MD    8/28/2023 7:03 PM EDT    Workstation ID: [id number]              Impression:   E. coli bacteremia  Peritonitis  Prepyloric perforated viscus  Left-sided kidney stone with obstructive uropathy  Leukocytosis with neutrophilia  Acute renal failure  Procalcitonin elevation    PLAN/RECOMMENDATIONS:   Thank you for asking us to see Siria Garcia, I recommend the following:      Urine cultures and blood cultures growing E. coli.  Patient certainly could have bacteremia from a genitourinary process with kidney stone obstruction.  Urology planning cystoscopy with stent placement.  Patient treated for ESBL producing E. coli.  Upon further stability and susceptibility information will consider simplification of IV antibiotics.    From a perforated viscus standpoint patient at risk for peritonitis from gram-negative enteric's and anaerobes.    Suspect that meropenem  will cover this adequately    Agree with antifungal coverage with fluconazole at this time    Continue meropenem 1 g IV every 8 hours upon further improvement simplify this antibiotic    Expected duration of antibiotics is 7 to 14 days     Dw/ family  Matthew Manuel MD  8/30/2023  14:47 EDT

## 2023-08-30 NOTE — PROGRESS NOTES
"Siria Garcia  1955  [id number]    Surgery Progress Note    Date of visit: 8/30/2023    Subjective: Working with OT  Feels better  Minimal nasogastric tube drainage  Good urine output    Objective:    /83   Pulse 87   Temp 98.1 °F (36.7 °C) (Oral)   Resp 16   Ht 160 cm (63\")   Wt 84.4 kg (186 lb 1.1 oz)   SpO2 92%   BMI 32.96 kg/m²     Intake/Output Summary (Last 24 hours) at 8/30/2023 0811  Last data filed at 8/30/2023 0545  Gross per 24 hour   Intake 3016.58 ml   Output 2145 ml   Net 871.58 ml       CV: Regular rate and rhythm  L: normal air entry  Abd: Soft  Dressing and abdominal binder intact   Clinton-Lopez with minimal serosanguineous drainage        LABS:    Results from last 7 days   Lab Units 08/30/23  0423   WBC 10*3/mm3 22.71*   HEMOGLOBIN g/dL 10.6*   HEMATOCRIT % 32.4*   PLATELETS 10*3/mm3 208     Results from last 7 days   Lab Units 08/30/23  0538 08/29/23  0540 08/28/23  1652   SODIUM mmol/L 139   < > 140   POTASSIUM mmol/L 4.8   < > 4.6   CHLORIDE mmol/L 105   < > 100   CO2 mmol/L 24.0   < > 23.0   BUN mg/dL 31*   < > 29*   CREATININE mg/dL 1.17*   < > 1.72*   CALCIUM mg/dL 8.0*   < > 10.0   BILIRUBIN mg/dL  --   --  0.4   ALK PHOS U/L  --   --  93   ALT (SGPT) U/L  --   --  24   AST (SGOT) U/L  --   --  32   GLUCOSE mg/dL 91   < > 137*    < > = values in this interval not displayed.     Results from last 7 days   Lab Units 08/30/23  0538   SODIUM mmol/L 139   POTASSIUM mmol/L 4.8   CHLORIDE mmol/L 105   CO2 mmol/L 24.0   BUN mg/dL 31*   CREATININE mg/dL 1.17*   GLUCOSE mg/dL 91   CALCIUM mg/dL 8.0*     Lab Results   Lab Value Date/Time    LIPASE 79 (H) 08/28/2023 1652         Assessment/ Plan: Stable course status post emergent abdominal exploration and repair of perforated prepyloric ulcer  Patient is progressing well  Continue with IV antibiotic and antifungal  Hypaque upper GI tomorrow  Increase activity  Minimize narcotics  Encourage pulmonary toilet  Left ureteral stent " placement later this week    Problem List Items Addressed This Visit          Other    Sepsis     Other Visit Diagnoses       Acute UTI    -  Primary    Relevant Medications    cefTRIAXone (ROCEPHIN) 2000 mg/100 mL 0.9% NS IVPB (MBP) (Completed)    piperacillin-tazobactam (ZOSYN) 3.375 g in iso-osmotic dextrose 50 ml (premix) (Completed)    piperacillin-tazobactam (ZOSYN) 3.375 g in iso-osmotic dextrose 50 ml (premix) (Completed)    Ureteropelvic junction (UPJ) obstruction        Free fluid in pelvis        History of gastroesophageal reflux (GERD)        History of hypertension        History of breast cancer        Acute kidney injury        Abdominal pain        Relevant Orders    Tissue Pathology Exam    Anaerobic Culture - Body Fluid, Abdominal Wall    Fungus Culture - Body Fluid, Abdominal Wall    Fungus Culture - Body Fluid, Abdominal Wall    Body Fluid Culture - Body Fluid, Abdominal Wall (Completed)    Body Fluid Culture - Body Fluid, Abdominal Wall (Completed)    AFB Culture - Body Fluid, Abdominal Wall (Completed)    AFB Culture - Body Fluid, Abdominal Wall (Completed)              Gabriel Del Toro MD  8/30/2023  08:11 EDT

## 2023-08-30 NOTE — PROGRESS NOTES
INTENSIVIST   PROGRESS NOTE        SUBJECTIVE     Siria 67 y.o. female is followed for: Abdominal Pain and Vomiting       K25.9, Pyloric ulcer perf. - S/P repair 23    Benign essential hypertension    Gastroesophageal reflux disease    Hypercholesterolemia    Hypothyroidism    Chronic low back pain    SUJATA (acute kidney injury)    UTI (urinary tract infection)    Sepsis    Peritonitis    Nephrolithiasis    Obstructive uropathy    Perforated ulcer    As an Intensivist, we provide an integrated approach to the ICU patient and family, medical management of comorbid conditions, including but not limited to electrolytes, glycemic control, organ dysfunction, lead interdisciplinary rounds and coordinate the care with all other services, including those from other specialists.     Interval History:  POD: 2 Days Post-Op    She continues to improve.  No fevers.  Less pain.  She stood up this morning. She wants to be more active.    Temp  Min: 97.9 °F (36.6 °C)  Max: 99.6 °F (37.6 °C)       History     Last Reviewed by Renan Tatum MD on 2023 at  7:48 AM    Sections Reviewed    Medical, Family, Surgical, Tobacco, Alcohol, Drug Use, Sexual Activity,   Social Documentation    Problem list reviewed by Renan Tatum MD on 2023 at  7:48 AM  Problem list reviewed by Renan Tatum MD on 2023 at  7:48 AM  Medicines reviewed by Renan Tatum MD on 2023 at  7:48 AM  Allergies reviewed by Renan Tatum MD on 2023 at  7:48 AM       The patient's relevant past medical, surgical and social history were reviewed and updated in Epic as appropriate.        OBJECTIVE     Vitals:  Temp: 99.1 °F (37.3 °C) (23 1200) Temp  Min: 97.9 °F (36.6 °C)  Max: 99.6 °F (37.6 °C)   Temp core:      BP: 152/86 (23 1200) BP  Min: 127/78  Max: 163/75   MAP (non-invasive) Noninvasive MAP (mmHg): 109 (23 1200) Noninvasive MAP (mmHg)  Av.7  Min: 81  Max: 113   Pulse: 93 (23 1200) Pulse  Min: 87   Max: 105   Resp: 16 (08/30/23 1200) Resp  Min: 16  Max: 16   SpO2: 94 % (08/30/23 1200) SpO2  Min: 92 %  Max: 97 %   Device: nasal cannula with ETCO2 (08/30/23 1200)    Flow Rate: 2 (08/30/23 1200) Flow (L/min)  Min: 0  Max: 2         08/28/23  2323 08/30/23  0545   Weight: 85.4 kg (188 lb 4.4 oz) 84.4 kg (186 lb 1.1 oz)        Intake/Ouptut 24 hrs (7:00AM - 6:59 AM)  Intake & Output (last 3 days)         08/27 0701 08/28 0700 08/28 0701  08/29 0700 08/29 0701  08/30 0700 08/30 0701 08/31 0700    I.V. (mL/kg)  2512.6 (29.4) 3310.7 (39.2) 725.9 (8.6)    Other    30    NG/GT   30     IV Piggyback  1700 100 52.6    Total Intake(mL/kg)  4212.6 (49.3) 3440.7 (40.8) 808.5 (9.6)    Urine (mL/kg/hr)  600 1910 (0.9) 685 (1.3)    Emesis/NG output   300 150    Drains  90 45 5    Total Output  690 2255 840    Net  +3522.6 +1185.7 -31.5                    Medications (drips):  dextrose 5 % and lactated Ringer's, Last Rate: 100 mL/hr (08/30/23 1003)        Physical Examination  Telemetry:  Rhythm: sinus tachycardia, normal sinus rhythm (08/30/23 1200)         Constitutional:  No acute distress.   Cardiovascular: RRR.    Respiratory: Normal breath sounds  No adventitious sounds   Abdominal:  Soft with no tenderness.   Extremities: No Edema   Neurological:   Alert, Oriented, Cooperative.  Best Eye Response: 4-->(E4) spontaneous (08/30/23 1200)  Best Motor Response: 6-->(M6) obeys commands (08/30/23 1200)  Best Verbal Response: 5-->(V5) oriented (08/30/23 1200)  Zulma Coma Scale Score: 15 (08/30/23 1200)     Results Reviewed:  Laboratory  Microbiology  Radiology  Pathology    Hematology:  Results from last 7 days   Lab Units 08/30/23  0423 08/29/23  0540 08/28/23  1652   WBC 10*3/mm3 22.71* 24.25* 26.63*   HEMOGLOBIN g/dL 10.6* 10.9* 13.0   MCV fL 91.3 92.2 92.2   PLATELETS 10*3/mm3 208 225 306       Results from last 7 days   Lab Units 08/30/23  0423 08/28/23  1652   NEUTROS ABS 10*3/mm3 20.44* 23.97*   EOS ABS 10*3/mm3 0.00  0.00   BASOS ABS 10*3/mm3 0.00 0.00       Chemistry:  Estimated Creatinine Clearance: 47.4 mL/min (A) (by C-G formula based on SCr of 1.17 mg/dL (H)).    Results from last 7 days   Lab Units 08/30/23  0538 08/29/23  0540   SODIUM mmol/L 139 138   POTASSIUM mmol/L 4.8 4.5   CHLORIDE mmol/L 105 105   CO2 mmol/L 24.0 23.0   BUN mg/dL 31* 28*   CREATININE mg/dL 1.17* 1.35*   GLUCOSE mg/dL 91 134*       Results from last 7 days   Lab Units 08/30/23  0538 08/29/23  0540   CALCIUM mg/dL 8.0* 8.0*   MAGNESIUM mg/dL 2.4 1.8   PHOSPHORUS mg/dL  --  3.3       Biomarkers:  Results from last 7 days   Lab Units 08/30/23  0538 08/30/23  0423 08/29/23  0540 08/28/23  1954/23  1652   LACTATE mmol/L  --  1.5 2.3* 2.0 3.6*   PROCALCITONIN ng/mL 12.58*  --  18.09*  --  23.71*       Images:  CT Abdomen Pelvis Without Contrast    Result Date: 8/28/2023  Impression: 1. 1.3 cm stone at the left uteropelvic junction causing moderate obstructive uropathy. There is also a cluster of stones in the inferior pole calyces of the left kidney. 2. There is free fluid and free air within the upper abdomen. There is also mild amount of free fluid in the lower pelvis. I'm uncertain of the clinical significance. If the patient has not had any surgical or interventional procedures, this could be secondary to a perforated viscus. 3. Colonic diverticulosis without acute diverticulitis. 4. Additional incidental findings as noted above. The exam is limited by noncontrasted technique. 5. The abnormal results were discussed with BRENDA Kee by telephone. Electronically Signed: Ray Saldana MD  8/28/2023 7:03 PM EDT  Workstation ID: [id number]       Results: Reviewed.  I reviewed the patient's new laboratory and imaging results.  I independently reviewed the patient's new images.    Medications: Reviewed.    Assessment   A/P     Hospital:  LOS: 1 day   ICU: 1d 13h     Active Hospital Problems    Diagnosis  POA    **K25.9, Pyloric ulcer perf. - S/P  repair 08/28/23 [K25.9]  Yes    Perforated ulcer [K27.5]  Yes    Chronic low back pain [M54.50, G89.29]  Yes    SUJATA (acute kidney injury) [N17.9]  Yes    UTI (urinary tract infection) [N39.0]  Yes    Sepsis [A41.9]  Yes    Peritonitis [K65.9]  Yes    Nephrolithiasis [N20.0]  Yes    Obstructive uropathy [N13.9]  Yes    Benign essential hypertension [I10]  Yes     Impression: 11/24/2015 - bp is good  Impression: 05/08/2015 - bp is good  bp is good at home as well - continue to monitor  Impression: 05/08/2015 - bp is good  bp is good at home as well - continue to test  Impression: 11/25/2014 - bp is good overall  Impression: 05/20/2014 - bp is higher here, better at home;       Gastroesophageal reflux disease [K21.9]  Yes    Hypercholesterolemia [E78.00]  Yes     Impression: 11/24/2015 - check flp  Impression: 05/08/2015 - check flp today  Impression: 11/25/2014 - check flp  Impression: 05/20/2014 - check flp;       Hypothyroidism [E03.9]  Yes     Impression: 11/24/2015 - check tfts  Impression: 05/08/2015 - check tsh  Impression: 11/25/2014 - update tft  Impression: 05/20/2014 - check tsh;        Siria is a 67 y.o. female admitted on 8/28/2023 with Perforated ulcer [K27.5]    Assessment/Management/Treatment Plan:    Perforated pyloric ulcer  Procedure(s) (LRB):  LAPAROTOMY EXPLORATORY (N/A)   Dr. Gabriel Del Toro (General Surgery)  08/28/23   Sepsis on admission. Elevated PCT. BC 1 out of 2: E coli (per BCID2 PCR), and UC (+) for E coli (R to TMP/SMX)    Lab Results   Component Value Date    PROCALCITO 12.58 (H) 08/30/2023    PROCALCITO 18.09 (H) 08/29/2023    PROCALCITO 23.71 (H) 08/28/2023     Nephrolithiasis } Urology  Dyslipidemia   HTN (But I don't see any antihypertensive medication on her current home medication list).  Renal  SUJATA - much improved as of 08/30/23   Endocrine   Body mass index is 32.96 kg/m². Obese Class I: 30-34.9kg/m2  Hypothyroidism ? Treatment with levothyroxine    Lab Results   Component  Value Date    TSH 0.415 2023     At risk for DM (pre-diabetes) based on her HbA1c. [HbA1C 5.7%-6.4%, eA-139 mg/dL].     Lab Results   Lab Value Date/Time    HGBA1C 5.80 (H) 2023 0540    HGBA1C 5.8 2023 1459    HGBA1C 5.6 2022 1658    HGBA1C 5.40 2020 1628       Results from last 7 days   Lab Units 23  1231 23  0523 23  2339 23  1728 23  1146   GLUCOSE mg/dL 107 71 80 93 103       Diet: NPO Diet NPO Type: Strict NPO  No active supplement orders      Advance Directives: Code Status and Medical Interventions:   Ordered at: 232     Code Status (Patient has no pulse and is not breathing):    CPR (Attempt to Resuscitate)     Medical Interventions (Patient has pulse or is breathing):    Full Support        DVT prophylaxis:  Mechanical DVT prophylaxis orders are present.       In brief:  UGI tomorrow as per Dr. SWANSON  Continue levothyroxine IV  Continue FERN to cover ESBL, just in case. Await final Blood culture Susceptibilities.  ID to see her.  sCr improving.  Cytoscopy and Ureteral stent later this week } As per Urology  Hold home antihypertensive for now.  PT/OT  Disposition: Transfer to Telemetry Unit    Plan of care and goals reviewed during interdisciplinary rounds.  I discussed the patient's findings and my recommendations with patient and nursing staff      MDM:    Problem(s) High due to: Acute or Chronic illness or injury that may poses a threat to life or bodily function  Data: Moderate due to: Review of prior external records from each unique source, Review or results of each unique test, and Ordering of each unique test    Moderate    [x] Primary Attending Intensive Care Medicine - Nutrition Support   [] Consultant    Copied text in this note has been reviewed and is accurate as of 23

## 2023-08-30 NOTE — PLAN OF CARE
Problem: Adult Inpatient Plan of Care  Goal: Plan of Care Review  Outcome: Ongoing, Progressing  Flowsheets (Taken 8/30/2023 0519)  Outcome Evaluation: VSS with patient on 2LNC. UOP adequate. Dilaudid givenx2 for pain in abd. IS self administered per patient to promote pulmonary hygiene. D5 added to LR for low BG.

## 2023-08-30 NOTE — THERAPY EVALUATION
Patient Name: Siria Garcia  : 1955    MRN: [id number]                              Today's Date: 2023       Admit Date: 2023    Visit Dx:     ICD-10-CM ICD-9-CM   1. Acute UTI  N39.0 599.0   2. Sepsis, due to unspecified organism, unspecified whether acute organ dysfunction present  A41.9 038.9     995.91   3. Ureteropelvic junction (UPJ) obstruction  N13.5 593.4   4. Free fluid in pelvis  R18.8 789.59   5. History of gastroesophageal reflux (GERD)  Z87.19 V12.79   6. History of hypertension  Z86.79 V12.59   7. History of breast cancer  Z85.3 V10.3   8. Acute kidney injury  N17.9 584.9   9. Abdominal pain  R10.9 789.00     Patient Active Problem List   Diagnosis    Acute low back pain    Benign essential hypertension    Gastroesophageal reflux disease    Fatigue    Foot pain    Hypercholesterolemia    Hypothyroidism    Knee pain    Osteoarthritis of hand    Vaginal yeast infection    Sore throat    Vaginitis and vulvovaginitis    Malignant neoplasm of upper-outer quadrant of breast in female, estrogen receptor positive    Tubular adenoma    Elevated glucose    Hypercalcemia    Vitamin D deficiency    K25.9, Pyloric ulcer perf. - S/P repair 23    Chronic low back pain    SUJATA (acute kidney injury)    UTI (urinary tract infection)    Sepsis    Peritonitis    Nephrolithiasis    Obstructive uropathy    Perforated ulcer     Past Medical History:   Diagnosis Date    Arthritis     Breast cancer 2017    Colon polyp 2018    Diverticulosis 2018    Environmental allergies     Gall bladder stones     GERD (gastroesophageal reflux disease)     Hypertension     Low back pain     Plantar fasciitis     Tarsal tunnel syndrome of both lower extremities     UTI (urinary tract infection)      Past Surgical History:   Procedure Laterality Date    BREAST LUMPECTOMY  2017    CHOLECYSTECTOMY      COLONOSCOPY W/ BIOPSIES  2018    EXPLORATORY LAPAROTOMY N/A 2023    Procedure: LAPAROTOMY EXPLORATORY;   Surgeon: Gabriel Del Toro MD;  Location: [facility];  Service: General;  Laterality: N/A;      General Information       Row Name 08/30/23 0904          OT Time and Intention    Document Type evaluation  -CS     Mode of Treatment occupational therapy  -CS       Row Name 08/30/23 0904          General Information    Patient Profile Reviewed yes  -CS     Prior Level of Function independent:;all household mobility;ADL's  -CS     Existing Precautions/Restrictions fall;oxygen therapy device and L/min;other (see comments)  MYRNA, abd binder, abd incision, NG to suction  -CS     Barriers to Rehab medically complex  -CS       Row Name 08/30/23 0904          Living Environment    People in Home alone  to stay w/ son w/ 24/7 assist upon return home  -CS       Row Name 08/30/23 0904          Home Main Entrance    Number of Stairs, Main Entrance none  -CS       Row Name 08/30/23 0904          Stairs Within Home, Primary    Number of Stairs, Within Home, Primary none  -CS       Row Name 08/30/23 0904          Cognition    Orientation Status (Cognition) oriented x 4  -CS       Row Name 08/30/23 0904          Safety Issues, Functional Mobility    Impairments Affecting Function (Mobility) balance;coordination;endurance/activity tolerance;strength;shortness of breath;postural/trunk control  -CS               User Key  (r) = Recorded By, (t) = Taken By, (c) = Cosigned By      Initials Name Provider Type    CS Naina Kenny OT Occupational Therapist                     Mobility/ADL's       Row Name 08/30/23 0905          Bed Mobility    Bed Mobility supine-sit;sit-supine  -CS     Supine-Sit [place] (Bed Mobility) moderate assist (50% patient effort);verbal cues  -CS     Sit-Supine [place] (Bed Mobility) moderate assist (50% patient effort);verbal cues  -CS     Assistive Device (Bed Mobility) bed rails;draw sheet;head of bed elevated  -CS     Comment, (Bed Mobility) Pt requires frequent rest breaks and cueing  -CS        Row Name 08/30/23 0905          Transfers    Transfers sit-stand transfer;stand-sit transfer  -     Comment, (Transfers) BUE support  -       Row Name 08/30/23 0905          Sit-Stand Transfer    Sit-Stand [place] (Transfers) minimum assist (75% patient effort);verbal cues  -[facility] Name 08/30/23 0905          Stand-Sit Transfer    Stand-Sit [place] (Transfers) minimum assist (75% patient effort);verbal cues  -       Row Name 08/30/23 0905          Activities of Daily Living    BADL Assessment/Intervention lower body dressing;grooming  -CS       Row Name 08/30/23 0905          Lower Body Dressing Assessment/Training    [place] Level (Lower Body Dressing) don;doff;socks;maximum assist (25% patient effort)  -     Position (Lower Body Dressing) edge of bed sitting  -CS       Row Name 08/30/23 0905          Grooming Assessment/Training    [place] Level (Grooming) wash face, hands;set up  -     Position (Grooming) sitting up in bed  -               User Key  (r) = Recorded By, (t) = Taken By, (c) = Cosigned By      Initials Name Provider Type    CS Naina Kenny OT Occupational Therapist                   Obj/Interventions       [facility] Name 08/30/23 0918          Sensory Assessment (Somatosensory)    Sensory Assessment (Somatosensory) UE sensation intact  -[facility] Name 08/30/23 0918          Range of Motion Comprehensive    General Range of Motion bilateral upper extremity ROM WFL  -CS       Row Name 08/30/23 0918          Strength Comprehensive (MMT)    Comment, General Manual Muscle Testing (MMT) Assessment BUE grossly 3+/5  -       Row Name 08/30/23 0918          Balance    Balance Assessment sitting static balance;sitting dynamic balance;standing static balance;standing dynamic balance  -     Static Sitting Balance standby assist  -     Dynamic Sitting Balance standby assist  -     Position, Sitting Balance sitting edge of bed  -     Static Standing Balance contact  guard  -CS     Dynamic Standing Balance minimal assist  -CS     Position/Device Used, Standing Balance supported  -CS     Balance Interventions sitting;standing;static;dynamic;dynamic reaching;occupation based/functional task;weight shifting activity  -CS               User Key  (r) = Recorded By, (t) = Taken By, (c) = Cosigned By      Initials Name Provider Type    CS Naina Kenny, OT Occupational Therapist                   Goals/Plan       Row Name 08/30/23 0928          Transfer Goal 1 (OT)    Activity/Assistive Device (Transfer Goal 1, OT) sit-to-stand/stand-to-sit;toilet  -CS     [place] Level/Cues Needed (Transfer Goal 1, OT) standby assist  -CS     Time Frame (Transfer Goal 1, OT) long term goal (LTG);10 days  -CS     Progress/Outcome (Transfer Goal 1, OT) goal ongoing  -CS       Row Name 08/30/23 0928          Dressing Goal 1 (OT)    Activity/Device (Dressing Goal 1, OT) lower body dressing  -CS     [place]/Cues Needed (Dressing Goal 1, OT) moderate assist (50-74% patient effort)  -CS     Time Frame (Dressing Goal 1, OT) long term goal (LTG);10 days  -CS     Strategies/Barriers (Dressing Goal 1, OT) don/doff socks w/ AAD  -CS     Progress/Outcome (Dressing Goal 1, OT) goal ongoing  -CS       Row Name 08/30/23 0928          Toileting Goal 1 (OT)    Activity/Device (Toileting Goal 1, OT) adjust/manage clothing;perform perineal hygiene  -CS     [place] Level/Cues Needed (Toileting Goal 1, OT) moderate assist (50-74% patient effort)  -CS     Time Frame (Toileting Goal 1, OT) long term goal (LTG);10 days  -CS     Progress/Outcome (Toileting Goal 1, OT) goal ongoing  -CS       Row Name 08/30/23 0928          Therapy Assessment/Plan (OT)    Planned Therapy Interventions (OT) activity tolerance training;adaptive equipment training;BADL retraining;functional balance retraining;occupation/activity based interventions;ROM/therapeutic exercise;transfer/mobility retraining;strengthening exercise  -CS                User Key  (r) = Recorded By, (t) = Taken By, (c) = Cosigned By      Initials Name Provider Type    CS Naina Kenny, OT Occupational Therapist                   Clinical Impression       Row Name 08/30/23 0921          Pain Assessment    Pain Intervention(s) Repositioned;Ambulation/increased activity  -CS     Additional Documentation Pain Scale: FACES Pre/Post-Treatment (Group)  -CS       Row Name 08/30/23 0921          Pain Scale: FACES Pre/Post-Treatment    Pain: FACES Scale, Pretreatment 4-->hurts little more  -CS     Posttreatment Pain Rating 4-->hurts little more  -CS     Pain Location incisional  -CS     Pain Location - abdomen  -CS     Pre/Posttreatment Pain Comment tolerated  -CS       Row Name 08/30/23 0921          Plan of Care Review    Plan of Care Reviewed With patient  -CS     Progress improving  -CS     Outcome Evaluation OT eval complete. Pt presents w/ c/o pain, limited ROM, and generalized weakness limiting functional independence from baseline. Pt would benefit from AE education next session as unable to perform compensatory strategies d/t c/o abd pain and baseline ROM limitations. Recommend cont skilled IPOT POC to promote return to PLOF. Recommend pt DC to IP rehab.  -CS       Row Name 08/30/23 0921          Therapy Assessment/Plan (OT)    Patient/Family Therapy Goal Statement (OT) Return to PLOF  -CS     Rehab Potential (OT) good, to achieve stated therapy goals  -CS     Criteria for Skilled Therapeutic Interventions Met (OT) yes;skilled treatment is necessary  -CS     Therapy Frequency (OT) daily  -CS       Row Name 08/30/23 0921          Therapy Plan Review/Discharge Plan (OT)    Anticipated Discharge Disposition (OT) inpatient rehabilitation facility  -       Row Name 08/30/23 0921          Vital Signs    Pre Systolic BP Rehab 143  -CS     Pre Treatment Diastolic BP 83  -CS     Post Systolic BP Rehab 149  -CS     Post Treatment Diastolic BP 84  -CS     Pretreatment Heart Rate  (beats/min) 91  -CS     Posttreatment Heart Rate (beats/min) 102  -CS     Pre SpO2 (%) 90  -CS     O2 Delivery Pre Treatment room air  -CS     Intra SpO2 (%) 95  -CS     O2 Delivery Intra Treatment nasal cannula  -CS     Post SpO2 (%) 94  -CS     O2 Delivery Post Treatment nasal cannula  -CS     Pre Patient Position Supine  -CS     Intra Patient Position Standing  -CS     Post Patient Position Supine  -CS       Row Name 08/30/23 0921          Positioning and Restraints    Pre-Treatment Position in bed  -CS     Post Treatment Position bed  -CS     In Bed notified nsg;fowlers;call light within reach;encouraged to call for assist;exit alarm on;legs elevated;heels elevated;with nsg  -CS               User Key  (r) = Recorded By, (t) = Taken By, (c) = Cosigned By      Initials Name Provider Type    Naina Kohler OT Occupational Therapist                   Outcome Measures       Row Name 08/30/23 0929          How much help from another is currently needed...    Putting on and taking off regular lower body clothing? 1  -CS     Bathing (including washing, rinsing, and drying) 2  -CS     Toileting (which includes using toilet bed pan or urinal) 1  -CS     Putting on and taking off regular upper body clothing 2  -CS     Taking care of personal grooming (such as brushing teeth) 3  -CS     Eating meals 3  -CS     AM-PAC 6 Clicks Score (OT) 12  -CS       Row Name 08/30/23 0929          Functional Assessment    Outcome Measure Options AM-PAC 6 Clicks Daily Activity (OT)  -CS               User Key  (r) = Recorded By, (t) = Taken By, (c) = Cosigned By      Initials Name Provider Type    Naina Kohler OT Occupational Therapist                    Occupational Therapy Education       Title: PT OT SLP Therapies (In Progress)       Topic: Occupational Therapy (In Progress)       Point: ADL training (In Progress)       Description:   Instruct learner(s) on proper safety adaptation and remediation techniques during self care  or transfers.   Instruct in proper use of assistive devices.                  Learning Progress Summary             Patient Acceptance, E, NR by  at 8/30/2023 0929                         Point: Home exercise program (Not Started)       Description:   Instruct learner(s) on appropriate technique for monitoring, assisting and/or progressing therapeutic exercises/activities.                  Learner Progress:  Not documented in this visit.              Point: Precautions (In Progress)       Description:   Instruct learner(s) on prescribed precautions during self-care and functional transfers.                  Learning Progress Summary             Patient Acceptance, E, NR by  at 8/30/2023 0929                         Point: Body mechanics (In Progress)       Description:   Instruct learner(s) on proper positioning and spine alignment during self-care, functional mobility activities and/or exercises.                  Learning Progress Summary             Patient Acceptance, E, NR by  at 8/30/2023 0929                                         User Key       Initials Effective Dates Name Provider Type Discipline     09/02/21 -  Naina Kenny OT Occupational Therapist OT                  OT Recommendation and Plan  Planned Therapy Interventions (OT): activity tolerance training, adaptive equipment training, BADL retraining, functional balance retraining, occupation/activity based interventions, ROM/therapeutic exercise, transfer/mobility retraining, strengthening exercise  Therapy Frequency (OT): daily  Plan of Care Review  Plan of Care Reviewed With: patient  Progress: improving  Outcome Evaluation: OT eval complete. Pt presents w/ c/o pain, limited ROM, and generalized weakness limiting functional independence from baseline. Pt would benefit from AE education next session as unable to perform compensatory strategies d/t c/o abd pain and baseline ROM limitations. Recommend cont skilled IPOT POC to promote return  to PLOF. Recommend pt DC to IP rehab.     Time Calculation:   Evaluation Complexity (OT)  Review Occupational Profile/Medical/Therapy History Complexity: expanded/moderate complexity  Assessment, Occupational Performance/Identification of Deficit Complexity: 5 or more performance deficits  Clinical Decision Making Complexity (OT): comprehensive assessment/high complexity  Overall Complexity of Evaluation (OT): moderate complexity     Time Calculation- OT       Row Name 08/30/23 0929             Time Calculation- OT    OT Start Time 0732  -CS      OT Received On 08/30/23  -CS      OT Goal Re-Cert Due Date 09/09/23  -CS         Timed Charges    59792 - OT Therapeutic Activity Minutes 20  -CS      86648 - OT Self Care/Mgmt Minutes 12  -CS         Untimed Charges    OT Eval/Re-eval Minutes 31  -CS         Total Minutes    Timed Charges Total Minutes 32  -CS      Untimed Charges Total Minutes 31  -CS       Total Minutes 63  -CS                User Key  (r) = Recorded By, (t) = Taken By, (c) = Cosigned By      Initials Name Provider Type    CS Naina Kenny OT Occupational Therapist                  Therapy Charges for Today       Code Description Service Date Service Provider Modifiers Qty    24852790892 HC OT THERAPEUTIC ACT EA 15 MIN 8/30/2023 Naina Kenny OT GO 1    08649436570 HC OT SELF CARE/MGMT/TRAIN EA 15 MIN 8/30/2023 Naina Kenny OT GO 1    02151758337 HC OT EVAL MOD COMPLEXITY 3 8/30/2023 Naina Kenny OT GO 1                 Naina Kenny OT  8/30/2023

## 2023-08-30 NOTE — PLAN OF CARE
Goal Outcome Evaluation:  Plan of Care Reviewed With: patient        Progress: improving  Outcome Evaluation: OT eval complete. Pt presents w/ c/o pain, limited ROM, and generalized weakness limiting functional independence from baseline. Pt would benefit from AE education next session as unable to perform compensatory strategies d/t c/o abd pain and baseline ROM limitations. Recommend cont skilled IPOT POC to promote return to PLOF. Recommend pt DC to IP rehab.      Anticipated Discharge Disposition (OT): inpatient rehabilitation facility

## 2023-08-30 NOTE — PLAN OF CARE
Goal Outcome Evaluation:  Plan of Care Reviewed With: patient        Progress: no change  Outcome Evaluation: Ambulated in yousif x1. OOBTC. IVF rate decreased. ID consulted. Plan for upper GI tomorrow. No nausea. Minimal pain. VSS on RA-2L NC.

## 2023-08-31 ENCOUNTER — APPOINTMENT (OUTPATIENT)
Dept: GENERAL RADIOLOGY | Facility: HOSPITAL | Age: 68
DRG: 853 | End: 2023-08-31
Payer: MEDICARE

## 2023-08-31 ENCOUNTER — ANESTHESIA EVENT (OUTPATIENT)
Dept: PERIOP | Facility: HOSPITAL | Age: 68
DRG: 853 | End: 2023-08-31
Payer: MEDICARE

## 2023-08-31 LAB
ANION GAP SERPL CALCULATED.3IONS-SCNC: 9 MMOL/L (ref 5–15)
BACTERIA SPEC AEROBE CULT: ABNORMAL
BASOPHILS # BLD AUTO: 0.03 10*3/MM3 (ref 0–0.2)
BASOPHILS NFR BLD AUTO: 0.2 % (ref 0–1.5)
BUN SERPL-MCNC: 30 MG/DL (ref 8–23)
BUN/CREAT SERPL: 32.3 (ref 7–25)
CALCIUM SPEC-SCNC: 8 MG/DL (ref 8.6–10.5)
CHLORIDE SERPL-SCNC: 105 MMOL/L (ref 98–107)
CO2 SERPL-SCNC: 25 MMOL/L (ref 22–29)
CREAT SERPL-MCNC: 0.93 MG/DL (ref 0.57–1)
DEPRECATED RDW RBC AUTO: 48.6 FL (ref 37–54)
EGFRCR SERPLBLD CKD-EPI 2021: 67.1 ML/MIN/1.73
EOSINOPHIL # BLD AUTO: 0.01 10*3/MM3 (ref 0–0.4)
EOSINOPHIL NFR BLD AUTO: 0.1 % (ref 0.3–6.2)
ERYTHROCYTE [DISTWIDTH] IN BLOOD BY AUTOMATED COUNT: 14.6 % (ref 12.3–15.4)
GLUCOSE SERPL-MCNC: 123 MG/DL (ref 65–99)
GRAM STN SPEC: ABNORMAL
HCT VFR BLD AUTO: 29.3 % (ref 34–46.6)
HGB BLD-MCNC: 9.7 G/DL (ref 12–15.9)
IMM GRANULOCYTES # BLD AUTO: 0.1 10*3/MM3 (ref 0–0.05)
IMM GRANULOCYTES NFR BLD AUTO: 0.7 % (ref 0–0.5)
ISOLATED FROM: ABNORMAL
LYMPHOCYTES # BLD AUTO: 1.25 10*3/MM3 (ref 0.7–3.1)
LYMPHOCYTES NFR BLD AUTO: 8.7 % (ref 19.6–45.3)
MAGNESIUM SERPL-MCNC: 2.1 MG/DL (ref 1.6–2.4)
MCH RBC QN AUTO: 29.9 PG (ref 26.6–33)
MCHC RBC AUTO-ENTMCNC: 33.1 G/DL (ref 31.5–35.7)
MCV RBC AUTO: 90.4 FL (ref 79–97)
MONOCYTES # BLD AUTO: 1.02 10*3/MM3 (ref 0.1–0.9)
MONOCYTES NFR BLD AUTO: 7.1 % (ref 5–12)
NEUTROPHILS NFR BLD AUTO: 11.95 10*3/MM3 (ref 1.7–7)
NEUTROPHILS NFR BLD AUTO: 83.2 % (ref 42.7–76)
NRBC BLD AUTO-RTO: 0 /100 WBC (ref 0–0.2)
PHOSPHATE SERPL-MCNC: 1.7 MG/DL (ref 2.5–4.5)
PLAT MORPH BLD: NORMAL
PLATELET # BLD AUTO: 191 10*3/MM3 (ref 140–450)
PMV BLD AUTO: 10.1 FL (ref 6–12)
POTASSIUM SERPL-SCNC: 4.1 MMOL/L (ref 3.5–5.2)
PROCALCITONIN SERPL-MCNC: 5.49 NG/ML (ref 0–0.25)
RBC # BLD AUTO: 3.24 10*6/MM3 (ref 3.77–5.28)
RBC MORPH BLD: NORMAL
SODIUM SERPL-SCNC: 139 MMOL/L (ref 136–145)
WBC MORPH BLD: NORMAL
WBC NRBC COR # BLD: 14.36 10*3/MM3 (ref 3.4–10.8)

## 2023-08-31 PROCEDURE — 85007 BL SMEAR W/DIFF WBC COUNT: CPT | Performed by: INTERNAL MEDICINE

## 2023-08-31 PROCEDURE — 25010000002 METRONIDAZOLE 500 MG/100ML SOLUTION: Performed by: INTERNAL MEDICINE

## 2023-08-31 PROCEDURE — 80048 BASIC METABOLIC PNL TOTAL CA: CPT | Performed by: INTERNAL MEDICINE

## 2023-08-31 PROCEDURE — 0 DIATRIZOATE MEGLUMINE & SODIUM PER 1 ML: Performed by: INTERNAL MEDICINE

## 2023-08-31 PROCEDURE — 85025 COMPLETE CBC W/AUTO DIFF WBC: CPT | Performed by: INTERNAL MEDICINE

## 2023-08-31 PROCEDURE — 83735 ASSAY OF MAGNESIUM: CPT | Performed by: INTERNAL MEDICINE

## 2023-08-31 PROCEDURE — 25010000002 MEROPENEM PER 100 MG: Performed by: INTERNAL MEDICINE

## 2023-08-31 PROCEDURE — 25010000002 HYDROMORPHONE PER 4 MG: Performed by: INTERNAL MEDICINE

## 2023-08-31 PROCEDURE — 25010000002 CEFTRIAXONE PER 250 MG: Performed by: INTERNAL MEDICINE

## 2023-08-31 PROCEDURE — 84145 PROCALCITONIN (PCT): CPT | Performed by: INTERNAL MEDICINE

## 2023-08-31 PROCEDURE — 84100 ASSAY OF PHOSPHORUS: CPT | Performed by: INTERNAL MEDICINE

## 2023-08-31 PROCEDURE — 25010000002 FLUCONAZOLE PER 200 MG: Performed by: INTERNAL MEDICINE

## 2023-08-31 PROCEDURE — 99232 SBSQ HOSP IP/OBS MODERATE 35: CPT | Performed by: INTERNAL MEDICINE

## 2023-08-31 PROCEDURE — 74240 X-RAY XM UPR GI TRC 1CNTRST: CPT

## 2023-08-31 RX ORDER — FAMOTIDINE 20 MG/1
20 TABLET, FILM COATED ORAL ONCE
Status: CANCELLED | OUTPATIENT
Start: 2023-08-31 | End: 2023-08-31

## 2023-08-31 RX ORDER — FAMOTIDINE 10 MG/ML
20 INJECTION, SOLUTION INTRAVENOUS ONCE
Status: CANCELLED | OUTPATIENT
Start: 2023-08-31 | End: 2023-08-31

## 2023-08-31 RX ORDER — METRONIDAZOLE 500 MG/100ML
500 INJECTION, SOLUTION INTRAVENOUS EVERY 8 HOURS
Status: DISCONTINUED | OUTPATIENT
Start: 2023-08-31 | End: 2023-09-04

## 2023-08-31 RX ADMIN — SODIUM CHLORIDE, SODIUM LACTATE, POTASSIUM CHLORIDE, CALCIUM CHLORIDE AND DEXTROSE MONOHYDRATE 100 ML/HR: 5; 600; 310; 30; 20 INJECTION, SOLUTION INTRAVENOUS at 14:27

## 2023-08-31 RX ADMIN — LEVOTHYROXINE SODIUM 20 MCG: 20 INJECTION, SOLUTION INTRAVENOUS at 11:16

## 2023-08-31 RX ADMIN — SODIUM CHLORIDE, SODIUM LACTATE, POTASSIUM CHLORIDE, CALCIUM CHLORIDE AND DEXTROSE MONOHYDRATE 100 ML/HR: 5; 600; 310; 30; 20 INJECTION, SOLUTION INTRAVENOUS at 21:36

## 2023-08-31 RX ADMIN — Medication 10 ML: at 09:08

## 2023-08-31 RX ADMIN — Medication 10 ML: at 21:36

## 2023-08-31 RX ADMIN — PANTOPRAZOLE SODIUM 40 MG: 40 INJECTION, POWDER, LYOPHILIZED, FOR SOLUTION INTRAVENOUS at 16:32

## 2023-08-31 RX ADMIN — HYDROMORPHONE HYDROCHLORIDE 0.5 MG: 1 INJECTION, SOLUTION INTRAMUSCULAR; INTRAVENOUS; SUBCUTANEOUS at 12:43

## 2023-08-31 RX ADMIN — MEROPENEM 1000 MG: 1 INJECTION, POWDER, FOR SOLUTION INTRAVENOUS at 02:37

## 2023-08-31 RX ADMIN — METRONIDAZOLE 500 MG: 500 INJECTION, SOLUTION INTRAVENOUS at 16:32

## 2023-08-31 RX ADMIN — MEROPENEM 1000 MG: 1 INJECTION, POWDER, FOR SOLUTION INTRAVENOUS at 11:17

## 2023-08-31 RX ADMIN — HYDROMORPHONE HYDROCHLORIDE 0.5 MG: 1 INJECTION, SOLUTION INTRAMUSCULAR; INTRAVENOUS; SUBCUTANEOUS at 16:11

## 2023-08-31 RX ADMIN — DIATRIZOATE MEGLUMINE AND DIATRIZOATE SODIUM 120 ML: 660; 100 LIQUID ORAL; RECTAL at 10:13

## 2023-08-31 RX ADMIN — SODIUM CHLORIDE, SODIUM LACTATE, POTASSIUM CHLORIDE, CALCIUM CHLORIDE AND DEXTROSE MONOHYDRATE 100 ML/HR: 5; 600; 310; 30; 20 INJECTION, SOLUTION INTRAVENOUS at 01:51

## 2023-08-31 RX ADMIN — HYDROMORPHONE HYDROCHLORIDE 0.5 MG: 1 INJECTION, SOLUTION INTRAMUSCULAR; INTRAVENOUS; SUBCUTANEOUS at 08:06

## 2023-08-31 RX ADMIN — FLUCONAZOLE 200 MG: 200 INJECTION, SOLUTION INTRAVENOUS at 21:36

## 2023-08-31 RX ADMIN — HYDROMORPHONE HYDROCHLORIDE 0.5 MG: 1 INJECTION, SOLUTION INTRAMUSCULAR; INTRAVENOUS; SUBCUTANEOUS at 21:33

## 2023-08-31 RX ADMIN — PANTOPRAZOLE SODIUM 40 MG: 40 INJECTION, POWDER, LYOPHILIZED, FOR SOLUTION INTRAVENOUS at 08:07

## 2023-08-31 RX ADMIN — FLUTICASONE PROPIONATE 2 SPRAY: 50 SPRAY, METERED NASAL at 08:06

## 2023-08-31 RX ADMIN — SODIUM CHLORIDE 2000 MG: 900 INJECTION INTRAVENOUS at 14:26

## 2023-08-31 NOTE — CASE MANAGEMENT/SOCIAL WORK
Continued Stay Note  [facility]     Patient Name: Siria Garcia  MRN: [id number]  Today's Date: 8/31/2023    Admit Date: 8/28/2023    Plan: ongoing   Discharge Plan       Row Name 08/31/23 1227       Plan    Plan ongoing    Patient/Family in Agreement with Plan yes    Provided Post Acute Provider List? Yes    Post Acute Provider List Inpatient Rehab    Provided Post Acute Provider Quality & Resource List? Yes    Post Acute Provider Quality and Resource List Inpatient Rehab    Delivered To Patient    Method of Delivery In person    Plan Comments CM met with the patient at the bedside to discuss discharge planning and possible skilled placement. Patient desires to be discharged to her son's home. She is open to considering skilled or rehab placement if necessary. Patient wants to discuss with her son. CM gave list and map of area facilities. Patient to discuss with her son. She stated that her son lives in [place] and she may consider skilled options there if her care is too much for her family. CM to follow up with her and discuss at a later time. CM to follow and assist.    Final Discharge Disposition Code 30 - still a patient                   Discharge Codes    No documentation.                 Expected Discharge Date and Time       Expected Discharge Date Expected Discharge Time    Sep 8, 2023               Ally Euceda, HANNAH

## 2023-08-31 NOTE — PROGRESS NOTES
Nutrition Services    Patient Name:  Siria Garcia  YOB: 1955  MRN: [id number]  Admit Date:  8/28/2023      Patient now NPO/Clear liquids x 3 days. EMR reviewed. RD will continue to monitor for diet advancement/progression. Please consult RD if nutrition support indicated.      Electronically signed by:  Arabella Marin RD  08/31/23 08:53 EDT

## 2023-08-31 NOTE — PROGRESS NOTES
[facility] Medicine Services  PROGRESS NOTE    Patient Name: Siria Garcia  : 1955  MRN: [id number]    Date of Admission: 2023  Primary Care Physician: Sofia Hong MD    Subjective   Subjective     CC: ICU follow-up for pyloric ulcer perforation    HPI: No acute events overnight, patient relates she feels okay, does endorse some mild abdominal discomfort this morning.    ROS:  Gen- No fevers, chills  CV- No chest pain, palpitations  Resp- No cough, dyspnea  GI- No N/V/D, +abd pain      Objective   Objective     Vital Signs:   Temp:  [98.5 °F (36.9 °C)-99.6 °F (37.6 °C)] 98.7 °F (37.1 °C)  Heart Rate:  [] 99  Resp:  [14-20] 18  BP: (121-156)/(68-88) 128/73  Flow (L/min):  [1-2] 2     Physical Exam:  Constitutional: No acute distress, awake, alert  HENT: NCAT, mucous membranes moist, NG tube in place  Respiratory: Clear to auscultation bilaterally, respiratory effort normal   Cardiovascular: RRR, no murmurs, rubs, or gallops  Gastrointestinal: Positive bowel sounds, soft, tender to palpation, incision under dressing, MYRNA drain in place  Musculoskeletal: No bilateral ankle edema  Psychiatric: Appropriate affect, cooperative  Neurologic: Nonfocal  Skin: No rashes      Results Reviewed:  LAB RESULTS:      Lab 23  0346 23  0345 23  0538 23  0423 23  0540 23  1955 23  1652   WBC  --  14.36*  --  22.71* 24.25*  --  26.63*   HEMOGLOBIN  --  9.7*  --  10.6* 10.9*  --  13.0   HEMATOCRIT  --  29.3*  --  32.4* 33.1*  --  40.2   PLATELETS  --  191  --  208 225  --  306   NEUTROS ABS  --  11.95*  --  20.44*  --   --  23.97*   IMMATURE GRANS (ABS)  --  0.10*  --   --   --   --   --    LYMPHS ABS  --  1.25  --   --   --   --   --    MONOS ABS  --  1.02*  --   --   --   --   --    EOS ABS  --  0.01  --  0.00  --   --  0.00   MCV  --  90.4  --  91.3 92.2  --  92.2   PROCALCITONIN 5.49*  --  12.58*  --  18.09*  --  23.71*   LACTATE  --    --   --  1.5 2.3* 2.0 3.6*         Lab 08/31/23  0346 08/30/23  0538 08/29/23  0540 08/28/23  1652   SODIUM 139 139 138 140   POTASSIUM 4.1 4.8 4.5 4.6   CHLORIDE 105 105 105 100   CO2 25.0 24.0 23.0 23.0   ANION GAP 9.0 10.0 10.0 17.0*   BUN 30* 31* 28* 29*   CREATININE 0.93 1.17* 1.35* 1.72*   EGFR 67.1 50.9* 42.9* 32.1*   GLUCOSE 123* 91 134* 137*   CALCIUM 8.0* 8.0* 8.0* 10.0   MAGNESIUM 2.1 2.4 1.8  --    PHOSPHORUS 1.7*  --  3.3  --    HEMOGLOBIN A1C  --   --  5.80*  --    TSH  --   --  0.415  --          Lab 08/28/23  1652   TOTAL PROTEIN 8.0   ALBUMIN 4.3   GLOBULIN 3.7   ALT (SGPT) 24   AST (SGOT) 32   BILIRUBIN 0.4   ALK PHOS 93   LIPASE 79*                 Lab 08/28/23 2035 08/1955   ABO TYPING O O   RH TYPING Positive Positive   ANTIBODY SCREEN  --  Negative         Brief Urine Lab Results  (Last result in the past 365 days)        Color   Clarity   Blood   Leuk Est   Nitrite   Protein   CREAT   Urine HCG        08/29/23 0349             44.3                 Microbiology Results Abnormal       Procedure Component Value - Date/Time    Blood Culture - Blood, Arm, Right [[id number]]  (Normal) Collected: 08/28/23 1800    Lab Status: Preliminary result Specimen: Blood from Arm, Right Updated: 08/30/23 1946     Blood Culture No growth at 2 days    Body Fluid Culture - Body Fluid, Abdominal Wall [[id number]] Collected: 08/28/23 2210    Lab Status: Preliminary result Specimen: Body Fluid from Abdominal Wall Updated: 08/30/23 0821     Body Fluid Culture No growth     Gram Stain Many (4+) WBCs seen      No organisms seen    Body Fluid Culture - Body Fluid, Abdominal Wall [[id number]] Collected: 08/28/23 2210    Lab Status: Preliminary result Specimen: Body Fluid from Abdominal Wall Updated: 08/30/23 0821     Body Fluid Culture No growth     Gram Stain Moderate (3+) WBCs seen      No organisms seen    AFB Culture - Body Fluid, Abdominal Wall [[id number]] Collected: 08/28/23 2210    Lab Status: Preliminary  result Specimen: Body Fluid from Abdominal Wall Updated: 08/29/23 1225     AFB Stain No acid fast bacilli seen on concentrated smear    AFB Culture - Body Fluid, Abdominal Wall [[id number]] Collected: 08/28/23 2210    Lab Status: Preliminary result Specimen: Body Fluid from Abdominal Wall Updated: 08/29/23 1225     AFB Stain No acid fast bacilli seen on concentrated smear            No radiology results from the last 24 hrs        Current medications:  Scheduled Meds:fluconazole, 200 mg, Intravenous, Q24H  fluticasone, 2 spray, Each Nare, Daily  Levothyroxine Sodium, 20 mcg, Intravenous, Daily  meropenem, 1,000 mg, Intravenous, Q8H  pantoprazole, 40 mg, Intravenous, BID AC  sodium chloride, 10 mL, Intravenous, Q12H      Continuous Infusions:dextrose 5 % and lactated Ringer's, 100 mL/hr, Last Rate: 100 mL/hr (08/31/23 0151)      PRN Meds:.  acetaminophen    [DISCONTINUED] senna-docusate sodium **AND** [DISCONTINUED] polyethylene glycol **AND** [DISCONTINUED] bisacodyl **AND** bisacodyl    HYDROmorphone    ondansetron    phenol    Sodium Chloride (PF)    sodium chloride    Assessment & Plan   Assessment & Plan     Active Hospital Problems    Diagnosis  POA    **K25.9, Pyloric ulcer perf. - S/P repair 08/28/23 [K25.9]  Yes    Perforated ulcer [K27.5]  Yes    Chronic low back pain [M54.50, G89.29]  Yes    SUJATA (acute kidney injury) [N17.9]  Yes    UTI (urinary tract infection) [N39.0]  Yes    Sepsis [A41.9]  Yes    Peritonitis [K65.9]  Yes    Nephrolithiasis [N20.0]  Yes    Obstructive uropathy [N13.9]  Yes    Benign essential hypertension [I10]  Yes    Gastroesophageal reflux disease [K21.9]  Yes    Hypercholesterolemia [E78.00]  Yes    Hypothyroidism [E03.9]  Yes      Resolved Hospital Problems   No resolved problems to display.        Brief Hospital Course to date:  Siria Garcia is a 67 y.o. female with history of hypertension, hypothyroidism, GERD, right ankle impingement syndrome followed by Ortho and has been on  NSAIDs for right foot pain who presented to ED 8/28/2023 with abdominal pain.  CT on pelvis was concerning for free air and fluid left UPJ 1.3 cm stone.  She did undergo expiratory laparotomy by general surgery, was found to have perforated prepyloric ulcer s/p repair.  She was transferred to the ICU for ongoing care.  Patient did progress well.  And was transferred to [facility] medicine assumed her care 8/31/1023    Sepsis secondary to perforated prepyloric ulcer  -Patient presented with leukocytosis, tachycardia, lactic acidosis, elevated procalcitonin  -General surgery following, s/p repair by Dr. SWANSON continue postop care  -Follow-up blood cultures, currently NGTD  -Continue antibiotics, with meropenem and Diflucan-ID consulted  -Encourage ambulation    SUJATA  -Baseline creatinine ~0.8-1.0, patient presented with Cr of 1.72, likely secondary to above  -Currently improving, back to baseline, IVF  -Avoid nephrotoxins    Hypertension  -BP currently normotensive, holding home meds for now    Obstructive uropathy  -Patient with 1.3 cm left UPJ stone  -Urology following, plan for cystoscopy with stent later this week    Hypothyroidism  -Continue Synthroid    Hypophosphatemia  -Continue to monitor replete per protocol    GERD  -Continue PPI    Expected Discharge Location and Transportation: home  Expected Discharge   Expected Discharge Date: 9/8/2023; Expected Discharge Time:      DVT prophylaxis:  Mechanical DVT prophylaxis orders are present.     AM-PAC 6 Clicks Score (PT): 16 (08/29/23 3282)    CODE STATUS:   Code Status and Medical Interventions:   Ordered at: 08/28/23 4913     Code Status (Patient has no pulse and is not breathing):    CPR (Attempt to Resuscitate)     Medical Interventions (Patient has pulse or is breathing):    Full Support       Cory Renner MD  08/31/23

## 2023-08-31 NOTE — PLAN OF CARE
Pt with upper GI today, tolerated. Up in chair most of the day. NG tube with 350mL dark brown fluid out, MYRNA 125mL serosanguinous drainage. Strict NPO. Pain managed with IV Dilaudid. Cystoscopy and ureteral stent scheduled tomorrow.       Problem: Adult Inpatient Plan of Care  Goal: Plan of Care Review  Outcome: Ongoing, Progressing  Goal: Patient-Specific Goal (Individualized)  Outcome: Ongoing, Progressing  Goal: Absence of Hospital-Acquired Illness or Injury  Outcome: Ongoing, Progressing  Intervention: Identify and Manage Fall Risk  Recent Flowsheet Documentation  Taken 8/31/2023 1600 by Emani Dietrich RN  Safety Promotion/Fall Prevention:   activity supervised   assistive device/personal items within reach   clutter free environment maintained   toileting scheduled   safety round/check completed   room organization consistent   nonskid shoes/slippers when out of bed  Taken 8/31/2023 1400 by Emani Dietrich RN  Safety Promotion/Fall Prevention:   assistive device/personal items within reach   activity supervised   clutter free environment maintained   toileting scheduled   safety round/check completed   room organization consistent   nonskid shoes/slippers when out of bed  Taken 8/31/2023 1200 by Emani Dietrich RN  Safety Promotion/Fall Prevention:   toileting scheduled   safety round/check completed   room organization consistent   activity supervised   assistive device/personal items within reach   clutter free environment maintained   nonskid shoes/slippers when out of bed  Taken 8/31/2023 1049 by Emani Dietrich, RN  Safety Promotion/Fall Prevention:   toileting scheduled   safety round/check completed   room organization consistent   activity supervised   assistive device/personal items within reach   clutter free environment maintained  Taken 8/31/2023 1000 by Emani Dietrich RN  Safety Promotion/Fall Prevention: patient off unit  Taken 8/31/2023 0800 by Emani Dietrich, HANNAH  Safety  Promotion/Fall Prevention:   activity supervised   assistive device/personal items within reach   clutter free environment maintained   toileting scheduled   safety round/check completed   room organization consistent  Intervention: Prevent Skin Injury  Recent Flowsheet Documentation  Taken 8/31/2023 1600 by Emani Dietrich RN  Body Position: position changed independently  Skin Protection:   adhesive use limited   tubing/devices free from skin contact   transparent dressing maintained   skin-to-skin areas padded   skin-to-device areas padded  Taken 8/31/2023 1400 by Emani Dietrich RN  Body Position: position changed independently  Skin Protection:   adhesive use limited   tubing/devices free from skin contact   transparent dressing maintained   skin-to-skin areas padded   skin-to-device areas padded  Taken 8/31/2023 1200 by Emani Dietrich RN  Body Position: position changed independently  Skin Protection:   tubing/devices free from skin contact   transparent dressing maintained   skin-to-skin areas padded   skin-to-device areas padded   adhesive use limited  Taken 8/31/2023 1049 by Emani Dietrich RN  Skin Protection:   adhesive use limited   tubing/devices free from skin contact   transparent dressing maintained   skin-to-skin areas padded   skin-to-device areas padded  Taken 8/31/2023 0800 by Emani Dietrich RN  Body Position: supine, legs elevated  Skin Protection:   adhesive use limited   tubing/devices free from skin contact   transparent dressing maintained   skin-to-skin areas padded   skin-to-device areas padded  Intervention: Prevent and Manage VTE (Venous Thromboembolism) Risk  Recent Flowsheet Documentation  Taken 8/31/2023 1600 by Emani Dietrich RN  Activity Management: activity encouraged  Taken 8/31/2023 1400 by Emani Dietrich RN  Activity Management: activity encouraged  Taken 8/31/2023 1200 by Emani Dietrich RN  Activity Management: activity encouraged  Taken 8/31/2023 0800 by  Emani Dietrich RN  Activity Management: activity encouraged  VTE Prevention/Management:   bilateral   sequential compression devices on  Intervention: Prevent Infection  Recent Flowsheet Documentation  Taken 8/31/2023 1600 by Emani Dietrich RN  Infection Prevention:   personal protective equipment utilized   hand hygiene promoted  Taken 8/31/2023 1400 by Emani Dietrich RN  Infection Prevention:   hand hygiene promoted   personal protective equipment utilized  Taken 8/31/2023 1200 by Emani Dietrich RN  Infection Prevention:   hand hygiene promoted   personal protective equipment utilized  Taken 8/31/2023 1049 by Emani Dietrich RN  Infection Prevention:   personal protective equipment utilized   hand hygiene promoted  Taken 8/31/2023 0800 by Emani Dietrich RN  Infection Prevention:   personal protective equipment utilized   hand hygiene promoted  Goal: Optimal Comfort and Wellbeing  Outcome: Ongoing, Progressing  Intervention: Monitor Pain and Promote Comfort  Recent Flowsheet Documentation  Taken 8/31/2023 1630 by Emani Dietrich RN  Pain Management Interventions: quiet environment facilitated  Taken 8/31/2023 1600 by Emani Dietrich RN  Pain Management Interventions: see MAR  Taken 8/31/2023 1330 by Emani Dietrich RN  Pain Management Interventions: quiet environment facilitated  Taken 8/31/2023 1243 by Emani Dietrich RN  Pain Management Interventions: see MAR  Taken 8/31/2023 0828 by Emani Dietrich RN  Pain Management Interventions: quiet environment facilitated  Taken 8/31/2023 0800 by Emani Dietrich RN  Pain Management Interventions: see MAR  Intervention: Provide Person-Centered Care  Recent Flowsheet Documentation  Taken 8/31/2023 0800 by Emani Dietrich RN  Trust Relationship/Rapport:   care explained   choices provided   empathic listening provided   emotional support provided   questions answered   questions encouraged  Goal: Readiness for Transition of Care  Outcome:  Ongoing, Progressing     Problem: Skin Injury Risk Increased  Goal: Skin Health and Integrity  Outcome: Ongoing, Progressing  Intervention: Optimize Skin Protection  Recent Flowsheet Documentation  Taken 8/31/2023 1600 by Emani Dietrich RN  Pressure Reduction Techniques: frequent weight shift encouraged  Pressure Reduction Devices: positioning supports utilized  Skin Protection:   adhesive use limited   tubing/devices free from skin contact   transparent dressing maintained   skin-to-skin areas padded   skin-to-device areas padded  Taken 8/31/2023 1400 by Emani Dietrich RN  Pressure Reduction Techniques: frequent weight shift encouraged  Pressure Reduction Devices: positioning supports utilized  Skin Protection:   adhesive use limited   tubing/devices free from skin contact   transparent dressing maintained   skin-to-skin areas padded   skin-to-device areas padded  Taken 8/31/2023 1200 by Emani Dietrich RN  Pressure Reduction Techniques: frequent weight shift encouraged  Pressure Reduction Devices: positioning supports utilized  Skin Protection:   tubing/devices free from skin contact   transparent dressing maintained   skin-to-skin areas padded   skin-to-device areas padded   adhesive use limited  Taken 8/31/2023 1049 by Emani Dietrich RN  Pressure Reduction Techniques: frequent weight shift encouraged  Skin Protection:   adhesive use limited   tubing/devices free from skin contact   transparent dressing maintained   skin-to-skin areas padded   skin-to-device areas padded  Taken 8/31/2023 0800 by Emani Dietrich RN  Pressure Reduction Techniques: frequent weight shift encouraged  Head of Bed (HOB) Positioning: HOB at 20-30 degrees  Pressure Reduction Devices:   pressure-redistributing mattress utilized   positioning supports utilized  Skin Protection:   adhesive use limited   tubing/devices free from skin contact   transparent dressing maintained   skin-to-skin areas padded   skin-to-device areas  padded     Problem: Adjustment to Illness (Sepsis/Septic Shock)  Goal: Optimal Coping  Outcome: Ongoing, Progressing  Intervention: Optimize Psychosocial Adjustment to Illness  Recent Flowsheet Documentation  Taken 8/31/2023 0800 by Emani Dietrich RN  Family/Support System Care: support provided     Problem: Bleeding (Sepsis/Septic Shock)  Goal: Absence of Bleeding  Outcome: Ongoing, Progressing     Problem: Glycemic Control Impaired (Sepsis/Septic Shock)  Goal: Blood Glucose Level Within Desired Range  Outcome: Ongoing, Progressing     Problem: Infection Progression (Sepsis/Septic Shock)  Goal: Absence of Infection Signs and Symptoms  Outcome: Ongoing, Progressing  Intervention: Initiate Sepsis Management  Recent Flowsheet Documentation  Taken 8/31/2023 1600 by Emani Dietrich RN  Infection Prevention:   personal protective equipment utilized   hand hygiene promoted  Taken 8/31/2023 1400 by Emani Dietrich RN  Infection Prevention:   hand hygiene promoted   personal protective equipment utilized  Taken 8/31/2023 1200 by Emani Dietrich RN  Infection Prevention:   hand hygiene promoted   personal protective equipment utilized  Taken 8/31/2023 1049 by Emani Dietrich RN  Infection Prevention:   personal protective equipment utilized   hand hygiene promoted  Taken 8/31/2023 0800 by Emani Dietrich RN  Infection Prevention:   personal protective equipment utilized   hand hygiene promoted  Intervention: Promote Recovery  Recent Flowsheet Documentation  Taken 8/31/2023 1600 by Emani Dietrich RN  Activity Management: activity encouraged  Taken 8/31/2023 1400 by Emani Dietrich RN  Activity Management: activity encouraged  Taken 8/31/2023 1200 by Emani Dietrich RN  Activity Management: activity encouraged  Taken 8/31/2023 0800 by Emani Dietrich RN  Activity Management: activity encouraged     Problem: Nutrition Impaired (Sepsis/Septic Shock)  Goal: Optimal Nutrition Intake  Outcome: Ongoing,  Progressing   Goal Outcome Evaluation:

## 2023-08-31 NOTE — PROGRESS NOTES
INFECTIOUS DISEASE f/u     Siria Garcia  1955  [id number]    Date of Consult: 8/31/2023    Admission Date: 8/28/2023      Requesting Provider: No ref. provider found  Evaluating Physician: Matthew Manuel MD    Reason for Consultation: peritonitis    History of present illness:    Patient is a 67 y.o. female with hypothyroidism, chronic back pain, GERD, hypertension, hyperlipidemia presents to [facility] emergency room with nausea vomiting abdominal pain chills starting 8/26.  Patient found to have lactic acidosis acute renal failure and leukocytosis and markedly elevated procalcitonin.  CT scan pelvis revealed free air and fluid as well as a left ureteropelvic junction kidney stone with obstructive uropathy.  Patient has been taken to operating room for exploratory laparotomy and was found to have a perforated prepyloric ulcer.  Patient has been started on broad-spectrum antibiotics.  Blood cultures have yielded E. coli.  Operative cultures are pending    Urology has seen patient and their plans for cystoscopy with ureteral stent placement upon further stability we are consulted for antibiotic management    8/31/23; transferred to floor; feels well; no events overnight has mild abdominal pain    Past Medical History:   Diagnosis Date    Arthritis     Breast cancer 2017    Colon polyp 2018    Diverticulosis 01/2018    Environmental allergies     Gall bladder stones     GERD (gastroesophageal reflux disease)     Hypertension     Low back pain     Plantar fasciitis     Tarsal tunnel syndrome of both lower extremities     UTI (urinary tract infection)        Past Surgical History:   Procedure Laterality Date    BREAST LUMPECTOMY  05/2017    CHOLECYSTECTOMY      COLONOSCOPY W/ BIOPSIES  01/25/2018    EXPLORATORY LAPAROTOMY N/A 8/28/2023    Procedure: LAPAROTOMY EXPLORATORY;  Surgeon: Gabriel Del Toro MD;  Location: [facility] [place];  Service: General;  Laterality: N/A;       Family History    Problem Relation Age of Onset    Diabetes Father     Hypertension Father     Breast cancer Mother        Social History     Socioeconomic History    Marital status:    Tobacco Use    Smoking status: Never    Smokeless tobacco: Never   Vaping Use    Vaping Use: Never used   Substance and Sexual Activity    Alcohol use: No    Drug use: No    Sexual activity: Not Currently       Allergies   Allergen Reactions    Bactrim [Sulfamethoxazole-Trimethoprim] Itching    Terbinafine Itching         Medication:    Current Facility-Administered Medications:     acetaminophen (TYLENOL) suppository 650 mg, 650 mg, Rectal, Q4H PRN, Renan Tatum MD    [DISCONTINUED] sennosides-docusate (PERICOLACE) 8.6-50 MG per tablet 2 tablet, 2 tablet, Oral, BID **AND** [DISCONTINUED] polyethylene glycol (MIRALAX) packet 17 g, 17 g, Oral, Daily PRN **AND** [DISCONTINUED] bisacodyl (DULCOLAX) EC tablet 5 mg, 5 mg, Oral, Daily PRN **AND** bisacodyl (DULCOLAX) suppository 10 mg, 10 mg, Rectal, Daily PRN, Renan Tatum MD    Calcium Replacement - Follow Nurse / BPA Driven Protocol, , Does not apply, PRN, Cory Renner MD    cefTRIAXone (ROCEPHIN) 2000 mg/100 mL 0.9% NS IVPB (MBP), 2,000 mg, Intravenous, Q24H, Matthew Manuel MD    dextrose 5 % and lactated Ringer's infusion, 100 mL/hr, Intravenous, Continuous, Renan Tatum MD, Last Rate: 100 mL/hr at 08/31/23 0151, 100 mL/hr at 08/31/23 0151    fluconazole (DIFLUCAN) IVPB 200 mg, 200 mg, Intravenous, Q24H, Renan Tatum MD, Last Rate: 100 mL/hr at 08/30/23 2033, 200 mg at 08/30/23 2033    fluticasone (FLONASE) 50 MCG/ACT nasal spray 2 spray, 2 spray, Each Nare, Daily, Renan Tatum MD, 2 spray at 08/31/23 0806    HYDROmorphone (DILAUDID) injection 0.5 mg, 0.5 mg, Intravenous, Q2H PRN, Renan Tatum MD, 0.5 mg at 08/31/23 1243    Levothyroxine Sodium injection 20 mcg, 20 mcg, Intravenous, Daily, Renan Tatum MD, 20 mcg at 08/31/23 1116    Magnesium Standard Dose  Replacement - Follow Nurse / BPA Driven Protocol, , Does not apply, Jud ARIAS Wycliffe, MD    metroNIDAZOLE (FLAGYL) IVPB 500 mg, 500 mg, Intravenous, Q8H, Matthew Manuel MD    ondansetron (ZOFRAN) injection 4 mg, 4 mg, Intravenous, Q6H PRN, Renan Tatum MD    pantoprazole (PROTONIX) injection 40 mg, 40 mg, Intravenous, BID AC, Renan Tatum MD, 40 mg at 08/31/23 0807    phenol (CHLORASEPTIC) 1.4 % liquid 1 spray, 1 spray, Mouth/Throat, Q2H PRN, Renan Tatum MD, 1 spray at 08/29/23 1206    Phosphorus Replacement - Follow Nurse / BPA Driven Protocol, , Does not apply, Jud ARIAS Wycliffe, MD    Potassium Replacement - Follow Nurse / BPA Driven Protocol, , Does not apply, Jud ARIAS Wycliffe, MD    Sodium Chloride (PF) 0.9 % 10 mL, 10 mL, Intravenous, PRN, Renan Tatum MD    sodium chloride 0.9 % flush 10 mL, 10 mL, Intravenous, Q12H, Renan Tatum MD, 10 mL at 08/31/23 0908    sodium chloride 0.9 % infusion 40 mL, 40 mL, Intravenous, PRN, Renan Tatum MD    Antibiotics:  Anti-Infectives (From admission, onward)      Ordered     Dose/Rate Route Frequency Start Stop    08/31/23 1412  cefTRIAXone (ROCEPHIN) 2000 mg/100 mL 0.9% NS IVPB (MBP)        Ordering Provider: Matthew Manuel MD    2,000 mg  over 30 Minutes Intravenous Every 24 Hours 08/31/23 1500 09/07/23 1459    08/31/23 1412  metroNIDAZOLE (FLAGYL) IVPB 500 mg        Ordering Provider: Matthew Manuel MD    500 mg  100 mL/hr over 60 Minutes Intravenous Every 8 Hours 08/31/23 1500 09/07/23 1459    08/29/23 1145  meropenem (MERREM) 1000 mg/100 mL 0.9% NS (mbp)        Ordering Provider: Renan Tatum MD    1,000 mg  over 30 Minutes Intravenous Once 08/29/23 1245 08/29/23 1230    08/29/23 0022  fluconazole (DIFLUCAN) IVPB 200 mg        Ordering Provider: Renan Tatum MD    200 mg  100 mL/hr over 60 Minutes Intravenous Every 24 Hours Scheduled 08/29/23 0115 09/02/23 2059 08/28/23 2153  piperacillin-tazobactam (ZOSYN) 3.375  g in iso-osmotic dextrose 50 ml (premix)        Ordering Provider: Gabriel Del Toro MD    3.375 g  100 mL/hr over 0.5 Hours Intravenous Once 23  piperacillin-tazobactam (ZOSYN) 3.375 g in iso-osmotic dextrose 50 ml (premix)        Ordering Provider: Axel Werner PA    3.375 g Intravenous Once 23 174  cefTRIAXone (ROCEPHIN) 2000 mg/100 mL 0.9% NS IVPB (MBP)        Ordering Provider: Axel Werner PA    2,000 mg  over 30 Minutes Intravenous Once 23              Review of Systems:  See hpi      Physical Exam:   Vital Signs  Temp (24hrs), Av °F (37.2 °C), Min:98.5 °F (36.9 °C), Max:99.9 °F (37.7 °C)    Temp  Min: 98.5 °F (36.9 °C)  Max: 99.9 °F (37.7 °C)  BP  Min: 121/78  Max: 151/86  Pulse  Min: 86  Max: 104  Resp  Min: 14  Max: 20  SpO2  Min: 93 %  Max: 98 %    GENERAL: Awake and alert, in no acute distress.   HEENT: Normocephalic, atraumatic.  PERRL. EOMI. No conjunctival injection. No icterus.  No external oral lesions    HEART: RRR; No murmur  LUNGS: Clear to auscultation bilaterally  ABDOMEN: abdominal wound c/d/i  EXT:  1+ edema    MSK: No joint effusions or erythema  SKIN: Warm and dry without cutaneous eruptions on Inspection/palpation.    NEURO: Oriented to PPT.  Motor 5/5 strength  PSYCHIATRIC: Normal insight and judgment. Cooperative with PE    Laboratory Data    Results from last 7 days   Lab Units 23  0345 23  0423 23  0540   WBC 10*3/mm3 14.36* 22.71* 24.25*   HEMOGLOBIN g/dL 9.7* 10.6* 10.9*   HEMATOCRIT % 29.3* 32.4* 33.1*   PLATELETS 10*3/mm3 191 208 225       Results from last 7 days   Lab Units 23  0346   SODIUM mmol/L 139   POTASSIUM mmol/L 4.1   CHLORIDE mmol/L 105   CO2 mmol/L 25.0   BUN mg/dL 30*   CREATININE mg/dL 0.93   GLUCOSE mg/dL 123*   CALCIUM mg/dL 8.0*       Results from last 7 days   Lab Units 23  1652   ALK PHOS U/L 93   BILIRUBIN mg/dL  0.4   ALT (SGPT) U/L 24   AST (SGOT) U/L 32               Results from last 7 days   Lab Units 08/30/23  0423   LACTATE mmol/L 1.5               Estimated Creatinine Clearance: 59.6 mL/min (by C-G formula based on SCr of 0.93 mg/dL).      Microbiology:  Blood Culture   Date Value Ref Range Status   08/28/2023 No growth at 24 hours  Preliminary     BCID, PCR   Date Value Ref Range Status   08/28/2023 Escherichia coli. Identification by BCID2 PCR. (A) Negative by BCID PCR. Culture to Follow. Final     No results found for: CULTURES, HSVCX, URCX  No results found for: EYECULTURE, GCCX, HSVCULTURE, LABHSV  No results found for: LEGIONELLA, MRSACX, MUMPSCX, MYCOPLASCX  No results found for: NOCARDIACX, STOOLCX  Urine Culture   Date Value Ref Range Status   08/28/2023 >100,000 CFU/mL Escherichia coli (A)  Final     No results found for: VIRALCULTU, WOUNDCX        Radiology:  Imaging Results (Last 72 Hours)       Procedure Component Value Units Date/Time    FL Upper GI Water Soluble [[id number]] Resulted: 08/31/23 0936     Updated: 08/31/23 1014    CT Abdomen Pelvis Without Contrast [[id number]] Collected: 08/28/23 1851     Updated: 08/28/23 1906    Narrative:      CT ABDOMEN PELVIS WO CONTRAST    Date of Exam: 8/28/2023 6:18 PM EDT    Indication: Abdominal pain with vomiting.    Comparison: None available.    Technique: Axial CT images were obtained of the abdomen and pelvis without the administration of contrast. Reconstructed coronal and sagittal images were also obtained. Automated exposure control and iterative construction methods were used.      Findings:  There is a 1.3 cm stone at the left ureteropelvic junction causing moderate obstructive uropathy. The left kidney is enlarged with perinephric soft tissue stranding and edema seen along the left-sided retroperitoneal fascial planes. There are also   clustered stones in the inferior pole calyces of the left kidney. There are no stones seen within the left ureter or  urinary bladder. There are no right renal stones. There is free fluid and free air within the upper abdomen. I'm uncertain of the   clinical significance. If the patient has not had any surgical or interventional procedures, this could be secondary to a perforated viscus. The gallbladder is surgically absent. Unenhanced images of the liver, pancreas, spleen, and adrenal glands are   normal. There is also mild free fluid in the lower pelvis. There is colonic diverticulosis without evidence of acute diverticulitis. There are no dilated loops of bowel to indicate an obstruction. There is no abnormal bowel wall thickening. The uterus   and adnexal structures appear unremarkable. There are no suspicious osteolytic or sclerotic lesions within the bony structures. There are degenerative changes involving the thoracolumbar spine with underlying dextroscoliotic curvature. There are also   degenerative changes of the hip joints. There is minor scarring versus subsegmental atelectasis in the lung bases.        Impression:      Impression:    1. 1.3 cm stone at the left uteropelvic junction causing moderate obstructive uropathy. There is also a cluster of stones in the inferior pole calyces of the left kidney.  2. There is free fluid and free air within the upper abdomen. There is also mild amount of free fluid in the lower pelvis. I'm uncertain of the clinical significance. If the patient has not had any surgical or interventional procedures, this could be   secondary to a perforated viscus.  3. Colonic diverticulosis without acute diverticulitis.  4. Additional incidental findings as noted above. The exam is limited by noncontrasted technique.  5. The abnormal results were discussed with BRENDA Kee by telephone.            Electronically Signed: Ray Saldana MD    8/28/2023 7:03 PM EDT    Workstation ID: [id number]              Impression:   E. coli bacteremia  Peritonitis  Prepyloric perforated viscus  Left-sided kidney  stone with obstructive uropathy  Leukocytosis with neutrophilia  Acute renal failure  Procalcitonin elevation    PLAN/RECOMMENDATIONS:   Thank you for asking us to see Siria Garcia, I recommend the following:      Urine cultures and blood cultures growing E. Coli susc to rocephin    Await urologic procedure    From a perforated viscus standpoint patient at risk for peritonitis from gram-negative enteric's and anaerobes.    Cont fluconazole    Change meropenem to rocephin 2 g iv daily   Add flagyl 500 mg iv q8h    Dw/ family  Matthew Manuel MD  8/31/2023  14:12 EDT

## 2023-08-31 NOTE — NURSING NOTE
Report called to Leah on 5G. All questions and concerns addressed during report. Family at bedside with patient and advised of new room location with patient's permission. Patient transported to [facility] at 2145 accompanied by 2 RNs on monitor. No issues with transport or transfer. Leah RN at bedside to receive patient upon arrival.

## 2023-08-31 NOTE — PROGRESS NOTES
"Siria Garcia  1955  [id number]    Surgery Progress Note    Date of visit: 8/31/2023    Subjective: No new complaints  Overall feels better  No flatus  Ambulating with assistance    Objective:    /73 (BP Location: Left arm, Patient Position: Lying)   Pulse 99   Temp 98.7 °F (37.1 °C) (Oral)   Resp 18   Ht 160 cm (63\")   Wt 84.4 kg (186 lb 1.1 oz)   SpO2 98%   BMI 32.96 kg/m²     Intake/Output Summary (Last 24 hours) at 8/31/2023 0731  Last data filed at 8/31/2023 0151  Gross per 24 hour   Intake 2605.47 ml   Output 1855 ml   Net 750.47 ml       CV: Regular rate and rhythm  L: normal air entry  Abd: Soft  Incisions intact and healing well  No erythema  Clinton-Lopez with serosanguineous drainage      LABS:    Results from last 7 days   Lab Units 08/31/23  0345   WBC 10*3/mm3 14.36*   HEMOGLOBIN g/dL 9.7*   HEMATOCRIT % 29.3*   PLATELETS 10*3/mm3 191     Results from last 7 days   Lab Units 08/31/23  0346 08/29/23  0540 08/28/23  1652   SODIUM mmol/L 139   < > 140   POTASSIUM mmol/L 4.1   < > 4.6   CHLORIDE mmol/L 105   < > 100   CO2 mmol/L 25.0   < > 23.0   BUN mg/dL 30*   < > 29*   CREATININE mg/dL 0.93   < > 1.72*   CALCIUM mg/dL 8.0*   < > 10.0   BILIRUBIN mg/dL  --   --  0.4   ALK PHOS U/L  --   --  93   ALT (SGPT) U/L  --   --  24   AST (SGOT) U/L  --   --  32   GLUCOSE mg/dL 123*   < > 137*    < > = values in this interval not displayed.     Results from last 7 days   Lab Units 08/31/23  0346   SODIUM mmol/L 139   POTASSIUM mmol/L 4.1   CHLORIDE mmol/L 105   CO2 mmol/L 25.0   BUN mg/dL 30*   CREATININE mg/dL 0.93   GLUCOSE mg/dL 123*   CALCIUM mg/dL 8.0*     Lab Results   Lab Value Date/Time    LIPASE 79 (H) 08/28/2023 1652         Assessment/ Plan: Stable course status post emergent abdominal exploration and repair of perforated prepyloric ulcer  Patient is progressing well   Hypaque upper GI this morning  IV antibiotic and antifungal management per ID  Left ureteral stent placement per " urology hopefully tomorrow  Continue with the current management      Problem List Items Addressed This Visit          Other    Sepsis     Other Visit Diagnoses       Acute UTI    -  Primary    Relevant Medications    cefTRIAXone (ROCEPHIN) 2000 mg/100 mL 0.9% NS IVPB (MBP) (Completed)    piperacillin-tazobactam (ZOSYN) 3.375 g in iso-osmotic dextrose 50 ml (premix) (Completed)    piperacillin-tazobactam (ZOSYN) 3.375 g in iso-osmotic dextrose 50 ml (premix) (Completed)    Ureteropelvic junction (UPJ) obstruction        Free fluid in pelvis        History of gastroesophageal reflux (GERD)        History of hypertension        History of breast cancer        Acute kidney injury        Abdominal pain        Relevant Orders    Tissue Pathology Exam (Completed)    Anaerobic Culture - Body Fluid, Abdominal Wall    Fungus Culture - Body Fluid, Abdominal Wall    Fungus Culture - Body Fluid, Abdominal Wall    Body Fluid Culture - Body Fluid, Abdominal Wall (Completed)    Body Fluid Culture - Body Fluid, Abdominal Wall (Completed)    AFB Culture - Body Fluid, Abdominal Wall (Completed)    AFB Culture - Body Fluid, Abdominal Wall (Completed)              Gabriel Del Toro MD  8/31/2023  07:31 EDT

## 2023-09-01 ENCOUNTER — ANESTHESIA (OUTPATIENT)
Dept: PERIOP | Facility: HOSPITAL | Age: 68
DRG: 853 | End: 2023-09-01
Payer: MEDICARE

## 2023-09-01 ENCOUNTER — APPOINTMENT (OUTPATIENT)
Dept: GENERAL RADIOLOGY | Facility: HOSPITAL | Age: 68
DRG: 853 | End: 2023-09-01
Payer: MEDICARE

## 2023-09-01 LAB
ALBUMIN SERPL-MCNC: 2.9 G/DL (ref 3.5–5.2)
ANION GAP SERPL CALCULATED.3IONS-SCNC: 7 MMOL/L (ref 5–15)
BACTERIA FLD CULT: NORMAL
BACTERIA FLD CULT: NORMAL
BASOPHILS # BLD AUTO: 0.04 10*3/MM3 (ref 0–0.2)
BASOPHILS NFR BLD AUTO: 0.4 % (ref 0–1.5)
BUN SERPL-MCNC: 25 MG/DL (ref 8–23)
BUN/CREAT SERPL: 30.5 (ref 7–25)
CALCIUM SPEC-SCNC: 8.1 MG/DL (ref 8.6–10.5)
CHLORIDE SERPL-SCNC: 106 MMOL/L (ref 98–107)
CO2 SERPL-SCNC: 27 MMOL/L (ref 22–29)
CREAT SERPL-MCNC: 0.82 MG/DL (ref 0.57–1)
DEPRECATED RDW RBC AUTO: 47.6 FL (ref 37–54)
EGFRCR SERPLBLD CKD-EPI 2021: 78 ML/MIN/1.73
EOSINOPHIL # BLD AUTO: 0.13 10*3/MM3 (ref 0–0.4)
EOSINOPHIL NFR BLD AUTO: 1.3 % (ref 0.3–6.2)
ERYTHROCYTE [DISTWIDTH] IN BLOOD BY AUTOMATED COUNT: 14.6 % (ref 12.3–15.4)
GLUCOSE SERPL-MCNC: 131 MG/DL (ref 65–99)
GRAM STN SPEC: NORMAL
HCT VFR BLD AUTO: 29.8 % (ref 34–46.6)
HGB BLD-MCNC: 9.8 G/DL (ref 12–15.9)
IMM GRANULOCYTES # BLD AUTO: 0.2 10*3/MM3 (ref 0–0.05)
IMM GRANULOCYTES NFR BLD AUTO: 2 % (ref 0–0.5)
LYMPHOCYTES # BLD AUTO: 1.49 10*3/MM3 (ref 0.7–3.1)
LYMPHOCYTES NFR BLD AUTO: 15.3 % (ref 19.6–45.3)
MCH RBC QN AUTO: 29.5 PG (ref 26.6–33)
MCHC RBC AUTO-ENTMCNC: 32.9 G/DL (ref 31.5–35.7)
MCV RBC AUTO: 89.8 FL (ref 79–97)
MONOCYTES # BLD AUTO: 1.11 10*3/MM3 (ref 0.1–0.9)
MONOCYTES NFR BLD AUTO: 11.4 % (ref 5–12)
NEUTROPHILS NFR BLD AUTO: 6.79 10*3/MM3 (ref 1.7–7)
NEUTROPHILS NFR BLD AUTO: 69.6 % (ref 42.7–76)
NRBC BLD AUTO-RTO: 0 /100 WBC (ref 0–0.2)
PHOSPHATE SERPL-MCNC: 2.2 MG/DL (ref 2.5–4.5)
PHOSPHATE SERPL-MCNC: 2.6 MG/DL (ref 2.5–4.5)
PLATELET # BLD AUTO: 186 10*3/MM3 (ref 140–450)
PMV BLD AUTO: 10.1 FL (ref 6–12)
POTASSIUM SERPL-SCNC: 3.3 MMOL/L (ref 3.5–5.2)
RBC # BLD AUTO: 3.32 10*6/MM3 (ref 3.77–5.28)
SODIUM SERPL-SCNC: 140 MMOL/L (ref 136–145)
WBC NRBC COR # BLD: 9.76 10*3/MM3 (ref 3.4–10.8)

## 2023-09-01 PROCEDURE — 25010000002 DEXAMETHASONE PER 1 MG: Performed by: NURSE ANESTHETIST, CERTIFIED REGISTERED

## 2023-09-01 PROCEDURE — 99232 SBSQ HOSP IP/OBS MODERATE 35: CPT | Performed by: FAMILY MEDICINE

## 2023-09-01 PROCEDURE — 80069 RENAL FUNCTION PANEL: CPT | Performed by: INTERNAL MEDICINE

## 2023-09-01 PROCEDURE — 0 POTASSIUM CHLORIDE 10 MEQ/100ML SOLUTION: Performed by: INTERNAL MEDICINE

## 2023-09-01 PROCEDURE — C1769 GUIDE WIRE: HCPCS | Performed by: UROLOGY

## 2023-09-01 PROCEDURE — 25010000002 METRONIDAZOLE 500 MG/100ML SOLUTION: Performed by: INTERNAL MEDICINE

## 2023-09-01 PROCEDURE — 25010000002 ONDANSETRON PER 1 MG: Performed by: NURSE ANESTHETIST, CERTIFIED REGISTERED

## 2023-09-01 PROCEDURE — 25010000002 CEFTRIAXONE PER 250 MG: Performed by: INTERNAL MEDICINE

## 2023-09-01 PROCEDURE — 25010000002 FLUCONAZOLE PER 200 MG: Performed by: INTERNAL MEDICINE

## 2023-09-01 PROCEDURE — 84100 ASSAY OF PHOSPHORUS: CPT | Performed by: UROLOGY

## 2023-09-01 PROCEDURE — 25010000002 HYDROMORPHONE PER 4 MG: Performed by: UROLOGY

## 2023-09-01 PROCEDURE — 76000 FLUOROSCOPY <1 HR PHYS/QHP: CPT

## 2023-09-01 PROCEDURE — 85025 COMPLETE CBC W/AUTO DIFF WBC: CPT | Performed by: INTERNAL MEDICINE

## 2023-09-01 PROCEDURE — 25010000002 PROPOFOL 10 MG/ML EMULSION: Performed by: NURSE ANESTHETIST, CERTIFIED REGISTERED

## 2023-09-01 PROCEDURE — 25010000002 FENTANYL CITRATE (PF) 100 MCG/2ML SOLUTION: Performed by: NURSE ANESTHETIST, CERTIFIED REGISTERED

## 2023-09-01 PROCEDURE — 25010000002 METRONIDAZOLE 500 MG/100ML SOLUTION: Performed by: UROLOGY

## 2023-09-01 PROCEDURE — 25010000002 HYDROMORPHONE PER 4 MG: Performed by: INTERNAL MEDICINE

## 2023-09-01 PROCEDURE — C2617 STENT, NON-COR, TEM W/O DEL: HCPCS | Performed by: UROLOGY

## 2023-09-01 DEVICE — URETERAL STENT
Type: IMPLANTABLE DEVICE | Site: URETER | Status: FUNCTIONAL
Brand: PERCUFLEX™ PLUS

## 2023-09-01 RX ORDER — SODIUM CHLORIDE 0.9 % (FLUSH) 0.9 %
3-10 SYRINGE (ML) INJECTION AS NEEDED
Status: DISCONTINUED | OUTPATIENT
Start: 2023-09-01 | End: 2023-09-01 | Stop reason: HOSPADM

## 2023-09-01 RX ORDER — MAGNESIUM HYDROXIDE 1200 MG/15ML
LIQUID ORAL AS NEEDED
Status: DISCONTINUED | OUTPATIENT
Start: 2023-09-01 | End: 2023-09-01 | Stop reason: HOSPADM

## 2023-09-01 RX ORDER — SODIUM CHLORIDE 0.9 % (FLUSH) 0.9 %
10 SYRINGE (ML) INJECTION AS NEEDED
Status: DISCONTINUED | OUTPATIENT
Start: 2023-09-01 | End: 2023-09-01 | Stop reason: HOSPADM

## 2023-09-01 RX ORDER — SODIUM CHLORIDE 0.9 % (FLUSH) 0.9 %
3 SYRINGE (ML) INJECTION EVERY 12 HOURS SCHEDULED
Status: DISCONTINUED | OUTPATIENT
Start: 2023-09-01 | End: 2023-09-01 | Stop reason: HOSPADM

## 2023-09-01 RX ORDER — LIDOCAINE HYDROCHLORIDE 20 MG/ML
JELLY TOPICAL AS NEEDED
Status: DISCONTINUED | OUTPATIENT
Start: 2023-09-01 | End: 2023-09-01 | Stop reason: HOSPADM

## 2023-09-01 RX ORDER — SODIUM CHLORIDE 9 MG/ML
40 INJECTION, SOLUTION INTRAVENOUS AS NEEDED
Status: DISCONTINUED | OUTPATIENT
Start: 2023-09-01 | End: 2023-09-01 | Stop reason: HOSPADM

## 2023-09-01 RX ORDER — FENTANYL/ROPIVACAINE/NS/PF 2-625MCG/1
15 PLASTIC BAG, INJECTION (ML) EPIDURAL ONCE
Status: COMPLETED | OUTPATIENT
Start: 2023-09-01 | End: 2023-09-01

## 2023-09-01 RX ORDER — PROPOFOL 10 MG/ML
VIAL (ML) INTRAVENOUS AS NEEDED
Status: DISCONTINUED | OUTPATIENT
Start: 2023-09-01 | End: 2023-09-01 | Stop reason: SURG

## 2023-09-01 RX ORDER — FENTANYL CITRATE 50 UG/ML
INJECTION, SOLUTION INTRAMUSCULAR; INTRAVENOUS AS NEEDED
Status: DISCONTINUED | OUTPATIENT
Start: 2023-09-01 | End: 2023-09-01 | Stop reason: SURG

## 2023-09-01 RX ORDER — LIDOCAINE HYDROCHLORIDE 10 MG/ML
INJECTION, SOLUTION EPIDURAL; INFILTRATION; INTRACAUDAL; PERINEURAL AS NEEDED
Status: DISCONTINUED | OUTPATIENT
Start: 2023-09-01 | End: 2023-09-01 | Stop reason: SURG

## 2023-09-01 RX ORDER — LABETALOL HYDROCHLORIDE 5 MG/ML
5 INJECTION, SOLUTION INTRAVENOUS
Status: DISCONTINUED | OUTPATIENT
Start: 2023-09-01 | End: 2023-09-01 | Stop reason: HOSPADM

## 2023-09-01 RX ORDER — ONDANSETRON 2 MG/ML
4 INJECTION INTRAMUSCULAR; INTRAVENOUS ONCE AS NEEDED
Status: DISCONTINUED | OUTPATIENT
Start: 2023-09-01 | End: 2023-09-01 | Stop reason: HOSPADM

## 2023-09-01 RX ORDER — SODIUM CHLORIDE 0.9 % (FLUSH) 0.9 %
10 SYRINGE (ML) INJECTION EVERY 12 HOURS SCHEDULED
Status: DISCONTINUED | OUTPATIENT
Start: 2023-09-01 | End: 2023-09-01 | Stop reason: HOSPADM

## 2023-09-01 RX ORDER — SODIUM CHLORIDE, SODIUM LACTATE, POTASSIUM CHLORIDE, CALCIUM CHLORIDE 600; 310; 30; 20 MG/100ML; MG/100ML; MG/100ML; MG/100ML
9 INJECTION, SOLUTION INTRAVENOUS CONTINUOUS
Status: DISCONTINUED | OUTPATIENT
Start: 2023-09-01 | End: 2023-09-05 | Stop reason: HOSPADM

## 2023-09-01 RX ORDER — ONDANSETRON 2 MG/ML
INJECTION INTRAMUSCULAR; INTRAVENOUS AS NEEDED
Status: DISCONTINUED | OUTPATIENT
Start: 2023-09-01 | End: 2023-09-01 | Stop reason: SURG

## 2023-09-01 RX ORDER — HYDRALAZINE HYDROCHLORIDE 20 MG/ML
5 INJECTION INTRAMUSCULAR; INTRAVENOUS
Status: DISCONTINUED | OUTPATIENT
Start: 2023-09-01 | End: 2023-09-01 | Stop reason: HOSPADM

## 2023-09-01 RX ORDER — POTASSIUM CHLORIDE 7.45 MG/ML
10 INJECTION INTRAVENOUS
Status: COMPLETED | OUTPATIENT
Start: 2023-09-01 | End: 2023-09-01

## 2023-09-01 RX ORDER — MIDAZOLAM HYDROCHLORIDE 1 MG/ML
0.5 INJECTION INTRAMUSCULAR; INTRAVENOUS
Status: DISCONTINUED | OUTPATIENT
Start: 2023-09-01 | End: 2023-09-01 | Stop reason: HOSPADM

## 2023-09-01 RX ORDER — DEXAMETHASONE SODIUM PHOSPHATE 4 MG/ML
INJECTION, SOLUTION INTRA-ARTICULAR; INTRALESIONAL; INTRAMUSCULAR; INTRAVENOUS; SOFT TISSUE AS NEEDED
Status: DISCONTINUED | OUTPATIENT
Start: 2023-09-01 | End: 2023-09-01 | Stop reason: SURG

## 2023-09-01 RX ORDER — NALOXONE HCL 0.4 MG/ML
0.4 VIAL (ML) INJECTION AS NEEDED
Status: DISCONTINUED | OUTPATIENT
Start: 2023-09-01 | End: 2023-09-01 | Stop reason: HOSPADM

## 2023-09-01 RX ORDER — IPRATROPIUM BROMIDE AND ALBUTEROL SULFATE 2.5; .5 MG/3ML; MG/3ML
3 SOLUTION RESPIRATORY (INHALATION) ONCE AS NEEDED
Status: DISCONTINUED | OUTPATIENT
Start: 2023-09-01 | End: 2023-09-01 | Stop reason: HOSPADM

## 2023-09-01 RX ORDER — LIDOCAINE HYDROCHLORIDE 10 MG/ML
0.5 INJECTION, SOLUTION EPIDURAL; INFILTRATION; INTRACAUDAL; PERINEURAL ONCE AS NEEDED
Status: DISCONTINUED | OUTPATIENT
Start: 2023-09-01 | End: 2023-09-01 | Stop reason: HOSPADM

## 2023-09-01 RX ORDER — HYDROCODONE BITARTRATE AND ACETAMINOPHEN 5; 325 MG/1; MG/1
1 TABLET ORAL ONCE AS NEEDED
Status: DISCONTINUED | OUTPATIENT
Start: 2023-09-01 | End: 2023-09-01 | Stop reason: HOSPADM

## 2023-09-01 RX ORDER — FENTANYL CITRATE 50 UG/ML
50 INJECTION, SOLUTION INTRAMUSCULAR; INTRAVENOUS
Status: DISCONTINUED | OUTPATIENT
Start: 2023-09-01 | End: 2023-09-01 | Stop reason: HOSPADM

## 2023-09-01 RX ADMIN — FLUCONAZOLE 200 MG: 200 INJECTION, SOLUTION INTRAVENOUS at 21:03

## 2023-09-01 RX ADMIN — POTASSIUM PHOSPHATE, MONOBASIC POTASSIUM PHOSPHATE, DIBASIC 15 MMOL: 224; 236 INJECTION, SOLUTION, CONCENTRATE INTRAVENOUS at 07:34

## 2023-09-01 RX ADMIN — METRONIDAZOLE 500 MG: 500 INJECTION, SOLUTION INTRAVENOUS at 16:48

## 2023-09-01 RX ADMIN — FENTANYL CITRATE 50 MCG: 50 INJECTION, SOLUTION INTRAMUSCULAR; INTRAVENOUS at 14:46

## 2023-09-01 RX ADMIN — HYDROMORPHONE HYDROCHLORIDE 0.5 MG: 1 INJECTION, SOLUTION INTRAMUSCULAR; INTRAVENOUS; SUBCUTANEOUS at 16:49

## 2023-09-01 RX ADMIN — Medication 3 ML: at 16:39

## 2023-09-01 RX ADMIN — HYDROMORPHONE HYDROCHLORIDE 0.5 MG: 1 INJECTION, SOLUTION INTRAMUSCULAR; INTRAVENOUS; SUBCUTANEOUS at 00:23

## 2023-09-01 RX ADMIN — POTASSIUM CHLORIDE 10 MEQ: 7.46 INJECTION, SOLUTION INTRAVENOUS at 07:54

## 2023-09-01 RX ADMIN — HYDROMORPHONE HYDROCHLORIDE 0.5 MG: 1 INJECTION, SOLUTION INTRAMUSCULAR; INTRAVENOUS; SUBCUTANEOUS at 11:45

## 2023-09-01 RX ADMIN — LIDOCAINE HYDROCHLORIDE 50 MG: 10 INJECTION, SOLUTION EPIDURAL; INFILTRATION; INTRACAUDAL; PERINEURAL at 14:23

## 2023-09-01 RX ADMIN — POTASSIUM CHLORIDE 10 MEQ: 7.46 INJECTION, SOLUTION INTRAVENOUS at 09:28

## 2023-09-01 RX ADMIN — Medication 10 ML: at 21:06

## 2023-09-01 RX ADMIN — PANTOPRAZOLE SODIUM 40 MG: 40 INJECTION, POWDER, LYOPHILIZED, FOR SOLUTION INTRAVENOUS at 05:27

## 2023-09-01 RX ADMIN — LEVOTHYROXINE SODIUM 20 MCG: 20 INJECTION, SOLUTION INTRAVENOUS at 11:45

## 2023-09-01 RX ADMIN — FENTANYL CITRATE 50 MCG: 50 INJECTION, SOLUTION INTRAMUSCULAR; INTRAVENOUS at 14:23

## 2023-09-01 RX ADMIN — POTASSIUM CHLORIDE 10 MEQ: 7.46 INJECTION, SOLUTION INTRAVENOUS at 11:45

## 2023-09-01 RX ADMIN — SODIUM CHLORIDE 2 G: 900 INJECTION INTRAVENOUS at 14:17

## 2023-09-01 RX ADMIN — METRONIDAZOLE 500 MG: 500 INJECTION, SOLUTION INTRAVENOUS at 09:41

## 2023-09-01 RX ADMIN — PROPOFOL 150 MG: 10 INJECTION, EMULSION INTRAVENOUS at 14:23

## 2023-09-01 RX ADMIN — PANTOPRAZOLE SODIUM 40 MG: 40 INJECTION, POWDER, LYOPHILIZED, FOR SOLUTION INTRAVENOUS at 16:49

## 2023-09-01 RX ADMIN — ONDANSETRON 4 MG: 2 INJECTION INTRAMUSCULAR; INTRAVENOUS at 14:25

## 2023-09-01 RX ADMIN — DEXAMETHASONE SODIUM PHOSPHATE 4 MG: 4 INJECTION, SOLUTION INTRAMUSCULAR; INTRAVENOUS at 14:25

## 2023-09-01 RX ADMIN — SODIUM CHLORIDE, SODIUM LACTATE, POTASSIUM CHLORIDE, CALCIUM CHLORIDE AND DEXTROSE MONOHYDRATE 100 ML/HR: 5; 600; 310; 30; 20 INJECTION, SOLUTION INTRAVENOUS at 21:06

## 2023-09-01 RX ADMIN — FLUTICASONE PROPIONATE 2 SPRAY: 50 SPRAY, METERED NASAL at 08:05

## 2023-09-01 RX ADMIN — Medication 10 ML: at 08:05

## 2023-09-01 RX ADMIN — POTASSIUM CHLORIDE 10 MEQ: 7.46 INJECTION, SOLUTION INTRAVENOUS at 06:28

## 2023-09-01 RX ADMIN — HYDROMORPHONE HYDROCHLORIDE 0.5 MG: 1 INJECTION, SOLUTION INTRAMUSCULAR; INTRAVENOUS; SUBCUTANEOUS at 03:50

## 2023-09-01 RX ADMIN — METRONIDAZOLE 500 MG: 500 INJECTION, SOLUTION INTRAVENOUS at 00:23

## 2023-09-01 RX ADMIN — SODIUM CHLORIDE, POTASSIUM CHLORIDE, SODIUM LACTATE AND CALCIUM CHLORIDE: 600; 310; 30; 20 INJECTION, SOLUTION INTRAVENOUS at 14:17

## 2023-09-01 RX ADMIN — HYDROMORPHONE HYDROCHLORIDE 0.5 MG: 1 INJECTION, SOLUTION INTRAMUSCULAR; INTRAVENOUS; SUBCUTANEOUS at 07:54

## 2023-09-01 NOTE — OP NOTE
Operative Note    Siria Garcia  9/1/2023    Pre-op Diagnosis:   Left UPJ stone       Post-Op Diagnosis Codes:  Left UPJ stone    Procedure/CPT® Codes:  Cystoscopy, left ureteral stent placement, intraoperative fluoroscopy with interpretation-less than 1 hour    Surgeon:  Murray Torres MD    Anesthesia:   General    Estimated Blood Loss:   none    Specimens:                None      Drains/ Implants:  6 [place] X 24 cm stent    Findings:   Left UPJ stone    Complications:   None noted    Procedure:  Patient was correctly identified in the preoperative holding area.  Informed consent was obtained.  She is taken the procedure room positioned supine position.  Anesthesia induced.  Once all appropriate lines and monitors were placed she was then repositioned to lithotomy position.  All pressure points padded.  Proper timeout procedure completed.  Preoperative antibiotics to be given.  Genitourinary was prepped and draped in normal sterile fashion.  Rigid cystoscopy performed.  The left ureteral orifice was identified and cannulated with a sensor wire.  The wire was advanced to the renal pelvis utilizing fluoroscopy.  A 6 [place]i by 24 cm stent was placed over the wire with good coil seen proximally and distally by fluoroscopy and cystoscopy respectively.  No string was left on the stent.  Patient's bladder was drained.  Lidocaine gel instilled for local analgesia.  16 [place]raini catheter was replaced at the conclusion of the procedure.    Disposition: To PACU in stable condition    Murray Torres MD     Date: 9/1/2023  Time: 15:04 EDT

## 2023-09-01 NOTE — PROGRESS NOTES
[facility] Medicine Services  PROGRESS NOTE    Patient Name: Siria Garcia  : 1955  MRN: [id number]    Date of Admission: 2023  Primary Care Physician: Sofia Hong MD    Subjective   Subjective     CC:  Perforated ulcer    HPI:  Patient undergoing urologic procedure today.  No new concerns overnight.  Tolerating some p.o.  Has worked with therapy.    ROS:  Gen- No fevers, chills  CV- No chest pain, palpitations  Resp- No cough, dyspnea  GI- No current N/V/D, positive abd pain       Objective   Objective     Vital Signs:   Temp:  [97.3 °F (36.3 °C)-98.8 °F (37.1 °C)] 98.7 °F (37.1 °C)  Heart Rate:  [] 87  Resp:  [14-18] 14  BP: (128-163)/(57-85) 143/78  Flow (L/min):  [2-2.5] 2     Physical Exam:  Constitutional: No acute distress, awake, alert  HENT: NCAT, mucous membranes moist  Respiratory: Clear to auscultation bilaterally, respiratory effort normal   Cardiovascular: RRR  Gastrointestinal: Positive bowel sounds, soft, moderately tender, nondistended  Musculoskeletal: Generally weak  Psychiatric: Appropriate affect, cooperative  Neurologic: Oriented x 3, generally weak, Cranial Nerves grossly intact to confrontation, speech clear  Skin: No rashes      Results Reviewed:  LAB RESULTS:      Lab 23  0404 23  0346 23  0345 23  0538 23  0423 23  0540 23  1955 23  1652   WBC 9.76  --  14.36*  --  22.71* 24.25*  --  26.63*   HEMOGLOBIN 9.8*  --  9.7*  --  10.6* 10.9*  --  13.0   HEMATOCRIT 29.8*  --  29.3*  --  32.4* 33.1*  --  40.2   PLATELETS 186  --  191  --  208 225  --  306   NEUTROS ABS 6.79  --  11.95*  --  20.44*  --   --  23.97*   IMMATURE GRANS (ABS) 0.20*  --  0.10*  --   --   --   --   --    LYMPHS ABS 1.49  --  1.25  --   --   --   --   --    MONOS ABS 1.11*  --  1.02*  --   --   --   --   --    EOS ABS 0.13  --  0.01  --  0.00  --   --  0.00   MCV 89.8  --  90.4  --  91.3 92.2  --  92.2   PROCALCITONIN   --  5.49*  --  12.58*  --  18.09*  --  23.71*   LACTATE  --   --   --   --  1.5 2.3* 2.0 3.6*         Lab 09/01/23  0404 08/31/23  0346 08/30/23  0538 08/29/23  0540 08/28/23  1652   SODIUM 140 139 139 138 140   POTASSIUM 3.3* 4.1 4.8 4.5 4.6   CHLORIDE 106 105 105 105 100   CO2 27.0 25.0 24.0 23.0 23.0   ANION GAP 7.0 9.0 10.0 10.0 17.0*   BUN 25* 30* 31* 28* 29*   CREATININE 0.82 0.93 1.17* 1.35* 1.72*   EGFR 78.0 67.1 50.9* 42.9* 32.1*   GLUCOSE 131* 123* 91 134* 137*   CALCIUM 8.1* 8.0* 8.0* 8.0* 10.0   MAGNESIUM  --  2.1 2.4 1.8  --    PHOSPHORUS 2.2* 1.7*  --  3.3  --    HEMOGLOBIN A1C  --   --   --  5.80*  --    TSH  --   --   --  0.415  --          Lab 09/01/23  0404 08/28/23  1652   TOTAL PROTEIN  --  8.0   ALBUMIN 2.9* 4.3   GLOBULIN  --  3.7   ALT (SGPT)  --  24   AST (SGOT)  --  32   BILIRUBIN  --  0.4   ALK PHOS  --  93   LIPASE  --  79*                 Lab 08/28/23 2035 08/1955   ABO TYPING O O   RH TYPING Positive Positive   ANTIBODY SCREEN  --  Negative         Brief Urine Lab Results  (Last result in the past 365 days)        Color   Clarity   Blood   Leuk Est   Nitrite   Protein   CREAT   Urine HCG        08/29/23 0349             44.3                 Microbiology Results Abnormal       Procedure Component Value - Date/Time    Body Fluid Culture - Body Fluid, Abdominal Wall [[id number]] Collected: 08/28/23 2210    Lab Status: Final result Specimen: Body Fluid from Abdominal Wall Updated: 09/01/23 0919     Body Fluid Culture No growth at 3 days     Gram Stain Many (4+) WBCs seen      No organisms seen    Body Fluid Culture - Body Fluid, Abdominal Wall [[id number]] Collected: 08/28/23 2210    Lab Status: Final result Specimen: Body Fluid from Abdominal Wall Updated: 09/01/23 0919     Body Fluid Culture No growth at 3 days     Gram Stain Moderate (3+) WBCs seen      No organisms seen    Anaerobic Culture - Body Fluid, Abdominal Wall [[id number]]  (Normal) Collected: 08/28/23 2207    Lab Status:  Preliminary result Specimen: Body Fluid from Abdominal Wall Updated: 09/01/23 0659     Anaerobic Culture No anaerobes isolated at 3 days    Blood Culture - Blood, Arm, Right [[id number]]  (Normal) Collected: 08/28/23 1800    Lab Status: Preliminary result Specimen: Blood from Arm, Right Updated: 08/31/23 1945     Blood Culture No growth at 3 days    AFB Culture - Body Fluid, Abdominal Wall [[id number]] Collected: 08/28/23 2210    Lab Status: Preliminary result Specimen: Body Fluid from Abdominal Wall Updated: 08/29/23 1225     AFB Stain No acid fast bacilli seen on concentrated smear    AFB Culture - Body Fluid, Abdominal Wall [[id number]] Collected: 08/28/23 2210    Lab Status: Preliminary result Specimen: Body Fluid from Abdominal Wall Updated: 08/29/23 1225     AFB Stain No acid fast bacilli seen on concentrated smear            FL C Arm During Surgery    Result Date: 9/1/2023  This procedure was auto-finalized with no dictation required.    FL Upper GI Water Soluble    Result Date: 8/31/2023  FL UPPER GI WATER SOLUBLE Date of Exam: 8/31/2023 9:36 AM EDT Indication: Status post repair of perforated prepyloric ulcer Comparison: None available. Technique:   radiograph of the abdomen was obtained. A single contrast radiographic exam was performed imaging through the upper gastrointestinal tract. Fluoroscopic Time: 2 minutes and 12 seconds Number of Images: 11 associated fluoroscopic series were saved Findings:  imaging reveals a nonobstructive bowel gas pattern. There is an NG tube visible in the left upper quadrant. Surgical clips are visible in the gallbladder fossa. Under fluoroscopic observation, the patient ingested water-soluble contrast. The oral phase of deglutition appeared normal. There are few small diverticula visible of the midesophagus. These diverticulum do not appear to retain significant contrast after swallows. Significant gastroesophageal reflux was not demonstrated during this exam.  Examination of the stomach demonstrated grossly normal gastric mucosa and gastric folds. The patient is status post perforated prepyloric ulcer repair. No extravasation of contrast was seen. There was no delay in gastric emptying.     Impression: Impression: 1. Status post perforated prepyloric ulcer repair. There was no evidence of extraluminal contrast. There was no delay in gastric emptying. 2. Esophageal diverticula Electronically Signed: Christopher Templeton MD  8/31/2023 5:42 PM EDT  Workstation ID: [id number]         Current medications:  Scheduled Meds:cefTRIAXone, 2,000 mg, Intravenous, Q24H  fluconazole, 200 mg, Intravenous, Q24H  [MAR Hold] fluticasone, 2 spray, Each Nare, Daily  [MAR Hold] Levothyroxine Sodium, 20 mcg, Intravenous, Daily  metroNIDAZOLE, 500 mg, Intravenous, Q8H  [MAR Hold] pantoprazole, 40 mg, Intravenous, BID AC  [MAR Hold] sodium chloride, 10 mL, Intravenous, Q12H  sodium chloride, 3 mL, Intravenous, Q12H      Continuous Infusions:dextrose 5 % and lactated Ringer's, 100 mL/hr, Last Rate: 100 mL/hr (09/01/23 0552)  lactated ringers, 9 mL/hr      PRN Meds:.  [MAR Hold] acetaminophen    [DISCONTINUED] senna-docusate sodium **AND** [DISCONTINUED] polyethylene glycol **AND** [DISCONTINUED] bisacodyl **AND** [MAR Hold] bisacodyl    [MAR Hold] Calcium Replacement - Follow Nurse / BPA Driven Protocol    fentanyl    hydrALAZINE    HYDROcodone-acetaminophen    [MAR Hold] HYDROmorphone    ipratropium-albuterol    labetalol    lactated ringers    [MAR Hold] Magnesium Standard Dose Replacement - Follow Nurse / BPA Driven Protocol    naloxone    [MAR Hold] ondansetron    ondansetron    [MAR Hold] phenol    [MAR Hold] Phosphorus Replacement - Follow Nurse / BPA Driven Protocol    Potassium Replacement - Follow Nurse / BPA Driven Protocol    [MAR Hold] Sodium Chloride (PF)    sodium chloride    [MAR Hold] sodium chloride    sodium chloride    Assessment & Plan   Assessment & Plan     Active Hospital Problems     Diagnosis  POA    **K25.9, Pyloric ulcer perf. - S/P repair 08/28/23 [K25.9]  Yes    Perforated ulcer [K27.5]  Yes    Chronic low back pain [M54.50, G89.29]  Yes    SUJATA (acute kidney injury) [N17.9]  Yes    UTI (urinary tract infection) [N39.0]  Yes    Sepsis [A41.9]  Yes    Peritonitis [K65.9]  Yes    Nephrolithiasis [N20.0]  Yes    Obstructive uropathy [N13.9]  Yes    Benign essential hypertension [I10]  Yes    Gastroesophageal reflux disease [K21.9]  Yes    Hypercholesterolemia [E78.00]  Yes    Hypothyroidism [E03.9]  Yes      Resolved Hospital Problems   No resolved problems to display.        Brief Hospital Course to date:  Siria Garcia is a 67 y.o. female with history of hypertension, hypothyroidism, GERD, right ankle impingement syndrome followed by Ortho and has been on NSAIDs for right foot pain who presented to ED 8/28/2023 with abdominal pain.  CT on pelvis was concerning for free air and fluid left UPJ 1.3 cm stone.  She did undergo expiratory laparotomy by general surgery, was found to have perforated prepyloric ulcer s/p repair.  She was transferred to the ICU for ongoing care.  Patient did progress well.  And was transferred to telemetry, [facility] medicine assumed her care 8/31/1023     Sepsis secondary to perforated prepyloric ulcer  -Patient presented with leukocytosis, tachycardia, lactic acidosis, elevated procalcitonin  -General surgery following, s/p repair by Dr. SWANSON continue postop care  -Blood cultures positive for E. coli  -Continue antibiotics, with meropenem and Diflucan-ID consulted  -Encourage ambulation     SUJATA  -Baseline creatinine ~0.8-1.0, patient presented with Cr of 1.72, likely secondary to above  -Currently improving, back to baseline, IVF  -Avoid nephrotoxins     Hypertension  -BP currently normotensive, holding home meds for now     Obstructive uropathy  -Patient with 1.3 cm left UPJ stone  -Urology following, plan for cystoscopy  123     Hypothyroidism  -Continue Synthroid      Hypophosphatemia  -Continue to monitor replete per protocol     GERD  -Continue PPI        Expected Discharge Location and Transportation: Rehab versus home, private vehicle  Expected Discharge   Expected Discharge Date: 9/5/2023; Expected Discharge Time:      DVT prophylaxis:  Mechanical DVT prophylaxis orders are present.     AM-PAC 6 Clicks Score (PT): 17 (09/01/23 0800)    CODE STATUS:   Code Status and Medical Interventions:   Ordered at: 08/28/23 2242     Code Status (Patient has no pulse and is not breathing):    CPR (Attempt to Resuscitate)     Medical Interventions (Patient has pulse or is breathing):    Full Support       Regina Shoemaker MD  09/01/23

## 2023-09-01 NOTE — ANESTHESIA PROCEDURE NOTES
Airway  Urgency: elective    Date/Time: 9/1/2023 2:24 PM  Airway not difficult    General Information and Staff    Patient location during procedure: OR  Anesthesiologist: Rito Carmen MD  CRNA/CAA: Rachelle Martins CRNA    Indications and Patient Condition  Indications for airway management: airway protection    Preoxygenated: yes  MILS not maintained throughout  Mask difficulty assessment: 0 - not attempted    Final Airway Details  Final airway type: endotracheal airway      Successful airway: ETT  Cuffed: yes   Successful intubation technique: video laryngoscopy and RSI  Facilitating devices/methods: intubating stylet and cricoid pressure  Endotracheal tube insertion site: oral  Blade: Butt  Blade size: 3  ETT size (mm): 7.0  Cormack-Lehane Classification: grade I - full view of glottis  Placement verified by: chest auscultation and capnometry   Measured from: lips  ETT/EBT  to lips (cm): 20  Number of attempts at approach: 1  Assessment: lips, teeth, and gum same as pre-op and atraumatic intubation    Additional Comments  Negative epigastric sounds, Breath sound equal bilaterally with symmetric chest rise and fall

## 2023-09-01 NOTE — CASE MANAGEMENT/SOCIAL WORK
Discharge Planning Assessment  [facility]     Patient Name: Siria Garcia  MRN: [id number]  Today's Date: 9/1/2023    Admit Date: 8/28/2023    Plan: Ongoing, TBD   Discharge Needs Assessment    No documentation.                  Discharge Plan       Row Name 09/01/23 1422       Plan    Plan Ongoing, TBD    Plan Comments Patient off the floor in ENDO, discussed in am rounds and plan is here thru [facility] and CM will visit first of week to see if able to go home with son or needs rehab.    Final Discharge Disposition Code 30 - still a patient                  Continued Care and Services - Admitted Since 8/28/2023    Coordination has not been started for this encounter.       Expected Discharge Date and Time       Expected Discharge Date Expected Discharge Time    Sep 8, 2023            Demographic Summary    No documentation.                  Functional Status    No documentation.                  Psychosocial    No documentation.                  Abuse/Neglect    No documentation.                  Legal    No documentation.                  Substance Abuse    No documentation.                  Patient Forms    No documentation.                     Vaishali Crawford RN

## 2023-09-01 NOTE — PROGRESS NOTES
INFECTIOUS DISEASE f/u     Siria Garcia  1955  [id number]    Date of Consult: 9/1/2023    Admission Date: 8/28/2023      Requesting Provider: No ref. provider found  Evaluating Physician: Matthew Manuel MD    Reason for Consultation: peritonitis    History of present illness:    Patient is a 67 y.o. female with hypothyroidism, chronic back pain, GERD, hypertension, hyperlipidemia presents to [facility] emergency room with nausea vomiting abdominal pain chills starting 8/26.  Patient found to have lactic acidosis acute renal failure and leukocytosis and markedly elevated procalcitonin.  CT scan pelvis revealed free air and fluid as well as a left ureteropelvic junction kidney stone with obstructive uropathy.  Patient has been taken to operating room for exploratory laparotomy and was found to have a perforated prepyloric ulcer.  Patient has been started on broad-spectrum antibiotics.  Blood cultures have yielded E. coli.  Operative cultures are pending    Urology has seen patient and their plans for cystoscopy with ureteral stent placement upon further stability we are consulted for antibiotic management    8/31/23; transferred to floor; feels well; no events overnight has mild abdominal pain    9/1/2023 doing well no events overnight no complaints denies fevers had had left ureteral stent, cystoscopy today    Past Medical History:   Diagnosis Date    Arthritis     Breast cancer 2017    Colon polyp 2018    Diverticulosis 01/2018    Environmental allergies     Gall bladder stones     GERD (gastroesophageal reflux disease)     Hypertension     Low back pain     Plantar fasciitis     Tarsal tunnel syndrome of both lower extremities     UTI (urinary tract infection)        Past Surgical History:   Procedure Laterality Date    BREAST LUMPECTOMY  05/2017    CHOLECYSTECTOMY      COLONOSCOPY W/ BIOPSIES  01/25/2018    EXPLORATORY LAPAROTOMY N/A 8/28/2023    Procedure: LAPAROTOMY EXPLORATORY;   Surgeon: Gabriel Del Toro MD;  Location: [facility];  Service: General;  Laterality: N/A;       Family History   Problem Relation Age of Onset    Diabetes Father     Hypertension Father     Breast cancer Mother        Social History     Socioeconomic History    Marital status:    Tobacco Use    Smoking status: Never    Smokeless tobacco: Never   Vaping Use    Vaping Use: Never used   Substance and Sexual Activity    Alcohol use: No    Drug use: No    Sexual activity: Not Currently       Allergies   Allergen Reactions    Bactrim [Sulfamethoxazole-Trimethoprim] Itching    Terbinafine Itching         Medication:    Current Facility-Administered Medications:     acetaminophen (TYLENOL) suppository 650 mg, 650 mg, Rectal, Q4H PRN, Murray Torres MD    [DISCONTINUED] sennosides-docusate (PERICOLACE) 8.6-50 MG per tablet 2 tablet, 2 tablet, Oral, BID **AND** [DISCONTINUED] polyethylene glycol (MIRALAX) packet 17 g, 17 g, Oral, Daily PRN **AND** [DISCONTINUED] bisacodyl (DULCOLAX) EC tablet 5 mg, 5 mg, Oral, Daily PRN **AND** bisacodyl (DULCOLAX) suppository 10 mg, 10 mg, Rectal, Daily PRN, Murray Torres MD    Calcium Replacement - Follow Nurse / BPA Driven Protocol, , Does not apply, PRN, Murray Torres MD    cefTRIAXone (ROCEPHIN) 2000 mg/100 mL 0.9% NS IVPB (MBP), 2,000 mg, Intravenous, Q24H, Murray Torres MD, 2 g at 09/01/23 1417    dextrose 5 % and lactated Ringer's infusion, 100 mL/hr, Intravenous, Continuous, Murray Torres MD, Last Rate: 100 mL/hr at 09/01/23 0552, 100 mL/hr at 09/01/23 0552    fluconazole (DIFLUCAN) IVPB 200 mg, 200 mg, Intravenous, Q24H, Renan Tatum MD, Last Rate: 100 mL/hr at 08/31/23 2136, 200 mg at 08/31/23 2136    fluticasone (FLONASE) 50 MCG/ACT nasal spray 2 spray, 2 spray, Each Nare, Daily, Murray Torres MD, 2 spray at 09/01/23 0805    HYDROmorphone (DILAUDID) injection 0.5 mg, 0.5 mg, Intravenous, Q2H PRN, Murray Torres,  MD, 0.5 mg at 09/01/23 1145    lactated ringers infusion, 9 mL/hr, Intravenous, Continuous, uMrray Torres MD, New Bag at 09/01/23 1417    Levothyroxine Sodium injection 20 mcg, 20 mcg, Intravenous, Daily, Murray Torres MD, 20 mcg at 09/01/23 1145    Magnesium Standard Dose Replacement - Follow Nurse / BPA Driven Protocol, , Does not apply, PRN, Murray Torres MD    metroNIDAZOLE (FLAGYL) IVPB 500 mg, 500 mg, Intravenous, Q8H, Murray Torres MD, Last Rate: 100 mL/hr at 09/01/23 0941, 500 mg at 09/01/23 0941    ondansetron (ZOFRAN) injection 4 mg, 4 mg, Intravenous, Q6H PRN, Murray Torres MD    pantoprazole (PROTONIX) injection 40 mg, 40 mg, Intravenous, BID AC, Murray Torres MD, 40 mg at 09/01/23 0527    phenol (CHLORASEPTIC) 1.4 % liquid 1 spray, 1 spray, Mouth/Throat, Q2H PRN, Murray Torres MD, 1 spray at 08/29/23 1206    Phosphorus Replacement - Follow Nurse / BPA Driven Protocol, , Does not apply, PRBrian VILLEGAS Justin Dale, MD    Potassium Replacement - Follow Nurse / BPA Driven Protocol, , Does not apply, PRN, Murray Torres MD    Sodium Chloride (PF) 0.9 % 10 mL, 10 mL, Intravenous, PRN, Murray Torres MD    sodium chloride 0.9 % flush 10 mL, 10 mL, Intravenous, Q12H, Murray Torres MD, 10 mL at 09/01/23 0805    sodium chloride 0.9 % infusion 40 mL, 40 mL, Intravenous, PRN, Murray Torres MD    Antibiotics:  Anti-Infectives (From admission, onward)      Ordered     Dose/Rate Route Frequency Start Stop    08/31/23 1412  metroNIDAZOLE (FLAGYL) IVPB 500 mg        Ordering Provider: Murray Torres MD    500 mg  100 mL/hr over 60 Minutes Intravenous Every 8 Hours 08/31/23 1600 09/07/23 1559    08/31/23 1412  cefTRIAXone (ROCEPHIN) 2000 mg/100 mL 0.9% NS IVPB (MBP)        Ordering Provider: Murray Torres MD    2,000 mg  over 30 Minutes Intravenous Every 24 Hours 08/31/23 1500 09/07/23 1459    08/29/23 1145  meropenem  (MERREM) 1000 mg/100 mL 0.9% NS (mbp)        Ordering Provider: Renan Tatum MD    1,000 mg  over 30 Minutes Intravenous Once 23 1245 23 1230    23 0022  fluconazole (DIFLUCAN) IVPB 200 mg        Ordering Provider: Renan Tatum MD    200 mg  100 mL/hr over 60 Minutes Intravenous Every 24 Hours Scheduled 23 0115 23 2153  piperacillin-tazobactam (ZOSYN) 3.375 g in iso-osmotic dextrose 50 ml (premix)        Ordering Provider: Gabriel Del Toro MD    3.375 g  100 mL/hr over 0.5 Hours Intravenous Once 23 194  piperacillin-tazobactam (ZOSYN) 3.375 g in iso-osmotic dextrose 50 ml (premix)        Ordering Provider: Axel Werner PA    3.375 g Intravenous Once 23 2045 23 174  cefTRIAXone (ROCEPHIN) 2000 mg/100 mL 0.9% NS IVPB (MBP)        Ordering Provider: Axel Werner PA    2,000 mg  over 30 Minutes Intravenous Once 23 1802 23 1948              Review of Systems:  See hpi      Physical Exam:   Vital Signs  Temp (24hrs), Av.5 °F (36.9 °C), Min:97.3 °F (36.3 °C), Max:98.8 °F (37.1 °C)    Temp  Min: 97.3 °F (36.3 °C)  Max: 98.8 °F (37.1 °C)  BP  Min: 128/68  Max: 163/85  Pulse  Min: 84  Max: 100  Resp  Min: 14  Max: 18  SpO2  Min: 90 %  Max: 97 %    GENERAL: Resting comfortably in no acute distress.   HEENT: Normocephalic, atraumatic.  PERRL. EOMI. No conjunctival injection. No icterus.  No external oral lesions      LUNGS: Metrical bilaterally  ABDOMEN: abdominal wound c/d/i  EXT:  1+ edema    MSK: No joint effusions or erythema  SKIN: Warm and dry without cutaneous eruptions on Inspection/palpation.      Laboratory Data    Results from last 7 days   Lab Units 23  0404 23  0345 23  0423   WBC 10*3/mm3 9.76 14.36* 22.71*   HEMOGLOBIN g/dL 9.8* 9.7* 10.6*   HEMATOCRIT % 29.8* 29.3* 32.4*   PLATELETS 10*3/mm3 186 191 208       Results from last 7 days   Lab Units  09/01/23  0404   SODIUM mmol/L 140   POTASSIUM mmol/L 3.3*   CHLORIDE mmol/L 106   CO2 mmol/L 27.0   BUN mg/dL 25*   CREATININE mg/dL 0.82   GLUCOSE mg/dL 131*   CALCIUM mg/dL 8.1*       Results from last 7 days   Lab Units 08/28/23  1652   ALK PHOS U/L 93   BILIRUBIN mg/dL 0.4   ALT (SGPT) U/L 24   AST (SGOT) U/L 32               Results from last 7 days   Lab Units 08/30/23  0423   LACTATE mmol/L 1.5               Estimated Creatinine Clearance: 68.7 mL/min (by C-G formula based on SCr of 0.82 mg/dL).      Microbiology:  Blood Culture   Date Value Ref Range Status   08/28/2023 No growth at 24 hours  Preliminary     BCID, PCR   Date Value Ref Range Status   08/28/2023 Escherichia coli. Identification by BCID2 PCR. (A) Negative by BCID PCR. Culture to Follow. Final     No results found for: CULTURES, HSVCX, URCX  No results found for: EYECULTURE, GCCX, HSVCULTURE, LABHSV  No results found for: LEGIONELLA, MRSACX, MUMPSCX, MYCOPLASCX  No results found for: NOCARDIACX, STOOLCX  Urine Culture   Date Value Ref Range Status   08/28/2023 >100,000 CFU/mL Escherichia coli (A)  Final     No results found for: VIRALCULTU, WOUNDCX        Radiology:  Imaging Results (Last 72 Hours)       Procedure Component Value Units Date/Time    FL C Arm During Surgery [[id number]] Resulted: 09/01/23 1515     Updated: 09/01/23 1515    Narrative:      This procedure was auto-finalized with no dictation required.    FL Upper GI Water Soluble [[id number]] Collected: 08/31/23 1018     Updated: 08/31/23 1745    Narrative:      FL UPPER GI WATER SOLUBLE    Date of Exam: 8/31/2023 9:36 AM EDT    Indication: Status post repair of perforated prepyloric ulcer    Comparison: None available.    Technique:   radiograph of the abdomen was obtained. A single contrast radiographic exam was performed imaging through the upper gastrointestinal tract.    Fluoroscopic Time: 2 minutes and 12 seconds    Number of Images: 11 associated fluoroscopic series were  saved    Findings:   imaging reveals a nonobstructive bowel gas pattern. There is an NG tube visible in the left upper quadrant. Surgical clips are visible in the gallbladder fossa. Under fluoroscopic observation, the patient ingested water-soluble contrast. The oral   phase of deglutition appeared normal. There are few small diverticula visible of the midesophagus. These diverticulum do not appear to retain significant contrast after swallows. Significant gastroesophageal reflux was not demonstrated during this exam.   Examination of the stomach demonstrated grossly normal gastric mucosa and gastric folds. The patient is status post perforated prepyloric ulcer repair. No extravasation of contrast was seen. There was no delay in gastric emptying.      Impression:      Impression:      1. Status post perforated prepyloric ulcer repair. There was no evidence of extraluminal contrast. There was no delay in gastric emptying.  2. Esophageal diverticula    Electronically Signed: Christopher Templeton MD    8/31/2023 5:42 PM EDT    Workstation ID: [id number]              Impression:   E. coli bacteremia  Peritonitis  Prepyloric perforated viscus  Left-sided kidney stone with obstructive uropathy  Leukocytosis with neutrophilia  Acute renal failure  Procalcitonin elevation    PLAN/RECOMMENDATIONS:   Thank you for asking us to see Siria Garcia, I recommend the following:      Urine cultures and blood cultures growing E. Coli susc to rocephin    Await urologic procedure    From a perforated viscus standpoint patient at risk for peritonitis from gram-negative enteric's and anaerobes.    Cont fluconazole    Continue rocephin 2 g iv daily   Continue flagyl 500 mg iv q8h      Matthew Manuel MD  9/1/2023  16:40 EDT

## 2023-09-01 NOTE — PROGRESS NOTES
"Siria Garcia  1955  [id number]    Surgery Progress Note    Date of visit: 9/1/2023    Subjective: No new complaints  Feels better    Objective:    /83 (BP Location: Right arm, Patient Position: Lying)   Pulse 100   Temp 97.3 °F (36.3 °C) (Temporal)   Resp 18   Ht 160 cm (63\")   Wt 87.1 kg (192 lb)   SpO2 92%   BMI 34.01 kg/m²     Intake/Output Summary (Last 24 hours) at 9/1/2023 1414  Last data filed at 9/1/2023 1100  Gross per 24 hour   Intake 4531.67 ml   Output 2705 ml   Net 1826.67 ml       CV: Regular rate and rhythm  L: normal air entry  Abd: Soft  Dressing is intact and dry  Clinton-Lopez with serosanguineous drainage      LABS:    Results from last 7 days   Lab Units 09/01/23  0404   WBC 10*3/mm3 9.76   HEMOGLOBIN g/dL 9.8*   HEMATOCRIT % 29.8*   PLATELETS 10*3/mm3 186     Results from last 7 days   Lab Units 09/01/23  0404 08/29/23  0540 08/28/23  1652   SODIUM mmol/L 140   < > 140   POTASSIUM mmol/L 3.3*   < > 4.6   CHLORIDE mmol/L 106   < > 100   CO2 mmol/L 27.0   < > 23.0   BUN mg/dL 25*   < > 29*   CREATININE mg/dL 0.82   < > 1.72*   CALCIUM mg/dL 8.1*   < > 10.0   BILIRUBIN mg/dL  --   --  0.4   ALK PHOS U/L  --   --  93   ALT (SGPT) U/L  --   --  24   AST (SGOT) U/L  --   --  32   GLUCOSE mg/dL 131*   < > 137*    < > = values in this interval not displayed.     Results from last 7 days   Lab Units 09/01/23  0404   SODIUM mmol/L 140   POTASSIUM mmol/L 3.3*   CHLORIDE mmol/L 106   CO2 mmol/L 27.0   BUN mg/dL 25*   CREATININE mg/dL 0.82   GLUCOSE mg/dL 131*   CALCIUM mg/dL 8.1*     Lab Results   Lab Value Date/Time    LIPASE 79 (H) 08/28/2023 1652         Assessment/ Plan: Stable course status post emergent abdominal exploration and repair of perforated prepyloric ulcer  Hypaque upper GI shows no extravasation or delayed gastric emptying  Patient is progressing well  Plan to remove nasogastric tube after urology procedure today  Start on clear liquid diet  Continue with antibiotic and " antifungal management per ID  Continue with the current management    Problem List Items Addressed This Visit          Other    Sepsis     Other Visit Diagnoses       Acute UTI    -  Primary    Relevant Medications    cefTRIAXone (ROCEPHIN) 2000 mg/100 mL 0.9% NS IVPB (MBP) (Completed)    piperacillin-tazobactam (ZOSYN) 3.375 g in iso-osmotic dextrose 50 ml (premix) (Completed)    piperacillin-tazobactam (ZOSYN) 3.375 g in iso-osmotic dextrose 50 ml (premix) (Completed)    cefTRIAXone (ROCEPHIN) 2000 mg/100 mL 0.9% NS IVPB (MBP)    Ureteropelvic junction (UPJ) obstruction        Free fluid in pelvis        History of gastroesophageal reflux (GERD)        History of hypertension        History of breast cancer        Acute kidney injury        Abdominal pain        Relevant Orders    Tissue Pathology Exam (Completed)    Anaerobic Culture - Body Fluid, Abdominal Wall (Completed)    Fungus Culture - Body Fluid, Abdominal Wall    Fungus Culture - Body Fluid, Abdominal Wall    Body Fluid Culture - Body Fluid, Abdominal Wall (Completed)    Body Fluid Culture - Body Fluid, Abdominal Wall (Completed)    AFB Culture - Body Fluid, Abdominal Wall (Completed)    AFB Culture - Body Fluid, Abdominal Wall (Completed)              Gabriel Del Toro MD  9/1/2023  14:14 EDT

## 2023-09-01 NOTE — ANESTHESIA POSTPROCEDURE EVALUATION
Patient: Siria Garcia    Procedure Summary       Date: 09/01/23 [facility]    Anesthesia Start: 1417 Anesthesia Stop: 1504    Procedure: CYSTOSCOPY WITH LEFT URETERAL STENT PLACEMENT Diagnosis:     Surgeons: Murray Torres MD Provider: Rito Carmen MD    Anesthesia Type: general ASA Status: 3            Anesthesia Type: general    Vitals  T97.8  /72  RR11  SpO2 98% 2L  HR 93        Post Anesthesia Care and Evaluation    Patient location during evaluation: PACU  Patient participation: complete - patient participated  Level of consciousness: awake and alert  Pain score: 0  Pain management: adequate    Airway patency: patent  Anesthetic complications: No anesthetic complications  PONV Status: none  Cardiovascular status: hemodynamically stable and acceptable  Respiratory status: nonlabored ventilation, acceptable and nasal cannula  Hydration status: acceptable

## 2023-09-01 NOTE — PLAN OF CARE
Goal Outcome Evaluation:      Patient has rested on and off tonight. Patient has had pain controlled with IV pain meds. NG still in place. Patient has had no complaints throughout this shift. Currently no s/s of distress noted, will continue to monitor.

## 2023-09-01 NOTE — ANESTHESIA PREPROCEDURE EVALUATION
Anesthesia Evaluation     Patient summary reviewed and Nursing notes reviewed   NPO Solid Status: > 8 hours  NPO Liquid Status: > 8 hours           Airway   Mallampati: II  TM distance: >3 FB  Neck ROM: full  Comment: Previous grade 1 view MAC 3 blade  Dental - normal exam     Pulmonary - normal exam   (+) a smoker Former,  Cardiovascular - normal exam    ECG reviewed    (+) hypertension, hyperlipidemia      Neuro/Psych  (+) numbness  GI/Hepatic/Renal/Endo    (+) GERD well controlled, PUD (s/p Ex lap 8/28/23), renal disease stones, thyroid problem hypothyroidism    Musculoskeletal     (+) neck pain  Abdominal    Substance History      OB/GYN          Other   arthritis, blood dyscrasia anemia,   history of cancer (h/o breast cancer)                  Anesthesia Plan    ASA 3     general     intravenous induction     Anesthetic plan, risks, benefits, and alternatives have been provided, discussed and informed consent has been obtained with: patient.    Plan discussed with CRNA.    CODE STATUS:    Code Status (Patient has no pulse and is not breathing): CPR (Attempt to Resuscitate)  Medical Interventions (Patient has pulse or is breathing): Full Support

## 2023-09-02 LAB — BACTERIA SPEC AEROBE CULT: NORMAL

## 2023-09-02 PROCEDURE — 25010000002 CEFTRIAXONE PER 250 MG: Performed by: UROLOGY

## 2023-09-02 PROCEDURE — 25010000002 METRONIDAZOLE 500 MG/100ML SOLUTION: Performed by: UROLOGY

## 2023-09-02 PROCEDURE — 25010000002 HYDROMORPHONE PER 4 MG: Performed by: UROLOGY

## 2023-09-02 PROCEDURE — 97110 THERAPEUTIC EXERCISES: CPT

## 2023-09-02 PROCEDURE — 99232 SBSQ HOSP IP/OBS MODERATE 35: CPT | Performed by: FAMILY MEDICINE

## 2023-09-02 PROCEDURE — 97116 GAIT TRAINING THERAPY: CPT

## 2023-09-02 RX ORDER — LEVOTHYROXINE SODIUM 0.03 MG/1
25 TABLET ORAL
Status: DISCONTINUED | OUTPATIENT
Start: 2023-09-02 | End: 2023-09-05 | Stop reason: HOSPADM

## 2023-09-02 RX ADMIN — FLUTICASONE PROPIONATE 2 SPRAY: 50 SPRAY, METERED NASAL at 08:43

## 2023-09-02 RX ADMIN — METRONIDAZOLE 500 MG: 500 INJECTION, SOLUTION INTRAVENOUS at 08:41

## 2023-09-02 RX ADMIN — PANTOPRAZOLE SODIUM 40 MG: 40 INJECTION, POWDER, LYOPHILIZED, FOR SOLUTION INTRAVENOUS at 16:59

## 2023-09-02 RX ADMIN — METRONIDAZOLE 500 MG: 500 INJECTION, SOLUTION INTRAVENOUS at 00:36

## 2023-09-02 RX ADMIN — METRONIDAZOLE 500 MG: 500 INJECTION, SOLUTION INTRAVENOUS at 16:59

## 2023-09-02 RX ADMIN — SODIUM CHLORIDE 2000 MG: 900 INJECTION INTRAVENOUS at 15:22

## 2023-09-02 RX ADMIN — HYDROMORPHONE HYDROCHLORIDE 0.5 MG: 1 INJECTION, SOLUTION INTRAMUSCULAR; INTRAVENOUS; SUBCUTANEOUS at 15:31

## 2023-09-02 RX ADMIN — Medication 10 ML: at 08:43

## 2023-09-02 RX ADMIN — HYDROMORPHONE HYDROCHLORIDE 0.5 MG: 1 INJECTION, SOLUTION INTRAMUSCULAR; INTRAVENOUS; SUBCUTANEOUS at 00:51

## 2023-09-02 RX ADMIN — PANTOPRAZOLE SODIUM 40 MG: 40 INJECTION, POWDER, LYOPHILIZED, FOR SOLUTION INTRAVENOUS at 08:42

## 2023-09-02 RX ADMIN — LEVOTHYROXINE SODIUM 25 MCG: 0.03 TABLET ORAL at 11:01

## 2023-09-02 RX ADMIN — METRONIDAZOLE 500 MG: 500 INJECTION, SOLUTION INTRAVENOUS at 23:43

## 2023-09-02 RX ADMIN — Medication 10 ML: at 23:43

## 2023-09-02 RX ADMIN — SODIUM CHLORIDE, SODIUM LACTATE, POTASSIUM CHLORIDE, CALCIUM CHLORIDE AND DEXTROSE MONOHYDRATE 100 ML/HR: 5; 600; 310; 30; 20 INJECTION, SOLUTION INTRAVENOUS at 00:40

## 2023-09-02 NOTE — PROGRESS NOTES
[facility] Medicine Services  PROGRESS NOTE    Patient Name: Siria Garcia  : 1955  MRN: [id number]    Date of Admission: 2023  Primary Care Physician: Sofia Hong MD    Subjective   Subjective     CC:  Perforated ulcer    HPI:   - Patient undergoing urologic procedure today.  No new concerns overnight.  Tolerating some p.o.  Has worked with therapy.     -patient doing much better.  Tolerated liquid diet for breakfast and looking forward to regular food for lunch.  Pain is well controlled with medications.    ROS:  Gen- No fevers, chills  CV- No chest pain, palpitations  Resp- No cough, dyspnea  GI- No current N/V/D, positive abd pain       Objective   Objective     Vital Signs:   Temp:  [97.8 °F (36.6 °C)-98.7 °F (37.1 °C)] 98.7 °F (37.1 °C)  Heart Rate:  [78-97] 87  Resp:  [14-16] 16  BP: (128-156)/(57-81) 156/74  Flow (L/min):  [2] 2     Physical Exam:  Constitutional: No acute distress, awake, alert  HENT: NCAT, mucous membranes moist  Respiratory: Clear to auscultation bilaterally, respiratory effort normal   Cardiovascular: RRR  Gastrointestinal: Positive bowel sounds, soft, moderately tender, nondistended  Musculoskeletal: Generally weak  Psychiatric: Appropriate affect, cooperative  Neurologic: Oriented x 3, generally weak, Cranial Nerves grossly intact to confrontation, speech clear  Skin: No rashes      Results Reviewed:  LAB RESULTS:      Lab 23  0404 23  0346 23  0345 23  0538 23  0423 23  0540 23  1955 23  1652   WBC 9.76  --  14.36*  --  22.71* 24.25*  --  26.63*   HEMOGLOBIN 9.8*  --  9.7*  --  10.6* 10.9*  --  13.0   HEMATOCRIT 29.8*  --  29.3*  --  32.4* 33.1*  --  40.2   PLATELETS 186  --  191  --  208 225  --  306   NEUTROS ABS 6.79  --  11.95*  --  20.44*  --   --  23.97*   IMMATURE GRANS (ABS) 0.20*  --  0.10*  --   --   --   --   --    LYMPHS ABS 1.49  --  1.25  --   --   --   --   --     MONOS ABS 1.11*  --  1.02*  --   --   --   --   --    EOS ABS 0.13  --  0.01  --  0.00  --   --  0.00   MCV 89.8  --  90.4  --  91.3 92.2  --  92.2   PROCALCITONIN  --  5.49*  --  12.58*  --  18.09*  --  23.71*   LACTATE  --   --   --   --  1.5 2.3* 2.0 3.6*           Lab 09/01/23  1801 09/01/23  0404 08/31/23  0346 08/30/23  0538 08/29/23  0540 08/28/23  1652   SODIUM  --  140 139 139 138 140   POTASSIUM  --  3.3* 4.1 4.8 4.5 4.6   CHLORIDE  --  106 105 105 105 100   CO2  --  27.0 25.0 24.0 23.0 23.0   ANION GAP  --  7.0 9.0 10.0 10.0 17.0*   BUN  --  25* 30* 31* 28* 29*   CREATININE  --  0.82 0.93 1.17* 1.35* 1.72*   EGFR  --  78.0 67.1 50.9* 42.9* 32.1*   GLUCOSE  --  131* 123* 91 134* 137*   CALCIUM  --  8.1* 8.0* 8.0* 8.0* 10.0   MAGNESIUM  --   --  2.1 2.4 1.8  --    PHOSPHORUS 2.6 2.2* 1.7*  --  3.3  --    HEMOGLOBIN A1C  --   --   --   --  5.80*  --    TSH  --   --   --   --  0.415  --            Lab 09/01/23  0404 08/28/23  1652   TOTAL PROTEIN  --  8.0   ALBUMIN 2.9* 4.3   GLOBULIN  --  3.7   ALT (SGPT)  --  24   AST (SGOT)  --  32   BILIRUBIN  --  0.4   ALK PHOS  --  93   LIPASE  --  79*                   Lab 08/28/23 2035 08/1955   ABO TYPING O O   RH TYPING Positive Positive   ANTIBODY SCREEN  --  Negative           Brief Urine Lab Results  (Last result in the past 365 days)        Color   Clarity   Blood   Leuk Est   Nitrite   Protein   CREAT   Urine HCG        08/29/23 0349             44.3                 Microbiology Results Abnormal       Procedure Component Value - Date/Time    Blood Culture - Blood, Arm, Right [[id number]]  (Normal) Collected: 08/28/23 1800    Lab Status: Preliminary result Specimen: Blood from Arm, Right Updated: 09/01/23 1945     Blood Culture No growth at 4 days    Body Fluid Culture - Body Fluid, Abdominal Wall [[id number]] Collected: 08/28/23 2210    Lab Status: Final result Specimen: Body Fluid from Abdominal Wall Updated: 09/01/23 0919     Body Fluid Culture No  growth at 3 days     Gram Stain Many (4+) WBCs seen      No organisms seen    Body Fluid Culture - Body Fluid, Abdominal Wall [[id number]] Collected: 08/28/23 2210    Lab Status: Final result Specimen: Body Fluid from Abdominal Wall Updated: 09/01/23 0919     Body Fluid Culture No growth at 3 days     Gram Stain Moderate (3+) WBCs seen      No organisms seen    Anaerobic Culture - Body Fluid, Abdominal Wall [[id number]]  (Normal) Collected: 08/28/23 2207    Lab Status: Preliminary result Specimen: Body Fluid from Abdominal Wall Updated: 09/01/23 0659     Anaerobic Culture No anaerobes isolated at 3 days    AFB Culture - Body Fluid, Abdominal Wall [[id number]] Collected: 08/28/23 2210    Lab Status: Preliminary result Specimen: Body Fluid from Abdominal Wall Updated: 08/29/23 1225     AFB Stain No acid fast bacilli seen on concentrated smear    AFB Culture - Body Fluid, Abdominal Wall [[id number]] Collected: 08/28/23 2210    Lab Status: Preliminary result Specimen: Body Fluid from Abdominal Wall Updated: 08/29/23 1225     AFB Stain No acid fast bacilli seen on concentrated smear            FL C Arm During Surgery    Result Date: 9/1/2023  This procedure was auto-finalized with no dictation required.         Current medications:  Scheduled Meds:cefTRIAXone, 2,000 mg, Intravenous, Q24H  fluticasone, 2 spray, Each Nare, Daily  levothyroxine, 25 mcg, Oral, Q AM  metroNIDAZOLE, 500 mg, Intravenous, Q8H  pantoprazole, 40 mg, Intravenous, BID AC  sodium chloride, 10 mL, Intravenous, Q12H      Continuous Infusions:dextrose 5 % and lactated Ringer's, 50 mL/hr, Last Rate: 50 mL/hr (09/02/23 0610)  lactated ringers, 9 mL/hr      PRN Meds:.  acetaminophen    [DISCONTINUED] senna-docusate sodium **AND** [DISCONTINUED] polyethylene glycol **AND** [DISCONTINUED] bisacodyl **AND** bisacodyl    Calcium Replacement - Follow Nurse / BPA Driven Protocol    HYDROmorphone    Magnesium Standard Dose Replacement - Follow Nurse / BPA Driven  Protocol    ondansetron    phenol    Phosphorus Replacement - Follow Nurse / BPA Driven Protocol    Potassium Replacement - Follow Nurse / BPA Driven Protocol    Sodium Chloride (PF)    sodium chloride    Assessment & Plan   Assessment & Plan     Active Hospital Problems    Diagnosis  POA    **K25.9, Pyloric ulcer perf. - S/P repair 08/28/23 [K25.9]  Yes    Perforated ulcer [K27.5]  Yes    Chronic low back pain [M54.50, G89.29]  Yes    SUJATA (acute kidney injury) [N17.9]  Yes    UTI (urinary tract infection) [N39.0]  Yes    Sepsis [A41.9]  Yes    Peritonitis [K65.9]  Yes    Nephrolithiasis [N20.0]  Yes    Obstructive uropathy [N13.9]  Yes    Benign essential hypertension [I10]  Yes    Gastroesophageal reflux disease [K21.9]  Yes    Hypercholesterolemia [E78.00]  Yes    Hypothyroidism [E03.9]  Yes      Resolved Hospital Problems   No resolved problems to display.        Brief Hospital Course to date:  Siria Garcia is a 67 y.o. female with history of hypertension, hypothyroidism, GERD, right ankle impingement syndrome followed by Ortho and has been on NSAIDs for right foot pain who presented to ED 8/28/2023 with abdominal pain.  CT on pelvis was concerning for free air and fluid left UPJ 1.3 cm stone.  She did undergo expiratory laparotomy by general surgery, was found to have perforated prepyloric ulcer s/p repair.  She was transferred to the ICU for ongoing care.  Patient did progress well.  [facility] medicine assumed her care 8/31/1023     Sepsis secondary to perforated prepyloric ulcer  E. coli bacteremia  -Patient presented with leukocytosis, tachycardia, lactic acidosis, elevated procalcitonin  -General surgery following, s/p repair by Dr. SWANSON continue postop care  -Blood cultures positive for E. coli  -Continue antibiotics, Rocephin/Flagyl/Diflucan  -Encourage ambulation     SUJATA  -Baseline creatinine ~0.8-1.0, patient presented with Cr of 1.72, likely secondary to above  -Currently  improving, back to baseline, IVF until tolerating p.o. well  -Avoid nephrotoxins     Hypertension  -BP currently normotensive, holding home meds for now     Obstructive uropathy  -Patient with 1.3 cm left UPJ stone  -Urology following, plan for cystoscopy  123     Hypothyroidism  -Continue Synthroid     Hypophosphatemia  -Continue to monitor replete per protocol     GERD  -Continue PPI        Expected Discharge Location and Transportation: Rehab versus home, private vehicle  Expected Discharge   Expected Discharge Date: 9/5/2023; Expected Discharge Time:      DVT prophylaxis:  Mechanical DVT prophylaxis orders are present.     AM-PAC 6 Clicks Score (PT): 21 (09/02/23 1400)    CODE STATUS:   Code Status and Medical Interventions:   Ordered at: 08/28/23 4545     Code Status (Patient has no pulse and is not breathing):    CPR (Attempt to Resuscitate)     Medical Interventions (Patient has pulse or is breathing):    Full Support       Regina Shoemaker MD  09/02/23

## 2023-09-02 NOTE — PROGRESS NOTES
INFECTIOUS DISEASE f/u     Siria Garcia  1955  [id number]    Date of Consult: 8/30/2023    Admission Date: 8/28/2023      Requesting Provider: No ref. provider found  Evaluating Physician: Matthew Manuel MD    Reason for Consultation: peritonitis    History of present illness:    Patient is a 67 y.o. female with hypothyroidism, chronic back pain, GERD, hypertension, hyperlipidemia presents to [facility] emergency room with nausea vomiting abdominal pain chills starting 8/26.  Patient found to have lactic acidosis acute renal failure and leukocytosis and markedly elevated procalcitonin.  CT scan pelvis revealed free air and fluid as well as a left ureteropelvic junction kidney stone with obstructive uropathy.  Patient has been taken to operating room for exploratory laparotomy and was found to have a perforated prepyloric ulcer.  Patient has been started on broad-spectrum antibiotics.  Blood cultures have yielded E. coli.  Operative cultures are pending    Urology has seen patient and their plans for cystoscopy with ureteral stent placement upon further stability we are consulted for antibiotic management    8/31/23; transferred to floor; feels well; no events overnight has mild abdominal pain    9/1/2023 doing well no events overnight no complaints denies fevers had had left ureteral stent, cystoscopy today    9/2/23: Still with abdominal pain, but better.  Denies f/c, sob, n/v, rashes.  Has some loose stools from bowel regimen.  Blood and urine cultures positive for E.coli.         Past Medical History:   Diagnosis Date    Arthritis     Breast cancer 2017    Colon polyp 2018    Diverticulosis 01/2018    Environmental allergies     Gall bladder stones     GERD (gastroesophageal reflux disease)     Hypertension     Low back pain     Plantar fasciitis     Tarsal tunnel syndrome of both lower extremities     UTI (urinary tract infection)        Past Surgical History:   Procedure Laterality Date     BREAST LUMPECTOMY  05/2017    CHOLECYSTECTOMY      COLONOSCOPY W/ BIOPSIES  01/25/2018    EXPLORATORY LAPAROTOMY N/A 8/28/2023    Procedure: LAPAROTOMY EXPLORATORY;  Surgeon: Gabriel Del Toro MD;  Location: [facility];  Service: General;  Laterality: N/A;       Family History   Problem Relation Age of Onset    Diabetes Father     Hypertension Father     Breast cancer Mother        Social History     Socioeconomic History    Marital status:    Tobacco Use    Smoking status: Never    Smokeless tobacco: Never   Vaping Use    Vaping Use: Never used   Substance and Sexual Activity    Alcohol use: No    Drug use: No    Sexual activity: Not Currently       Allergies   Allergen Reactions    Bactrim [Sulfamethoxazole-Trimethoprim] Itching    Terbinafine Itching         Medication:    Current Facility-Administered Medications:     acetaminophen (TYLENOL) suppository 650 mg, 650 mg, Rectal, Q4H PRN, Murray Torres MD    [DISCONTINUED] sennosides-docusate (PERICOLACE) 8.6-50 MG per tablet 2 tablet, 2 tablet, Oral, BID **AND** [DISCONTINUED] polyethylene glycol (MIRALAX) packet 17 g, 17 g, Oral, Daily PRN **AND** [DISCONTINUED] bisacodyl (DULCOLAX) EC tablet 5 mg, 5 mg, Oral, Daily PRN **AND** bisacodyl (DULCOLAX) suppository 10 mg, 10 mg, Rectal, Daily PRN, Murray Torres MD    Calcium Replacement - Follow Nurse / BPA Driven Protocol, , Does not apply, PRN, Murray Torres MD    cefTRIAXone (ROCEPHIN) 2000 mg/100 mL 0.9% NS IVPB (MBP), 2,000 mg, Intravenous, Q24H, Murray Torres MD, 2 g at 09/01/23 1417    dextrose 5 % and lactated Ringer's infusion, 50 mL/hr, Intravenous, Continuous, Gabriel Del Toro MD, Last Rate: 50 mL/hr at 09/02/23 0610, 50 mL/hr at 09/02/23 0610    fluticasone (FLONASE) 50 MCG/ACT nasal spray 2 spray, 2 spray, Each Nare, Daily, Murray Torres MD, 2 spray at 09/02/23 0843    HYDROmorphone (DILAUDID) injection 0.5 mg, 0.5 mg, Intravenous, Q2H PRN,  Murray Torres MD, 0.5 mg at 09/02/23 0051    lactated ringers infusion, 9 mL/hr, Intravenous, Continuous, Murray Torres MD, New Bag at 09/01/23 1417    levothyroxine (SYNTHROID, LEVOTHROID) tablet 25 mcg, 25 mcg, Oral, Q AM, Gabriel Del Toro MD, 25 mcg at 09/02/23 1101    Magnesium Standard Dose Replacement - Follow Nurse / BPA Driven Protocol, , Does not apply, PRN, Murray Torres MD    metroNIDAZOLE (FLAGYL) IVPB 500 mg, 500 mg, Intravenous, Q8H, Murray Torres MD, Last Rate: 100 mL/hr at 09/02/23 0841, 500 mg at 09/02/23 0841    ondansetron (ZOFRAN) injection 4 mg, 4 mg, Intravenous, Q6H PRN, Murray Torres MD    pantoprazole (PROTONIX) injection 40 mg, 40 mg, Intravenous, BID AC, Murray Torres MD, 40 mg at 09/02/23 0842    phenol (CHLORASEPTIC) 1.4 % liquid 1 spray, 1 spray, Mouth/Throat, Q2H PRN, Murray Torres MD, 1 spray at 08/29/23 1206    Phosphorus Replacement - Follow Nurse / BPA Driven Protocol, , Does not apply, PRBrian VILLEGAS Justin Dale, MD    Potassium Replacement - Follow Nurse / BPA Driven Protocol, , Does not apply, PRN, Murray Torres MD    Sodium Chloride (PF) 0.9 % 10 mL, 10 mL, Intravenous, PRN, Murray Torres MD    sodium chloride 0.9 % flush 10 mL, 10 mL, Intravenous, Q12H, Murray Torres MD, 10 mL at 09/02/23 0843    sodium chloride 0.9 % infusion 40 mL, 40 mL, Intravenous, PRN, Murray Torres MD    Antibiotics:  Anti-Infectives (From admission, onward)      Ordered     Dose/Rate Route Frequency Start Stop    08/31/23 1412  metroNIDAZOLE (FLAGYL) IVPB 500 mg        Ordering Provider: Murray Torres MD    500 mg  100 mL/hr over 60 Minutes Intravenous Every 8 Hours 08/31/23 1600 09/07/23 1559    08/31/23 1412  cefTRIAXone (ROCEPHIN) 2000 mg/100 mL 0.9% NS IVPB (MBP)        Ordering Provider: Murray Torres MD    2,000 mg  over 30 Minutes Intravenous Every 24 Hours 08/31/23 1500 09/07/23 4246     23 1145  meropenem (MERREM) 1000 mg/100 mL 0.9% NS (mbp)        Ordering Provider: Renan Tatum MD    1,000 mg  over 30 Minutes Intravenous Once 23 1245 23 1230    23 0022  fluconazole (DIFLUCAN) IVPB 200 mg        Ordering Provider: Renan Tatum MD    200 mg  100 mL/hr over 60 Minutes Intravenous Every 24 Hours Scheduled 23 0115 23 2203    23 2153  piperacillin-tazobactam (ZOSYN) 3.375 g in iso-osmotic dextrose 50 ml (premix)        Ordering Provider: Gabriel Del Toro MD    3.375 g  100 mL/hr over 0.5 Hours Intravenous Once 23 2155 23 194  piperacillin-tazobactam (ZOSYN) 3.375 g in iso-osmotic dextrose 50 ml (premix)        Ordering Provider: Axel Werner PA    3.375 g Intravenous Once 23 2045 23 1746  cefTRIAXone (ROCEPHIN) 2000 mg/100 mL 0.9% NS IVPB (MBP)        Ordering Provider: Axel Werner PA    2,000 mg  over 30 Minutes Intravenous Once 23 1802 23 1948              Review of Systems:  See hpi      Physical Exam:   Vital Signs  Temp (24hrs), Av.5 °F (36.9 °C), Min:97.8 °F (36.6 °C), Max:98.7 °F (37.1 °C)    Temp  Min: 97.8 °F (36.6 °C)  Max: 98.7 °F (37.1 °C)  BP  Min: 128/68  Max: 156/74  Pulse  Min: 78  Max: 97  Resp  Min: 14  Max: 16  SpO2  Min: 92 %  Max: 96 %    GENERAL: Resting comfortably in no acute distress.   HEENT: Normocephalic, atraumatic.  PERRL. EOMI. No conjunctival injection. No icterus.  No external oral lesions  LUNGS: CTA bilaterally  ABDOMEN: abdominal wound c/d/i  EXT:  1+ edema  MSK: No joint effusions or erythema  SKIN: Warm and dry without cutaneous eruptions on Inspection/palpation.      Laboratory Data    Results from last 7 days   Lab Units 23  0404 23  0345 23  0423   WBC 10*3/mm3 9.76 14.36* 22.71*   HEMOGLOBIN g/dL 9.8* 9.7* 10.6*   HEMATOCRIT % 29.8* 29.3* 32.4*   PLATELETS 10*3/mm3 186 191 208       Results from last 7 days    Lab Units 09/01/23  0404   SODIUM mmol/L 140   POTASSIUM mmol/L 3.3*   CHLORIDE mmol/L 106   CO2 mmol/L 27.0   BUN mg/dL 25*   CREATININE mg/dL 0.82   GLUCOSE mg/dL 131*   CALCIUM mg/dL 8.1*       Results from last 7 days   Lab Units 08/28/23  1652   ALK PHOS U/L 93   BILIRUBIN mg/dL 0.4   ALT (SGPT) U/L 24   AST (SGOT) U/L 32               Results from last 7 days   Lab Units 08/30/23  0423   LACTATE mmol/L 1.5               Estimated Creatinine Clearance: 69.3 mL/min (by C-G formula based on SCr of 0.82 mg/dL).      Microbiology:  Microbiology Results (last 10 days)       Procedure Component Value - Date/Time    Body Fluid Culture - Body Fluid, Abdominal Wall [[id number]] Collected: 08/28/23 2210    Lab Status: Final result Specimen: Body Fluid from Abdominal Wall Updated: 09/01/23 0919     Body Fluid Culture No growth at 3 days     Gram Stain Moderate (3+) WBCs seen      No organisms seen    Body Fluid Culture - Body Fluid, Abdominal Wall [[id number]] Collected: 08/28/23 2210    Lab Status: Final result Specimen: Body Fluid from Abdominal Wall Updated: 09/01/23 0919     Body Fluid Culture No growth at 3 days     Gram Stain Many (4+) WBCs seen      No organisms seen    AFB Culture - Body Fluid, Abdominal Wall [[id number]] Collected: 08/28/23 2210    Lab Status: Preliminary result Specimen: Body Fluid from Abdominal Wall Updated: 08/29/23 1225     AFB Stain No acid fast bacilli seen on concentrated smear    AFB Culture - Body Fluid, Abdominal Wall [[id number]] Collected: 08/28/23 2210    Lab Status: Preliminary result Specimen: Body Fluid from Abdominal Wall Updated: 08/29/23 1225     AFB Stain No acid fast bacilli seen on concentrated smear    Anaerobic Culture - Body Fluid, Abdominal Wall [[id number]]  (Normal) Collected: 08/28/23 2207    Lab Status: Preliminary result Specimen: Body Fluid from Abdominal Wall Updated: 09/01/23 0659     Anaerobic Culture No anaerobes isolated at 3 days    Blood Culture - Blood, Arm,  Right [[id number]]  (Normal) Collected: 08/28/23 1800    Lab Status: Preliminary result Specimen: Blood from Arm, Right Updated: 09/01/23 1945     Blood Culture No growth at 4 days    Blood Culture - Blood, Arm, Left [[id number]]  (Abnormal)  (Susceptibility) Collected: 08/28/23 1755    Lab Status: Final result Specimen: Blood from Arm, Left Updated: 08/31/23 0607     Blood Culture Escherichia coli     Isolated from Anaerobic Bottle     Gram Stain Anaerobic Bottle Gram negative bacilli    Susceptibility        Escherichia coli      SERGIO      Ampicillin Susceptible      Ampicillin + Sulbactam Susceptible      Cefepime Susceptible      Ceftazidime Susceptible      Ceftriaxone Susceptible      Gentamicin Susceptible      Levofloxacin Susceptible      Piperacillin + Tazobactam Susceptible      Trimethoprim + Sulfamethoxazole Resistant                       Susceptibility Comments       Escherichia coli    Cefazolin sensitivity will not be reported for Enterobacteriaceae in non-urine isolates. If cefazolin is preferred, please call the microbiology lab to request an E-test.  With the exception of urinary-sourced infections, aminoglycosides should not be used as monotherapy.               Blood Culture ID, PCR - Blood, Arm, Left [[id number]]  (Abnormal) Collected: 08/28/23 1755    Lab Status: Final result Specimen: Blood from Arm, Left Updated: 08/29/23 1246     BCID, PCR Escherichia coli. Identification by BCID2 PCR.     BOTTLE TYPE Anaerobic Bottle    Narrative:      No resistance genes detected.    Urine Culture - Urine, Urine, Clean Catch [[id number]]  (Abnormal)  (Susceptibility) Collected: 08/28/23 1653    Lab Status: Final result Specimen: Urine, Clean Catch Updated: 08/30/23 0531     Urine Culture >100,000 CFU/mL Escherichia coli    Narrative:      Colonization of the urinary tract without infection is common. Treatment is discouraged unless the patient is symptomatic, pregnant, or undergoing an invasive urologic  procedure.    Susceptibility        Escherichia coli      SERGIO      Ampicillin Susceptible      Ampicillin + Sulbactam Susceptible      Cefazolin Susceptible      Cefepime Susceptible      Ceftazidime Susceptible      Ceftriaxone Susceptible      Gentamicin Susceptible      Levofloxacin Susceptible      Nitrofurantoin Susceptible      Piperacillin + Tazobactam Susceptible      Trimethoprim + Sulfamethoxazole Resistant                                       Radiology:  Imaging Results (Last 72 Hours)       Procedure Component Value Units Date/Time    FL C Arm During Surgery [[id number]] Resulted: 09/01/23 1515     Updated: 09/01/23 1515    Narrative:      This procedure was auto-finalized with no dictation required.    FL Upper GI Water Soluble [[id number]] Collected: 08/31/23 1018     Updated: 08/31/23 1745    Narrative:      FL UPPER GI WATER SOLUBLE    Date of Exam: 8/31/2023 9:36 AM EDT    Indication: Status post repair of perforated prepyloric ulcer    Comparison: None available.    Technique:   radiograph of the abdomen was obtained. A single contrast radiographic exam was performed imaging through the upper gastrointestinal tract.    Fluoroscopic Time: 2 minutes and 12 seconds    Number of Images: 11 associated fluoroscopic series were saved    Findings:   imaging reveals a nonobstructive bowel gas pattern. There is an NG tube visible in the left upper quadrant. Surgical clips are visible in the gallbladder fossa. Under fluoroscopic observation, the patient ingested water-soluble contrast. The oral   phase of deglutition appeared normal. There are few small diverticula visible of the midesophagus. These diverticulum do not appear to retain significant contrast after swallows. Significant gastroesophageal reflux was not demonstrated during this exam.   Examination of the stomach demonstrated grossly normal gastric mucosa and gastric folds. The patient is status post perforated prepyloric ulcer repair. No  extravasation of contrast was seen. There was no delay in gastric emptying.      Impression:      Impression:      1. Status post perforated prepyloric ulcer repair. There was no evidence of extraluminal contrast. There was no delay in gastric emptying.  2. Esophageal diverticula    Electronically Signed: Christopher Templeton MD    8/31/2023 5:42 PM EDT    Workstation ID: [id number]              Impression:   E. coli bacteremia  Peritonitis  Prepyloric perforated viscus s/p repair  Left-sided kidney stone with obstructive uropathy s/p stent  Leukocytosis with neutrophilia  Acute renal failure  Procalcitonin elevation    PLAN/RECOMMENDATIONS:   Thank you for asking us to see Siria Garcia, I recommend the following:      Urine cultures and blood cultures growing E. Coli susc to rocephin    From a perforated viscus standpoint patient at risk for peritonitis from gram-negative enteric's and anaerobes.    S/p fluconazole x 5 days stopped on 9/2    Continue rocephin 2 g iv daily   Continue flagyl 500 mg iv q8h      Matthew Manuel MD saw and examined patient, verified hx and PE, reviewed labs and micro data, and formulated dx, plan for treatment and all medical decision making.      Mayank Kamara PA-C for Matthew Manuel MD    I have seen and examined patient agree with above    We are covering for both E. coli sepsis genitourinary infection pyelonephritis as well as peritonitis  BRENDA Sterling  9/2/2023  13:32 EDT

## 2023-09-02 NOTE — PLAN OF CARE
Goal Outcome Evaluation:                 No Acute events overnight. VSS, No c/o pain or nausea. Had bm once, and passing gas. Slept and rested well throughout shift

## 2023-09-02 NOTE — PROGRESS NOTES
"Siria Garcia  1955  [id number]    Surgery Progress Note    Date of visit: 9/2/2023    Subjective: Feels better  Tolerating clear liquids  Had a bowel movement    Objective:    /79 (BP Location: Right arm, Patient Position: Lying)   Pulse 78   Temp 98 °F (36.7 °C) (Oral)   Resp 16   Ht 160 cm (63\")   Wt 88.7 kg (195 lb 8 oz)   SpO2 92%   BMI 34.63 kg/m²     Intake/Output Summary (Last 24 hours) at 9/2/2023 0900  Last data filed at 9/2/2023 0841  Gross per 24 hour   Intake 3000 ml   Output 2715 ml   Net 285 ml       CV: Regular rate and rhythm  L: normal air entry  Abd: Soft  Dressing is intact and dry  [place] with minimal serosanguineous drainage  Minimal incisional tenderness    LABS:    Results from last 7 days   Lab Units 09/01/23  0404   WBC 10*3/mm3 9.76   HEMOGLOBIN g/dL 9.8*   HEMATOCRIT % 29.8*   PLATELETS 10*3/mm3 186     Results from last 7 days   Lab Units 09/01/23  0404 08/29/23  0540 08/28/23  1652   SODIUM mmol/L 140   < > 140   POTASSIUM mmol/L 3.3*   < > 4.6   CHLORIDE mmol/L 106   < > 100   CO2 mmol/L 27.0   < > 23.0   BUN mg/dL 25*   < > 29*   CREATININE mg/dL 0.82   < > 1.72*   CALCIUM mg/dL 8.1*   < > 10.0   BILIRUBIN mg/dL  --   --  0.4   ALK PHOS U/L  --   --  93   ALT (SGPT) U/L  --   --  24   AST (SGOT) U/L  --   --  32   GLUCOSE mg/dL 131*   < > 137*    < > = values in this interval not displayed.     Results from last 7 days   Lab Units 09/01/23  0404   SODIUM mmol/L 140   POTASSIUM mmol/L 3.3*   CHLORIDE mmol/L 106   CO2 mmol/L 27.0   BUN mg/dL 25*   CREATININE mg/dL 0.82   GLUCOSE mg/dL 131*   CALCIUM mg/dL 8.1*     Lab Results   Lab Value Date/Time    LIPASE 79 (H) 08/28/2023 1652         Assessment/ Plan: Labile course status post emergent abdominal exploration and repair of perforated prepyloric ulcer  Upper GI showed no extravasation  Patient was started on clear liquid diet  Plan to advance her diet slowly   increase activity  Continue with IV antibiotic " management per ID      Problem List Items Addressed This Visit          Other    Sepsis     Other Visit Diagnoses       Acute UTI    -  Primary    Relevant Medications    cefTRIAXone (ROCEPHIN) 2000 mg/100 mL 0.9% NS IVPB (MBP) (Completed)    piperacillin-tazobactam (ZOSYN) 3.375 g in iso-osmotic dextrose 50 ml (premix) (Completed)    piperacillin-tazobactam (ZOSYN) 3.375 g in iso-osmotic dextrose 50 ml (premix) (Completed)    cefTRIAXone (ROCEPHIN) 2000 mg/100 mL 0.9% NS IVPB (MBP)    Ureteropelvic junction (UPJ) obstruction        Free fluid in pelvis        History of gastroesophageal reflux (GERD)        History of hypertension        History of breast cancer        Acute kidney injury        Abdominal pain        Relevant Orders    Tissue Pathology Exam (Completed)    Anaerobic Culture - Body Fluid, Abdominal Wall (Completed)    Fungus Culture - Body Fluid, Abdominal Wall    Fungus Culture - Body Fluid, Abdominal Wall    Body Fluid Culture - Body Fluid, Abdominal Wall (Completed)    Body Fluid Culture - Body Fluid, Abdominal Wall (Completed)    AFB Culture - Body Fluid, Abdominal Wall (Completed)    AFB Culture - Body Fluid, Abdominal Wall (Completed)              Gabriel Del Toro MD  9/2/2023  09:00 EDT

## 2023-09-02 NOTE — PLAN OF CARE
Goal Outcome Evaluation:  Plan of Care Reviewed With: patient, family        Progress: improving  Outcome Evaluation: Pt able to increase gait distance this date, gave good effort with therex. Pt remains below baseline level of function for mobility and will continue to benefit from IPPT services. Pt progressing well toward goals, should be able to d/c home with son and resume outpatient PT.      Anticipated Discharge Disposition (PT): home with assist, home with outpatient therapy services

## 2023-09-02 NOTE — THERAPY TREATMENT NOTE
Patient Name: Siria Garcia  : 1955    MRN: [id number]                              Today's Date: 2023       Admit Date: 2023    Visit Dx:     ICD-10-CM ICD-9-CM   1. Acute UTI  N39.0 599.0   2. Sepsis, due to unspecified organism, unspecified whether acute organ dysfunction present  A41.9 038.9     995.91   3. Ureteropelvic junction (UPJ) obstruction  N13.5 593.4   4. Free fluid in pelvis  R18.8 789.59   5. History of gastroesophageal reflux (GERD)  Z87.19 V12.79   6. History of hypertension  Z86.79 V12.59   7. History of breast cancer  Z85.3 V10.3   8. Acute kidney injury  N17.9 584.9   9. Abdominal pain  R10.9 789.00     Patient Active Problem List   Diagnosis    Acute low back pain    Benign essential hypertension    Gastroesophageal reflux disease    Fatigue    Foot pain    Hypercholesterolemia    Hypothyroidism    Knee pain    Osteoarthritis of hand    Vaginal yeast infection    Sore throat    Vaginitis and vulvovaginitis    Malignant neoplasm of upper-outer quadrant of breast in female, estrogen receptor positive    Tubular adenoma    Elevated glucose    Hypercalcemia    Vitamin D deficiency    K25.9, Pyloric ulcer perf. - S/P repair 23    Chronic low back pain    SUJATA (acute kidney injury)    UTI (urinary tract infection)    Sepsis    Peritonitis    Nephrolithiasis    Obstructive uropathy    Perforated ulcer     Past Medical History:   Diagnosis Date    Arthritis     Breast cancer 2017    Colon polyp 2018    Diverticulosis 2018    Environmental allergies     Gall bladder stones     GERD (gastroesophageal reflux disease)     Hypertension     Low back pain     Plantar fasciitis     Tarsal tunnel syndrome of both lower extremities     UTI (urinary tract infection)      Past Surgical History:   Procedure Laterality Date    BREAST LUMPECTOMY  2017    CHOLECYSTECTOMY      COLONOSCOPY W/ BIOPSIES  2018    EXPLORATORY LAPAROTOMY N/A 2023    Procedure: LAPAROTOMY EXPLORATORY;   Surgeon: Gabriel Del Toro MD;  Location: [facility] [place];  Service: General;  Laterality: N/A;      General Information       Row Name 09/02/23 1314          Physical Therapy Time and Intention    Document Type therapy note (daily note)  -SD     Mode of Treatment physical therapy  -SD       Row Name 09/02/23 1314          General Information    Existing Precautions/Restrictions other (see comments)  alex drain, abdominal binder, incision  -SD       Row Name 09/02/23 1314          Cognition    Orientation Status (Cognition) oriented x 4  -SD       Row Name 09/02/23 1314          Safety Issues, Functional Mobility    Safety Issues Affecting Function (Mobility) sequencing abilities  -SD     Impairments Affecting Function (Mobility) balance;strength;pain;postural/trunk control  -SD               User Key  (r) = Recorded By, (t) = Taken By, (c) = Cosigned By      Initials Name Provider Type    SD Rachelle Mathew, PT Physical Therapist                   Mobility       Row Name 09/02/23 1351          Bed Mobility    Comment, (Bed Mobility) UIC  -SD       Row Name 09/02/23 1351          Transfers    Comment, (Transfers) pt able to t/f on her own with BUE support on armrests  -SD       Row Name 09/02/23 1351          Sit-Stand Transfer    Sit-Stand [place] (Transfers) supervision  -SD     Assistive Device (Sit-Stand Transfers) walker, front-wheeled  -SD       Row Name 09/02/23 1351          Gait/Stairs (Locomotion)    [place] Level (Gait) standby assist  -SD     Assistive Device (Gait) walker, front-wheeled  -SD     Distance in Feet (Gait) 350  -SD     Deviations/Abnormal Patterns (Gait) araceli decreased;gait speed decreased;base of support, narrow  -SD     Bilateral Gait Deviations heel strike decreased;forward flexed posture  -SD     Comment, (Gait/Stairs) Pt amb in hallway with step-through gait pattern with forward flexed posture due to incisional pain. Distance limited by needing to use the bathroom.  -SD                User Key  (r) = Recorded By, (t) = Taken By, (c) = Cosigned By      Initials Name Provider Type    Rachelle Ryan PT Physical Therapist                   Obj/Interventions       Row Name 09/02/23 1354          Motor Skills    Motor Skills functional endurance  -SD     Functional Endurance Pt performed sit <> stand activity x6 reps  -SD     Therapeutic Exercise ankle;knee  -SD       Row Name 09/02/23 1354          Knee (Therapeutic Exercise)    Knee Strengthening (Therapeutic Exercise) bilateral;hamstring curls;standing;15 repititions  -SD       [facility] Name 09/02/23 1354          Ankle (Therapeutic Exercise)    Ankle (Therapeutic Exercise) AROM (active range of motion)  -SD     Ankle AROM (Therapeutic Exercise) bilateral;plantarflexion;standing;10 repetitions  -SD       [facility] Name 09/02/23 1354          Balance    Static Sitting Balance independent  -SD     Dynamic Sitting Balance supervision  -SD     Position, Sitting Balance unsupported;sitting in chair  -SD     Static Standing Balance independent  -SD     Dynamic Standing Balance contact guard  -SD     Position/Device Used, Standing Balance unsupported  -SD     Balance Interventions sitting;standing;static;dynamic;occupation based/functional task  -SD               User Key  (r) = Recorded By, (t) = Taken By, (c) = Cosigned By      Initials Name Provider Type    Rachelle Ryan PT Physical Therapist                   Goals/Plan    No documentation.                  Clinical Impression       [facility] Name 09/02/23 1355          Pain    Pain Intervention(s) Repositioned;Ambulation/increased activity  -SD       [facility] Name 09/02/23 1357          Pain Scale: FACES Pre/Post-Treatment    Pain: FACES Scale, Pretreatment 4-->hurts little more  -SD     Posttreatment Pain Rating 4-->hurts little more  -SD     Pain Location incisional  -SD     Pain Location - abdomen  -SD       [facility] Name 09/02/23 1792          Plan of Care Review    Plan of Care Reviewed  With patient;family  -SD     Progress improving  -SD     Outcome Evaluation Pt able to increase gait distance this date, gave good effort with therex. Pt remains below baseline level of function for mobility and will continue to benefit from IPPT services. Pt progressing well toward goals, should be able to d/c home with son and resume outpatient PT.  -SD       Row Name 09/02/23 1355          Vital Signs    Pre Systolic BP Rehab 156  -SD     Pre Treatment Diastolic BP 74  -SD     Pretreatment Heart Rate (beats/min) 81  -SD     Pre SpO2 (%) 94  -SD     O2 Delivery Pre Treatment room air  -SD     Pre Patient Position Sitting  -SD     Post Patient Position Sitting  -SD       Row Name 09/02/23 1355          Positioning and Restraints    Pre-Treatment Position sitting in chair/recliner  -SD     Post Treatment Position bathroom  -SD     Bathroom sitting;call light within reach;with family/caregiver  -SD               User Key  (r) = Recorded By, (t) = Taken By, (c) = Cosigned By      Initials Name Provider Type    Rachelle Ryan, PT Physical Therapist                   Outcome Measures       Row Name 09/02/23 1400          How much help from another person do you currently need...    Turning from your back to your side while in flat bed without using bedrails? 3  -SD     Moving from lying on back to sitting on the side of a flat bed without bedrails? 3  -SD     Moving to and from a bed to a chair (including a wheelchair)? 4  -SD     Standing up from a chair using your arms (e.g., wheelchair, bedside chair)? 4  -SD     Climbing 3-5 steps with a railing? 3  -SD     To walk in hospital room? 4  -SD     AM-PAC 6 Clicks Score (PT) 21  -SD     Highest level of mobility 6 --> Walked 10 steps or more  -SD       Row Name 09/02/23 1400          Functional Assessment    Outcome Measure Options AM-PAC 6 Clicks Basic Mobility (PT)  -SD               User Key  (r) = Recorded By, (t) = Taken By, (c) = Cosigned By      Initials  Name Provider Type    SD Rachelle Mathew, LISA Physical Therapist                                 Physical Therapy Education       Title: PT OT SLP Therapies (In Progress)       Topic: Physical Therapy (Done)       Point: Mobility training (Done)       Learning Progress Summary             Patient Eager, E,D,H, VU,DU by SD at 9/2/2023 1401    Acceptance, E, NR by AY at 8/29/2023 1405   Family Eager, E,D,H, VU,DU by SD at 9/2/2023 1401                         Point: Home exercise program (Done)       Learning Progress Summary             Patient Eager, E,D,H, VU,DU by SD at 9/2/2023 1401    Acceptance, E, NR by AY at 8/29/2023 1405   Family Eager, E,D,H, VU,DU by SD at 9/2/2023 1401                         Point: Body mechanics (Done)       Learning Progress Summary             Patient Eager, E,D,H, VU,DU by SD at 9/2/2023 1401    Acceptance, E, NR by AY at 8/29/2023 1405   Family Eager, E,D,H, VU,DU by SD at 9/2/2023 1401                         Point: Precautions (Done)       Learning Progress Summary             Patient Eager, E,D,H, VU,DU by SD at 9/2/2023 1401    Acceptance, E, NR by AY at 8/29/2023 1405   Family Eager, E,D,H, VU,DU by SD at 9/2/2023 1401                                         User Key       Initials Effective Dates Name Provider Type Discipline    SD 03/13/23 -  Rachelle Mathew, LISA Physical Therapist PT    AY 11/10/20 -  Erika Madrigal PT Physical Therapist PT                  PT Recommendation and Plan     Plan of Care Reviewed With: patient, family  Progress: improving  Outcome Evaluation: Pt able to increase gait distance this date, gave good effort with therex. Pt remains below baseline level of function for mobility and will continue to benefit from IPPT services. Pt progressing well toward goals, should be able to d/c home with son and resume outpatient PT.     Time Calculation:         PT Charges       Row Name 09/02/23 1402             Time Calculation    Start Time 1314  -SD       PT Received On 09/02/23  -SD      PT Goal Re-Cert Due Date 09/08/23  -SD         Time Calculation- PT    Total Timed Code Minutes- PT 39 minute(s)  -SD         Timed Charges    19737 - PT Therapeutic Exercise Minutes 13  -SD      03616 - Gait Training Minutes  26  -SD         Total Minutes    Timed Charges Total Minutes 39  -SD       Total Minutes 39  -SD                User Key  (r) = Recorded By, (t) = Taken By, (c) = Cosigned By      Initials Name Provider Type    Rachelle Ryan, LISA Physical Therapist                  Therapy Charges for Today       Code Description Service Date Service Provider Modifiers Qty    59723461803 HC PT THER PROC EA 15 MIN 9/2/2023 Rachelle Mathew, PT GP 1    20055747037 HC GAIT TRAINING EA 15 MIN 9/2/2023 Rachelle Mathew, PT GP 2            PT G-Codes  Outcome Measure Options: AM-PAC 6 Clicks Basic Mobility (PT)  AM-PAC 6 Clicks Score (PT): 21  AM-PAC 6 Clicks Score (OT): 12  PT Discharge Summary  Anticipated Discharge Disposition (PT): home with assist, home with outpatient therapy services    Rachelle Mathew, LISA  9/2/2023

## 2023-09-02 NOTE — PLAN OF CARE
Goal Outcome Evaluation:  Plan of Care Reviewed With: patient         VSS. Awake and alert. On room air. Sinus Rhythm on telemetry. Patient up in chair most of the shift. Tolerating liquid diet.

## 2023-09-03 LAB — BACTERIA SPEC ANAEROBE CULT: NORMAL

## 2023-09-03 PROCEDURE — 25010000002 CEFTRIAXONE PER 250 MG: Performed by: UROLOGY

## 2023-09-03 PROCEDURE — 99232 SBSQ HOSP IP/OBS MODERATE 35: CPT | Performed by: FAMILY MEDICINE

## 2023-09-03 PROCEDURE — 25010000002 METRONIDAZOLE 500 MG/100ML SOLUTION: Performed by: UROLOGY

## 2023-09-03 PROCEDURE — 25010000002 HYDROMORPHONE PER 4 MG: Performed by: UROLOGY

## 2023-09-03 RX ORDER — HYDROCODONE BITARTRATE AND ACETAMINOPHEN 5; 325 MG/1; MG/1
1 TABLET ORAL EVERY 6 HOURS PRN
Status: DISCONTINUED | OUTPATIENT
Start: 2023-09-03 | End: 2023-09-05 | Stop reason: HOSPADM

## 2023-09-03 RX ADMIN — PANTOPRAZOLE SODIUM 40 MG: 40 INJECTION, POWDER, LYOPHILIZED, FOR SOLUTION INTRAVENOUS at 09:27

## 2023-09-03 RX ADMIN — METRONIDAZOLE 500 MG: 500 INJECTION, SOLUTION INTRAVENOUS at 16:20

## 2023-09-03 RX ADMIN — HYDROMORPHONE HYDROCHLORIDE 0.5 MG: 1 INJECTION, SOLUTION INTRAMUSCULAR; INTRAVENOUS; SUBCUTANEOUS at 18:49

## 2023-09-03 RX ADMIN — HYDROMORPHONE HYDROCHLORIDE 0.5 MG: 1 INJECTION, SOLUTION INTRAMUSCULAR; INTRAVENOUS; SUBCUTANEOUS at 01:09

## 2023-09-03 RX ADMIN — SODIUM CHLORIDE 2000 MG: 900 INJECTION INTRAVENOUS at 16:19

## 2023-09-03 RX ADMIN — PANTOPRAZOLE SODIUM 40 MG: 40 INJECTION, POWDER, LYOPHILIZED, FOR SOLUTION INTRAVENOUS at 16:19

## 2023-09-03 RX ADMIN — Medication 10 ML: at 19:55

## 2023-09-03 RX ADMIN — Medication 10 ML: at 09:27

## 2023-09-03 RX ADMIN — FLUTICASONE PROPIONATE 2 SPRAY: 50 SPRAY, METERED NASAL at 09:26

## 2023-09-03 RX ADMIN — LEVOTHYROXINE SODIUM 25 MCG: 0.03 TABLET ORAL at 06:28

## 2023-09-03 RX ADMIN — METRONIDAZOLE 500 MG: 500 INJECTION, SOLUTION INTRAVENOUS at 09:26

## 2023-09-03 NOTE — PROGRESS NOTES
INFECTIOUS DISEASE f/u     Siria Garcia  1955  [id number]    Date of Consult: 8/30/2023    Admission Date: 8/28/2023      Requesting Provider: No ref. provider found  Evaluating Physician: Matthew Manuel MD    Reason for Consultation: peritonitis    History of present illness:    Patient is a 67 y.o. female with hypothyroidism, chronic back pain, GERD, hypertension, hyperlipidemia presents to [facility] emergency room with nausea vomiting abdominal pain chills starting 8/26.  Patient found to have lactic acidosis acute renal failure and leukocytosis and markedly elevated procalcitonin.  CT scan pelvis revealed free air and fluid as well as a left ureteropelvic junction kidney stone with obstructive uropathy.  Patient has been taken to operating room for exploratory laparotomy and was found to have a perforated prepyloric ulcer.  Patient has been started on broad-spectrum antibiotics.  Blood cultures have yielded E. coli.  Operative cultures are pending    Urology has seen patient and their plans for cystoscopy with ureteral stent placement upon further stability we are consulted for antibiotic management    8/31/23; transferred to floor; feels well; no events overnight has mild abdominal pain    9/1/2023 doing well no events overnight no complaints denies fevers had had left ureteral stent, cystoscopy today    9/2/23: Still with abdominal pain, but better.  Denies f/c, sob, n/v, rashes.  Has some loose stools from bowel regimen.  Blood and urine cultures positive for E.coli.     9/3/23: Still with abdominal pain but not worse.  Has explosive diarrhea, but no n/v. Denies f/c, sob, rashes.         Past Medical History:   Diagnosis Date    Arthritis     Breast cancer 2017    Colon polyp 2018    Diverticulosis 01/2018    Environmental allergies     Gall bladder stones     GERD (gastroesophageal reflux disease)     Hypertension     Low back pain     Plantar fasciitis     Tarsal tunnel syndrome of  both lower extremities     UTI (urinary tract infection)        Past Surgical History:   Procedure Laterality Date    BREAST LUMPECTOMY  05/2017    CHOLECYSTECTOMY      COLONOSCOPY W/ BIOPSIES  01/25/2018    EXPLORATORY LAPAROTOMY N/A 8/28/2023    Procedure: LAPAROTOMY EXPLORATORY;  Surgeon: Gabriel Del Toro MD;  Location: [facility];  Service: General;  Laterality: N/A;       Family History   Problem Relation Age of Onset    Diabetes Father     Hypertension Father     Breast cancer Mother        Social History     Socioeconomic History    Marital status:    Tobacco Use    Smoking status: Never    Smokeless tobacco: Never   Vaping Use    Vaping Use: Never used   Substance and Sexual Activity    Alcohol use: No    Drug use: No    Sexual activity: Not Currently       Allergies   Allergen Reactions    Bactrim [Sulfamethoxazole-Trimethoprim] Itching    Terbinafine Itching         Medication:    Current Facility-Administered Medications:     acetaminophen (TYLENOL) suppository 650 mg, 650 mg, Rectal, Q4H PRN, Murray Torres MD    [DISCONTINUED] sennosides-docusate (PERICOLACE) 8.6-50 MG per tablet 2 tablet, 2 tablet, Oral, BID **AND** [DISCONTINUED] polyethylene glycol (MIRALAX) packet 17 g, 17 g, Oral, Daily PRN **AND** [DISCONTINUED] bisacodyl (DULCOLAX) EC tablet 5 mg, 5 mg, Oral, Daily PRN **AND** bisacodyl (DULCOLAX) suppository 10 mg, 10 mg, Rectal, Daily PRN, Murray oTrres MD    Calcium Replacement - Follow Nurse / BPA Driven Protocol, , Does not apply, PRN, Murray Torres MD    cefTRIAXone (ROCEPHIN) 2000 mg/100 mL 0.9% NS IVPB (MBP), 2,000 mg, Intravenous, Q24H, Murray Torres MD, 2,000 mg at 09/03/23 1619    fluticasone (FLONASE) 50 MCG/ACT nasal spray 2 spray, 2 spray, Each Nare, Daily, Murray Torres MD, 2 spray at 09/03/23 0926    HYDROcodone-acetaminophen (NORCO) 5-325 MG per tablet 1 tablet, 1 tablet, Oral, Q6H PRN, Gabriel Del Toro MD    HYDROmorphone  (DILAUDID) injection 0.5 mg, 0.5 mg, Intravenous, Q2H PRN, Murray Torres MD, 0.5 mg at 09/03/23 0109    lactated ringers infusion, 9 mL/hr, Intravenous, Continuous, Murray Torres MD, New Bag at 09/01/23 1417    levothyroxine (SYNTHROID, LEVOTHROID) tablet 25 mcg, 25 mcg, Oral, Q AM, Gabriel Del Toro MD, 25 mcg at 09/03/23 0628    Magnesium Standard Dose Replacement - Follow Nurse / BPA Driven Protocol, , Does not apply, PRN, Murray Torres MD    metroNIDAZOLE (FLAGYL) IVPB 500 mg, 500 mg, Intravenous, Q8H, Murray Torres MD, Last Rate: 100 mL/hr at 09/03/23 1620, 500 mg at 09/03/23 1620    ondansetron (ZOFRAN) injection 4 mg, 4 mg, Intravenous, Q6H PRN, Murray Torres MD    pantoprazole (PROTONIX) injection 40 mg, 40 mg, Intravenous, BID AC, Murray Torres MD, 40 mg at 09/03/23 1619    phenol (CHLORASEPTIC) 1.4 % liquid 1 spray, 1 spray, Mouth/Throat, Q2H PRN, Murray Torres MD, 1 spray at 08/29/23 1206    Phosphorus Replacement - Follow Nurse / BPA Driven Protocol, , Does not apply, PRBrian VILLEGAS Justin Dale, MD    Potassium Replacement - Follow Nurse / BPA Driven Protocol, , Does not apply, PRN, Murray Torres MD    Sodium Chloride (PF) 0.9 % 10 mL, 10 mL, Intravenous, PRN, Murray Torres MD    sodium chloride 0.9 % flush 10 mL, 10 mL, Intravenous, Q12H, Murray Torres MD, 10 mL at 09/03/23 0927    sodium chloride 0.9 % infusion 40 mL, 40 mL, Intravenous, PRN, Murray Torres MD    Antibiotics:  Anti-Infectives (From admission, onward)      Ordered     Dose/Rate Route Frequency Start Stop    08/31/23 1412  metroNIDAZOLE (FLAGYL) IVPB 500 mg        Ordering Provider: Murray Torres MD    500 mg  100 mL/hr over 60 Minutes Intravenous Every 8 Hours 08/31/23 1600 09/07/23 1559    08/31/23 1412  cefTRIAXone (ROCEPHIN) 2000 mg/100 mL 0.9% NS IVPB (MBP)        Ordering Provider: Murray Torres MD    2,000 mg  over 30 Minutes  Intravenous Every 24 Hours 23 1500 23 1459    23 1145  meropenem (MERREM) 1000 mg/100 mL 0.9% NS (mbp)        Ordering Provider: Renan Tatum MD    1,000 mg  over 30 Minutes Intravenous Once 23 1245 23 1230    23 0022  fluconazole (DIFLUCAN) IVPB 200 mg        Ordering Provider: Renan Tatum MD    200 mg  100 mL/hr over 60 Minutes Intravenous Every 24 Hours Scheduled 23 0115 23 2203    23 2153  piperacillin-tazobactam (ZOSYN) 3.375 g in iso-osmotic dextrose 50 ml (premix)        Ordering Provider: Gabriel Del Toro MD    3.375 g  100 mL/hr over 0.5 Hours Intravenous Once 23 2155 23 2210    23 194  piperacillin-tazobactam (ZOSYN) 3.375 g in iso-osmotic dextrose 50 ml (premix)        Ordering Provider: Axel Werner PA    3.375 g Intravenous Once 23 2045 23 1746  cefTRIAXone (ROCEPHIN) 2000 mg/100 mL 0.9% NS IVPB (MBP)        Ordering Provider: Axel Werner PA    2,000 mg  over 30 Minutes Intravenous Once 23 1802 23 1948              Review of Systems:  See hpi      Physical Exam:   Vital Signs  Temp (24hrs), Av.2 °F (36.8 °C), Min:98 °F (36.7 °C), Max:98.5 °F (36.9 °C)    Temp  Min: 98 °F (36.7 °C)  Max: 98.5 °F (36.9 °C)  BP  Min: 127/65  Max: 156/72  Pulse  Min: 71  Max: 87  Resp  Min: 16  Max: 16  SpO2  Min: 91 %  Max: 94 %    GENERAL: Resting comfortably in no acute distress.   HEENT: Normocephalic, atraumatic.  PERRL. EOMI. No conjunctival injection. No icterus.  No external oral lesions  LUNGS: CTA bilaterally  ABDOMEN: abdominal wound c/d/i  EXT:  1+ edema  MSK: No joint effusions or erythema  SKIN: Warm and dry without cutaneous eruptions on Inspection/palpation.      Laboratory Data    Results from last 7 days   Lab Units 23  0404 23  0345 23  0423   WBC 10*3/mm3 9.76 14.36* 22.71*   HEMOGLOBIN g/dL 9.8* 9.7* 10.6*   HEMATOCRIT % 29.8* 29.3* 32.4*   PLATELETS  10*3/mm3 186 191 208       Results from last 7 days   Lab Units 09/01/23  0404   SODIUM mmol/L 140   POTASSIUM mmol/L 3.3*   CHLORIDE mmol/L 106   CO2 mmol/L 27.0   BUN mg/dL 25*   CREATININE mg/dL 0.82   GLUCOSE mg/dL 131*   CALCIUM mg/dL 8.1*       Results from last 7 days   Lab Units 08/28/23  1652   ALK PHOS U/L 93   BILIRUBIN mg/dL 0.4   ALT (SGPT) U/L 24   AST (SGOT) U/L 32               Results from last 7 days   Lab Units 08/30/23  0423   LACTATE mmol/L 1.5               Estimated Creatinine Clearance: 70 mL/min (by C-G formula based on SCr of 0.82 mg/dL).      Microbiology:  Microbiology Results (last 10 days)       Procedure Component Value - Date/Time    Fungus Culture - Body Fluid, Abdominal Wall [[id number]] Collected: 08/28/23 2210    Lab Status: Preliminary result Specimen: Body Fluid from Abdominal Wall Updated: 09/03/23 0201     Fungus Culture No fungus isolated at less than 1 week    Fungus Culture - Body Fluid, Abdominal Wall [[id number]] Collected: 08/28/23 2210    Lab Status: Preliminary result Specimen: Body Fluid from Abdominal Wall Updated: 09/03/23 0201     Fungus Culture No fungus isolated at less than 1 week    Body Fluid Culture - Body Fluid, Abdominal Wall [[id number]] Collected: 08/28/23 2210    Lab Status: Final result Specimen: Body Fluid from Abdominal Wall Updated: 09/01/23 0919     Body Fluid Culture No growth at 3 days     Gram Stain Moderate (3+) WBCs seen      No organisms seen    Body Fluid Culture - Body Fluid, Abdominal Wall [[id number]] Collected: 08/28/23 2210    Lab Status: Final result Specimen: Body Fluid from Abdominal Wall Updated: 09/01/23 0919     Body Fluid Culture No growth at 3 days     Gram Stain Many (4+) WBCs seen      No organisms seen    AFB Culture - Body Fluid, Abdominal Wall [[id number]] Collected: 08/28/23 2210    Lab Status: Preliminary result Specimen: Body Fluid from Abdominal Wall Updated: 09/03/23 0201     AFB Culture No AFB isolated at less than 1 week      AFB Stain No acid fast bacilli seen on concentrated smear    AFB Culture - Body Fluid, Abdominal Wall [[id number]] Collected: 08/28/23 2210    Lab Status: Preliminary result Specimen: Body Fluid from Abdominal Wall Updated: 09/03/23 0201     AFB Culture No AFB isolated at less than 1 week     AFB Stain No acid fast bacilli seen on concentrated smear    Anaerobic Culture - Body Fluid, Abdominal Wall [[id number]]  (Normal) Collected: 08/28/23 2207    Lab Status: Final result Specimen: Body Fluid from Abdominal Wall Updated: 09/03/23 0537     Anaerobic Culture No anaerobes isolated at 5 days    Blood Culture - Blood, Arm, Right [[id number]]  (Normal) Collected: 08/28/23 1800    Lab Status: Final result Specimen: Blood from Arm, Right Updated: 09/02/23 1947     Blood Culture No growth at 5 days    Blood Culture - Blood, Arm, Left [[id number]]  (Abnormal)  (Susceptibility) Collected: 08/28/23 1755    Lab Status: Final result Specimen: Blood from Arm, Left Updated: 08/31/23 0607     Blood Culture Escherichia coli     Isolated from Anaerobic Bottle     Gram Stain Anaerobic Bottle Gram negative bacilli    Susceptibility        Escherichia coli      SERGIO      Ampicillin Susceptible      Ampicillin + Sulbactam Susceptible      Cefepime Susceptible      Ceftazidime Susceptible      Ceftriaxone Susceptible      Gentamicin Susceptible      Levofloxacin Susceptible      Piperacillin + Tazobactam Susceptible      Trimethoprim + Sulfamethoxazole Resistant                       Susceptibility Comments       Escherichia coli    Cefazolin sensitivity will not be reported for Enterobacteriaceae in non-urine isolates. If cefazolin is preferred, please call the microbiology lab to request an E-test.  With the exception of urinary-sourced infections, aminoglycosides should not be used as monotherapy.               Blood Culture ID, PCR - Blood, Arm, Left [[id number]]  (Abnormal) Collected: 08/28/23 1755    Lab Status: Final result Specimen:  Blood from Arm, Left Updated: 08/29/23 1246     BCID, PCR Escherichia coli. Identification by BCID2 PCR.     BOTTLE TYPE Anaerobic Bottle    Narrative:      No resistance genes detected.    Urine Culture - Urine, Urine, Clean Catch [[id number]]  (Abnormal)  (Susceptibility) Collected: 08/28/23 1653    Lab Status: Final result Specimen: Urine, Clean Catch Updated: 08/30/23 0531     Urine Culture >100,000 CFU/mL Escherichia coli    Narrative:      Colonization of the urinary tract without infection is common. Treatment is discouraged unless the patient is symptomatic, pregnant, or undergoing an invasive urologic procedure.    Susceptibility        Escherichia coli      SERGIO      Ampicillin Susceptible      Ampicillin + Sulbactam Susceptible      Cefazolin Susceptible      Cefepime Susceptible      Ceftazidime Susceptible      Ceftriaxone Susceptible      Gentamicin Susceptible      Levofloxacin Susceptible      Nitrofurantoin Susceptible      Piperacillin + Tazobactam Susceptible      Trimethoprim + Sulfamethoxazole Resistant                                       Radiology:  Imaging Results (Last 72 Hours)       Procedure Component Value Units Date/Time    FL C Arm During Surgery [[id number]] Resulted: 09/01/23 1515     Updated: 09/01/23 1515    Narrative:      This procedure was auto-finalized with no dictation required.              Impression:   E. coli bacteremia  Peritonitis  Prepyloric perforated viscus s/p repair  Left-sided kidney stone with obstructive uropathy s/p stent  Leukocytosis with neutrophilia  Acute renal failure  Procalcitonin elevation  Severe diarrhea--NEW    PLAN/RECOMMENDATIONS:   Thank you for asking us to see Siria Garcia, I recommend the following:      Urine cultures and blood cultures growing E. Coli susc to rocephin    From a perforated viscus standpoint patient at risk for peritonitis from gram-negative enteric's and anaerobes.    S/p fluconazole x 5 days stopped on 9/2    Continue rocephin  2 g iv daily   Continue flagyl 500 mg iv q8h      Matthew Manuel MD saw and examined patient, verified hx and PE, reviewed labs and micro data, and formulated dx, plan for treatment and all medical decision making.      BRENDA Sterling-C for MD Mayank Dawkins PA  9/3/2023  17:54 EDT                   3/day(s)

## 2023-09-03 NOTE — PROGRESS NOTES
[facility] Medicine Services  PROGRESS NOTE    Patient Name: Siria Garcia  : 1955  MRN: [id number]    Date of Admission: 2023  Primary Care Physician: Sofia Hong MD    Subjective   Subjective     CC:  Perforated ulcer    HPI:   - Patient undergoing urologic procedure today.  No new concerns overnight.  Tolerating some p.o.  Has worked with therapy.     -patient doing much better.  Tolerated liquid diet for breakfast and looking forward to regular food for lunch.  Pain is well controlled with medications.    9/3 -patient had several bowel movements documented yesterday and states they have increased even more today.  They are soft and in large volume.  Tolerating p.o.  No new concerns.  Worked with therapy yesterday.  She is able to ambulate pretty independently but states it is easier if she has help getting up.  Cuevas remains in place.    ROS:  Gen- No fevers, chills  CV- No chest pain, palpitations  Resp- No cough, dyspnea  GI- No current N/V/D, positive abd pain       Objective   Objective     Vital Signs:   Temp:  [98 °F (36.7 °C)-98.2 °F (36.8 °C)] 98.2 °F (36.8 °C)  Heart Rate:  [71-76] 71  Resp:  [16] 16  BP: (127-139)/(65-75) 134/67     Physical Exam:  Constitutional: No acute distress, awake, alert  HENT: NCAT, mucous membranes moist  Respiratory: Clear to auscultation bilaterally, respiratory effort normal   Cardiovascular: RRR  Gastrointestinal: Positive bowel sounds, soft, moderately tender, nondistended  Musculoskeletal: Generally weak  Psychiatric: Appropriate affect, cooperative  Neurologic: Oriented x 3, generally weak, Cranial Nerves grossly intact to confrontation, speech clear  Skin: No rashes      Results Reviewed:  LAB RESULTS:      Lab 23  0404 23  0346 23  0345 23  0538 23  0423 23  0540 23  1652   WBC 9.76  --  14.36*  --  22.71* 24.25*  --  26.63*   HEMOGLOBIN 9.8*  --  9.7*   --  10.6* 10.9*  --  13.0   HEMATOCRIT 29.8*  --  29.3*  --  32.4* 33.1*  --  40.2   PLATELETS 186  --  191  --  208 225  --  306   NEUTROS ABS 6.79  --  11.95*  --  20.44*  --   --  23.97*   IMMATURE GRANS (ABS) 0.20*  --  0.10*  --   --   --   --   --    LYMPHS ABS 1.49  --  1.25  --   --   --   --   --    MONOS ABS 1.11*  --  1.02*  --   --   --   --   --    EOS ABS 0.13  --  0.01  --  0.00  --   --  0.00   MCV 89.8  --  90.4  --  91.3 92.2  --  92.2   PROCALCITONIN  --  5.49*  --  12.58*  --  18.09*  --  23.71*   LACTATE  --   --   --   --  1.5 2.3* 2.0 3.6*         Lab 09/01/23  1801 09/01/23  0404 08/31/23  0346 08/30/23  0538 08/29/23  0540 08/28/23  1652   SODIUM  --  140 139 139 138 140   POTASSIUM  --  3.3* 4.1 4.8 4.5 4.6   CHLORIDE  --  106 105 105 105 100   CO2  --  27.0 25.0 24.0 23.0 23.0   ANION GAP  --  7.0 9.0 10.0 10.0 17.0*   BUN  --  25* 30* 31* 28* 29*   CREATININE  --  0.82 0.93 1.17* 1.35* 1.72*   EGFR  --  78.0 67.1 50.9* 42.9* 32.1*   GLUCOSE  --  131* 123* 91 134* 137*   CALCIUM  --  8.1* 8.0* 8.0* 8.0* 10.0   MAGNESIUM  --   --  2.1 2.4 1.8  --    PHOSPHORUS 2.6 2.2* 1.7*  --  3.3  --    HEMOGLOBIN A1C  --   --   --   --  5.80*  --    TSH  --   --   --   --  0.415  --          Lab 09/01/23  0404 08/28/23  1652   TOTAL PROTEIN  --  8.0   ALBUMIN 2.9* 4.3   GLOBULIN  --  3.7   ALT (SGPT)  --  24   AST (SGOT)  --  32   BILIRUBIN  --  0.4   ALK PHOS  --  93   LIPASE  --  79*                 Lab 08/28/23 2035 08/1955   ABO TYPING O O   RH TYPING Positive Positive   ANTIBODY SCREEN  --  Negative         Brief Urine Lab Results  (Last result in the past 365 days)        Color   Clarity   Blood   Leuk Est   Nitrite   Protein   CREAT   Urine HCG        08/29/23 0349             44.3                 Microbiology Results Abnormal       Procedure Component Value - Date/Time    Anaerobic Culture - Body Fluid, Abdominal Wall [[id number]]  (Normal) Collected: 08/28/23 2207    Lab Status: Final  result Specimen: Body Fluid from Abdominal Wall Updated: 09/03/23 0537     Anaerobic Culture No anaerobes isolated at 5 days    Fungus Culture - Body Fluid, Abdominal Wall [[id number]] Collected: 08/28/23 2210    Lab Status: Preliminary result Specimen: Body Fluid from Abdominal Wall Updated: 09/03/23 0201     Fungus Culture No fungus isolated at less than 1 week    Fungus Culture - Body Fluid, Abdominal Wall [[id number]] Collected: 08/28/23 2210    Lab Status: Preliminary result Specimen: Body Fluid from Abdominal Wall Updated: 09/03/23 0201     Fungus Culture No fungus isolated at less than 1 week    AFB Culture - Body Fluid, Abdominal Wall [[id number]] Collected: 08/28/23 2210    Lab Status: Preliminary result Specimen: Body Fluid from Abdominal Wall Updated: 09/03/23 0201     AFB Culture No AFB isolated at less than 1 week     AFB Stain No acid fast bacilli seen on concentrated smear    AFB Culture - Body Fluid, Abdominal Wall [[id number]] Collected: 08/28/23 2210    Lab Status: Preliminary result Specimen: Body Fluid from Abdominal Wall Updated: 09/03/23 0201     AFB Culture No AFB isolated at less than 1 week     AFB Stain No acid fast bacilli seen on concentrated smear    Blood Culture - Blood, Arm, Right [[id number]]  (Normal) Collected: 08/28/23 1800    Lab Status: Final result Specimen: Blood from Arm, Right Updated: 09/02/23 1947     Blood Culture No growth at 5 days    Body Fluid Culture - Body Fluid, Abdominal Wall [[id number]] Collected: 08/28/23 2210    Lab Status: Final result Specimen: Body Fluid from Abdominal Wall Updated: 09/01/23 0919     Body Fluid Culture No growth at 3 days     Gram Stain Many (4+) WBCs seen      No organisms seen    Body Fluid Culture - Body Fluid, Abdominal Wall [[id number]] Collected: 08/28/23 2210    Lab Status: Final result Specimen: Body Fluid from Abdominal Wall Updated: 09/01/23 0919     Body Fluid Culture No growth at 3 days     Gram Stain Moderate (3+) WBCs seen      No  organisms seen            FL C Arm During Surgery    Result Date: 9/1/2023  This procedure was auto-finalized with no dictation required.         Current medications:  Scheduled Meds:cefTRIAXone, 2,000 mg, Intravenous, Q24H  fluticasone, 2 spray, Each Nare, Daily  levothyroxine, 25 mcg, Oral, Q AM  metroNIDAZOLE, 500 mg, Intravenous, Q8H  pantoprazole, 40 mg, Intravenous, BID AC  sodium chloride, 10 mL, Intravenous, Q12H      Continuous Infusions:lactated ringers, 9 mL/hr      PRN Meds:.  acetaminophen    [DISCONTINUED] senna-docusate sodium **AND** [DISCONTINUED] polyethylene glycol **AND** [DISCONTINUED] bisacodyl **AND** bisacodyl    Calcium Replacement - Follow Nurse / BPA Driven Protocol    HYDROcodone-acetaminophen    HYDROmorphone    Magnesium Standard Dose Replacement - Follow Nurse / BPA Driven Protocol    ondansetron    phenol    Phosphorus Replacement - Follow Nurse / BPA Driven Protocol    Potassium Replacement - Follow Nurse / BPA Driven Protocol    Sodium Chloride (PF)    sodium chloride    Assessment & Plan   Assessment & Plan     Active Hospital Problems    Diagnosis  POA    **K25.9, Pyloric ulcer perf. - S/P repair 08/28/23 [K25.9]  Yes    Perforated ulcer [K27.5]  Yes    Chronic low back pain [M54.50, G89.29]  Yes    SUJATA (acute kidney injury) [N17.9]  Yes    UTI (urinary tract infection) [N39.0]  Yes    Sepsis [A41.9]  Yes    Peritonitis [K65.9]  Yes    Nephrolithiasis [N20.0]  Yes    Obstructive uropathy [N13.9]  Yes    Benign essential hypertension [I10]  Yes    Gastroesophageal reflux disease [K21.9]  Yes    Hypercholesterolemia [E78.00]  Yes    Hypothyroidism [E03.9]  Yes      Resolved Hospital Problems   No resolved problems to display.        Brief Hospital Course to date:  Siria Garcia is a 67 y.o. female with history of hypertension, hypothyroidism, GERD, right ankle impingement syndrome followed by Ortho and has been on NSAIDs for right foot pain who presented to ED 8/28/2023 with  abdominal pain.  CT on pelvis was concerning for free air and fluid left UPJ 1.3 cm stone.  She did undergo expiratory laparotomy by general surgery, was found to have perforated prepyloric ulcer s/p repair.  She was transferred to the ICU for ongoing care.  Patient did progress well.  And was transferred to [facility] medicine assumed her care 8/31/1023     Sepsis secondary to perforated prepyloric ulcer  E. coli bacteremia  -Patient presented with leukocytosis, tachycardia, lactic acidosis, elevated procalcitonin  -General surgery following, s/p repair by Dr. SWANSON continue postop care  -Blood cultures positive for E. coli  -Continue antibiotics, Rocephin/Flagyl/Diflucan  -Infectious disease consulted and following  -Encourage ambulation     SUJATA  -Baseline creatinine ~0.8-1.0, patient presented with Cr of 1.72, likely secondary to above  -Currently improving, back to baseline  -Avoid nephrotoxins     Hypertension  -BP currently normotensive, holding home meds for now     Obstructive uropathy  -Patient with 1.3 cm left UPJ stone  -Urology following, underwent cystoscopy with stent on 9/1/23  -Cuevas has been in place since procedure, will ask urology if okay to DC Cuevas on 9/3/2023     Hypothyroidism  -Continue Synthroid     Hypophosphatemia  -Continue to monitor replete per protocol     GERD  -Continue PPI        Expected Discharge Location and Transportation: Rehab versus home, private vehicle  Expected Discharge   Expected Discharge Date: 9/5/2023; Expected Discharge Time:      DVT prophylaxis:  Mechanical DVT prophylaxis orders are present.     AM-PAC 6 Clicks Score (PT): 21 (09/02/23 1400)    CODE STATUS:   Code Status and Medical Interventions:   Ordered at: 08/28/23 0603     Code Status (Patient has no pulse and is not breathing):    CPR (Attempt to Resuscitate)     Medical Interventions (Patient has pulse or is breathing):    Full Support       Regina Shoemaker MD  09/03/23

## 2023-09-03 NOTE — PROGRESS NOTES
"Siria Garcia  1955  [id number]    Surgery Progress Note    Date of visit: 9/3/2023    Subjective: Tolerated full liquid diet  Having bowel movements  Ambulating with assistance    Objective:    /67 (BP Location: Right arm, Patient Position: Lying)   Pulse 71   Temp 98.2 °F (36.8 °C) (Oral)   Resp 16   Ht 160 cm (63\")   Wt 90.1 kg (198 lb 11.2 oz)   SpO2 93%   BMI 35.20 kg/m²     Intake/Output Summary (Last 24 hours) at 9/3/2023 0837  Last data filed at 9/3/2023 0300  Gross per 24 hour   Intake 560 ml   Output 1925 ml   Net -1365 ml       CV: Regular rate and rhythm  L: normal air entry  Abd: Soft  Incisions intact and healing well  Sharri removed  Clinton-Lopez with minimal serosanguineous drainage      LABS:    Results from last 7 days   Lab Units 09/01/23  0404   WBC 10*3/mm3 9.76   HEMOGLOBIN g/dL 9.8*   HEMATOCRIT % 29.8*   PLATELETS 10*3/mm3 186     Results from last 7 days   Lab Units 09/01/23  0404 08/29/23  0540 08/28/23  1652   SODIUM mmol/L 140   < > 140   POTASSIUM mmol/L 3.3*   < > 4.6   CHLORIDE mmol/L 106   < > 100   CO2 mmol/L 27.0   < > 23.0   BUN mg/dL 25*   < > 29*   CREATININE mg/dL 0.82   < > 1.72*   CALCIUM mg/dL 8.1*   < > 10.0   BILIRUBIN mg/dL  --   --  0.4   ALK PHOS U/L  --   --  93   ALT (SGPT) U/L  --   --  24   AST (SGOT) U/L  --   --  32   GLUCOSE mg/dL 131*   < > 137*    < > = values in this interval not displayed.     Results from last 7 days   Lab Units 09/01/23  0404   SODIUM mmol/L 140   POTASSIUM mmol/L 3.3*   CHLORIDE mmol/L 106   CO2 mmol/L 27.0   BUN mg/dL 25*   CREATININE mg/dL 0.82   GLUCOSE mg/dL 131*   CALCIUM mg/dL 8.1*     Lab Results   Lab Value Date/Time    LIPASE 79 (H) 08/28/2023 1652         Assessment/ Plan: Stable course status post emergent abdominal exploration and repair of perforated prepyloric ulcer  Patient is progressing well  Advance diet  Increase activity  Continue with Protonix  IV antibiotic and antifungal management per ID    Problem " List Items Addressed This Visit          Other    Sepsis     Other Visit Diagnoses       Acute UTI    -  Primary    Relevant Medications    cefTRIAXone (ROCEPHIN) 2000 mg/100 mL 0.9% NS IVPB (MBP) (Completed)    piperacillin-tazobactam (ZOSYN) 3.375 g in iso-osmotic dextrose 50 ml (premix) (Completed)    piperacillin-tazobactam (ZOSYN) 3.375 g in iso-osmotic dextrose 50 ml (premix) (Completed)    cefTRIAXone (ROCEPHIN) 2000 mg/100 mL 0.9% NS IVPB (MBP)    Ureteropelvic junction (UPJ) obstruction        Free fluid in pelvis        History of gastroesophageal reflux (GERD)        History of hypertension        History of breast cancer        Acute kidney injury        Abdominal pain        Relevant Orders    Tissue Pathology Exam (Completed)    Anaerobic Culture - Body Fluid, Abdominal Wall (Completed)    Fungus Culture - Body Fluid, Abdominal Wall (Completed)    Fungus Culture - Body Fluid, Abdominal Wall (Completed)    Body Fluid Culture - Body Fluid, Abdominal Wall (Completed)    Body Fluid Culture - Body Fluid, Abdominal Wall (Completed)    AFB Culture - Body Fluid, Abdominal Wall (Completed)    AFB Culture - Body Fluid, Abdominal Wall (Completed)              Gabriel Del Toro MD  9/3/2023  08:37 EDT

## 2023-09-03 NOTE — PLAN OF CARE
Goal Outcome Evaluation:                     Problem: Adult Inpatient Plan of Care  Goal: Plan of Care Review  Outcome: Ongoing, Progressing  Goal: Patient-Specific Goal (Individualized)  Outcome: Ongoing, Progressing  Goal: Absence of Hospital-Acquired Illness or Injury  Outcome: Ongoing, Progressing  Intervention: Identify and Manage Fall Risk  Recent Flowsheet Documentation  Taken 9/3/2023 0000 by Amanda Angulo RN  Safety Promotion/Fall Prevention:   activity supervised   assistive device/personal items within reach   clutter free environment maintained   lighting adjusted   mobility aid in reach   nonskid shoes/slippers when out of bed   room organization consistent   safety round/check completed  Taken 9/2/2023 2200 by Amanda Angulo RN  Safety Promotion/Fall Prevention:   activity supervised   assistive device/personal items within reach   clutter free environment maintained   lighting adjusted   mobility aid in reach   nonskid shoes/slippers when out of bed   room organization consistent   safety round/check completed  Taken 9/2/2023 2000 by Amanda Angulo RN  Safety Promotion/Fall Prevention:   activity supervised   assistive device/personal items within reach   clutter free environment maintained   lighting adjusted   mobility aid in reach   nonskid shoes/slippers when out of bed   room organization consistent   safety round/check completed  Intervention: Prevent Skin Injury  Recent Flowsheet Documentation  Taken 9/3/2023 0000 by Amanda Angulo RN  Body Position: position changed independently  Skin Protection:   adhesive use limited   transparent dressing maintained   tubing/devices free from skin contact  Taken 9/2/2023 2200 by Amanda Angulo RN  Body Position: position changed independently  Taken 9/2/2023 2000 by Amanda Angulo RN  Body Position: position changed independently  Skin Protection:   adhesive use limited   transparent dressing maintained   tubing/devices free from skin contact  Intervention:  Prevent and Manage VTE (Venous Thromboembolism) Risk  Recent Flowsheet Documentation  Taken 9/3/2023 0000 by Amanda Angulo RN  Activity Management: activity encouraged  Range of Motion: active ROM (range of motion) encouraged  Taken 9/2/2023 2200 by Amanda Angulo RN  Activity Management: activity encouraged  Taken 9/2/2023 2000 by Amanda Angulo RN  Activity Management: activity encouraged  Range of Motion: active ROM (range of motion) encouraged  Intervention: Prevent Infection  Recent Flowsheet Documentation  Taken 9/3/2023 0000 by Amanda Angulo RN  Infection Prevention:   hand hygiene promoted   rest/sleep promoted   single patient room provided  Taken 9/2/2023 2200 by Amanda Angulo RN  Infection Prevention:   hand hygiene promoted   rest/sleep promoted   single patient room provided  Taken 9/2/2023 2000 by Amanda Angulo RN  Infection Prevention:   hand hygiene promoted   rest/sleep promoted   single patient room provided  Goal: Optimal Comfort and Wellbeing  Outcome: Ongoing, Progressing  Intervention: Provide Person-Centered Care  Recent Flowsheet Documentation  Taken 9/2/2023 2000 by Amanda Angulo RN  Trust Relationship/Rapport:   care explained   choices provided   emotional support provided   empathic listening provided   questions answered   questions encouraged  Goal: Readiness for Transition of Care  Outcome: Ongoing, Progressing     Problem: Skin Injury Risk Increased  Goal: Skin Health and Integrity  Outcome: Ongoing, Progressing  Intervention: Optimize Skin Protection  Recent Flowsheet Documentation  Taken 9/3/2023 0000 by Amanda Angulo RN  Pressure Reduction Techniques: frequent weight shift encouraged  Head of Bed (HOB) Positioning: [facility] elevated  Pressure Reduction Devices: positioning supports utilized  Skin Protection:   adhesive use limited   transparent dressing maintained   tubing/devices free from skin contact  Taken 9/2/2023 2200 by Amanda Angulo RN  Head of Bed (HOB) Positioning: HOB  elevated  Taken 9/2/2023 2000 by Amanda Angulo RN  Pressure Reduction Techniques: frequent weight shift encouraged  Head of Bed (HOB) Positioning: HOB elevated  Pressure Reduction Devices: positioning supports utilized  Skin Protection:   adhesive use limited   transparent dressing maintained   tubing/devices free from skin contact     Problem: Adjustment to Illness (Sepsis/Septic Shock)  Goal: Optimal Coping  Outcome: Ongoing, Progressing  Intervention: Optimize Psychosocial Adjustment to Illness  Recent Flowsheet Documentation  Taken 9/2/2023 2000 by Amanda Angulo RN  Supportive Measures: active listening utilized  Family/Support System Care: support provided     Problem: Bleeding (Sepsis/Septic Shock)  Goal: Absence of Bleeding  Outcome: Ongoing, Progressing  Intervention: Monitor and Manage Bleeding  Recent Flowsheet Documentation  Taken 9/2/2023 2000 by Amanda Angulo RN  Bleeding Precautions: monitored for signs of bleeding  Bleeding Management: dressing monitored     Problem: Glycemic Control Impaired (Sepsis/Septic Shock)  Goal: Blood Glucose Level Within Desired Range  Outcome: Ongoing, Progressing     Problem: Infection Progression (Sepsis/Septic Shock)  Goal: Absence of Infection Signs and Symptoms  Outcome: Ongoing, Progressing  Intervention: Initiate Sepsis Management  Recent Flowsheet Documentation  Taken 9/3/2023 0000 by Amanda Angulo RN  Infection Prevention:   hand hygiene promoted   rest/sleep promoted   single patient room provided  Taken 9/2/2023 2200 by Amanda Angulo RN  Infection Prevention:   hand hygiene promoted   rest/sleep promoted   single patient room provided  Taken 9/2/2023 2000 by Amanda Angulo RN  Infection Prevention:   hand hygiene promoted   rest/sleep promoted   single patient room provided  Intervention: Promote Recovery  Recent Flowsheet Documentation  Taken 9/3/2023 0000 by Amanda Angulo RN  Activity Management: activity encouraged  Taken 9/2/2023 2200 by Amanda Angulo  RN  Activity Management: activity encouraged  Taken 9/2/2023 2000 by Amanda Angulo RN  Activity Management: activity encouraged  Sleep/Rest Enhancement: awakenings minimized     Problem: Nutrition Impaired (Sepsis/Septic Shock)  Goal: Optimal Nutrition Intake  Outcome: Ongoing, Progressing     Problem: Fall Injury Risk  Goal: Absence of Fall and Fall-Related Injury  Outcome: Ongoing, Progressing  Intervention: Identify and Manage Contributors  Recent Flowsheet Documentation  Taken 9/2/2023 2000 by Amanda Angulo RN  Medication Review/Management: medications reviewed  Intervention: Promote Injury-Free Environment  Recent Flowsheet Documentation  Taken 9/3/2023 0000 by Amanda Angulo RN  Safety Promotion/Fall Prevention:   activity supervised   assistive device/personal items within reach   clutter free environment maintained   lighting adjusted   mobility aid in reach   nonskid shoes/slippers when out of bed   room organization consistent   safety round/check completed  Taken 9/2/2023 2200 by Amanda Angulo RN  Safety Promotion/Fall Prevention:   activity supervised   assistive device/personal items within reach   clutter free environment maintained   lighting adjusted   mobility aid in reach   nonskid shoes/slippers when out of bed   room organization consistent   safety round/check completed  Taken 9/2/2023 2000 by Amanda Angulo RN  Safety Promotion/Fall Prevention:   activity supervised   assistive device/personal items within reach   clutter free environment maintained   lighting adjusted   mobility aid in reach   nonskid shoes/slippers when out of bed   room organization consistent   safety round/check completed

## 2023-09-04 PROBLEM — B96.20 E COLI BACTEREMIA: Status: ACTIVE | Noted: 2023-09-04

## 2023-09-04 PROBLEM — R78.81 E COLI BACTEREMIA: Status: ACTIVE | Noted: 2023-09-04

## 2023-09-04 LAB
ALBUMIN SERPL-MCNC: 2.9 G/DL (ref 3.5–5.2)
ALBUMIN/GLOB SERPL: 1.1 G/DL
ALP SERPL-CCNC: 58 U/L (ref 39–117)
ALT SERPL W P-5'-P-CCNC: 19 U/L (ref 1–33)
ANION GAP SERPL CALCULATED.3IONS-SCNC: 12 MMOL/L (ref 5–15)
AST SERPL-CCNC: 18 U/L (ref 1–32)
BASOPHILS # BLD AUTO: 0.04 10*3/MM3 (ref 0–0.2)
BASOPHILS NFR BLD AUTO: 0.4 % (ref 0–1.5)
BILIRUB SERPL-MCNC: 0.4 MG/DL (ref 0–1.2)
BUN SERPL-MCNC: 20 MG/DL (ref 8–23)
BUN/CREAT SERPL: 27.8 (ref 7–25)
CALCIUM SPEC-SCNC: 8.1 MG/DL (ref 8.6–10.5)
CHLORIDE SERPL-SCNC: 105 MMOL/L (ref 98–107)
CO2 SERPL-SCNC: 24 MMOL/L (ref 22–29)
CREAT SERPL-MCNC: 0.72 MG/DL (ref 0.57–1)
DEPRECATED RDW RBC AUTO: 45.9 FL (ref 37–54)
EGFRCR SERPLBLD CKD-EPI 2021: 91.2 ML/MIN/1.73
EOSINOPHIL # BLD AUTO: 0.38 10*3/MM3 (ref 0–0.4)
EOSINOPHIL NFR BLD AUTO: 3.6 % (ref 0.3–6.2)
ERYTHROCYTE [DISTWIDTH] IN BLOOD BY AUTOMATED COUNT: 14.3 % (ref 12.3–15.4)
GLOBULIN UR ELPH-MCNC: 2.7 GM/DL
GLUCOSE SERPL-MCNC: 84 MG/DL (ref 65–99)
HCT VFR BLD AUTO: 28.6 % (ref 34–46.6)
HGB BLD-MCNC: 9.6 G/DL (ref 12–15.9)
IMM GRANULOCYTES # BLD AUTO: 0.41 10*3/MM3 (ref 0–0.05)
IMM GRANULOCYTES NFR BLD AUTO: 3.9 % (ref 0–0.5)
LYMPHOCYTES # BLD AUTO: 2.42 10*3/MM3 (ref 0.7–3.1)
LYMPHOCYTES NFR BLD AUTO: 23 % (ref 19.6–45.3)
MAGNESIUM SERPL-MCNC: 1.9 MG/DL (ref 1.6–2.4)
MCH RBC QN AUTO: 29.4 PG (ref 26.6–33)
MCHC RBC AUTO-ENTMCNC: 33.6 G/DL (ref 31.5–35.7)
MCV RBC AUTO: 87.7 FL (ref 79–97)
MONOCYTES # BLD AUTO: 1.26 10*3/MM3 (ref 0.1–0.9)
MONOCYTES NFR BLD AUTO: 12 % (ref 5–12)
NEUTROPHILS NFR BLD AUTO: 57.1 % (ref 42.7–76)
NEUTROPHILS NFR BLD AUTO: 6.03 10*3/MM3 (ref 1.7–7)
NRBC BLD AUTO-RTO: 0 /100 WBC (ref 0–0.2)
PLATELET # BLD AUTO: 303 10*3/MM3 (ref 140–450)
PMV BLD AUTO: 9.7 FL (ref 6–12)
POTASSIUM SERPL-SCNC: 2.9 MMOL/L (ref 3.5–5.2)
POTASSIUM SERPL-SCNC: 4 MMOL/L (ref 3.5–5.2)
PROT SERPL-MCNC: 5.6 G/DL (ref 6–8.5)
RBC # BLD AUTO: 3.26 10*6/MM3 (ref 3.77–5.28)
SODIUM SERPL-SCNC: 141 MMOL/L (ref 136–145)
WBC NRBC COR # BLD: 10.54 10*3/MM3 (ref 3.4–10.8)

## 2023-09-04 PROCEDURE — 85025 COMPLETE CBC W/AUTO DIFF WBC: CPT | Performed by: FAMILY MEDICINE

## 2023-09-04 PROCEDURE — 25010000002 METRONIDAZOLE 500 MG/100ML SOLUTION: Performed by: UROLOGY

## 2023-09-04 PROCEDURE — 80053 COMPREHEN METABOLIC PANEL: CPT | Performed by: FAMILY MEDICINE

## 2023-09-04 PROCEDURE — 99232 SBSQ HOSP IP/OBS MODERATE 35: CPT | Performed by: FAMILY MEDICINE

## 2023-09-04 PROCEDURE — 84132 ASSAY OF SERUM POTASSIUM: CPT | Performed by: FAMILY MEDICINE

## 2023-09-04 PROCEDURE — 83735 ASSAY OF MAGNESIUM: CPT | Performed by: FAMILY MEDICINE

## 2023-09-04 PROCEDURE — 25010000002 HYDROMORPHONE PER 4 MG: Performed by: UROLOGY

## 2023-09-04 PROCEDURE — 25010000002 CEFTRIAXONE PER 250 MG: Performed by: UROLOGY

## 2023-09-04 RX ORDER — METRONIDAZOLE 500 MG/1
500 TABLET ORAL EVERY 8 HOURS SCHEDULED
Status: DISCONTINUED | OUTPATIENT
Start: 2023-09-04 | End: 2023-09-05

## 2023-09-04 RX ORDER — POTASSIUM CHLORIDE 20 MEQ/1
40 TABLET, EXTENDED RELEASE ORAL EVERY 4 HOURS
Status: COMPLETED | OUTPATIENT
Start: 2023-09-04 | End: 2023-09-04

## 2023-09-04 RX ADMIN — METRONIDAZOLE 500 MG: 500 INJECTION, SOLUTION INTRAVENOUS at 07:42

## 2023-09-04 RX ADMIN — HYDROMORPHONE HYDROCHLORIDE 0.5 MG: 1 INJECTION, SOLUTION INTRAMUSCULAR; INTRAVENOUS; SUBCUTANEOUS at 03:18

## 2023-09-04 RX ADMIN — METRONIDAZOLE 500 MG: 500 INJECTION, SOLUTION INTRAVENOUS at 00:26

## 2023-09-04 RX ADMIN — POTASSIUM CHLORIDE 40 MEQ: 1500 TABLET, EXTENDED RELEASE ORAL at 15:46

## 2023-09-04 RX ADMIN — Medication 10 ML: at 09:11

## 2023-09-04 RX ADMIN — LEVOTHYROXINE SODIUM 25 MCG: 0.03 TABLET ORAL at 06:18

## 2023-09-04 RX ADMIN — POTASSIUM CHLORIDE 40 MEQ: 1500 TABLET, EXTENDED RELEASE ORAL at 12:12

## 2023-09-04 RX ADMIN — FLUTICASONE PROPIONATE 2 SPRAY: 50 SPRAY, METERED NASAL at 09:11

## 2023-09-04 RX ADMIN — METRONIDAZOLE 500 MG: 500 TABLET ORAL at 21:38

## 2023-09-04 RX ADMIN — POTASSIUM CHLORIDE 40 MEQ: 1500 TABLET, EXTENDED RELEASE ORAL at 07:41

## 2023-09-04 RX ADMIN — SODIUM CHLORIDE 2000 MG: 900 INJECTION INTRAVENOUS at 15:46

## 2023-09-04 RX ADMIN — PANTOPRAZOLE SODIUM 40 MG: 40 INJECTION, POWDER, LYOPHILIZED, FOR SOLUTION INTRAVENOUS at 07:41

## 2023-09-04 RX ADMIN — Medication 10 ML: at 21:38

## 2023-09-04 RX ADMIN — METRONIDAZOLE 500 MG: 500 TABLET ORAL at 15:46

## 2023-09-04 RX ADMIN — PANTOPRAZOLE SODIUM 40 MG: 40 INJECTION, POWDER, LYOPHILIZED, FOR SOLUTION INTRAVENOUS at 17:27

## 2023-09-04 NOTE — PLAN OF CARE
Goal Outcome Evaluation:                   Problem: Adult Inpatient Plan of Care  Goal: Plan of Care Review  Outcome: Ongoing, Progressing  Goal: Patient-Specific Goal (Individualized)  Outcome: Ongoing, Progressing  Goal: Absence of Hospital-Acquired Illness or Injury  Outcome: Ongoing, Progressing  Intervention: Identify and Manage Fall Risk  Recent Flowsheet Documentation  Taken 9/3/2023 2223 by Amanda Angulo RN  Safety Promotion/Fall Prevention:   activity supervised   assistive device/personal items within reach   clutter free environment maintained   lighting adjusted   nonskid shoes/slippers when out of bed   room organization consistent   safety round/check completed  Taken 9/3/2023 2000 by Amanda Angulo RN  Safety Promotion/Fall Prevention:   activity supervised   assistive device/personal items within reach   clutter free environment maintained   lighting adjusted   nonskid shoes/slippers when out of bed   room organization consistent   safety round/check completed  Intervention: Prevent Skin Injury  Recent Flowsheet Documentation  Taken 9/3/2023 2223 by Amanda Angulo RN  Body Position: position changed independently  Taken 9/3/2023 2000 by Amanda Angulo RN  Body Position: position changed independently  Skin Protection:   adhesive use limited   transparent dressing maintained   tubing/devices free from skin contact  Intervention: Prevent and Manage VTE (Venous Thromboembolism) Risk  Recent Flowsheet Documentation  Taken 9/3/2023 2223 by Amanda Angulo RN  Activity Management: activity encouraged  Taken 9/3/2023 2000 by Amanda Angulo RN  Activity Management: activity encouraged  Range of Motion: active ROM (range of motion) encouraged  Intervention: Prevent Infection  Recent Flowsheet Documentation  Taken 9/3/2023 2200 by Amanda Angulo RN  Infection Prevention:   hand hygiene promoted   rest/sleep promoted   single patient room provided  Taken 9/3/2023 2000 by Amanda Angulo RN  Infection Prevention:   hand  hygiene promoted   rest/sleep promoted   single patient room provided  Goal: Optimal Comfort and Wellbeing  Outcome: Ongoing, Progressing  Intervention: Provide Person-Centered Care  Recent Flowsheet Documentation  Taken 9/3/2023 2000 by Amanda Angulo RN  Trust Relationship/Rapport:   care explained   choices provided   emotional support provided   empathic listening provided   questions answered   questions encouraged  Goal: Readiness for Transition of Care  Outcome: Ongoing, Progressing     Problem: Skin Injury Risk Increased  Goal: Skin Health and Integrity  Outcome: Ongoing, Progressing  Intervention: Optimize Skin Protection  Recent Flowsheet Documentation  Taken 9/3/2023 2223 by Amanda Angulo RN  Head of Bed (HOB) Positioning: HOB elevated  Taken 9/3/2023 2000 by Amanda Angulo RN  Pressure Reduction Techniques: frequent weight shift encouraged  Head of Bed (HOB) Positioning: HOB elevated  Pressure Reduction Devices: pressure-redistributing mattress utilized  Skin Protection:   adhesive use limited   transparent dressing maintained   tubing/devices free from skin contact     Problem: Adjustment to Illness (Sepsis/Septic Shock)  Goal: Optimal Coping  Outcome: Ongoing, Progressing  Intervention: Optimize Psychosocial Adjustment to Illness  Recent Flowsheet Documentation  Taken 9/3/2023 2000 by Amanda Angulo RN  Supportive Measures: active listening utilized     Problem: Bleeding (Sepsis/Septic Shock)  Goal: Absence of Bleeding  Outcome: Ongoing, Progressing     Problem: Glycemic Control Impaired (Sepsis/Septic Shock)  Goal: Blood Glucose Level Within Desired Range  Outcome: Ongoing, Progressing     Problem: Infection Progression (Sepsis/Septic Shock)  Goal: Absence of Infection Signs and Symptoms  Outcome: Ongoing, Progressing  Intervention: Initiate Sepsis Management  Recent Flowsheet Documentation  Taken 9/3/2023 2200 by Amanda Angulo RN  Infection Prevention:   hand hygiene promoted   rest/sleep promoted    single patient room provided  Taken 9/3/2023 2000 by Amanda Angulo RN  Infection Prevention:   hand hygiene promoted   rest/sleep promoted   single patient room provided  Intervention: Promote Recovery  Recent Flowsheet Documentation  Taken 9/3/2023 2223 by Amanda Angulo RN  Activity Management: activity encouraged  Taken 9/3/2023 2000 by Amanda Angulo RN  Activity Management: activity encouraged  Sleep/Rest Enhancement: awakenings minimized     Problem: Nutrition Impaired (Sepsis/Septic Shock)  Goal: Optimal Nutrition Intake  Outcome: Ongoing, Progressing     Problem: Fall Injury Risk  Goal: Absence of Fall and Fall-Related Injury  Outcome: Ongoing, Progressing  Intervention: Identify and Manage Contributors  Recent Flowsheet Documentation  Taken 9/3/2023 2000 by Amanda Angulo RN  Medication Review/Management: medications reviewed  Self-Care Promotion: independence encouraged  Intervention: Promote Injury-Free Environment  Recent Flowsheet Documentation  Taken 9/3/2023 2223 by Amanda Angulo RN  Safety Promotion/Fall Prevention:   activity supervised   assistive device/personal items within reach   clutter free environment maintained   lighting adjusted   nonskid shoes/slippers when out of bed   room organization consistent   safety round/check completed  Taken 9/3/2023 2000 by Amanda Angulo RN  Safety Promotion/Fall Prevention:   activity supervised   assistive device/personal items within reach   clutter free environment maintained   lighting adjusted   nonskid shoes/slippers when out of bed   room organization consistent   safety round/check completed

## 2023-09-04 NOTE — PROGRESS NOTES
"Siria Garcia  1955  [id number]    Surgery Progress Note    Date of visit: 9/4/2023    Subjective: No new complaints  Tolerating regular diet  Adequate bowel function  Ambulating    Objective:    /76 (BP Location: Right arm, Patient Position: Lying)   Pulse 85   Temp 98.3 °F (36.8 °C) (Oral)   Resp 16   Ht 160 cm (63\")   Wt 90.8 kg (200 lb 3.2 oz)   SpO2 92%   BMI 35.46 kg/m²     Intake/Output Summary (Last 24 hours) at 9/4/2023 0917  Last data filed at 9/4/2023 0900  Gross per 24 hour   Intake --   Output 1420 ml   Net -1420 ml       CV: Regular rate and rhythm  L: normal air entry  Abd: Soft  Dressing is intact and dry  Clinton-Lopez with minimal serosanguineous drainage  Drain removed    LABS:    Results from last 7 days   Lab Units 09/04/23  0408   WBC 10*3/mm3 10.54   HEMOGLOBIN g/dL 9.6*   HEMATOCRIT % 28.6*   PLATELETS 10*3/mm3 303     Results from last 7 days   Lab Units 09/04/23  0408   SODIUM mmol/L 141   POTASSIUM mmol/L 2.9*   CHLORIDE mmol/L 105   CO2 mmol/L 24.0   BUN mg/dL 20   CREATININE mg/dL 0.72   CALCIUM mg/dL 8.1*   BILIRUBIN mg/dL 0.4   ALK PHOS U/L 58   ALT (SGPT) U/L 19   AST (SGOT) U/L 18   GLUCOSE mg/dL 84     Results from last 7 days   Lab Units 09/04/23  0408   SODIUM mmol/L 141   POTASSIUM mmol/L 2.9*   CHLORIDE mmol/L 105   CO2 mmol/L 24.0   BUN mg/dL 20   CREATININE mg/dL 0.72   GLUCOSE mg/dL 84   CALCIUM mg/dL 8.1*     Lab Results   Lab Value Date/Time    LIPASE 79 (H) 08/28/2023 1652         Assessment/ Plan: Stable course status post emergent repair of perforated prepyloric ulcer  Patient is progressing well  MYRNA drain removed  Stable for discharge from surgical standpoint  Antibiotic management per ID  Follow-up in the office in 1 week    Problem List Items Addressed This Visit          Other    Sepsis     Other Visit Diagnoses       Acute UTI    -  Primary    Relevant Medications    cefTRIAXone (ROCEPHIN) 2000 mg/100 mL 0.9% NS IVPB (MBP) (Completed)    " piperacillin-tazobactam (ZOSYN) 3.375 g in iso-osmotic dextrose 50 ml (premix) (Completed)    piperacillin-tazobactam (ZOSYN) 3.375 g in iso-osmotic dextrose 50 ml (premix) (Completed)    cefTRIAXone (ROCEPHIN) 2000 mg/100 mL 0.9% NS IVPB (MBP)    Ureteropelvic junction (UPJ) obstruction        Free fluid in pelvis        History of gastroesophageal reflux (GERD)        History of hypertension        History of breast cancer        Acute kidney injury        Abdominal pain        Relevant Orders    Tissue Pathology Exam (Completed)    Anaerobic Culture - Body Fluid, Abdominal Wall (Completed)    Fungus Culture - Body Fluid, Abdominal Wall (Completed)    Fungus Culture - Body Fluid, Abdominal Wall (Completed)    Body Fluid Culture - Body Fluid, Abdominal Wall (Completed)    Body Fluid Culture - Body Fluid, Abdominal Wall (Completed)    AFB Culture - Body Fluid, Abdominal Wall (Completed)    AFB Culture - Body Fluid, Abdominal Wall (Completed)              Gabriel Del Toro MD  9/4/2023  09:17 EDT

## 2023-09-04 NOTE — PROGRESS NOTES
[facility]  PROGRESS NOTE    Patient Name: Siria Garcia  : 1955  MRN: [id number]    Date of Admission: 2023  Primary Care Physician: Sofia Hong MD    Subjective   Subjective     CC:  Perforated ulcer    HPI:   - Patient undergoing urologic procedure today.  No new concerns overnight.  Tolerating some p.o.  Has worked with therapy.     -patient doing much better.  Tolerated liquid diet for breakfast and looking forward to regular food for lunch.  Pain is well controlled with medications.    9/3 -patient had several bowel movements documented yesterday and states they have increased even more today.  They are soft and in large volume.  Tolerating p.o.  No new concerns.  Worked with therapy yesterday.  She is able to ambulate pretty independently but states it is easier if she has help getting up.  Cuevas remains in place.     -no new concerns from patient.  She was up to the bathroom several times yesterday.  Tolerating p.o.  Bowel movements have decreased.    ROS:  Gen- No fevers, chills  CV- No chest pain, palpitations  Resp- No cough, dyspnea  GI- No current N/V/D, positive abd pain but improved with pain meds       Objective   Objective     Vital Signs:   Temp:  [98 °F (36.7 °C)-98.3 °F (36.8 °C)] 98.3 °F (36.8 °C)  Heart Rate:  [70-85] 70  Resp:  [16] 16  BP: (141-164)/(72-79) 164/79     Physical Exam:  Constitutional: No acute distress, awake, alert  HENT: NCAT, mucous membranes moist  Respiratory: Clear to auscultation bilaterally, respiratory effort normal   Cardiovascular: RRR  Gastrointestinal: Positive bowel sounds, soft, moderately tender, nondistended  Musculoskeletal: Generally weak  Psychiatric: Appropriate affect, cooperative  Neurologic: Oriented x 3, generally weak, Cranial Nerves grossly intact to confrontation, speech clear  Skin: No rashes      Results Reviewed:  LAB RESULTS:      Lab 238 23  0404  08/31/23  0346 08/31/23  0345 08/30/23  0538 08/30/23  0423 08/29/23  0540 08/28/23  1955/23  1652   WBC 10.54 9.76  --  14.36*  --  22.71* 24.25*  --  26.63*   HEMOGLOBIN 9.6* 9.8*  --  9.7*  --  10.6* 10.9*  --  13.0   HEMATOCRIT 28.6* 29.8*  --  29.3*  --  32.4* 33.1*  --  40.2   PLATELETS 303 186  --  191  --  208 225  --  306   NEUTROS ABS 6.03 6.79  --  11.95*  --  20.44*  --   --  23.97*   IMMATURE GRANS (ABS) 0.41* 0.20*  --  0.10*  --   --   --   --   --    LYMPHS ABS 2.42 1.49  --  1.25  --   --   --   --   --    MONOS ABS 1.26* 1.11*  --  1.02*  --   --   --   --   --    EOS ABS 0.38 0.13  --  0.01  --  0.00  --   --  0.00   MCV 87.7 89.8  --  90.4  --  91.3 92.2  --  92.2   PROCALCITONIN  --   --  5.49*  --  12.58*  --  18.09*  --  23.71*   LACTATE  --   --   --   --   --  1.5 2.3* 2.0 3.6*           Lab 09/04/23  0408 09/01/23  1801 09/01/23  0404 08/31/23  0346 08/30/23  0538 08/29/23  0540   SODIUM 141  --  140 139 139 138   POTASSIUM 2.9*  --  3.3* 4.1 4.8 4.5   CHLORIDE 105  --  106 105 105 105   CO2 24.0  --  27.0 25.0 24.0 23.0   ANION GAP 12.0  --  7.0 9.0 10.0 10.0   BUN 20  --  25* 30* 31* 28*   CREATININE 0.72  --  0.82 0.93 1.17* 1.35*   EGFR 91.2  --  78.0 67.1 50.9* 42.9*   GLUCOSE 84  --  131* 123* 91 134*   CALCIUM 8.1*  --  8.1* 8.0* 8.0* 8.0*   MAGNESIUM  --   --   --  2.1 2.4 1.8   PHOSPHORUS  --  2.6 2.2* 1.7*  --  3.3   HEMOGLOBIN A1C  --   --   --   --   --  5.80*   TSH  --   --   --   --   --  0.415           Lab 09/04/23  0408 09/01/23  0404 08/28/23  1652   TOTAL PROTEIN 5.6*  --  8.0   ALBUMIN 2.9* 2.9* 4.3   GLOBULIN 2.7  --  3.7   ALT (SGPT) 19  --  24   AST (SGOT) 18  --  32   BILIRUBIN 0.4  --  0.4   ALK PHOS 58  --  93   LIPASE  --   --  79*                   Lab 08/28/23 2035 08/1955   ABO TYPING O O   RH TYPING Positive Positive   ANTIBODY SCREEN  --  Negative           Brief Urine Lab Results  (Last result in the past 365 days)        Color   Clarity    Blood   Leuk Est   Nitrite   Protein   CREAT   Urine HCG        08/29/23 0349             44.3                 Microbiology Results Abnormal       Procedure Component Value - Date/Time    Anaerobic Culture - Body Fluid, Abdominal Wall [[id number]]  (Normal) Collected: 08/28/23 2207    Lab Status: Final result Specimen: Body Fluid from Abdominal Wall Updated: 09/03/23 0537     Anaerobic Culture No anaerobes isolated at 5 days    Fungus Culture - Body Fluid, Abdominal Wall [[id number]] Collected: 08/28/23 2210    Lab Status: Preliminary result Specimen: Body Fluid from Abdominal Wall Updated: 09/03/23 0201     Fungus Culture No fungus isolated at less than 1 week    Fungus Culture - Body Fluid, Abdominal Wall [[id number]] Collected: 08/28/23 2210    Lab Status: Preliminary result Specimen: Body Fluid from Abdominal Wall Updated: 09/03/23 0201     Fungus Culture No fungus isolated at less than 1 week    AFB Culture - Body Fluid, Abdominal Wall [[id number]] Collected: 08/28/23 2210    Lab Status: Preliminary result Specimen: Body Fluid from Abdominal Wall Updated: 09/03/23 0201     AFB Culture No AFB isolated at less than 1 week     AFB Stain No acid fast bacilli seen on concentrated smear    AFB Culture - Body Fluid, Abdominal Wall [[id number]] Collected: 08/28/23 2210    Lab Status: Preliminary result Specimen: Body Fluid from Abdominal Wall Updated: 09/03/23 0201     AFB Culture No AFB isolated at less than 1 week     AFB Stain No acid fast bacilli seen on concentrated smear    Blood Culture - Blood, Arm, Right [[id number]]  (Normal) Collected: 08/28/23 1800    Lab Status: Final result Specimen: Blood from Arm, Right Updated: 09/02/23 1947     Blood Culture No growth at 5 days    Body Fluid Culture - Body Fluid, Abdominal Wall [[id number]] Collected: 08/28/23 2210    Lab Status: Final result Specimen: Body Fluid from Abdominal Wall Updated: 09/01/23 0919     Body Fluid Culture No growth at 3 days     Gram Stain Many (4+)  WBCs seen      No organisms seen    Body Fluid Culture - Body Fluid, Abdominal Wall [[id number]] Collected: 08/28/23 2210    Lab Status: Final result Specimen: Body Fluid from Abdominal Wall Updated: 09/01/23 0919     Body Fluid Culture No growth at 3 days     Gram Stain Moderate (3+) WBCs seen      No organisms seen            No radiology results from the last 24 hrs        Current medications:  Scheduled Meds:cefTRIAXone, 2,000 mg, Intravenous, Q24H  fluticasone, 2 spray, Each Nare, Daily  levothyroxine, 25 mcg, Oral, Q AM  metroNIDAZOLE, 500 mg, Oral, Q8H  pantoprazole, 40 mg, Intravenous, BID AC  potassium chloride ER, 40 mEq, Oral, Q4H  sodium chloride, 10 mL, Intravenous, Q12H      Continuous Infusions:lactated ringers, 9 mL/hr      PRN Meds:.  acetaminophen    [DISCONTINUED] senna-docusate sodium **AND** [DISCONTINUED] polyethylene glycol **AND** [DISCONTINUED] bisacodyl **AND** bisacodyl    Calcium Replacement - Follow Nurse / BPA Driven Protocol    HYDROcodone-acetaminophen    HYDROmorphone    Magnesium Standard Dose Replacement - Follow Nurse / BPA Driven Protocol    ondansetron    phenol    Phosphorus Replacement - Follow Nurse / BPA Driven Protocol    Potassium Replacement - Follow Nurse / BPA Driven Protocol    Sodium Chloride (PF)    sodium chloride    Assessment & Plan   Assessment & Plan     Active Hospital Problems    Diagnosis  POA    **K25.9, Pyloric ulcer perf. - S/P repair 08/28/23 [K25.9]  Yes    E coli bacteremia [R78.81, B96.20]  Yes    Perforated ulcer [K27.5]  Yes    Chronic low back pain [M54.50, G89.29]  Yes    SUJATA (acute kidney injury) [N17.9]  Yes    UTI (urinary tract infection) [N39.0]  Yes    Sepsis [A41.9]  Yes    Peritonitis [K65.9]  Yes    Nephrolithiasis [N20.0]  Yes    Obstructive uropathy [N13.9]  Yes    Benign essential hypertension [I10]  Yes    Gastroesophageal reflux disease [K21.9]  Yes    Hypercholesterolemia [E78.00]  Yes    Hypothyroidism [E03.9]  Yes      Resolved  Hospital Problems   No resolved problems to display.        Brief Hospital Course to date:  Siria Garcia is a 67 y.o. female with history of hypertension, hypothyroidism, GERD, right ankle impingement syndrome followed by Ortho and has been on NSAIDs for right foot pain who presented to ED 8/28/2023 with abdominal pain.  CT on pelvis was concerning for free air and fluid left UPJ 1.3 cm stone.  She did undergo expiratory laparotomy by general surgery, was found to have perforated prepyloric ulcer s/p repair.  She was transferred to the ICU for ongoing care.  She was noted to have E. coli bacteremia.  Patient did progress well.  And was transferred to [facility] medicine assumed her care 8/31/1023     Sepsis secondary to perforated prepyloric ulcer  E. coli bacteremia  -Patient presented with leukocytosis, tachycardia, lactic acidosis, elevated procalcitonin  -General surgery following, s/p repair by Dr. SWANSON continue postop care  -Blood cultures positive for E. coli  -Continue antibiotics, Rocephin/Flagyl.  Completed Diflucan  -Infectious disease consulted and following  -Encourage ambulation     SUJATA  -Baseline creatinine ~0.8-1.0, patient presented with Cr of 1.72, likely secondary to above  -Currently improving, back to baseline  -Avoid nephrotoxins     Hypertension  -BP currently normotensive, holding home meds for now     Obstructive uropathy  -Patient with 1.3 cm left UPJ stone  -Urology following, underwent cystoscopy with stent on 9/1/23  -Cuevas placed perioperatively on 9/1/2023, DC Cuevas on 9/3/2023     Hypothyroidism  -Continue Synthroid     Hypophosphatemia  -Continue to monitor replete per protocol     GERD  -Continue PPI        Expected Discharge Location and Transportation: Rehab versus home, private vehicle  Expected Discharge   Expected Discharge Date: 9/5/2023; Expected Discharge Time:      DVT prophylaxis:  Mechanical DVT prophylaxis orders are present.     AM-PAC 6 Clicks Score (PT): 21  (09/02/23 1400)    CODE STATUS:   Code Status and Medical Interventions:   Ordered at: 08/28/23 7389     Code Status (Patient has no pulse and is not breathing):    CPR (Attempt to Resuscitate)     Medical Interventions (Patient has pulse or is breathing):    Full Support       Regina Shoemaker MD  09/04/23

## 2023-09-04 NOTE — PROGRESS NOTES
INFECTIOUS DISEASE f/u     Siria Garcia  1955  [id number]    Date of Consult: 8/30/2023    Admission Date: 8/28/2023      Requesting Provider: No ref. provider found  Evaluating Physician: Matthew Manuel MD    Reason for Consultation: peritonitis    History of present illness:    Patient is a 67 y.o. female with hypothyroidism, chronic back pain, GERD, hypertension, hyperlipidemia presents to [facility] emergency room with nausea vomiting abdominal pain chills starting 8/26.  Patient found to have lactic acidosis acute renal failure and leukocytosis and markedly elevated procalcitonin.  CT scan pelvis revealed free air and fluid as well as a left ureteropelvic junction kidney stone with obstructive uropathy.  Patient has been taken to operating room for exploratory laparotomy and was found to have a perforated prepyloric ulcer.  Patient has been started on broad-spectrum antibiotics.  Blood cultures have yielded E. coli.  Operative cultures are pending    Urology has seen patient and their plans for cystoscopy with ureteral stent placement upon further stability we are consulted for antibiotic management    8/31/23; transferred to floor; feels well; no events overnight has mild abdominal pain    9/1/2023 doing well no events overnight no complaints denies fevers had had left ureteral stent, cystoscopy today    9/2/23: Still with abdominal pain, but better.  Denies f/c, sob, n/v, rashes.  Has some loose stools from bowel regimen.  Blood and urine cultures positive for E.coli.     9/3/23: Still with abdominal pain but not worse.  Has explosive diarrhea, but no n/v. Denies f/c, sob, rashes.         9/4/23 no events overnight no fevers rash or sore throat patient believes she had night sweats last night      Past Medical History:   Diagnosis Date    Arthritis     Breast cancer 2017    Colon polyp 2018    Diverticulosis 01/2018    Environmental allergies     Gall bladder stones     GERD  (gastroesophageal reflux disease)     Hypertension     Low back pain     Plantar fasciitis     Tarsal tunnel syndrome of both lower extremities     UTI (urinary tract infection)        Past Surgical History:   Procedure Laterality Date    BREAST LUMPECTOMY  05/2017    CHOLECYSTECTOMY      COLONOSCOPY W/ BIOPSIES  01/25/2018    EXPLORATORY LAPAROTOMY N/A 8/28/2023    Procedure: LAPAROTOMY EXPLORATORY;  Surgeon: Gabriel Del Toro MD;  Location: [facility];  Service: General;  Laterality: N/A;       Family History   Problem Relation Age of Onset    Diabetes Father     Hypertension Father     Breast cancer Mother        Social History     Socioeconomic History    Marital status:    Tobacco Use    Smoking status: Never    Smokeless tobacco: Never   Vaping Use    Vaping Use: Never used   Substance and Sexual Activity    Alcohol use: No    Drug use: No    Sexual activity: Not Currently       Allergies   Allergen Reactions    Bactrim [Sulfamethoxazole-Trimethoprim] Itching    Terbinafine Itching         Medication:    Current Facility-Administered Medications:     acetaminophen (TYLENOL) suppository 650 mg, 650 mg, Rectal, Q4H PRN, Murray Torres MD    [DISCONTINUED] sennosides-docusate (PERICOLACE) 8.6-50 MG per tablet 2 tablet, 2 tablet, Oral, BID **AND** [DISCONTINUED] polyethylene glycol (MIRALAX) packet 17 g, 17 g, Oral, Daily PRN **AND** [DISCONTINUED] bisacodyl (DULCOLAX) EC tablet 5 mg, 5 mg, Oral, Daily PRN **AND** bisacodyl (DULCOLAX) suppository 10 mg, 10 mg, Rectal, Daily PRN, Murray Torres MD    Calcium Replacement - Follow Nurse / BPA Driven Protocol, , Does not apply, PRN, Murray Torres MD    cefTRIAXone (ROCEPHIN) 2000 mg/100 mL 0.9% NS IVPB (MBP), 2,000 mg, Intravenous, Q24H, Murray Torres MD, 2,000 mg at 09/03/23 1613    fluticasone (FLONASE) 50 MCG/ACT nasal spray 2 spray, 2 spray, Each Nare, Daily, Murray Torres MD, 2 spray at 09/03/23 2139     HYDROcodone-acetaminophen (NORCO) 5-325 MG per tablet 1 tablet, 1 tablet, Oral, Q6H PRN, Gabriel Del Toro MD    HYDROmorphone (DILAUDID) injection 0.5 mg, 0.5 mg, Intravenous, Q2H PRN, Murray Torres MD, 0.5 mg at 09/04/23 0318    lactated ringers infusion, 9 mL/hr, Intravenous, Continuous, Murray Torres MD, New Bag at 09/01/23 1417    levothyroxine (SYNTHROID, LEVOTHROID) tablet 25 mcg, 25 mcg, Oral, Q AM, Gabriel Del Toro MD, 25 mcg at 09/04/23 0618    Magnesium Standard Dose Replacement - Follow Nurse / BPA Driven Protocol, , Does not apply, PRN, Murray Torres MD    metroNIDAZOLE (FLAGYL) IVPB 500 mg, 500 mg, Intravenous, Q8H, Murray Torres MD, Last Rate: 100 mL/hr at 09/04/23 0742, 500 mg at 09/04/23 0742    ondansetron (ZOFRAN) injection 4 mg, 4 mg, Intravenous, Q6H PRN, Murray Torres MD    pantoprazole (PROTONIX) injection 40 mg, 40 mg, Intravenous, BID AC, Murray Torres MD, 40 mg at 09/04/23 0741    phenol (CHLORASEPTIC) 1.4 % liquid 1 spray, 1 spray, Mouth/Throat, Q2H PRN, Murray Torres MD, 1 spray at 08/29/23 1206    Phosphorus Replacement - Follow Nurse / BPA Driven Protocol, , Does not apply, PRBrian VILLEGAS Justin Dale, MD    potassium chloride (K-DUR,KLOR-CON) CR tablet 40 mEq, 40 mEq, Oral, Q4H, Regina Shoemaker MD, 40 mEq at 09/04/23 0741    Potassium Replacement - Follow Nurse / BPA Driven Protocol, , Does not apply, PRBrian VILLEGAS Justin Dale, MD    Sodium Chloride (PF) 0.9 % 10 mL, 10 mL, Intravenous, PRN, Murray Torres MD    sodium chloride 0.9 % flush 10 mL, 10 mL, Intravenous, Q12H, Murray Torres MD, 10 mL at 09/03/23 1955    sodium chloride 0.9 % infusion 40 mL, 40 mL, Intravenous, PRN, Murray Torres MD    Antibiotics:  Anti-Infectives (From admission, onward)      Ordered     Dose/Rate Route Frequency Start Stop    08/31/23 1412  metroNIDAZOLE (FLAGYL) IVPB 500 mg        Ordering Provider: Murray Torres  MD Quan    500 mg  100 mL/hr over 60 Minutes Intravenous Every 8 Hours 23 1600 23 1559    23 1412  cefTRIAXone (ROCEPHIN) 2000 mg/100 mL 0.9% NS IVPB (MBP)        Ordering Provider: Murray Torres MD    2,000 mg  over 30 Minutes Intravenous Every 24 Hours 23 1500 23 1459    23 1145  meropenem (MERREM) 1000 mg/100 mL 0.9% NS (mbp)        Ordering Provider: Renan Tatum MD    1,000 mg  over 30 Minutes Intravenous Once 23 1245 23 1230    23 0022  fluconazole (DIFLUCAN) IVPB 200 mg        Ordering Provider: Renan Tatum MD    200 mg  100 mL/hr over 60 Minutes Intravenous Every 24 Hours Scheduled 23 0115 23 2203    23 2153  piperacillin-tazobactam (ZOSYN) 3.375 g in iso-osmotic dextrose 50 ml (premix)        Ordering Provider: Gabriel Del Toro MD    3.375 g  100 mL/hr over 0.5 Hours Intravenous Once 23 2155 23 2210    23 194  piperacillin-tazobactam (ZOSYN) 3.375 g in iso-osmotic dextrose 50 ml (premix)        Ordering Provider: Axel Werner PA    3.375 g Intravenous Once 23 2045 23 1746  cefTRIAXone (ROCEPHIN) 2000 mg/100 mL 0.9% NS IVPB (MBP)        Ordering Provider: Axel Werner PA    2,000 mg  over 30 Minutes Intravenous Once 23 1802 23 1948              Review of Systems:  See hpi      Physical Exam:   Vital Signs  Temp (24hrs), Av.2 °F (36.8 °C), Min:98 °F (36.7 °C), Max:98.5 °F (36.9 °C)    Temp  Min: 98 °F (36.7 °C)  Max: 98.5 °F (36.9 °C)  BP  Min: 141/76  Max: 156/72  Pulse  Min: 75  Max: 87  Resp  Min: 16  Max: 16  SpO2  Min: 92 %  Max: 92 %    GENERAL: Awake and alert resting comfortably in no acute distress.   HEENT: Normocephalic, atraumatic.  PERRL. EOMI. No conjunctival injection. No icterus.  No external oral lesions  LUNGS: CTA bilaterally  ABDOMEN: Soft and nontender  EXT:  1+ edema  MSK: No joint effusions or erythema  SKIN: Warm and dry  without cutaneous eruptions on Inspection/palpation.      Laboratory Data    Results from last 7 days   Lab Units 09/04/23  0408 09/01/23  0404 08/31/23  0345   WBC 10*3/mm3 10.54 9.76 14.36*   HEMOGLOBIN g/dL 9.6* 9.8* 9.7*   HEMATOCRIT % 28.6* 29.8* 29.3*   PLATELETS 10*3/mm3 303 186 191       Results from last 7 days   Lab Units 09/04/23  0408   SODIUM mmol/L 141   POTASSIUM mmol/L 2.9*   CHLORIDE mmol/L 105   CO2 mmol/L 24.0   BUN mg/dL 20   CREATININE mg/dL 0.72   GLUCOSE mg/dL 84   CALCIUM mg/dL 8.1*       Results from last 7 days   Lab Units 09/04/23  0408   ALK PHOS U/L 58   BILIRUBIN mg/dL 0.4   ALT (SGPT) U/L 19   AST (SGOT) U/L 18               Results from last 7 days   Lab Units 08/30/23  0423   LACTATE mmol/L 1.5               Estimated Creatinine Clearance: 80 mL/min (by C-G formula based on SCr of 0.72 mg/dL).      Microbiology:  Microbiology Results (last 10 days)       Procedure Component Value - Date/Time    Fungus Culture - Body Fluid, Abdominal Wall [[id number]] Collected: 08/28/23 2210    Lab Status: Preliminary result Specimen: Body Fluid from Abdominal Wall Updated: 09/03/23 0201     Fungus Culture No fungus isolated at less than 1 week    Fungus Culture - Body Fluid, Abdominal Wall [[id number]] Collected: 08/28/23 2210    Lab Status: Preliminary result Specimen: Body Fluid from Abdominal Wall Updated: 09/03/23 0201     Fungus Culture No fungus isolated at less than 1 week    Body Fluid Culture - Body Fluid, Abdominal Wall [[id number]] Collected: 08/28/23 2210    Lab Status: Final result Specimen: Body Fluid from Abdominal Wall Updated: 09/01/23 0919     Body Fluid Culture No growth at 3 days     Gram Stain Moderate (3+) WBCs seen      No organisms seen    Body Fluid Culture - Body Fluid, Abdominal Wall [[id number]] Collected: 08/28/23 2210    Lab Status: Final result Specimen: Body Fluid from Abdominal Wall Updated: 09/01/23 0919     Body Fluid Culture No growth at 3 days     Gram Stain Many  (4+) WBCs seen      No organisms seen    AFB Culture - Body Fluid, Abdominal Wall [[id number]] Collected: 08/28/23 2210    Lab Status: Preliminary result Specimen: Body Fluid from Abdominal Wall Updated: 09/03/23 0201     AFB Culture No AFB isolated at less than 1 week     AFB Stain No acid fast bacilli seen on concentrated smear    AFB Culture - Body Fluid, Abdominal Wall [[id number]] Collected: 08/28/23 2210    Lab Status: Preliminary result Specimen: Body Fluid from Abdominal Wall Updated: 09/03/23 0201     AFB Culture No AFB isolated at less than 1 week     AFB Stain No acid fast bacilli seen on concentrated smear    Anaerobic Culture - Body Fluid, Abdominal Wall [[id number]]  (Normal) Collected: 08/28/23 2207    Lab Status: Final result Specimen: Body Fluid from Abdominal Wall Updated: 09/03/23 0537     Anaerobic Culture No anaerobes isolated at 5 days    Blood Culture - Blood, Arm, Right [[id number]]  (Normal) Collected: 08/28/23 1800    Lab Status: Final result Specimen: Blood from Arm, Right Updated: 09/02/23 1947     Blood Culture No growth at 5 days    Blood Culture - Blood, Arm, Left [[id number]]  (Abnormal)  (Susceptibility) Collected: 08/28/23 1755    Lab Status: Final result Specimen: Blood from Arm, Left Updated: 08/31/23 0607     Blood Culture Escherichia coli     Isolated from Anaerobic Bottle     Gram Stain Anaerobic Bottle Gram negative bacilli    Susceptibility        Escherichia coli      SERGIO      Ampicillin Susceptible      Ampicillin + Sulbactam Susceptible      Cefepime Susceptible      Ceftazidime Susceptible      Ceftriaxone Susceptible      Gentamicin Susceptible      Levofloxacin Susceptible      Piperacillin + Tazobactam Susceptible      Trimethoprim + Sulfamethoxazole Resistant                       Susceptibility Comments       Escherichia coli    Cefazolin sensitivity will not be reported for Enterobacteriaceae in non-urine isolates. If cefazolin is preferred, please call the microbiology  lab to request an E-test.  With the exception of urinary-sourced infections, aminoglycosides should not be used as monotherapy.               Blood Culture ID, PCR - Blood, Arm, Left [[id number]]  (Abnormal) Collected: 08/28/23 1755    Lab Status: Final result Specimen: Blood from Arm, Left Updated: 08/29/23 1246     BCID, PCR Escherichia coli. Identification by BCID2 PCR.     BOTTLE TYPE Anaerobic Bottle    Narrative:      No resistance genes detected.    Urine Culture - Urine, Urine, Clean Catch [[id number]]  (Abnormal)  (Susceptibility) Collected: 08/28/23 1653    Lab Status: Final result Specimen: Urine, Clean Catch Updated: 08/30/23 0531     Urine Culture >100,000 CFU/mL Escherichia coli    Narrative:      Colonization of the urinary tract without infection is common. Treatment is discouraged unless the patient is symptomatic, pregnant, or undergoing an invasive urologic procedure.    Susceptibility        Escherichia coli      SERGIO      Ampicillin Susceptible      Ampicillin + Sulbactam Susceptible      Cefazolin Susceptible      Cefepime Susceptible      Ceftazidime Susceptible      Ceftriaxone Susceptible      Gentamicin Susceptible      Levofloxacin Susceptible      Nitrofurantoin Susceptible      Piperacillin + Tazobactam Susceptible      Trimethoprim + Sulfamethoxazole Resistant                                       Radiology:  Imaging Results (Last 72 Hours)       Procedure Component Value Units Date/Time    FL C Arm During Surgery [[id number]] Resulted: 09/01/23 1515     Updated: 09/01/23 1515    Narrative:      This procedure was auto-finalized with no dictation required.              Impression:   E. coli bacteremia  Peritonitis  Prepyloric perforated viscus s/p repair  Left-sided kidney stone with obstructive uropathy s/p stent  Leukocytosis with neutrophilia  Acute renal failure  Procalcitonin elevation  Severe diarrhea--NEW    PLAN/RECOMMENDATIONS:   Thank you for asking us to see CARINE Workman  recommend the following:      Urine cultures and blood cultures growing E. Coli susc to rocephin    From a perforated viscus standpoint patient at risk for peritonitis from gram-negative enteric's and anaerobes.        Continue rocephin 2 g iv daily   Continue flagyl 500 mg will change to by mouth    Duration of antibiotics is probably a total of 7 to 14 days    Abdominal standpoint if source control is achieved 5 days antibiotics are reasonable but patient had also kidney infection and kidney stone of left side requiring stent.  I would recommend antibiotics until stone is broken up    After 7 days of antibiotics could consider conversion to p.o.            Matthew Manuel MD  9/4/2023  07:52 EDT

## 2023-09-05 ENCOUNTER — READMISSION MANAGEMENT (OUTPATIENT)
Dept: CALL CENTER | Facility: HOSPITAL | Age: 68
End: 2023-09-05
Payer: MEDICARE

## 2023-09-05 VITALS
HEIGHT: 63 IN | DIASTOLIC BLOOD PRESSURE: 68 MMHG | BODY MASS INDEX: 35.4 KG/M2 | RESPIRATION RATE: 18 BRPM | HEART RATE: 81 BPM | TEMPERATURE: 99.2 F | WEIGHT: 199.8 LBS | OXYGEN SATURATION: 94 % | SYSTOLIC BLOOD PRESSURE: 150 MMHG

## 2023-09-05 LAB
ANION GAP SERPL CALCULATED.3IONS-SCNC: 11 MMOL/L (ref 5–15)
BASOPHILS # BLD AUTO: 0.09 10*3/MM3 (ref 0–0.2)
BASOPHILS NFR BLD AUTO: 0.7 % (ref 0–1.5)
BUN SERPL-MCNC: 17 MG/DL (ref 8–23)
BUN/CREAT SERPL: 22.7 (ref 7–25)
CALCIUM SPEC-SCNC: 8.2 MG/DL (ref 8.6–10.5)
CHLORIDE SERPL-SCNC: 107 MMOL/L (ref 98–107)
CO2 SERPL-SCNC: 23 MMOL/L (ref 22–29)
CREAT SERPL-MCNC: 0.75 MG/DL (ref 0.57–1)
DEPRECATED RDW RBC AUTO: 46.5 FL (ref 37–54)
EGFRCR SERPLBLD CKD-EPI 2021: 86.8 ML/MIN/1.73
EOSINOPHIL # BLD AUTO: 0.48 10*3/MM3 (ref 0–0.4)
EOSINOPHIL NFR BLD AUTO: 3.9 % (ref 0.3–6.2)
ERYTHROCYTE [DISTWIDTH] IN BLOOD BY AUTOMATED COUNT: 14.5 % (ref 12.3–15.4)
FUNGUS WND CULT: NORMAL
FUNGUS WND CULT: NORMAL
GLUCOSE SERPL-MCNC: 99 MG/DL (ref 65–99)
HCT VFR BLD AUTO: 29.1 % (ref 34–46.6)
HGB BLD-MCNC: 9.6 G/DL (ref 12–15.9)
IMM GRANULOCYTES # BLD AUTO: 0.56 10*3/MM3 (ref 0–0.05)
IMM GRANULOCYTES NFR BLD AUTO: 4.5 % (ref 0–0.5)
LYMPHOCYTES # BLD AUTO: 2.32 10*3/MM3 (ref 0.7–3.1)
LYMPHOCYTES NFR BLD AUTO: 18.8 % (ref 19.6–45.3)
MCH RBC QN AUTO: 29.1 PG (ref 26.6–33)
MCHC RBC AUTO-ENTMCNC: 33 G/DL (ref 31.5–35.7)
MCV RBC AUTO: 88.2 FL (ref 79–97)
MONOCYTES # BLD AUTO: 1.2 10*3/MM3 (ref 0.1–0.9)
MONOCYTES NFR BLD AUTO: 9.7 % (ref 5–12)
MYCOBACTERIUM SPEC CULT: NORMAL
MYCOBACTERIUM SPEC CULT: NORMAL
NEUTROPHILS NFR BLD AUTO: 62.4 % (ref 42.7–76)
NEUTROPHILS NFR BLD AUTO: 7.68 10*3/MM3 (ref 1.7–7)
NIGHT BLUE STAIN TISS: NORMAL
NIGHT BLUE STAIN TISS: NORMAL
NRBC BLD AUTO-RTO: 0 /100 WBC (ref 0–0.2)
PLATELET # BLD AUTO: 350 10*3/MM3 (ref 140–450)
PMV BLD AUTO: 9.8 FL (ref 6–12)
POTASSIUM SERPL-SCNC: 4.1 MMOL/L (ref 3.5–5.2)
RBC # BLD AUTO: 3.3 10*6/MM3 (ref 3.77–5.28)
SODIUM SERPL-SCNC: 141 MMOL/L (ref 136–145)
WBC NRBC COR # BLD: 12.33 10*3/MM3 (ref 3.4–10.8)

## 2023-09-05 PROCEDURE — 99239 HOSP IP/OBS DSCHRG MGMT >30: CPT | Performed by: INTERNAL MEDICINE

## 2023-09-05 PROCEDURE — 97116 GAIT TRAINING THERAPY: CPT | Performed by: PHYSICAL THERAPIST

## 2023-09-05 PROCEDURE — 97530 THERAPEUTIC ACTIVITIES: CPT | Performed by: PHYSICAL THERAPIST

## 2023-09-05 PROCEDURE — 97535 SELF CARE MNGMENT TRAINING: CPT

## 2023-09-05 PROCEDURE — 80048 BASIC METABOLIC PNL TOTAL CA: CPT | Performed by: FAMILY MEDICINE

## 2023-09-05 PROCEDURE — 25010000002 CEFTRIAXONE PER 250 MG: Performed by: UROLOGY

## 2023-09-05 PROCEDURE — 85025 COMPLETE CBC W/AUTO DIFF WBC: CPT | Performed by: FAMILY MEDICINE

## 2023-09-05 RX ORDER — METRONIDAZOLE 500 MG/1
500 TABLET ORAL EVERY 8 HOURS SCHEDULED
Qty: 18 TABLET | Refills: 0 | Status: SHIPPED | OUTPATIENT
Start: 2023-09-05 | End: 2023-09-05 | Stop reason: HOSPADM

## 2023-09-05 RX ORDER — CEFUROXIME AXETIL 250 MG/1
250 TABLET ORAL 2 TIMES DAILY
Qty: 12 TABLET | Refills: 0 | Status: SHIPPED | OUTPATIENT
Start: 2023-09-05 | End: 2023-09-05 | Stop reason: SDUPTHER

## 2023-09-05 RX ORDER — CEFUROXIME AXETIL 250 MG/1
250 TABLET ORAL 2 TIMES DAILY
Qty: 14 TABLET | Refills: 1 | Status: SHIPPED | OUTPATIENT
Start: 2023-09-05 | End: 2023-09-12

## 2023-09-05 RX ORDER — HYDROCODONE BITARTRATE AND ACETAMINOPHEN 5; 325 MG/1; MG/1
1 TABLET ORAL EVERY 6 HOURS PRN
Qty: 12 TABLET | Refills: 0 | Status: SHIPPED | OUTPATIENT
Start: 2023-09-05 | End: 2023-09-08

## 2023-09-05 RX ORDER — PANTOPRAZOLE SODIUM 40 MG/1
40 TABLET, DELAYED RELEASE ORAL
Status: DISCONTINUED | OUTPATIENT
Start: 2023-09-05 | End: 2023-09-05 | Stop reason: HOSPADM

## 2023-09-05 RX ADMIN — Medication 10 ML: at 08:18

## 2023-09-05 RX ADMIN — METRONIDAZOLE 500 MG: 500 TABLET ORAL at 05:41

## 2023-09-05 RX ADMIN — SODIUM CHLORIDE 2000 MG: 900 INJECTION INTRAVENOUS at 14:39

## 2023-09-05 RX ADMIN — HYDROCODONE BITARTRATE AND ACETAMINOPHEN 1 TABLET: 5; 325 TABLET ORAL at 00:50

## 2023-09-05 RX ADMIN — METRONIDAZOLE 500 MG: 500 TABLET ORAL at 14:38

## 2023-09-05 RX ADMIN — LEVOTHYROXINE SODIUM 25 MCG: 0.03 TABLET ORAL at 05:41

## 2023-09-05 RX ADMIN — PANTOPRAZOLE SODIUM 40 MG: 40 INJECTION, POWDER, LYOPHILIZED, FOR SOLUTION INTRAVENOUS at 08:18

## 2023-09-05 RX ADMIN — FLUTICASONE PROPIONATE 2 SPRAY: 50 SPRAY, METERED NASAL at 08:18

## 2023-09-05 NOTE — CASE MANAGEMENT/SOCIAL WORK
Continued Stay Note  [facility]     Patient Name: Siria Garcia  MRN: [id number]  Today's Date: 9/5/2023    Admit Date: 8/28/2023    Plan: discharage plan   Discharge Plan       Row Name 09/05/23 1523       Plan    Plan discharage plan    Plan Comments Pt is being disccharged today and per conversation with sonLenny, pt plans to stay at his home: [address] [place] [address]([employer] Pt/son asking for BSC, RW and HH with no preference to a particlular DME or HH company.Referral made to [facility] and spoke with Francisco Javier. [facility]'s [facility] prior to discharge. Referral made to [facility] and spoke with Sandra.  Sandra is aware pt is being discharged today and states the [place] HH office should be able to accept referral. SonLenny plans to transport pt home. CM will cont to follow    Final Discharge Disposition Code 06 - home with home health care                   Discharge Codes    No documentation.                 Expected Discharge Date and Time       Expected Discharge Date Expected Discharge Time    Sep 5, 2023               Shannan Lance RN

## 2023-09-05 NOTE — PROGRESS NOTES
"Siria Garcia  1955  [id number]    Surgery Progress Note    Date of visit: 9/5/2023    Subjective: Comfortable with no complaints of pain  Ambulating and tolerating diet well    Objective:    /68 (BP Location: Right arm, Patient Position: Lying)   Pulse 81   Temp 99.2 °F (37.3 °C) (Oral)   Resp 18   Ht 160 cm (63\")   Wt 90.6 kg (199 lb 12.8 oz)   SpO2 94%   BMI 35.39 kg/m²     Intake/Output Summary (Last 24 hours) at 9/5/2023 1319  Last data filed at 9/5/2023 0900  Gross per 24 hour   Intake 240 ml   Output 1200 ml   Net -960 ml       CV: Regular rate and rhythm  L: normal air entry  Abd: Incisions intact and healing well  No erythema or drainage      LABS:    Results from last 7 days   Lab Units 09/05/23  0333   WBC 10*3/mm3 12.33*   HEMOGLOBIN g/dL 9.6*   HEMATOCRIT % 29.1*   PLATELETS 10*3/mm3 350     Results from last 7 days   Lab Units 09/05/23  0333 09/04/23  2106 09/04/23  0408   SODIUM mmol/L 141  --  141   POTASSIUM mmol/L 4.1   < > 2.9*   CHLORIDE mmol/L 107  --  105   CO2 mmol/L 23.0  --  24.0   BUN mg/dL 17  --  20   CREATININE mg/dL 0.75  --  0.72   CALCIUM mg/dL 8.2*  --  8.1*   BILIRUBIN mg/dL  --   --  0.4   ALK PHOS U/L  --   --  58   ALT (SGPT) U/L  --   --  19   AST (SGOT) U/L  --   --  18   GLUCOSE mg/dL 99  --  84    < > = values in this interval not displayed.     Results from last 7 days   Lab Units 09/05/23  0333   SODIUM mmol/L 141   POTASSIUM mmol/L 4.1   CHLORIDE mmol/L 107   CO2 mmol/L 23.0   BUN mg/dL 17   CREATININE mg/dL 0.75   GLUCOSE mg/dL 99   CALCIUM mg/dL 8.2*     Lab Results   Lab Value Date/Time    LIPASE 79 (H) 08/28/2023 1652         Assessment/ Plan: Stable course status post emergent repair of perforated prepyloric ulcer  Patient is progressing well  Home when okay with ID and primary service  Follow-up in the office in 1 week    Problem List Items Addressed This Visit          Other    Sepsis     Other Visit Diagnoses       Acute UTI    -  Primary    " Relevant Medications    cefTRIAXone (ROCEPHIN) 2000 mg/100 mL 0.9% NS IVPB (MBP) (Completed)    piperacillin-tazobactam (ZOSYN) 3.375 g in iso-osmotic dextrose 50 ml (premix) (Completed)    piperacillin-tazobactam (ZOSYN) 3.375 g in iso-osmotic dextrose 50 ml (premix) (Completed)    cefTRIAXone (ROCEPHIN) 2000 mg/100 mL 0.9% NS IVPB (MBP)    Ureteropelvic junction (UPJ) obstruction        Free fluid in pelvis        History of gastroesophageal reflux (GERD)        History of hypertension        History of breast cancer        Acute kidney injury        Abdominal pain        Relevant Orders    Tissue Pathology Exam (Completed)    Anaerobic Culture - Body Fluid, Abdominal Wall (Completed)    Fungus Culture - Body Fluid, Abdominal Wall (Completed)    Fungus Culture - Body Fluid, Abdominal Wall (Completed)    Body Fluid Culture - Body Fluid, Abdominal Wall (Completed)    Body Fluid Culture - Body Fluid, Abdominal Wall (Completed)    AFB Culture - Body Fluid, Abdominal Wall (Completed)    AFB Culture - Body Fluid, Abdominal Wall (Completed)              Gabriel Del Toro MD  9/5/2023  13:19 EDT

## 2023-09-05 NOTE — THERAPY TREATMENT NOTE
Patient Name: Siria Garcia  : 1955    MRN: [id number]                              Today's Date: 2023       Admit Date: 2023    Visit Dx:     ICD-10-CM ICD-9-CM   1. Acute UTI  N39.0 599.0   2. Sepsis, due to unspecified organism, unspecified whether acute organ dysfunction present  A41.9 038.9     995.91   3. Ureteropelvic junction (UPJ) obstruction  N13.5 593.4   4. Free fluid in pelvis  R18.8 789.59   5. History of gastroesophageal reflux (GERD)  Z87.19 V12.79   6. History of hypertension  Z86.79 V12.59   7. History of breast cancer  Z85.3 V10.3   8. Acute kidney injury  N17.9 584.9   9. Abdominal pain  R10.9 789.00   10. Perforated ulcer  K27.5 533.50   11. Pylorus ulcer, unspecified chronicity  K25.9 531.90   12. Other fatigue  R53.83 780.79     Patient Active Problem List   Diagnosis    Acute low back pain    Benign essential hypertension    Gastroesophageal reflux disease    Fatigue    Foot pain    Hypercholesterolemia    Hypothyroidism    Knee pain    Osteoarthritis of hand    Vaginal yeast infection    Sore throat    Vaginitis and vulvovaginitis    Malignant neoplasm of upper-outer quadrant of breast in female, estrogen receptor positive    Tubular adenoma    Elevated glucose    Hypercalcemia    Vitamin D deficiency    K25.9, Pyloric ulcer perf. - S/P repair 23    Chronic low back pain    SUJATA (acute kidney injury)    UTI (urinary tract infection)    Sepsis    Peritonitis    Nephrolithiasis    Obstructive uropathy    Perforated ulcer    E coli bacteremia     Past Medical History:   Diagnosis Date    Arthritis     Breast cancer 2017    Colon polyp 2018    Diverticulosis 2018    Environmental allergies     Gall bladder stones     GERD (gastroesophageal reflux disease)     Hypertension     Low back pain     Plantar fasciitis     Tarsal tunnel syndrome of both lower extremities     UTI (urinary tract infection)      Past Surgical History:   Procedure Laterality Date    BREAST  LUMPECTOMY  05/2017    CHOLECYSTECTOMY      COLONOSCOPY W/ BIOPSIES  01/25/2018    CYSTOSCOPY W/ URETERAL STENT PLACEMENT N/A 9/1/2023    Procedure: CYSTOSCOPY WITH LEFT URETERAL STENT PLACEMENT;  Surgeon: Murray Torres MD;  Location: [facility] OR;  Service: Urology;  Laterality: N/A;    EXPLORATORY LAPAROTOMY N/A 8/28/2023    Procedure: LAPAROTOMY EXPLORATORY;  Surgeon: Gabriel Del Toro MD;  Location: [facility] OR;  Service: General;  Laterality: N/A;      General Information       Row Name 09/05/23 1445          OT Time and Intention    Document Type therapy note (daily note)  -     Mode of Treatment occupational therapy  -       Row Name 09/05/23 1445          General Information    Patient Profile Reviewed yes  -LC     Prior Level of Function --  See IE  -     Existing Precautions/Restrictions other (see comments)  MYRNA Drain; Abd Incision; Abd Binder  -     Barriers to Rehab medically complex  -       Row Name 09/05/23 1445          Cognition    Orientation Status (Cognition) oriented x 4  -       Row Name 09/05/23 1445          Safety Issues, Functional Mobility    Safety Issues Affecting Function (Mobility) sequencing abilities  -     Impairments Affecting Function (Mobility) balance;endurance/activity tolerance;pain;strength  -               User Key  (r) = Recorded By, (t) = Taken By, (c) = Cosigned By      Initials Name Provider Type     Neena Arcos OT Occupational Therapist                     Mobility/ADL's       Row Name 09/05/23 1446          Bed Mobility    Comment, (Bed Mobility) Declined  -       Row Name 09/05/23 1446          Activities of Daily Living    BADL Assessment/Intervention lower body dressing  -       Row Name 09/05/23 1446          Lower Body Dressing Assessment/Training    [place] Level (Lower Body Dressing) don;doff;pants/bottoms;shoes/slippers;socks;moderate assist (50% patient effort);verbal cues  -     Assistive Devices (Lower Body Dressing)  long-handled shoe horn;reacher;sock-aid  -     Comment, (Lower Body Dressing) Educated pt. on use of AE for LBD to improve comfort and independence with task. Demonstrated use and pt. verbalized understanding. recommend conitnued trials to ensure carry over.  -               User Key  (r) = Recorded By, (t) = Taken By, (c) = Cosigned By      Initials Name Provider Type     Neena Arcos, JANNETH Occupational Therapist                   Obj/Interventions    No documentation.                  Goals/Plan    No documentation.                  Clinical Impression       Row Name 09/05/23 1447          Pain Assessment    Pretreatment Pain Rating 3/10  -     Posttreatment Pain Rating 3/10  -     Pain Location incisional  -     Pain Location - abdomen  -     Pain Intervention(s) Rest  -     Additional Documentation Pain Scale: Word Pre/Post-Treatment (Group)  -       Row Name 09/05/23 1447          Plan of Care Review    Plan of Care Reviewed With patient  -     Progress no change  -     Outcome Evaluation Educated pt. on use of AE for LBD to improve comfort and independence with task. Recommend continued trials to ensure carry over. Continue to recommend home with assist and OP services at discharge.  -LC       Row Name 09/05/23 1447          Positioning and Restraints    Pre-Treatment Position in bed  -     Post Treatment Position bed  -LC     In Bed notified nsg;fowlers;encouraged to call for assist;exit alarm on;patient within staff view;with family/caregiver  -               User Key  (r) = Recorded By, (t) = Taken By, (c) = Cosigned By      Initials Name Provider Type     Neena Arcos, JANNETH Occupational Therapist                   Outcome Measures       Row Name 09/05/23 1447          How much help from another is currently needed...    Putting on and taking off regular lower body clothing? 2  -LC     Bathing (including washing, rinsing, and drying) 2  -LC     Toileting (which includes using  toilet bed pan or urinal) 2  -LC     Putting on and taking off regular upper body clothing 3  -LC     Taking care of personal grooming (such as brushing teeth) 3  -LC     Eating meals 3  -LC     AM-PAC 6 Clicks Score (OT) 15  -LC       Row Name 09/05/23 1405 09/05/23 0800       How much help from another person do you currently need...    Turning from your back to your side while in flat bed without using bedrails? 4  -LM 4  -EL    Moving from lying on back to sitting on the side of a flat bed without bedrails? 3  -LM 3  -EL    Moving to and from a bed to a chair (including a wheelchair)? 3  -LM 3  -EL    Standing up from a chair using your arms (e.g., wheelchair, bedside chair)? 3  -LM 3  -EL    Climbing 3-5 steps with a railing? 3  -LM 2  -EL    To walk in hospital room? 3  -LM 3  -EL    AM-PAC 6 Clicks Score (PT) 19  -LM 18  -EL    Highest level of mobility 6 --> Walked 10 steps or more  -LM 6 --> Walked 10 steps or more  -EL      Row Name 09/05/23 1449 09/05/23 1405       Functional Assessment    Outcome Measure Options AM-PAC 6 Clicks Daily Activity (OT)  - AM-PAC 6 Clicks Basic Mobility (PT)  -LM              User Key  (r) = Recorded By, (t) = Taken By, (c) = Cosigned By      Initials Name Provider Type    Donna Langley, PT Physical Therapist    Neena Monet OT Occupational Therapist    Emani Wu, RN Registered Nurse                    Occupational Therapy Education       Title: PT OT SLP Therapies (In Progress)       Topic: Occupational Therapy (In Progress)       Point: ADL training (In Progress)       Description:   Instruct learner(s) on proper safety adaptation and remediation techniques during self care or transfers.   Instruct in proper use of assistive devices.                  Learning Progress Summary             Patient Acceptance, E,D, NR by LC at 9/5/2023 1405    Acceptance, E, NR by SILVIA at 8/30/2023 0929                         Point: Home exercise program (Not Started)        Description:   Instruct learner(s) on appropriate technique for monitoring, assisting and/or progressing therapeutic exercises/activities.                  Learner Progress:  Not documented in this visit.              Point: Precautions (In Progress)       Description:   Instruct learner(s) on prescribed precautions during self-care and functional transfers.                  Learning Progress Summary             Patient Acceptance, E,D, NR by  at 9/5/2023 1405    Acceptance, E, NR by  at 8/30/2023 0929                         Point: Body mechanics (In Progress)       Description:   Instruct learner(s) on proper positioning and spine alignment during self-care, functional mobility activities and/or exercises.                  Learning Progress Summary             Patient Acceptance, E,D, NR by  at 9/5/2023 1405    Acceptance, E, NR by  at 8/30/2023 0929                                         User Key       Initials Effective Dates Name Provider Type Discipline     09/02/21 -  Naina Kenny, OT Occupational Therapist OT     06/16/21 -  Neena Arcos OT Occupational Therapist OT                  OT Recommendation and Plan     Plan of Care Review  Plan of Care Reviewed With: patient  Progress: no change  Outcome Evaluation: Educated pt. on use of AE for LBD to improve comfort and independence with task. Recommend continued trials to ensure carry over. Continue to recommend home with assist and OP services at discharge.     Time Calculation:         Time Calculation- OT       Row Name 09/05/23 1450 09/05/23 1405          Time Calculation- OT    OT Start Time 1405  -LC --     OT Received On 09/05/23 - --     OT Goal Re-Cert Due Date 09/09/23 - --        Timed Charges    27363 - Gait Training Minutes  -- 20  -LM     52229 - OT Self Care/Mgmt Minutes 16  - --        Total Minutes    Timed Charges Total Minutes 16  -LC 20  -LM      Total Minutes 16  - 20  -LM               User Key  (r) = Recorded  By, (t) = Taken By, (c) = Cosigned By      Initials Name Provider Type    Donna Langley, PT Physical Therapist    LC Neena Arcos OT Occupational Therapist                  Therapy Charges for Today       Code Description Service Date Service Provider Modifiers Qty    [id number] HC OT SELF CARE/MGMT/TRAIN EA 15 MIN 9/5/2023 Neena Arcos OT GO 1                 Neena Arcos OT  9/5/2023

## 2023-09-05 NOTE — PLAN OF CARE
Goal Outcome Evaluation:  Plan of Care Reviewed With: patient, son        Progress: improving  Outcome Evaluation: Progressing well.  Pt stood, ambulated 16' + 200' using rw, and ambulated up/down 2 stairs with SBAx1.  Pt continues to be limited by decreased activity tolerance.  Continue to recommend home with assist and resuming outpt PT.      Anticipated Discharge Disposition (PT): home with assist, home with outpatient therapy services

## 2023-09-05 NOTE — DISCHARGE SUMMARY
[facility] Medicine Services  DISCHARGE SUMMARY    Patient Name: Siria Garcia  : 1955  MRN: [id number]    Date of Admission: 2023  4:53 PM  Date of Discharge: 2023  Primary Care Physician: Sofia Hong MD    Consults       Date and Time Order Name Status Description    2023  1:31 PM Inpatient Infectious Diseases Consult Completed             Hospital Course     Presenting Problem: sepsis    Active Hospital Problems    Diagnosis  POA    **K25.9, Pyloric ulcer perf. - S/P repair 23 [K25.9]  Yes    E coli bacteremia [R78.81, B96.20]  Yes    Perforated ulcer [K27.5]  Yes    Chronic low back pain [M54.50, G89.29]  Yes    SUJATA (acute kidney injury) [N17.9]  Yes    UTI (urinary tract infection) [N39.0]  Yes    Sepsis [A41.9]  Yes    Peritonitis [K65.9]  Yes    Nephrolithiasis [N20.0]  Yes    Obstructive uropathy [N13.9]  Yes    Benign essential hypertension [I10]  Yes    Gastroesophageal reflux disease [K21.9]  Yes    Hypercholesterolemia [E78.00]  Yes    Hypothyroidism [E03.9]  Yes      Resolved Hospital Problems   No resolved problems to display.          Hospital Course:  Siria Garcia is a 67 y.o. female with history of hypertension, hypothyroidism, GERD, right ankle impingement syndrome followed by Ortho and has been on NSAIDs for right foot pain who presented to ED 2023 with abdominal pain.  CT abd/pelvis concerning for free air and fluid left UPJ 1.3 cm stone.    She did undergo expiratory laparotomy by general surgery, was found to have perforated prepyloric ulcer s/p repair.  She was transferred to the ICU, noted to have E. coli bacteremia.  Transferred to [facility] on .      Sepsis secondary to perforated prepyloric ulcer  E. coli bacteremia  -Patient presented with leukocytosis, tachycardia, lactic acidosis, elevated procalcitonin  -General surgery following, s/p repair by Dr. SWANSON continue postop care  -Continue antibiotics, completed  Diflucan  -Infectious disease following, cefdinir times 7 days, with one refill  -followup with Dr Torres regarding stone removal    Weakness  Patient is confined to one level of the home and there is no toilet on that level      SUJATA, resolved  -now at baseline       Obstructive uropathy  -Patient with 1.3 cm left UPJ stone  -Urology following, underwent cystoscopy with stent on 9/1/23  -Cuevas placed perioperatively on 9/1/2023, DC Cuevas on 9/3/2023     Hypothyroidism  -Continue Synthroid     Hypophosphatemia  -Continue to monitor replete per protocol     GERD  -Continue PPI      Discharge Follow Up Recommendations for outpatient labs/diagnostics:  PCP in 1 week  [facility] in 1 week  Dr Torres in 1 week    Day of Discharge     HPI:   Doing better, pain improved    Review of Systems  Gen- No fevers, chills  CV- No chest pain, palpitations  Resp- No cough, dyspnea  GI- No N/V/D, abd pain      Vital Signs:   Temp:  [97.9 °F (36.6 °C)-99.2 °F (37.3 °C)] 99.2 °F (37.3 °C)  Heart Rate:  [81-89] 81  Resp:  [16-18] 18  BP: (142-151)/(65-91) 150/68      Physical Exam:  Constitutional: No acute distress, awake, alert  HENT: NCAT, mucous membranes moist  Respiratory: Clear to auscultation bilaterally, respiratory effort normal   Cardiovascular: RRR, no murmurs, rubs, or gallops  Gastrointestinal: Soft, abd binder in place, not distended  Musculoskeletal: No bilateral ankle edema  Psychiatric: Appropriate affect, cooperative  Neurologic: Oriented x 3, speech clear  Skin: No rashes      Pertinent  and/or Most Recent Results     LAB RESULTS:      Lab 09/05/23  0333 09/04/23  0408 09/01/23  0404 08/31/23  0346 08/31/23  0345 08/30/23  0538 08/30/23  0423   WBC 12.33* 10.54 9.76  --  14.36*  --  22.71*   HEMOGLOBIN 9.6* 9.6* 9.8*  --  9.7*  --  10.6*   HEMATOCRIT 29.1* 28.6* 29.8*  --  29.3*  --  32.4*   PLATELETS 350 303 186  --  191  --  208   NEUTROS ABS 7.68* 6.03 6.79  --  11.95*  --  20.44*   IMMATURE GRANS (ABS) 0.56* 0.41*  0.20*  --  0.10*  --   --    LYMPHS ABS 2.32 2.42 1.49  --  1.25  --   --    MONOS ABS 1.20* 1.26* 1.11*  --  1.02*  --   --    EOS ABS 0.48* 0.38 0.13  --  0.01  --  0.00   MCV 88.2 87.7 89.8  --  90.4  --  91.3   PROCALCITONIN  --   --   --  5.49*  --  12.58*  --    LACTATE  --   --   --   --   --   --  1.5         Lab 09/05/23  0333 09/04/23  2106 09/04/23 0408 09/01/23  1801 09/01/23  0404 08/31/23  0346 08/30/23  0538   SODIUM 141  --  141  --  140 139 139   POTASSIUM 4.1 4.0 2.9*  --  3.3* 4.1 4.8   CHLORIDE 107  --  105  --  106 105 105   CO2 23.0  --  24.0  --  27.0 25.0 24.0   ANION GAP 11.0  --  12.0  --  7.0 9.0 10.0   BUN 17  --  20  --  25* 30* 31*   CREATININE 0.75  --  0.72  --  0.82 0.93 1.17*   EGFR 86.8  --  91.2  --  78.0 67.1 50.9*   GLUCOSE 99  --  84  --  131* 123* 91   CALCIUM 8.2*  --  8.1*  --  8.1* 8.0* 8.0*   MAGNESIUM  --  1.9  --   --   --  2.1 2.4   PHOSPHORUS  --   --   --  2.6 2.2* 1.7*  --          Lab 09/04/23  0408 09/01/23  0404   TOTAL PROTEIN 5.6*  --    ALBUMIN 2.9* 2.9*   GLOBULIN 2.7  --    ALT (SGPT) 19  --    AST (SGOT) 18  --    BILIRUBIN 0.4  --    ALK PHOS 58  --                      Brief Urine Lab Results  (Last result in the past 365 days)        Color   Clarity   Blood   Leuk Est   Nitrite   Protein   CREAT   Urine HCG        08/29/23 0349             44.3               Microbiology Results (last 10 days)       Procedure Component Value - Date/Time    Fungus Culture - Body Fluid, Abdominal Wall [[id number]] Collected: 08/28/23 2210    Lab Status: Preliminary result Specimen: Body Fluid from Abdominal Wall Updated: 09/05/23 0201     Fungus Culture No fungus isolated at 1 week    Fungus Culture - Body Fluid, Abdominal Wall [[id number]] Collected: 08/28/23 2210    Lab Status: Preliminary result Specimen: Body Fluid from Abdominal Wall Updated: 09/05/23 0201     Fungus Culture No fungus isolated at 1 week    Body Fluid Culture - Body Fluid, Abdominal Wall [[id number]]  Collected: 08/28/23 2210    Lab Status: Final result Specimen: Body Fluid from Abdominal Wall Updated: 09/01/23 0919     Body Fluid Culture No growth at 3 days     Gram Stain Moderate (3+) WBCs seen      No organisms seen    Body Fluid Culture - Body Fluid, Abdominal Wall [[id number]] Collected: 08/28/23 2210    Lab Status: Final result Specimen: Body Fluid from Abdominal Wall Updated: 09/01/23 0919     Body Fluid Culture No growth at 3 days     Gram Stain Many (4+) WBCs seen      No organisms seen    AFB Culture - Body Fluid, Abdominal Wall [[id number]] Collected: 08/28/23 2210    Lab Status: Preliminary result Specimen: Body Fluid from Abdominal Wall Updated: 09/05/23 0201     AFB Culture No AFB isolated at 1 week     AFB Stain No acid fast bacilli seen on concentrated smear    AFB Culture - Body Fluid, Abdominal Wall [[id number]] Collected: 08/28/23 2210    Lab Status: Preliminary result Specimen: Body Fluid from Abdominal Wall Updated: 09/05/23 0201     AFB Culture No AFB isolated at 1 week     AFB Stain No acid fast bacilli seen on concentrated smear    Anaerobic Culture - Body Fluid, Abdominal Wall [[id number]]  (Normal) Collected: 08/28/23 2207    Lab Status: Final result Specimen: Body Fluid from Abdominal Wall Updated: 09/03/23 0537     Anaerobic Culture No anaerobes isolated at 5 days    Blood Culture - Blood, Arm, Right [[id number]]  (Normal) Collected: 08/28/23 1800    Lab Status: Final result Specimen: Blood from Arm, Right Updated: 09/02/23 1947     Blood Culture No growth at 5 days    Blood Culture - Blood, Arm, Left [[id number]]  (Abnormal)  (Susceptibility) Collected: 08/28/23 1755    Lab Status: Final result Specimen: Blood from Arm, Left Updated: 08/31/23 0607     Blood Culture Escherichia coli     Isolated from Anaerobic Bottle     Gram Stain Anaerobic Bottle Gram negative bacilli    Susceptibility        Escherichia coli      SERGIO      Ampicillin Susceptible      Ampicillin + Sulbactam Susceptible       Cefepime Susceptible      Ceftazidime Susceptible      Ceftriaxone Susceptible      Gentamicin Susceptible      Levofloxacin Susceptible      Piperacillin + Tazobactam Susceptible      Trimethoprim + Sulfamethoxazole Resistant                       Susceptibility Comments       Escherichia coli    Cefazolin sensitivity will not be reported for Enterobacteriaceae in non-urine isolates. If cefazolin is preferred, please call the microbiology lab to request an E-test.  With the exception of urinary-sourced infections, aminoglycosides should not be used as monotherapy.               Blood Culture ID, PCR - Blood, Arm, Left [[id number]]  (Abnormal) Collected: 08/28/23 1755    Lab Status: Final result Specimen: Blood from Arm, Left Updated: 08/29/23 1246     BCID, PCR Escherichia coli. Identification by BCID2 PCR.     BOTTLE TYPE Anaerobic Bottle    Narrative:      No resistance genes detected.    Urine Culture - Urine, Urine, Clean Catch [[id number]]  (Abnormal)  (Susceptibility) Collected: 08/28/23 1653    Lab Status: Final result Specimen: Urine, Clean Catch Updated: 08/30/23 0531     Urine Culture >100,000 CFU/mL Escherichia coli    Narrative:      Colonization of the urinary tract without infection is common. Treatment is discouraged unless the patient is symptomatic, pregnant, or undergoing an invasive urologic procedure.    Susceptibility        Escherichia coli      SERGIO      Ampicillin Susceptible      Ampicillin + Sulbactam Susceptible      Cefazolin Susceptible      Cefepime Susceptible      Ceftazidime Susceptible      Ceftriaxone Susceptible      Gentamicin Susceptible      Levofloxacin Susceptible      Nitrofurantoin Susceptible      Piperacillin + Tazobactam Susceptible      Trimethoprim + Sulfamethoxazole Resistant                                   CT Abdomen Pelvis Without Contrast    Result Date: 8/28/2023  CT ABDOMEN PELVIS WO CONTRAST Date of Exam: 8/28/2023 6:18 PM EDT Indication: Abdominal pain with  vomiting. Comparison: None available. Technique: Axial CT images were obtained of the abdomen and pelvis without the administration of contrast. Reconstructed coronal and sagittal images were also obtained. Automated exposure control and iterative construction methods were used. Findings: There is a 1.3 cm stone at the left ureteropelvic junction causing moderate obstructive uropathy. The left kidney is enlarged with perinephric soft tissue stranding and edema seen along the left-sided retroperitoneal fascial planes. There are also clustered stones in the inferior pole calyces of the left kidney. There are no stones seen within the left ureter or urinary bladder. There are no right renal stones. There is free fluid and free air within the upper abdomen. I'm uncertain of the clinical significance. If the patient has not had any surgical or interventional procedures, this could be secondary to a perforated viscus. The gallbladder is surgically absent. Unenhanced images of the liver, pancreas, spleen, and adrenal glands are normal. There is also mild free fluid in the lower pelvis. There is colonic diverticulosis without evidence of acute diverticulitis. There are no dilated loops of bowel to indicate an obstruction. There is no abnormal bowel wall thickening. The uterus and adnexal structures appear unremarkable. There are no suspicious osteolytic or sclerotic lesions within the bony structures. There are degenerative changes involving the thoracolumbar spine with underlying dextroscoliotic curvature. There are also degenerative changes of the hip joints. There is minor scarring versus subsegmental atelectasis in the lung bases.     Impression: 1. 1.3 cm stone at the left uteropelvic junction causing moderate obstructive uropathy. There is also a cluster of stones in the inferior pole calyces of the left kidney. 2. There is free fluid and free air within the upper abdomen. There is also mild amount of free fluid in  the lower pelvis. I'm uncertain of the clinical significance. If the patient has not had any surgical or interventional procedures, this could be secondary to a perforated viscus. 3. Colonic diverticulosis without acute diverticulitis. 4. Additional incidental findings as noted above. The exam is limited by noncontrasted technique. 5. The abnormal results were discussed with BRENDA Kee by telephone. Electronically Signed: Ray Saldana MD  8/28/2023 7:03 PM EDT  Workstation ID: [id number]    FL C Arm During Surgery    Result Date: 9/1/2023  This procedure was auto-finalized with no dictation required.    FL Upper GI Water Soluble    Result Date: 8/31/2023  FL UPPER GI WATER SOLUBLE Date of Exam: 8/31/2023 9:36 AM EDT Indication: Status post repair of perforated prepyloric ulcer Comparison: None available. Technique:   radiograph of the abdomen was obtained. A single contrast radiographic exam was performed imaging through the upper gastrointestinal tract. Fluoroscopic Time: 2 minutes and 12 seconds Number of Images: 11 associated fluoroscopic series were saved Findings:  imaging reveals a nonobstructive bowel gas pattern. There is an NG tube visible in the left upper quadrant. Surgical clips are visible in the gallbladder fossa. Under fluoroscopic observation, the patient ingested water-soluble contrast. The oral phase of deglutition appeared normal. There are few small diverticula visible of the midesophagus. These diverticulum do not appear to retain significant contrast after swallows. Significant gastroesophageal reflux was not demonstrated during this exam. Examination of the stomach demonstrated grossly normal gastric mucosa and gastric folds. The patient is status post perforated prepyloric ulcer repair. No extravasation of contrast was seen. There was no delay in gastric emptying.     Impression: 1. Status post perforated prepyloric ulcer repair. There was no evidence of extraluminal contrast.  There was no delay in gastric emptying. 2. Esophageal diverticula Electronically Signed: Christopher Templeton MD  8/31/2023 5:42 PM EDT  Workstation ID: [id number]                 Plan for Follow-up of Pending Labs/Results:   Pending Labs       Order Current Status    AFB Culture - Body Fluid, Abdominal Wall Preliminary result    AFB Culture - Body Fluid, Abdominal Wall Preliminary result    Fungus Culture - Body Fluid, Abdominal Wall Preliminary result    Fungus Culture - Body Fluid, Abdominal Wall Preliminary result          Discharge Details        Discharge Medications        New Medications        Instructions Start Date   cefuroxime 250 MG tablet  Commonly known as: CEFTIN   250 mg, Oral, 2 Times Daily      HYDROcodone-acetaminophen 5-325 MG per tablet  Commonly known as: NORCO   1 tablet, Oral, Every 6 Hours PRN             Continue These Medications        Instructions Start Date   [place] Dream Arthritis 0.025 % cream  Generic drug: Histamine Dihydrochloride   Apply externally, Apply topically TID       CRANBERRY CONCENTRATE PO   Oral      exemestane 25 MG tablet  Commonly known as: AROMASIN   25 mg, Oral, Daily      fluconazole 150 MG tablet  Commonly known as: DIFLUCAN   150 mg, Oral, Daily PRN      IMODIUM PO   1 tablet, Oral, 2 Times Daily PRN      levothyroxine 25 MCG tablet  Commonly known as: SYNTHROID, LEVOTHROID   12.5 mcg, Oral, Daily      loratadine 10 MG tablet  Commonly known as: CLARITIN   10 mg, Oral, Daily      MOVE FREE JOINT HEALTH ADVANCE PO   Oral      multivitamin with minerals tablet tablet   Oral      OMEGA-3 1450 PO   Oral, Daily      sore muscle 10-30 % cream cream   Topical, 2 Times Daily PRN, With lido       trimethoprim 100 MG tablet  Commonly known as: TRIMPEX   100 mg, Oral, Daily      Vitamin D 50 MCG (2000 UT) tablet   2,000 Units, Oral, Daily               Allergies   Allergen Reactions    Bactrim [Sulfamethoxazole-Trimethoprim] Itching    Terbinafine Itching         Discharge  Disposition:  Home or Self Care    Diet:  Hospital:  Diet Order   Procedures    Diet: Regular/House Diet; Texture: Mechanical Ground (NDD 2); Fluid Consistency: Thin (IDDSI 0)       Activity:  Activity Instructions    No heavy lifting or driving until cleared by surgeon.   Keep incision clean and dry, change after shower and as needed.           Restrictions or Other Recommendations:  Wound care  Patient is confined to one level of the home and there is no toilet on that level, needs bedside commode  Followup with urology, continue cefdinir until eval       CODE STATUS:    Code Status and Medical Interventions:   Ordered at: 08/28/23 2322     Code Status (Patient has no pulse and is not breathing):    CPR (Attempt to Resuscitate)     Medical Interventions (Patient has pulse or is breathing):    Full Support       Future Appointments   Date Time Provider Department Center   11/21/2023  2:45 PM Yehuda Hong MD [facility]   5/14/2024  3:15 PM Yehuda Hong MD [facility]       Additional Instructions for the Follow-ups that You Need to Schedule       Ambulatory Referral to Home Health   As directed      Face to Face Visit Date: 9/5/2023   Follow-up provider for Plan of Care?: I treated the patient in an acute care facility and will not continue treatment after discharge.   Follow-up provider: YEHUDA HONG [[id number]]   Reason/Clinical Findings: Pyloric ulcer perf. - S/P repair, E coli bacteremia,Perforated ulcer, sepsis   Describe mobility limitations that make leaving home difficult: Pyloric ulcer perf. - S/P repair, E coli bacteremia,Perforated ulcer, sepsis   Nursing/Therapeutic Services Requested: Skilled Nursing Physical Therapy Occupational Therapy   Skilled nursing orders: Cardiopulmonary assessments (wound care checks)   PT orders: Home safety assessment   Occupational orders: Home safety assessment   Frequency: 1 Week 1        Discharge Follow-up with PCP   As directed        Currently Documented PCP:    Sofia Hong MD    PCP Phone Number:    [phone]     Follow Up Details: 1 week        Discharge Follow-up with Specified Provider: Dr SWANSON as instructed   As directed      To: Dr SWANSON as instructed        Discharge Follow-up with Specified Provider: Dr Torres; 2 Weeks   As directed      To: Dr Torres   Follow Up: 2 Weeks                      Allison Skelton DO  09/05/23      Time Spent on Discharge:  I spent  36  minutes on this discharge activity which included: face-to-face encounter with the patient, reviewing the data in the system, coordination of the care with the nursing staff as well as consultants, documentation, and entering orders.

## 2023-09-05 NOTE — OUTREACH NOTE
Prep Survey      Flowsheet Row Responses   [facility] patient discharged from? [place]   Is LACE score < 7 ? No   Eligibility [facility]   Date of Admission 08/28/23   Date of Discharge 09/05/23   Discharge Disposition Home-Health Care Sv   Discharge diagnosis Sepsis - E coli bacteremia   Does the patient have one of the following disease processes/diagnoses(primary or secondary)? Sepsis   Does the patient have Home health ordered? Yes   What is the Home health agency?  [facility]   Is there a DME ordered? Yes   What DME was ordered? [facility]   Prep survey completed? Yes            Janneth RODAS - Registered Nurse

## 2023-09-05 NOTE — CASE MANAGEMENT/SOCIAL WORK
Case Management Discharge Note      Final Note: Plan is home with sonLenny(64[address] [facility]) and HH arranged with [facility] for skilled nursing, PT and OT. [facility] is aware pt is being discharged today and the son's address. They will be in contact with pt to arrange HH visits. Family plans to provide transportation home. I spoke with Francisco Javier and he confirmed that [facility] delivered at [facility] and RW to pt's room prior to discharge. Pt/son denies further discharge needs.    Provided Post Acute Provider List?: Yes  Post Acute Provider List: Inpatient Rehab  Provided Post Acute Provider Quality & Resource List?: Yes  Post Acute Provider Quality and Resource List: Inpatient Rehab  Delivered To: Patient  Method of Delivery: In person    Selected Continued Care - Admitted Since 8/28/2023       Destination    No services have been selected for the patient.                Durable Medical Equipment       Service Provider Selected Services Address Phone Fax Patient Preferred    [facility] [address] [address] [phone] --              Dialysis/Infusion    No services have been selected for the patient.                Home Medical Care       Service Provider Selected Services Address Phone Fax Patient Preferred    [facility] [address] [place] [address] [phone] --              Therapy    No services have been selected for the patient.                Community Resources    No services have been selected for the patient.                Community & DME    No services have been selected for the patient.                         Final Discharge Disposition Code: 06 - home with home health care

## 2023-09-05 NOTE — PLAN OF CARE
Goal Outcome Evaluation:  Plan of Care Reviewed With: patient        Progress: no change  Outcome Evaluation: Educated pt. on use of AE for LBD to improve comfort and independence with task. Recommend continued trials to ensure carry over. Continue to recommend home with assist and OP services at discharge.

## 2023-09-05 NOTE — THERAPY TREATMENT NOTE
Patient Name: Siria Garcia  : 1955    MRN: [id number]                              Today's Date: 2023       Admit Date: 2023    Visit Dx:     ICD-10-CM ICD-9-CM   1. Acute UTI  N39.0 599.0   2. Sepsis, due to unspecified organism, unspecified whether acute organ dysfunction present  A41.9 038.9     995.91   3. Ureteropelvic junction (UPJ) obstruction  N13.5 593.4   4. Free fluid in pelvis  R18.8 789.59   5. History of gastroesophageal reflux (GERD)  Z87.19 V12.79   6. History of hypertension  Z86.79 V12.59   7. History of breast cancer  Z85.3 V10.3   8. Acute kidney injury  N17.9 584.9   9. Abdominal pain  R10.9 789.00     Patient Active Problem List   Diagnosis    Acute low back pain    Benign essential hypertension    Gastroesophageal reflux disease    Fatigue    Foot pain    Hypercholesterolemia    Hypothyroidism    Knee pain    Osteoarthritis of hand    Vaginal yeast infection    Sore throat    Vaginitis and vulvovaginitis    Malignant neoplasm of upper-outer quadrant of breast in female, estrogen receptor positive    Tubular adenoma    Elevated glucose    Hypercalcemia    Vitamin D deficiency    K25.9, Pyloric ulcer perf. - S/P repair 23    Chronic low back pain    SUJATA (acute kidney injury)    UTI (urinary tract infection)    Sepsis    Peritonitis    Nephrolithiasis    Obstructive uropathy    Perforated ulcer    E coli bacteremia     Past Medical History:   Diagnosis Date    Arthritis     Breast cancer 2017    Colon polyp 2018    Diverticulosis 2018    Environmental allergies     Gall bladder stones     GERD (gastroesophageal reflux disease)     Hypertension     Low back pain     Plantar fasciitis     Tarsal tunnel syndrome of both lower extremities     UTI (urinary tract infection)      Past Surgical History:   Procedure Laterality Date    BREAST LUMPECTOMY  2017    CHOLECYSTECTOMY      COLONOSCOPY W/ BIOPSIES  2018    CYSTOSCOPY W/ URETERAL STENT PLACEMENT N/A 2023     Procedure: CYSTOSCOPY WITH LEFT URETERAL STENT PLACEMENT;  Surgeon: Murray Torres MD;  Location:  [facility];  Service: Urology;  Laterality: N/A;    EXPLORATORY LAPAROTOMY N/A 8/28/2023    Procedure: LAPAROTOMY EXPLORATORY;  Surgeon: Gabriel Del Toro MD;  Location:  [facility];  Service: General;  Laterality: N/A;      General Information       Row Name 09/05/23 1359          Physical Therapy Time and Intention    Document Type therapy note (daily note)  -LM     Mode of Treatment individual therapy;physical therapy  -LM       Row Name 09/05/23 1359          General Information    Patient Profile Reviewed yes  -LM     Existing Precautions/Restrictions other (see comments)  MYRNA Drain;  Abd Incision;  Abd Binder  -LM       Row Name 09/05/23 1359          Cognition    Orientation Status (Cognition) oriented x 4  -LM       Row Name 09/05/23 1359          Safety Issues, Functional Mobility    Safety Issues Affecting Function (Mobility) sequencing abilities  -LM     Impairments Affecting Function (Mobility) balance;strength;pain  -LM               User Key  (r) = Recorded By, (t) = Taken By, (c) = Cosigned By      Initials Name Provider Type    LM Donna Lindquist, PT Physical Therapist                   Mobility       Row Name 09/05/23 1400          Bed Mobility    Bed Mobility supine-sit;sit-supine  -LM     Supine-Sit [place] (Bed Mobility) standby assist  -LM     Sit-Supine [place] (Bed Mobility) standby assist  -LM     Assistive Device (Bed Mobility) bed rails;head of bed elevated  -LM       Row Name 09/05/23 1400          Sit-Stand Transfer    Sit-Stand [place] (Transfers) standby assist  -LM     Assistive Device (Sit-Stand Transfers) walker, front-wheeled  -LM     Comment, (Sit-Stand Transfer) Stood x 2 - first from EOB; second from low toilet.  Pt independent with toileting.  -LM       Row Name 09/05/23 1400          Gait/Stairs (Locomotion)    [place] Level (Gait) standby assist  -LM      Assistive Device (Gait) walker, front-wheeled  -LM     Distance in Feet (Gait) 16 + 200  -LM     Deviations/Abnormal Patterns (Gait) araceli decreased;gait speed decreased;base of support, narrow  -LM     Bilateral Gait Deviations heel strike decreased;forward flexed posture  -LM     [place] Level (Stairs) stand by assist  -LM     Handrail Location (Stairs) left side (ascending)  -LM     Number of Steps (Stairs) 2  -LM     Ascending Technique (Stairs) step-to-step  -LM     Descending Technique (Stairs) step-to-step  -LM     Comment, (Gait/Stairs) Vc's for upright posture.  Although pt ambulates at a slower pace, no unsteadiness noted.  -LM               User Key  (r) = Recorded By, (t) = Taken By, (c) = Cosigned By      Initials Name Provider Type    Donna Langley PT Physical Therapist                   Obj/Interventions       Row Name 09/05/23 1402          Balance    Static Sitting Balance independent  -LM     Position, Sitting Balance unsupported  -LM     Static Standing Balance independent  -LM     Dynamic Standing Balance standby assist  -LM     Position/Device Used, Standing Balance supported;walker, front-wheeled  -LM               User Key  (r) = Recorded By, (t) = Taken By, (c) = Cosigned By      Initials Name Provider Type    Donna Langley PT Physical Therapist                   Goals/Plan    No documentation.                  Clinical Impression       Row Name 09/05/23 1403          Pain    Pretreatment Pain Rating 2/10  -LM     Posttreatment Pain Rating 2/10  -LM     Pain Location incisional  -LM     Pain Location - abdomen  -LM     Pain Intervention(s) Repositioned;Ambulation/increased activity  -LM       Row Name 09/05/23 1403          Plan of Care Review    Plan of Care Reviewed With patient;son  -LM     Progress improving  -LM     Outcome Evaluation Progressing well.  Pt stood, ambulated 16' + 200' using rw, and ambulated up/down 2 stairs with SBAx1.  Pt continues to be limited by  decreased activity tolerance.  Continue to recommend home with assist and resuming outpt PT.  -LM       Row Name 09/05/23 1403          Vital Signs    Pre Systolic BP Rehab 150  -LM     Pre Treatment Diastolic BP 68  -LM     Pretreatment Heart Rate (beats/min) 90  -LM     Pre SpO2 (%) 92  -LM     O2 Delivery Pre Treatment room air  -LM     Post SpO2 (%) 94  -LM     O2 Delivery Post Treatment room air  -LM     Pre Patient Position Supine  -LM     Post Patient Position Supine  -LM       Row Name 09/05/23 1403          Positioning and Restraints    Pre-Treatment Position in bed  -LM     Post Treatment Position bed  -LM     In Bed fowlers;call light within reach;encouraged to call for assist;with family/caregiver;notified nsg  -LM               User Key  (r) = Recorded By, (t) = Taken By, (c) = Cosigned By      Initials Name Provider Type    Donna Langley, PT Physical Therapist                   Outcome Measures       Row Name 09/05/23 1405 09/05/23 0800       How much help from another person do you currently need...    Turning from your back to your side while in flat bed without using bedrails? 4  -LM 4  -EL    Moving from lying on back to sitting on the side of a flat bed without bedrails? 3  -LM 3  -EL    Moving to and from a bed to a chair (including a wheelchair)? 3  -LM 3  -EL    Standing up from a chair using your arms (e.g., wheelchair, bedside chair)? 3  -LM 3  -EL    Climbing 3-5 steps with a railing? 3  -LM 2  -EL    To walk in hospital room? 3  -LM 3  -EL    AM-PAC 6 Clicks Score (PT) 19  -LM 18  -EL    Highest level of mobility 6 --> Walked 10 steps or more  -LM 6 --> Walked 10 steps or more  -EL      Row Name 09/05/23 1405          Functional Assessment    Outcome Measure Options AM-PAC 6 Clicks Basic Mobility (PT)  -LM               User Key  (r) = Recorded By, (t) = Taken By, (c) = Cosigned By      Initials Name Provider Type    Donna Langley, LISA Physical Therapist    Emani Wu RN  Registered Nurse                                 Physical Therapy Education       Title: PT OT SLP Therapies (In Progress)       Topic: Physical Therapy (Done)       Point: Mobility training (Done)       Learning Progress Summary             Patient Eager, E,D,H, VU,DU by SD at 9/2/2023 1401    Acceptance, E, NR by AY at 8/29/2023 1405   Family Eager, E,D,H, VU,DU by SD at 9/2/2023 1401                         Point: Home exercise program (Done)       Learning Progress Summary             Patient Eager, E,D,H, VU,DU by SD at 9/2/2023 1401    Acceptance, E, NR by AY at 8/29/2023 1405   Family Eager, E,D,H, VU,DU by SD at 9/2/2023 1401                         Point: Body mechanics (Done)       Learning Progress Summary             Patient Eager, E,D,H, VU,DU by SD at 9/2/2023 1401    Acceptance, E, NR by AY at 8/29/2023 1405   Family Eager, E,D,H, VU,DU by SD at 9/2/2023 1401                         Point: Precautions (Done)       Learning Progress Summary             Patient Eager, E,D,H, VU,DU by SD at 9/2/2023 1401    Acceptance, E, NR by AY at 8/29/2023 1405   Family Eager, E,D,H, VU,DU by SD at 9/2/2023 1401                                         User Key       Initials Effective Dates Name Provider Type Discipline    SD 03/13/23 -  Rachelle Mathew, PT Physical Therapist PT    AY 11/10/20 -  Erika Madrigal PT Physical Therapist PT                  PT Recommendation and Plan     Plan of Care Reviewed With: patient, son  Progress: improving  Outcome Evaluation: Progressing well.  Pt stood, ambulated 16' + 200' using rw, and ambulated up/down 2 stairs with SBAx1.  Pt continues to be limited by decreased activity tolerance.  Continue to recommend home with assist and resuming outpt PT.     Time Calculation:         PT Charges       Row Name 09/05/23 1405             Time Calculation    Start Time 1314  -LM      PT Received On 09/05/23  -LM      PT Goal Re-Cert Due Date 09/08/23  -LM         Timed Charges     38951 - Gait Training Minutes  20  -LM      45748 - PT Therapeutic Activity Minutes 14  -LM         Total Minutes    Timed Charges Total Minutes 34  -LM       Total Minutes 34  -LM                User Key  (r) = Recorded By, (t) = Taken By, (c) = Cosigned By      Initials Name Provider Type    LM Donna Lindquist, PT Physical Therapist                  Therapy Charges for Today       Code Description Service Date Service Provider Modifiers Qty    87365693342 HC GAIT TRAINING EA 15 MIN 9/5/2023 Donna Lindquist, PT GP 1    52019340626 HC PT THERAPEUTIC ACT EA 15 MIN 9/5/2023 Donna Lindquist, PT GP 1            PT G-Codes  Outcome Measure Options: AM-PAC 6 Clicks Basic Mobility (PT)  AM-PAC 6 Clicks Score (PT): 19  AM-PAC 6 Clicks Score (OT): 12  PT Discharge Summary  Anticipated Discharge Disposition (PT): home with assist, home with outpatient therapy services    Donna Lindquist PT  9/5/2023

## 2023-09-05 NOTE — PROGRESS NOTES
INFECTIOUS DISEASE f/u     Siria Garcia  1955  [id number]    Date of Consult: 8/30/2023    Admission Date: 8/28/2023      Requesting Provider: No ref. provider found  Evaluating Physician: Matthew Manuel MD    Reason for Consultation: peritonitis    History of present illness:    Patient is a 67 y.o. female with hypothyroidism, chronic back pain, GERD, hypertension, hyperlipidemia presents to [facility] emergency room with nausea vomiting abdominal pain chills starting 8/26.  Patient found to have lactic acidosis acute renal failure and leukocytosis and markedly elevated procalcitonin.  CT scan pelvis revealed free air and fluid as well as a left ureteropelvic junction kidney stone with obstructive uropathy.  Patient has been taken to operating room for exploratory laparotomy and was found to have a perforated prepyloric ulcer.  Patient has been started on broad-spectrum antibiotics.  Blood cultures have yielded E. coli.  Operative cultures are pending    Urology has seen patient and their plans for cystoscopy with ureteral stent placement upon further stability we are consulted for antibiotic management    8/31/23; transferred to floor; feels well; no events overnight has mild abdominal pain    9/1/2023 doing well no events overnight no complaints denies fevers had had left ureteral stent, cystoscopy today    9/2/23: Still with abdominal pain, but better.  Denies f/c, sob, n/v, rashes.  Has some loose stools from bowel regimen.  Blood and urine cultures positive for E.coli.     9/3/23: Still with abdominal pain but not worse.  Has explosive diarrhea, but no n/v. Denies f/c, sob, rashes.         9/4/23 no events overnight no fevers rash or sore throat patient believes she had night sweats last night    9/5/23; doing well; no events overnight; no fever, rash, sore throat      Past Medical History:   Diagnosis Date    Arthritis     Breast cancer 2017    Colon polyp 2018    Diverticulosis 01/2018     Environmental allergies     Gall bladder stones     GERD (gastroesophageal reflux disease)     Hypertension     Low back pain     Plantar fasciitis     Tarsal tunnel syndrome of both lower extremities     UTI (urinary tract infection)        Past Surgical History:   Procedure Laterality Date    BREAST LUMPECTOMY  05/2017    CHOLECYSTECTOMY      COLONOSCOPY W/ BIOPSIES  01/25/2018    CYSTOSCOPY W/ URETERAL STENT PLACEMENT N/A 9/1/2023    Procedure: CYSTOSCOPY WITH LEFT URETERAL STENT PLACEMENT;  Surgeon: Murray Torres MD;  Location:  [facility];  Service: Urology;  Laterality: N/A;    EXPLORATORY LAPAROTOMY N/A 8/28/2023    Procedure: LAPAROTOMY EXPLORATORY;  Surgeon: Gabriel Del Toro MD;  Location:  [facility];  Service: General;  Laterality: N/A;       Family History   Problem Relation Age of Onset    Diabetes Father     Hypertension Father     Breast cancer Mother        Social History     Socioeconomic History    Marital status:    Tobacco Use    Smoking status: Never    Smokeless tobacco: Never   Vaping Use    Vaping Use: Never used   Substance and Sexual Activity    Alcohol use: No    Drug use: No    Sexual activity: Not Currently       Allergies   Allergen Reactions    Bactrim [Sulfamethoxazole-Trimethoprim] Itching    Terbinafine Itching         Medication:    Current Facility-Administered Medications:     acetaminophen (TYLENOL) suppository 650 mg, 650 mg, Rectal, Q4H PRN, Murray Torres MD    [DISCONTINUED] sennosides-docusate (PERICOLACE) 8.6-50 MG per tablet 2 tablet, 2 tablet, Oral, BID **AND** [DISCONTINUED] polyethylene glycol (MIRALAX) packet 17 g, 17 g, Oral, Daily PRN **AND** [DISCONTINUED] bisacodyl (DULCOLAX) EC tablet 5 mg, 5 mg, Oral, Daily PRN **AND** bisacodyl (DULCOLAX) suppository 10 mg, 10 mg, Rectal, Daily PRN, Murray Torres MD    Calcium Replacement - Follow Nurse / BPA Driven Protocol, , Does not apply, PRN, Murray Torres MD    cefTRIAXone  (ROCEPHIN) 2000 mg/100 mL 0.9% NS IVPB (MBP), 2,000 mg, Intravenous, Q24H, Murray Torres MD, 2,000 mg at 09/05/23 1439    fluticasone (FLONASE) 50 MCG/ACT nasal spray 2 spray, 2 spray, Each Nare, Daily, Murray Torres MD, 2 spray at 09/05/23 0818    HYDROcodone-acetaminophen (NORCO) 5-325 MG per tablet 1 tablet, 1 tablet, Oral, Q6H PRN, Gabriel Del Toro MD, 1 tablet at 09/05/23 0050    lactated ringers infusion, 9 mL/hr, Intravenous, Continuous, Murray Torres MD, New Bag at 09/01/23 1417    levothyroxine (SYNTHROID, LEVOTHROID) tablet 25 mcg, 25 mcg, Oral, Q AM, Gabriel Del Toro MD, 25 mcg at 09/05/23 0541    Magnesium Standard Dose Replacement - Follow Nurse / BPA Driven Protocol, , Does not apply, PRN, Murray Torres MD    metroNIDAZOLE (FLAGYL) tablet 500 mg, 500 mg, Oral, Q8H, Matthew Manuel MD, 500 mg at 09/05/23 1438    ondansetron (ZOFRAN) injection 4 mg, 4 mg, Intravenous, Q6H PRN, Murray Torres MD    pantoprazole (PROTONIX) EC tablet 40 mg, 40 mg, Oral, BID AC, Keiry Clifford, PharmD    phenol (CHLORASEPTIC) 1.4 % liquid 1 spray, 1 spray, Mouth/Throat, Q2H PRN, Murray Torres MD, 1 spray at 08/29/23 1206    Phosphorus Replacement - Follow Nurse / BPA Driven Protocol, , Does not apply, PRNBrian Justin Dale, MD    Potassium Replacement - Follow Nurse / BPA Driven Protocol, , Does not apply, PRN, Murray Torres MD    Sodium Chloride (PF) 0.9 % 10 mL, 10 mL, Intravenous, PRN, Murray Torres MD    sodium chloride 0.9 % flush 10 mL, 10 mL, Intravenous, Q12H, Murray Torres MD, 10 mL at 09/05/23 0818    sodium chloride 0.9 % infusion 40 mL, 40 mL, Intravenous, PRN, Murray Torres MD    Antibiotics:  Anti-Infectives (From admission, onward)      Ordered     Dose/Rate Route Frequency Start Stop    09/05/23 1422  metroNIDAZOLE (FLAGYL) 500 MG tablet        Ordering Provider: Allison Skelton,     500 mg Oral  Every 8 Hours Scheduled 23 0000 23 2359    23 1449  cefuroxime (CEFTIN) 250 MG tablet        Ordering Provider: Allison Skelton DO    250 mg Oral 2 Times Daily 23 0000 23 2359    23 1147  metroNIDAZOLE (FLAGYL) tablet 500 mg        Ordering Provider: Matthew Manuel MD    500 mg Oral Every 8 Hours Scheduled 23 1400 23 1359    23 1412  cefTRIAXone (ROCEPHIN) 2000 mg/100 mL 0.9% NS IVPB (MBP)        Ordering Provider: Murray Torres MD    2,000 mg  over 30 Minutes Intravenous Every 24 Hours 23 1500 23 1459    23 1145  meropenem (MERREM) 1000 mg/100 mL 0.9% NS (mbp)        Ordering Provider: Renan Tatum MD    1,000 mg  over 30 Minutes Intravenous Once 23 1245 23 1230    23 0022  fluconazole (DIFLUCAN) IVPB 200 mg        Ordering Provider: Renan Tatum MD    200 mg  100 mL/hr over 60 Minutes Intravenous Every 24 Hours Scheduled 23 0115 23 2203    23 2153  piperacillin-tazobactam (ZOSYN) 3.375 g in iso-osmotic dextrose 50 ml (premix)        Ordering Provider: Gabriel Del Toro MD    3.375 g  100 mL/hr over 0.5 Hours Intravenous Once 23 2155 23 22123 1941  piperacillin-tazobactam (ZOSYN) 3.375 g in iso-osmotic dextrose 50 ml (premix)        Ordering Provider: Axel Werner PA    3.375 g Intravenous Once 23 2045 23 1746  cefTRIAXone (ROCEPHIN) 2000 mg/100 mL 0.9% NS IVPB (MBP)        Ordering Provider: Axel Werner PA    2,000 mg  over 30 Minutes Intravenous Once 23 1802 23 1948              Review of Systems:  See hpi      Physical Exam:   Vital Signs  Temp (24hrs), Av.5 °F (36.9 °C), Min:97.9 °F (36.6 °C), Max:99.2 °F (37.3 °C)    Temp  Min: 97.9 °F (36.6 °C)  Max: 99.2 °F (37.3 °C)  BP  Min: 142/65  Max: 151/72  Pulse  Min: 81  Max: 89  Resp  Min: 16  Max: 18  SpO2  Min: 91 %  Max: 95 %    GENERAL: Awake and  alert no distress  HEENT: Normocephalic, atraumatic.  PERRL. EOMI. No conjunctival injection. No icterus.  No external oral lesions  LUNGS: CTA bilaterally  ABDOMEN: Soft and nontender  EXT:  1+ edema  MSK: No joint effusions or erythema  SKIN: Warm and dry without cutaneous eruptions on Inspection/palpation.      Laboratory Data    Results from last 7 days   Lab Units 09/05/23  0333 09/04/23  0408 09/01/23  0404   WBC 10*3/mm3 12.33* 10.54 9.76   HEMOGLOBIN g/dL 9.6* 9.6* 9.8*   HEMATOCRIT % 29.1* 28.6* 29.8*   PLATELETS 10*3/mm3 350 303 186       Results from last 7 days   Lab Units 09/05/23  0333   SODIUM mmol/L 141   POTASSIUM mmol/L 4.1   CHLORIDE mmol/L 107   CO2 mmol/L 23.0   BUN mg/dL 17   CREATININE mg/dL 0.75   GLUCOSE mg/dL 99   CALCIUM mg/dL 8.2*       Results from last 7 days   Lab Units 09/04/23  0408   ALK PHOS U/L 58   BILIRUBIN mg/dL 0.4   ALT (SGPT) U/L 19   AST (SGOT) U/L 18               Results from last 7 days   Lab Units 08/30/23  0423   LACTATE mmol/L 1.5               Estimated Creatinine Clearance: 76.7 mL/min (by C-G formula based on SCr of 0.75 mg/dL).      Microbiology:  Microbiology Results (last 10 days)       Procedure Component Value - Date/Time    Fungus Culture - Body Fluid, Abdominal Wall [[id number]] Collected: 08/28/23 2210    Lab Status: Preliminary result Specimen: Body Fluid from Abdominal Wall Updated: 09/05/23 0201     Fungus Culture No fungus isolated at 1 week    Fungus Culture - Body Fluid, Abdominal Wall [[id number]] Collected: 08/28/23 2210    Lab Status: Preliminary result Specimen: Body Fluid from Abdominal Wall Updated: 09/05/23 0201     Fungus Culture No fungus isolated at 1 week    Body Fluid Culture - Body Fluid, Abdominal Wall [[id number]] Collected: 08/28/23 2210    Lab Status: Final result Specimen: Body Fluid from Abdominal Wall Updated: 09/01/23 0919     Body Fluid Culture No growth at 3 days     Gram Stain Moderate (3+) WBCs seen      No organisms seen    Body  Fluid Culture - Body Fluid, Abdominal Wall [[id number]] Collected: 08/28/23 2210    Lab Status: Final result Specimen: Body Fluid from Abdominal Wall Updated: 09/01/23 0919     Body Fluid Culture No growth at 3 days     Gram Stain Many (4+) WBCs seen      No organisms seen    AFB Culture - Body Fluid, Abdominal Wall [[id number]] Collected: 08/28/23 2210    Lab Status: Preliminary result Specimen: Body Fluid from Abdominal Wall Updated: 09/05/23 0201     AFB Culture No AFB isolated at 1 week     AFB Stain No acid fast bacilli seen on concentrated smear    AFB Culture - Body Fluid, Abdominal Wall [[id number]] Collected: 08/28/23 2210    Lab Status: Preliminary result Specimen: Body Fluid from Abdominal Wall Updated: 09/05/23 0201     AFB Culture No AFB isolated at 1 week     AFB Stain No acid fast bacilli seen on concentrated smear    Anaerobic Culture - Body Fluid, Abdominal Wall [[id number]]  (Normal) Collected: 08/28/23 2207    Lab Status: Final result Specimen: Body Fluid from Abdominal Wall Updated: 09/03/23 0537     Anaerobic Culture No anaerobes isolated at 5 days    Blood Culture - Blood, Arm, Right [[id number]]  (Normal) Collected: 08/28/23 1800    Lab Status: Final result Specimen: Blood from Arm, Right Updated: 09/02/23 1947     Blood Culture No growth at 5 days    Blood Culture - Blood, Arm, Left [[id number]]  (Abnormal)  (Susceptibility) Collected: 08/28/23 1755    Lab Status: Final result Specimen: Blood from Arm, Left Updated: 08/31/23 0607     Blood Culture Escherichia coli     Isolated from Anaerobic Bottle     Gram Stain Anaerobic Bottle Gram negative bacilli    Susceptibility        Escherichia coli      SERGIO      Ampicillin Susceptible      Ampicillin + Sulbactam Susceptible      Cefepime Susceptible      Ceftazidime Susceptible      Ceftriaxone Susceptible      Gentamicin Susceptible      Levofloxacin Susceptible      Piperacillin + Tazobactam Susceptible      Trimethoprim + Sulfamethoxazole Resistant                        Susceptibility Comments       Escherichia coli    Cefazolin sensitivity will not be reported for Enterobacteriaceae in non-urine isolates. If cefazolin is preferred, please call the microbiology lab to request an E-test.  With the exception of urinary-sourced infections, aminoglycosides should not be used as monotherapy.               Blood Culture ID, PCR - Blood, Arm, Left [[id number]]  (Abnormal) Collected: 08/28/23 1755    Lab Status: Final result Specimen: Blood from Arm, Left Updated: 08/29/23 1246     BCID, PCR Escherichia coli. Identification by BCID2 PCR.     BOTTLE TYPE Anaerobic Bottle    Narrative:      No resistance genes detected.    Urine Culture - Urine, Urine, Clean Catch [[id number]]  (Abnormal)  (Susceptibility) Collected: 08/28/23 1653    Lab Status: Final result Specimen: Urine, Clean Catch Updated: 08/30/23 0531     Urine Culture >100,000 CFU/mL Escherichia coli    Narrative:      Colonization of the urinary tract without infection is common. Treatment is discouraged unless the patient is symptomatic, pregnant, or undergoing an invasive urologic procedure.    Susceptibility        Escherichia coli      SERGIO      Ampicillin Susceptible      Ampicillin + Sulbactam Susceptible      Cefazolin Susceptible      Cefepime Susceptible      Ceftazidime Susceptible      Ceftriaxone Susceptible      Gentamicin Susceptible      Levofloxacin Susceptible      Nitrofurantoin Susceptible      Piperacillin + Tazobactam Susceptible      Trimethoprim + Sulfamethoxazole Resistant                                       Radiology:  Imaging Results (Last 72 Hours)       ** No results found for the last 72 hours. **              Impression:   E. coli bacteremia  Peritonitis  Prepyloric perforated viscus s/p repair  Left-sided kidney stone with obstructive uropathy s/p stent  Leukocytosis with neutrophilia  Acute renal failure  Procalcitonin elevation  Severe diarrhea--NEW    PLAN/RECOMMENDATIONS:    Thank you for asking us to see Siria W Radha, I recommend the following:      Urine cultures and blood cultures growing E. Coli susc to rocephin    From a perforated viscus standpoint patient at risk for peritonitis from gram-negative enteric's and anaerobes.        Continue rocephin 2 g iv daily   Continue flagyl 500 mg will change to by mouth    Duration of antibiotics is probably a total of 7 to 14 days    Abdominal standpoint if source control is achieved 5 days antibiotics are reasonable but patient had also kidney infection and kidney stone of left side requiring stent.  I would recommend antibiotics until stone is broken up    After 7 days of antibiotics could consider conversion to p.o.    Pharmacy, hospital medicine planning on d/c home on po cefdinir 300 mg po bid x 1 week (with 1 refill)     Would offer 2 weeks hopefully to bridge gap of time between now upcoming lithotripsy    D/c flagyl; given patient had surgery and source control achieved do not need to extend anaerobic coverage    Schedule f/u with me in 1 week  D/w hospitalist, [facility] office, family  Dc/cm time 30 mintues            Matthew Manuel MD  9/5/2023  15:07 EDT

## 2023-09-05 NOTE — PLAN OF CARE
Goal Outcome Evaluation:                   Problem: Adult Inpatient Plan of Care  Goal: Plan of Care Review  Outcome: Ongoing, Progressing  Goal: Patient-Specific Goal (Individualized)  Outcome: Ongoing, Progressing  Goal: Absence of Hospital-Acquired Illness or Injury  Outcome: Ongoing, Progressing  Intervention: Identify and Manage Fall Risk  Recent Flowsheet Documentation  Taken 9/5/2023 0400 by Amanda Angulo RN  Safety Promotion/Fall Prevention:   activity supervised   assistive device/personal items within reach   clutter free environment maintained   fall prevention program maintained   mobility aid in reach   nonskid shoes/slippers when out of bed   room organization consistent   safety round/check completed  Taken 9/5/2023 0200 by Amanda Angulo RN  Safety Promotion/Fall Prevention:   activity supervised   assistive device/personal items within reach   clutter free environment maintained   fall prevention program maintained   mobility aid in reach   nonskid shoes/slippers when out of bed   room organization consistent   safety round/check completed  Taken 9/5/2023 0000 by Amanda Angulo RN  Safety Promotion/Fall Prevention:   activity supervised   assistive device/personal items within reach   clutter free environment maintained   fall prevention program maintained   mobility aid in reach   nonskid shoes/slippers when out of bed   room organization consistent   safety round/check completed  Taken 9/4/2023 2200 by Amanda Angulo RN  Safety Promotion/Fall Prevention:   activity supervised   assistive device/personal items within reach   clutter free environment maintained   fall prevention program maintained   mobility aid in reach   nonskid shoes/slippers when out of bed   room organization consistent   safety round/check completed  Taken 9/4/2023 2000 by Amanda Angulo RN  Safety Promotion/Fall Prevention:   activity supervised   assistive device/personal items within reach   clutter free environment  maintained   fall prevention program maintained   mobility aid in reach   nonskid shoes/slippers when out of bed   room organization consistent   safety round/check completed  Intervention: Prevent Skin Injury  Recent Flowsheet Documentation  Taken 9/5/2023 0400 by Amanda Angulo RN  Body Position: position changed independently  Skin Protection:   adhesive use limited   incontinence pads utilized   transparent dressing maintained   tubing/devices free from skin contact  Taken 9/5/2023 0200 by Amanda Angulo RN  Body Position: position changed independently  Taken 9/5/2023 0000 by Amanda Angulo RN  Body Position: position changed independently  Skin Protection:   adhesive use limited   incontinence pads utilized   transparent dressing maintained   tubing/devices free from skin contact  Taken 9/4/2023 2200 by Amanda Angulo RN  Body Position: position changed independently  Taken 9/4/2023 2000 by Amanda Angulo RN  Body Position: position changed independently  Skin Protection:   adhesive use limited   incontinence pads utilized   transparent dressing maintained   tubing/devices free from skin contact  Intervention: Prevent and Manage VTE (Venous Thromboembolism) Risk  Recent Flowsheet Documentation  Taken 9/5/2023 0400 by Amanda Angulo RN  Activity Management: activity encouraged  Range of Motion: active ROM (range of motion) encouraged  Taken 9/5/2023 0200 by Amanda Angulo RN  Activity Management: activity encouraged  Taken 9/5/2023 0000 by Amanda Angulo RN  Activity Management: activity encouraged  Range of Motion: active ROM (range of motion) encouraged  Taken 9/4/2023 2200 by Amanda Angulo RN  Activity Management: activity encouraged  Taken 9/4/2023 2000 by Amanda Angulo RN  Activity Management: activity encouraged  Range of Motion: active ROM (range of motion) encouraged  Intervention: Prevent Infection  Recent Flowsheet Documentation  Taken 9/5/2023 0400 by Amanda Angulo RN  Infection Prevention:   hand hygiene  promoted   rest/sleep promoted   single patient room provided  Taken 9/5/2023 0200 by Amanda Angulo RN  Infection Prevention:   hand hygiene promoted   rest/sleep promoted   single patient room provided  Taken 9/5/2023 0000 by Amanda Angulo RN  Infection Prevention:   hand hygiene promoted   rest/sleep promoted   single patient room provided  Taken 9/4/2023 2000 by Amanda Angulo RN  Infection Prevention:   hand hygiene promoted   rest/sleep promoted   single patient room provided  Goal: Optimal Comfort and Wellbeing  Outcome: Ongoing, Progressing  Intervention: Provide Person-Centered Care  Recent Flowsheet Documentation  Taken 9/4/2023 2000 by Amanda Angulo RN  Trust Relationship/Rapport:   care explained   choices provided   emotional support provided   empathic listening provided   questions answered   questions encouraged  Goal: Readiness for Transition of Care  Outcome: Ongoing, Progressing     Problem: Skin Injury Risk Increased  Goal: Skin Health and Integrity  Outcome: Ongoing, Progressing  Intervention: Optimize Skin Protection  Recent Flowsheet Documentation  Taken 9/5/2023 0400 by Amanda Angulo RN  Pressure Reduction Techniques:   frequent weight shift encouraged   weight shift assistance provided  Head of Bed (HOB) Positioning: HOB elevated  Pressure Reduction Devices:   positioning supports utilized   pressure-redistributing mattress utilized  Skin Protection:   adhesive use limited   incontinence pads utilized   transparent dressing maintained   tubing/devices free from skin contact  Taken 9/5/2023 0200 by Amanda Angulo RN  Head of Bed (HOB) Positioning: HOB elevated  Taken 9/5/2023 0000 by Amanda Angulo RN  Pressure Reduction Techniques:   frequent weight shift encouraged   weight shift assistance provided  Head of Bed (HOB) Positioning: HOB elevated  Pressure Reduction Devices:   positioning supports utilized   pressure-redistributing mattress utilized  Skin Protection:   adhesive use limited    incontinence pads utilized   transparent dressing maintained   tubing/devices free from skin contact  Taken 9/4/2023 2200 by Amanda Angulo RN  Head of Bed (HOB) Positioning: HOB elevated  Taken 9/4/2023 2000 by Amanda Angulo RN  Pressure Reduction Techniques:   frequent weight shift encouraged   weight shift assistance provided  Head of Bed (HOB) Positioning: HOB elevated  Pressure Reduction Devices:   positioning supports utilized   pressure-redistributing mattress utilized  Skin Protection:   adhesive use limited   incontinence pads utilized   transparent dressing maintained   tubing/devices free from skin contact     Problem: Adjustment to Illness (Sepsis/Septic Shock)  Goal: Optimal Coping  Outcome: Ongoing, Progressing  Intervention: Optimize Psychosocial Adjustment to Illness  Recent Flowsheet Documentation  Taken 9/4/2023 2000 by Amanda Angulo RN  Supportive Measures: active listening utilized  Family/Support System Care: support provided     Problem: Bleeding (Sepsis/Septic Shock)  Goal: Absence of Bleeding  Outcome: Ongoing, Progressing     Problem: Glycemic Control Impaired (Sepsis/Septic Shock)  Goal: Blood Glucose Level Within Desired Range  Outcome: Ongoing, Progressing     Problem: Infection Progression (Sepsis/Septic Shock)  Goal: Absence of Infection Signs and Symptoms  Outcome: Ongoing, Progressing  Intervention: Initiate Sepsis Management  Recent Flowsheet Documentation  Taken 9/5/2023 0400 by Amanda Angulo RN  Infection Prevention:   hand hygiene promoted   rest/sleep promoted   single patient room provided  Taken 9/5/2023 0200 by Amanda Angulo RN  Infection Prevention:   hand hygiene promoted   rest/sleep promoted   single patient room provided  Taken 9/5/2023 0000 by Amanda Angulo RN  Infection Prevention:   hand hygiene promoted   rest/sleep promoted   single patient room provided  Taken 9/4/2023 2000 by Amanda Angulo RN  Infection Prevention:   hand hygiene promoted   rest/sleep promoted    single patient room provided  Intervention: Promote Recovery  Recent Flowsheet Documentation  Taken 9/5/2023 0400 by Amanda Angulo RN  Activity Management: activity encouraged  Taken 9/5/2023 0200 by Amanda Angulo RN  Activity Management: activity encouraged  Taken 9/5/2023 0000 by Amanda Angulo RN  Activity Management: activity encouraged  Taken 9/4/2023 2200 by Amanda Angulo RN  Activity Management: activity encouraged  Taken 9/4/2023 2000 by Amanda Angulo RN  Activity Management: activity encouraged  Sleep/Rest Enhancement: awakenings minimized     Problem: Nutrition Impaired (Sepsis/Septic Shock)  Goal: Optimal Nutrition Intake  Outcome: Ongoing, Progressing     Problem: Fall Injury Risk  Goal: Absence of Fall and Fall-Related Injury  Outcome: Ongoing, Progressing  Intervention: Identify and Manage Contributors  Recent Flowsheet Documentation  Taken 9/4/2023 2000 by Amanda Angulo RN  Medication Review/Management: medications reviewed  Self-Care Promotion: independence encouraged  Intervention: Promote Injury-Free Environment  Recent Flowsheet Documentation  Taken 9/5/2023 0400 by Amanda Angulo RN  Safety Promotion/Fall Prevention:   activity supervised   assistive device/personal items within reach   clutter free environment maintained   fall prevention program maintained   mobility aid in reach   nonskid shoes/slippers when out of bed   room organization consistent   safety round/check completed  Taken 9/5/2023 0200 by Amanda Angulo RN  Safety Promotion/Fall Prevention:   activity supervised   assistive device/personal items within reach   clutter free environment maintained   fall prevention program maintained   mobility aid in reach   nonskid shoes/slippers when out of bed   room organization consistent   safety round/check completed  Taken 9/5/2023 0000 by Amanda Angulo RN  Safety Promotion/Fall Prevention:   activity supervised   assistive device/personal items within reach   clutter free environment  maintained   fall prevention program maintained   mobility aid in reach   nonskid shoes/slippers when out of bed   room organization consistent   safety round/check completed  Taken 9/4/2023 2200 by Amanda Angulo RN  Safety Promotion/Fall Prevention:   activity supervised   assistive device/personal items within reach   clutter free environment maintained   fall prevention program maintained   mobility aid in reach   nonskid shoes/slippers when out of bed   room organization consistent   safety round/check completed  Taken 9/4/2023 2000 by Amanda Angulo RN  Safety Promotion/Fall Prevention:   activity supervised   assistive device/personal items within reach   clutter free environment maintained   fall prevention program maintained   mobility aid in reach   nonskid shoes/slippers when out of bed   room organization consistent   safety round/check completed

## 2023-09-06 ENCOUNTER — TRANSITIONAL CARE MANAGEMENT TELEPHONE ENCOUNTER (OUTPATIENT)
Dept: CALL CENTER | Facility: HOSPITAL | Age: 68
End: 2023-09-06
Payer: MEDICARE

## 2023-09-06 ENCOUNTER — TELEPHONE (OUTPATIENT)
Dept: ENDOCRINOLOGY | Facility: CLINIC | Age: 68
End: 2023-09-06

## 2023-09-06 NOTE — TELEPHONE ENCOUNTER
Lisbeth called from [facility].    She asked if dr coronado will sign home health orders for patient.    I advised she is listed as her PCP. If dr coronado can't sign these orders, then call lisbeth.     Otherwise lisbeth doesn't require call back. She will fax us orders for dr coronado to sign.    Lisbeth's phone: [phone]

## 2023-09-12 LAB
FUNGUS WND CULT: NORMAL
FUNGUS WND CULT: NORMAL
MYCOBACTERIUM SPEC CULT: NORMAL
MYCOBACTERIUM SPEC CULT: NORMAL
NIGHT BLUE STAIN TISS: NORMAL
NIGHT BLUE STAIN TISS: NORMAL

## 2023-10-04 ENCOUNTER — LAB REQUISITION (OUTPATIENT)
Dept: LAB | Facility: HOSPITAL | Age: 68
End: 2023-10-04
Payer: MEDICARE

## 2023-10-04 DIAGNOSIS — A41.51 SEPSIS DUE TO ESCHERICHIA COLI (E. COLI): ICD-10-CM

## 2023-10-04 DIAGNOSIS — N39.0 URINARY TRACT INFECTION, SITE NOT SPECIFIED: ICD-10-CM

## 2023-10-04 DIAGNOSIS — N13.8 OTHER OBSTRUCTIVE AND REFLUX UROPATHY: ICD-10-CM

## 2023-10-04 DIAGNOSIS — N20.2 CALCULUS OF KIDNEY WITH CALCULUS OF URETER: ICD-10-CM

## 2023-10-04 LAB
BACTERIA UR QL AUTO: ABNORMAL /HPF
BILIRUB UR QL STRIP: NEGATIVE
CLARITY UR: CLEAR
COLOR UR: YELLOW
GLUCOSE UR STRIP-MCNC: NEGATIVE MG/DL
HGB UR QL STRIP.AUTO: ABNORMAL
HYALINE CASTS UR QL AUTO: ABNORMAL /LPF
KETONES UR QL STRIP: NEGATIVE
LEUKOCYTE ESTERASE UR QL STRIP.AUTO: ABNORMAL
NITRITE UR QL STRIP: NEGATIVE
PH UR STRIP.AUTO: 6 [PH] (ref 4.5–8)
PROT UR QL STRIP: ABNORMAL
RBC # UR STRIP: ABNORMAL /HPF
REF LAB TEST METHOD: ABNORMAL
SP GR UR STRIP: 1.01 (ref 1–1.03)
SQUAMOUS #/AREA URNS HPF: ABNORMAL /HPF
UROBILINOGEN UR QL STRIP: ABNORMAL
WBC # UR STRIP: ABNORMAL /HPF

## 2023-10-04 PROCEDURE — 87086 URINE CULTURE/COLONY COUNT: CPT | Performed by: UROLOGY

## 2023-10-04 PROCEDURE — 81001 URINALYSIS AUTO W/SCOPE: CPT | Performed by: UROLOGY

## 2023-10-05 LAB — BACTERIA SPEC AEROBE CULT: NORMAL

## 2023-10-18 RX ORDER — PANTOPRAZOLE SODIUM 40 MG/1
40 TABLET, DELAYED RELEASE ORAL DAILY
COMMUNITY

## 2023-10-19 ENCOUNTER — ANESTHESIA EVENT (OUTPATIENT)
Dept: PERIOP | Facility: HOSPITAL | Age: 68
End: 2023-10-19
Payer: MEDICARE

## 2023-10-19 RX ORDER — FAMOTIDINE 10 MG/ML
20 INJECTION, SOLUTION INTRAVENOUS ONCE
Status: CANCELLED | OUTPATIENT
Start: 2023-10-19 | End: 2023-10-19

## 2023-10-19 RX ORDER — SODIUM CHLORIDE 9 MG/ML
40 INJECTION, SOLUTION INTRAVENOUS AS NEEDED
Status: CANCELLED | OUTPATIENT
Start: 2023-10-19

## 2023-10-20 ENCOUNTER — HOSPITAL ENCOUNTER (OUTPATIENT)
Facility: HOSPITAL | Age: 68
Setting detail: HOSPITAL OUTPATIENT SURGERY
Discharge: HOME | End: 2023-10-20
Attending: UROLOGY | Admitting: UROLOGY
Payer: MEDICARE

## 2023-10-20 ENCOUNTER — ANESTHESIA (OUTPATIENT)
Dept: PERIOP | Facility: HOSPITAL | Age: 68
End: 2023-10-20
Payer: MEDICARE

## 2023-10-20 VITALS
DIASTOLIC BLOOD PRESSURE: 75 MMHG | SYSTOLIC BLOOD PRESSURE: 140 MMHG | HEIGHT: 63 IN | TEMPERATURE: 97.4 F | WEIGHT: 167 LBS | OXYGEN SATURATION: 99 % | HEART RATE: 70 BPM | BODY MASS INDEX: 29.59 KG/M2 | RESPIRATION RATE: 16 BRPM

## 2023-10-20 DIAGNOSIS — N20.0 NEPHROLITHIASIS: Primary | ICD-10-CM

## 2023-10-20 LAB
ANION GAP SERPL CALCULATED.3IONS-SCNC: 11 MMOL/L (ref 5–15)
BACTERIA UR QL AUTO: ABNORMAL /HPF
BILIRUB UR QL STRIP: NEGATIVE
BUN SERPL-MCNC: 12 MG/DL (ref 8–23)
BUN/CREAT SERPL: 12.5 (ref 7–25)
CALCIUM SPEC-SCNC: 9.9 MG/DL (ref 8.6–10.5)
CHLORIDE SERPL-SCNC: 105 MMOL/L (ref 98–107)
CLARITY UR: ABNORMAL
CO2 SERPL-SCNC: 26 MMOL/L (ref 22–29)
COLOR UR: YELLOW
CREAT SERPL-MCNC: 0.96 MG/DL (ref 0.57–1)
EGFRCR SERPLBLD CKD-EPI 2021: 64.6 ML/MIN/1.73
GLUCOSE SERPL-MCNC: 115 MG/DL (ref 65–99)
GLUCOSE UR STRIP-MCNC: NEGATIVE MG/DL
HGB UR QL STRIP.AUTO: ABNORMAL
HYALINE CASTS UR QL AUTO: ABNORMAL /LPF
KETONES UR QL STRIP: NEGATIVE
LEUKOCYTE ESTERASE UR QL STRIP.AUTO: ABNORMAL
NITRITE UR QL STRIP: NEGATIVE
PH UR STRIP.AUTO: 5.5 [PH] (ref 5–8)
POTASSIUM SERPL-SCNC: 4 MMOL/L (ref 3.5–5.2)
PROT UR QL STRIP: ABNORMAL
RBC # UR STRIP: ABNORMAL /HPF
REF LAB TEST METHOD: ABNORMAL
SODIUM SERPL-SCNC: 142 MMOL/L (ref 136–145)
SP GR UR STRIP: 1.01 (ref 1–1.03)
SQUAMOUS #/AREA URNS HPF: ABNORMAL /HPF
UROBILINOGEN UR QL STRIP: ABNORMAL
WBC # UR STRIP: ABNORMAL /HPF

## 2023-10-20 PROCEDURE — 80048 BASIC METABOLIC PNL TOTAL CA: CPT | Performed by: ANESTHESIOLOGY

## 2023-10-20 PROCEDURE — 87086 URINE CULTURE/COLONY COUNT: CPT | Performed by: UROLOGY

## 2023-10-20 PROCEDURE — C1769 GUIDE WIRE: HCPCS | Performed by: UROLOGY

## 2023-10-20 PROCEDURE — 81001 URINALYSIS AUTO W/SCOPE: CPT | Performed by: UROLOGY

## 2023-10-20 PROCEDURE — 25010000002 DEXAMETHASONE PER 1 MG: Performed by: NURSE ANESTHETIST, CERTIFIED REGISTERED

## 2023-10-20 PROCEDURE — 25810000003 LACTATED RINGERS PER 1000 ML: Performed by: NURSE ANESTHETIST, CERTIFIED REGISTERED

## 2023-10-20 PROCEDURE — 25010000002 ONDANSETRON PER 1 MG: Performed by: NURSE ANESTHETIST, CERTIFIED REGISTERED

## 2023-10-20 PROCEDURE — C1758 CATHETER, URETERAL: HCPCS | Performed by: UROLOGY

## 2023-10-20 PROCEDURE — 25010000002 MEPERIDINE PER 100 MG

## 2023-10-20 PROCEDURE — 25010000002 CEFAZOLIN PER 500 MG: Performed by: UROLOGY

## 2023-10-20 PROCEDURE — 25010000002 PROPOFOL 10 MG/ML EMULSION: Performed by: NURSE ANESTHETIST, CERTIFIED REGISTERED

## 2023-10-20 PROCEDURE — 25010000002 FENTANYL CITRATE (PF) 100 MCG/2ML SOLUTION: Performed by: NURSE ANESTHETIST, CERTIFIED REGISTERED

## 2023-10-20 PROCEDURE — 25810000003 LACTATED RINGERS PER 1000 ML: Performed by: ANESTHESIOLOGY

## 2023-10-20 PROCEDURE — 25010000002 MEPERIDINE PER 100 MG: Performed by: ANESTHESIOLOGY

## 2023-10-20 RX ORDER — FAMOTIDINE 20 MG/1
20 TABLET, FILM COATED ORAL ONCE
Status: COMPLETED | OUTPATIENT
Start: 2023-10-20 | End: 2023-10-20

## 2023-10-20 RX ORDER — FENTANYL CITRATE 50 UG/ML
50 INJECTION, SOLUTION INTRAMUSCULAR; INTRAVENOUS
Status: DISCONTINUED | OUTPATIENT
Start: 2023-10-20 | End: 2023-10-20 | Stop reason: HOSPADM

## 2023-10-20 RX ORDER — PHENYLEPHRINE HCL IN 0.9% NACL 1 MG/10 ML
SYRINGE (ML) INTRAVENOUS AS NEEDED
Status: DISCONTINUED | OUTPATIENT
Start: 2023-10-20 | End: 2023-10-20 | Stop reason: SURG

## 2023-10-20 RX ORDER — MIDAZOLAM HYDROCHLORIDE 1 MG/ML
0.5 INJECTION INTRAMUSCULAR; INTRAVENOUS
Status: DISCONTINUED | OUTPATIENT
Start: 2023-10-20 | End: 2023-10-20 | Stop reason: HOSPADM

## 2023-10-20 RX ORDER — LIDOCAINE HYDROCHLORIDE 10 MG/ML
0.5 INJECTION, SOLUTION EPIDURAL; INFILTRATION; INTRACAUDAL; PERINEURAL ONCE AS NEEDED
Status: COMPLETED | OUTPATIENT
Start: 2023-10-20 | End: 2023-10-20

## 2023-10-20 RX ORDER — DEXAMETHASONE SODIUM PHOSPHATE 4 MG/ML
INJECTION, SOLUTION INTRA-ARTICULAR; INTRALESIONAL; INTRAMUSCULAR; INTRAVENOUS; SOFT TISSUE AS NEEDED
Status: DISCONTINUED | OUTPATIENT
Start: 2023-10-20 | End: 2023-10-20 | Stop reason: SURG

## 2023-10-20 RX ORDER — ACETAMINOPHEN 500 MG
1000 TABLET ORAL EVERY 6 HOURS PRN
COMMUNITY

## 2023-10-20 RX ORDER — HYDROMORPHONE HYDROCHLORIDE 1 MG/ML
0.5 INJECTION, SOLUTION INTRAMUSCULAR; INTRAVENOUS; SUBCUTANEOUS
Status: DISCONTINUED | OUTPATIENT
Start: 2023-10-20 | End: 2023-10-20 | Stop reason: HOSPADM

## 2023-10-20 RX ORDER — FENTANYL CITRATE 50 UG/ML
INJECTION, SOLUTION INTRAMUSCULAR; INTRAVENOUS AS NEEDED
Status: DISCONTINUED | OUTPATIENT
Start: 2023-10-20 | End: 2023-10-20 | Stop reason: SURG

## 2023-10-20 RX ORDER — PROMETHAZINE HYDROCHLORIDE 25 MG/1
25 SUPPOSITORY RECTAL ONCE AS NEEDED
Status: DISCONTINUED | OUTPATIENT
Start: 2023-10-20 | End: 2023-10-20 | Stop reason: HOSPADM

## 2023-10-20 RX ORDER — SODIUM CHLORIDE 0.9 % (FLUSH) 0.9 %
10 SYRINGE (ML) INJECTION AS NEEDED
Status: DISCONTINUED | OUTPATIENT
Start: 2023-10-20 | End: 2023-10-20 | Stop reason: HOSPADM

## 2023-10-20 RX ORDER — MEPERIDINE HYDROCHLORIDE 25 MG/ML
12.5 INJECTION INTRAMUSCULAR; INTRAVENOUS; SUBCUTANEOUS ONCE
Status: COMPLETED | OUTPATIENT
Start: 2023-10-20 | End: 2023-10-20

## 2023-10-20 RX ORDER — MEPERIDINE HYDROCHLORIDE 25 MG/ML
25 INJECTION INTRAMUSCULAR; INTRAVENOUS; SUBCUTANEOUS ONCE
Status: DISCONTINUED | OUTPATIENT
Start: 2023-10-20 | End: 2023-10-20

## 2023-10-20 RX ORDER — LIDOCAINE HYDROCHLORIDE 10 MG/ML
INJECTION, SOLUTION EPIDURAL; INFILTRATION; INTRACAUDAL; PERINEURAL AS NEEDED
Status: DISCONTINUED | OUTPATIENT
Start: 2023-10-20 | End: 2023-10-20 | Stop reason: SURG

## 2023-10-20 RX ORDER — SODIUM CHLORIDE, SODIUM LACTATE, POTASSIUM CHLORIDE, CALCIUM CHLORIDE 600; 310; 30; 20 MG/100ML; MG/100ML; MG/100ML; MG/100ML
INJECTION, SOLUTION INTRAVENOUS CONTINUOUS PRN
Status: DISCONTINUED | OUTPATIENT
Start: 2023-10-20 | End: 2023-10-20 | Stop reason: SURG

## 2023-10-20 RX ORDER — ONDANSETRON 2 MG/ML
INJECTION INTRAMUSCULAR; INTRAVENOUS AS NEEDED
Status: DISCONTINUED | OUTPATIENT
Start: 2023-10-20 | End: 2023-10-20 | Stop reason: SURG

## 2023-10-20 RX ORDER — MEPERIDINE HYDROCHLORIDE 25 MG/ML
INJECTION INTRAMUSCULAR; INTRAVENOUS; SUBCUTANEOUS
Status: COMPLETED
Start: 2023-10-20 | End: 2023-10-20

## 2023-10-20 RX ORDER — BUPIVACAINE HCL/0.9 % NACL/PF 0.1 %
2000 PLASTIC BAG, INJECTION (ML) EPIDURAL ONCE
Status: COMPLETED | OUTPATIENT
Start: 2023-10-20 | End: 2023-10-20

## 2023-10-20 RX ORDER — MAGNESIUM HYDROXIDE 1200 MG/15ML
LIQUID ORAL AS NEEDED
Status: DISCONTINUED | OUTPATIENT
Start: 2023-10-20 | End: 2023-10-20 | Stop reason: HOSPADM

## 2023-10-20 RX ORDER — PROCHLORPERAZINE EDISYLATE 5 MG/ML
10 INJECTION INTRAMUSCULAR; INTRAVENOUS ONCE AS NEEDED
Status: DISCONTINUED | OUTPATIENT
Start: 2023-10-20 | End: 2023-10-20 | Stop reason: HOSPADM

## 2023-10-20 RX ORDER — PROPOFOL 10 MG/ML
VIAL (ML) INTRAVENOUS AS NEEDED
Status: DISCONTINUED | OUTPATIENT
Start: 2023-10-20 | End: 2023-10-20 | Stop reason: SURG

## 2023-10-20 RX ORDER — SODIUM CHLORIDE 0.9 % (FLUSH) 0.9 %
10 SYRINGE (ML) INJECTION EVERY 12 HOURS SCHEDULED
Status: DISCONTINUED | OUTPATIENT
Start: 2023-10-20 | End: 2023-10-20 | Stop reason: HOSPADM

## 2023-10-20 RX ORDER — SODIUM CHLORIDE, SODIUM LACTATE, POTASSIUM CHLORIDE, CALCIUM CHLORIDE 600; 310; 30; 20 MG/100ML; MG/100ML; MG/100ML; MG/100ML
9 INJECTION, SOLUTION INTRAVENOUS CONTINUOUS
Status: DISCONTINUED | OUTPATIENT
Start: 2023-10-20 | End: 2023-10-20 | Stop reason: HOSPADM

## 2023-10-20 RX ORDER — HYDROCODONE BITARTRATE AND ACETAMINOPHEN 7.5; 325 MG/1; MG/1
1 TABLET ORAL EVERY 4 HOURS PRN
Qty: 12 TABLET | Refills: 0 | Status: SHIPPED | OUTPATIENT
Start: 2023-10-20

## 2023-10-20 RX ORDER — PROMETHAZINE HYDROCHLORIDE 25 MG/1
25 TABLET ORAL ONCE AS NEEDED
Status: DISCONTINUED | OUTPATIENT
Start: 2023-10-20 | End: 2023-10-20 | Stop reason: HOSPADM

## 2023-10-20 RX ADMIN — DEXAMETHASONE SODIUM PHOSPHATE 8 MG: 4 INJECTION, SOLUTION INTRAMUSCULAR; INTRAVENOUS at 13:26

## 2023-10-20 RX ADMIN — SODIUM CHLORIDE, POTASSIUM CHLORIDE, SODIUM LACTATE AND CALCIUM CHLORIDE: 600; 310; 30; 20 INJECTION, SOLUTION INTRAVENOUS at 13:14

## 2023-10-20 RX ADMIN — SODIUM CHLORIDE, POTASSIUM CHLORIDE, SODIUM LACTATE AND CALCIUM CHLORIDE 9 ML/HR: 600; 310; 30; 20 INJECTION, SOLUTION INTRAVENOUS at 12:46

## 2023-10-20 RX ADMIN — Medication 2000 MG: at 13:14

## 2023-10-20 RX ADMIN — FAMOTIDINE 20 MG: 20 TABLET, FILM COATED ORAL at 12:01

## 2023-10-20 RX ADMIN — PROPOFOL 200 MG: 10 INJECTION, EMULSION INTRAVENOUS at 13:19

## 2023-10-20 RX ADMIN — MEPERIDINE HYDROCHLORIDE 12.5 MG: 25 INJECTION INTRAMUSCULAR; INTRAVENOUS; SUBCUTANEOUS at 14:38

## 2023-10-20 RX ADMIN — Medication 100 MCG: at 13:35

## 2023-10-20 RX ADMIN — ONDANSETRON 4 MG: 2 INJECTION INTRAMUSCULAR; INTRAVENOUS at 14:02

## 2023-10-20 RX ADMIN — MEPERIDINE HYDROCHLORIDE 25 MG: 25 INJECTION INTRAMUSCULAR; INTRAVENOUS; SUBCUTANEOUS at 15:10

## 2023-10-20 RX ADMIN — LIDOCAINE HYDROCHLORIDE 50 MG: 10 INJECTION, SOLUTION EPIDURAL; INFILTRATION; INTRACAUDAL; PERINEURAL at 13:19

## 2023-10-20 RX ADMIN — LIDOCAINE HYDROCHLORIDE 0.5 ML: 10 INJECTION, SOLUTION EPIDURAL; INFILTRATION; INTRACAUDAL; PERINEURAL at 12:01

## 2023-10-20 RX ADMIN — FENTANYL CITRATE 100 MCG: 50 INJECTION, SOLUTION INTRAMUSCULAR; INTRAVENOUS at 13:14

## 2023-10-20 NOTE — ANESTHESIA PROCEDURE NOTES
Airway  Urgency: elective    Date/Time: 10/20/2023 1:20 PM  Airway not difficult    General Information and Staff    Patient location during procedure: OR  CRNA/CAA: Lorna Decker CRNA    Indications and Patient Condition  Indications for airway management: airway protection    Preoxygenated: yes  Mask difficulty assessment: 0 - not attempted    Final Airway Details  Final airway type: supraglottic airway      Successful airway: I-gel  Size 4     Number of attempts at approach: 1  Assessment: lips, teeth, and gum same as pre-op and atraumatic intubation    Additional Comments  LMA placed without difficulty, ventilation with assist, equal breath sounds and symmetric chest rise and fall

## 2023-10-20 NOTE — OP NOTE
EXTRACORPOREAL SHOCKWAVE LITHOTRIPSY WITH STENT INSERTION/REMOVAL  Progress Note    Siria Garcia  10/20/2023    Pre-op Diagnosis:   Left renal stone       Post-Op Diagnosis Codes:  Left renal stone    Procedure/CPT® Codes:  Cystoscopy, left ureteral stent removal, left extracorporeal shockwave lithotripsy    Surgeon(s):  Murray Torres MD    Anesthesia: General    Staff:   Circulator: Leon German RN  Scrub Person: Larissa Hedrick     Estimated Blood Loss: none    Urine Voided: * No values recorded between 10/20/2023  1:11 PM and 10/20/2023  2:14 PM *    Specimens:                None     Drains: * No LDAs found *    Findings: Left renal stone with good fragmentation by intermittent fluoroscopy during ESWL    Complications: None    Procedure: Patient was correctly identified in preoperative holding area.  Informed consent was obtained.  She is taken to the operative suite and positioned in supine position.  Anesthesia induced.  She is repositioned to lithotomy position.  All pressure points padded.  Proper timeout procedure completed.  Preoperative antibiotics have been given.  The genitourinary area is prepped and draped in normal sterile fashion.  Rigid cystoscopy performed.  The distal end of the retained ureteral stent was grasped and the stent removed intact.  The patient's bladder was drained.  She was repositioned to position the stone within the lithotripter focus using biplanar fluoroscopy.  A total of 3000 shockwaves were administered to the stone with escalating voltage.  Intermittent fluoroscopy was performed throughout the case to confirm this time remained within the lithotripter focus and to ensure adequate fragmentation.  Patient tolerated procedure well.  She is awoken from anesthesia uneventfully.    Disposition: Patient is taken to the recovery room in stable condition.  She is discharged home with instructions for outpatient follow-up with [facility].      Murray Torres MD      Date: 10/20/2023  Time: 14:24 EDT

## 2023-10-20 NOTE — ANESTHESIA POSTPROCEDURE EVALUATION
Patient: Siria Garcia    Procedure Summary       Date: 10/20/23 Room / [facility]:  [facility]    Anesthesia Start: 1314 Anesthesia Stop:     Procedure: LEFT EXTRACORPOREAL SHOCKWAVE LITHOTRIPSY WITH STENT REMOVAL (Left) Diagnosis:     Surgeons: Murray Torres MD Provider: Navid Sanchez MD    Anesthesia Type: general ASA Status: 3            Anesthesia Type: general    Vitals  Vitals Value Taken Time   BP     Temp     Pulse 85 10/20/23 1418   Resp     SpO2 95 % 10/20/23 1418   Vitals shown include unfiled device data.      TEMP 97.4  Post Anesthesia Care and Evaluation    Patient location during evaluation: PACU  Patient participation: waiting for patient participation  Pain management: adequate    Airway patency: patent  Anesthetic complications: No anesthetic complications  PONV Status: none  Cardiovascular status: hemodynamically stable and acceptable  Respiratory status: nonlabored ventilation, acceptable and nasal cannula  Hydration status: acceptable

## 2023-10-20 NOTE — BRIEF OP NOTE
EXTRACORPOREAL SHOCKWAVE LITHOTRIPSY WITH STENT INSERTION/REMOVAL  Progress Note    Siria Garcia  10/20/2023    Pre-op Diagnosis:   Left renal stone       Post-Op Diagnosis Codes:  Left renal stone    Procedure/CPT® Codes:  Cystoscopy, left ureteral stent removal, left extracorporeal shockwave lithotripsy    Surgeon(s):  Murray Torres MD    Anesthesia: General    Staff:   Circulator: Leon German RN  Scrub Person: Larissa Hedrick     Estimated Blood Loss: none    Urine Voided: * No values recorded between 10/20/2023  1:11 PM and 10/20/2023  2:14 PM *    Specimens:                None     Drains: * No LDAs found *    Findings: Left renal stone with good fragmentation by intermittent fluoroscopy during ESWL    Complications: None          Murray Torres MD     Date: 10/20/2023  Time: 14:22 EDT

## 2023-10-20 NOTE — H&P
Pre-Op H&P  Siria Garcia  [id number]  1955      Chief complaint: Kidney stone      Subjective:  Patient is a 67 y.o.female presents for scheduled surgery by Dr. Torres. She anticipates a LEFT EXTRACORPOREAL SHOCKWAVE LITHOTRIPSY WITH STENT REMOVAL today. She had left ureteral stent placed 9/1/23 due to 1.3cm left UPJ stone. She continues to have left flank and groin pain. She denies hematuria. She has dysuria.       Review of Systems:  Constitutional-- No fever, chills or sweats. No fatigue.  CV-- No chest pain, palpitation or syncope  Resp-- No SOB, cough, hemoptysis  Skin--No rashes or lesions      Allergies:   Allergies   Allergen Reactions    Bactrim [Sulfamethoxazole-Trimethoprim] Itching     Trimehtoprim is okay, raction when combined with sulfa     Terbinafine Itching         Home Meds:  Medications Prior to Admission   Medication Sig Dispense Refill Last Dose    Cholecalciferol (Vitamin D) 50 MCG (2000 UT) tablet Take 1 tablet by mouth Daily.       CRANBERRY CONCENTRATE PO Take 1 tablet/day by mouth.       exemestane (AROMASIN) 25 MG chemo tablet Take 1 tablet by mouth Daily.       fluconazole (DIFLUCAN) 150 MG tablet Take 1 tablet by mouth Daily As Needed (yeast symptoms). 10 tablet 3     Glucos-Chond-Hyal Ac-Ca Fructo (MOVE FREE JOINT HEALTH ADVANCE PO) Take 1 tablet/day by mouth.       Histamine Dihydrochloride (Australian Dream Arthritis) 0.025 % cream Apply  topically As Needed (joint pain). Apply topically TID       levothyroxine (SYNTHROID, LEVOTHROID) 25 MCG tablet Take 0.5 tablets by mouth Daily. 45 tablet 3     Loperamide HCl (IMODIUM PO) Take 1 tablet by mouth 2 (Two) Times a Day As Needed (diarrhea).       loratadine (CLARITIN) 10 MG tablet Take 1 tablet by mouth Daily.       Multiple Vitamins-Minerals (YARI MULTIVITAMIN FOR WOMEN PO) Take 1 tablet by mouth Daily.   10/17/2023    Omega-3 Fatty Acids (OMEGA-3 1450 PO) Take 1,000 mg by mouth Daily.       pantoprazole (PROTONIX) 40 MG  EC tablet Take 1 tablet by mouth Daily.       Pumpkin Seed-Soy Germ (AZO BLADDER CONTROL/GO-LESS PO) Take 1 tablet by mouth As Needed (bladder discomfort).       sore muscle (ICY HOT EXTRA STRENGTH) 10-30 % cream cream Apply 1 application  topically to the appropriate area as directed 2 (Two) Times a Day As Needed (joint pain). With lido       trimethoprim (TRIMPEX) 100 MG tablet Take 1 tablet by mouth Daily. (Patient taking differently: Take 1 tablet by mouth Daily. As needed) 90 tablet 1          PMH:   Past Medical History:   Diagnosis Date    Arthritis     Breast cancer 2017    left , lumpectomy and radiation    Colon polyp 2018    Diverticulosis 2018    Environmental allergies     Gall bladder stones     GERD (gastroesophageal reflux disease)     History of goiter     Hypertension     managed with lifestyle changes    Low back pain     Plantar fasciitis     Tarsal tunnel syndrome of both lower extremities     UTI (urinary tract infection)     Wears eyeglasses      PSH:    Past Surgical History:   Procedure Laterality Date    BREAST LUMPECTOMY  2017    CHOLECYSTECTOMY      COLONOSCOPY      COLONOSCOPY W/ BIOPSIES  2018    CYSTOSCOPY W/ URETERAL STENT PLACEMENT N/A 2023    Procedure: CYSTOSCOPY WITH LEFT URETERAL STENT PLACEMENT;  Surgeon: Murray Torres MD;  Location:  [facility];  Service: Urology;  Laterality: N/A;    EXPLORATORY LAPAROTOMY N/A 2023    Procedure: LAPAROTOMY EXPLORATORY;  Surgeon: Gabriel Del Toro MD;  Location: [facility] OR;  Service: General;  Laterality: N/A;    SKIN BIOPSY         Social History:   Tobacco:   Social History     Tobacco Use   Smoking Status Former    Packs/day: 0.50    Years: 13.00    Additional pack years: 0.00    Total pack years: 6.50    Types: Cigarettes    Quit date:     Years since quittin.8   Smokeless Tobacco Never      Alcohol:     Social History     Substance and Sexual Activity   Alcohol Use No         Physical Exam: VS: BP  "137/94    RR 16  T 97.4  Sat 99%RA  Ht 160 cm (63\")   Wt 75.8 kg (167 lb)   BMI 29.58 kg/m²       General Appearance:    Alert, cooperative, no distress, appears stated age   Head:    Normocephalic, without obvious abnormality, atraumatic   Lungs:     Clear to auscultation bilaterally, respirations unlabored    Heart:   Regular rate and rhythm, S1 and S2 normal    Abdomen:    Soft without tenderness   Extremities:   Extremities normal, atraumatic, no cyanosis or edema   Skin:   Skin color, texture, turgor normal, no rashes or lesions   Neurologic:   Grossly intact     Results Review:     LABS:  Lab Results   Component Value Date    WBC 12.33 (H) 09/05/2023    HGB 9.6 (L) 09/05/2023    HCT 29.1 (L) 09/05/2023    MCV 88.2 09/05/2023     09/05/2023    NEUTROABS 7.68 (H) 09/05/2023    GLUCOSE 99 09/05/2023    BUN 17 09/05/2023    CREATININE 0.75 09/05/2023    EGFRIFNONA 60 (L) 11/22/2021     09/05/2023    K 4.1 09/05/2023     09/05/2023    CO2 23.0 09/05/2023    MG 1.9 09/04/2023    PHOS 2.6 09/01/2023    CALCIUM 8.2 (L) 09/05/2023    ALBUMIN 2.9 (L) 09/04/2023    AST 18 09/04/2023    ALT 19 09/04/2023    BILITOT 0.4 09/04/2023       RADIOLOGY:  Imaging Results (Last 72 Hours)       ** No results found for the last 72 hours. **            I reviewed the patient's new clinical results.    Cancer Staging (if applicable)  Cancer Patient: __ yes __no __unknown; If yes, clinical stage T:__ N:__M:__, stage group or __N/A      Impression: left UPJ renal stone      Plan: LEFT EXTRACORPOREAL SHOCKWAVE LITHOTRIPSY WITH STENT REMOVAL      ANGIE Pereyra   10/20/2023   11:29 EDT  "

## 2023-10-20 NOTE — ANESTHESIA PREPROCEDURE EVALUATION
Anesthesia Evaluation     Patient summary reviewed and Nursing notes reviewed   no history of anesthetic complications:   NPO Solid Status: > 8 hours  NPO Liquid Status: > 8 hours           Airway   Mallampati: II  TM distance: >3 FB  Neck ROM: full  No difficulty expected  Dental      Pulmonary - negative pulmonary ROS and normal exam   Cardiovascular - normal exam    (+) hypertension, hyperlipidemia      Neuro/Psych  (+) numbness  GI/Hepatic/Renal/Endo    (+) GERD, PUD, renal disease-, thyroid problem     Musculoskeletal     Abdominal    Substance History      OB/GYN          Other   arthritis,   history of cancer                Anesthesia Plan    ASA 3     general     intravenous induction     Anesthetic plan, risks, benefits, and alternatives have been provided, discussed and informed consent has been obtained with: patient.    Plan discussed with CRNA.    CODE STATUS:          ,

## 2023-10-21 LAB — BACTERIA SPEC AEROBE CULT: NO GROWTH

## 2023-11-13 ENCOUNTER — TRANSCRIBE ORDERS (OUTPATIENT)
Dept: ADMINISTRATIVE | Facility: HOSPITAL | Age: 68
End: 2023-11-13
Payer: MEDICARE

## 2023-11-13 ENCOUNTER — HOSPITAL ENCOUNTER (OUTPATIENT)
Dept: GENERAL RADIOLOGY | Facility: HOSPITAL | Age: 68
Discharge: HOME | End: 2023-11-13
Admitting: UROLOGY
Payer: MEDICARE

## 2023-11-13 DIAGNOSIS — N20.0 CALCULUS, KIDNEY: ICD-10-CM

## 2023-11-13 DIAGNOSIS — N20.0 CALCULUS, KIDNEY: Primary | ICD-10-CM

## 2023-11-13 PROCEDURE — 74018 RADEX ABDOMEN 1 VIEW: CPT

## 2023-11-21 ENCOUNTER — OFFICE VISIT (OUTPATIENT)
Dept: ENDOCRINOLOGY | Facility: CLINIC | Age: 68
End: 2023-11-21
Payer: MEDICARE

## 2023-11-21 VITALS
BODY MASS INDEX: 27.82 KG/M2 | OXYGEN SATURATION: 100 % | HEIGHT: 63 IN | WEIGHT: 157 LBS | DIASTOLIC BLOOD PRESSURE: 80 MMHG | HEART RATE: 72 BPM | SYSTOLIC BLOOD PRESSURE: 144 MMHG

## 2023-11-21 DIAGNOSIS — Z76.89 ENCOUNTER TO ESTABLISH CARE: ICD-10-CM

## 2023-11-21 DIAGNOSIS — N20.0 KIDNEY STONES: ICD-10-CM

## 2023-11-21 DIAGNOSIS — E78.00 HYPERCHOLESTEROLEMIA: ICD-10-CM

## 2023-11-21 DIAGNOSIS — N20.0 NEPHROLITHIASIS: ICD-10-CM

## 2023-11-21 DIAGNOSIS — I10 BENIGN ESSENTIAL HYPERTENSION: ICD-10-CM

## 2023-11-21 DIAGNOSIS — R73.09 ELEVATED GLUCOSE: Primary | ICD-10-CM

## 2023-11-21 DIAGNOSIS — E03.9 ACQUIRED HYPOTHYROIDISM: ICD-10-CM

## 2023-11-21 DIAGNOSIS — E55.9 VITAMIN D DEFICIENCY: ICD-10-CM

## 2023-11-21 PROBLEM — K25.1 ACUTE GASTRIC ULCER WITH PERFORATION: Status: ACTIVE | Noted: 2023-11-21

## 2023-11-21 LAB
EXPIRATION DATE: NORMAL
EXPIRATION DATE: NORMAL
GLUCOSE BLDC GLUCOMTR-MCNC: 75 MG/DL (ref 70–130)
HBA1C MFR BLD: 5.3 % (ref 4.5–5.7)
Lab: NORMAL
Lab: NORMAL

## 2023-11-21 PROCEDURE — 83970 ASSAY OF PARATHORMONE: CPT | Performed by: INTERNAL MEDICINE

## 2023-11-21 PROCEDURE — 3051F HG A1C>EQUAL 7.0%<8.0%: CPT | Performed by: INTERNAL MEDICINE

## 2023-11-21 PROCEDURE — 82330 ASSAY OF CALCIUM: CPT | Performed by: INTERNAL MEDICINE

## 2023-11-21 PROCEDURE — 1160F RVW MEDS BY RX/DR IN RCRD: CPT | Performed by: INTERNAL MEDICINE

## 2023-11-21 PROCEDURE — 84439 ASSAY OF FREE THYROXINE: CPT | Performed by: INTERNAL MEDICINE

## 2023-11-21 PROCEDURE — 82306 VITAMIN D 25 HYDROXY: CPT | Performed by: INTERNAL MEDICINE

## 2023-11-21 PROCEDURE — 1159F MED LIST DOCD IN RCRD: CPT | Performed by: INTERNAL MEDICINE

## 2023-11-21 PROCEDURE — 3046F HEMOGLOBIN A1C LEVEL >9.0%: CPT | Performed by: INTERNAL MEDICINE

## 2023-11-21 PROCEDURE — 80061 LIPID PANEL: CPT | Performed by: INTERNAL MEDICINE

## 2023-11-21 PROCEDURE — 82947 ASSAY GLUCOSE BLOOD QUANT: CPT | Performed by: INTERNAL MEDICINE

## 2023-11-21 PROCEDURE — 3044F HG A1C LEVEL LT 7.0%: CPT | Performed by: INTERNAL MEDICINE

## 2023-11-21 PROCEDURE — 3077F SYST BP >= 140 MM HG: CPT | Performed by: INTERNAL MEDICINE

## 2023-11-21 PROCEDURE — 83036 HEMOGLOBIN GLYCOSYLATED A1C: CPT | Performed by: INTERNAL MEDICINE

## 2023-11-21 PROCEDURE — 3052F HG A1C>EQUAL 8.0%<EQUAL 9.0%: CPT | Performed by: INTERNAL MEDICINE

## 2023-11-21 PROCEDURE — 80053 COMPREHEN METABOLIC PANEL: CPT | Performed by: INTERNAL MEDICINE

## 2023-11-21 PROCEDURE — 3079F DIAST BP 80-89 MM HG: CPT | Performed by: INTERNAL MEDICINE

## 2023-11-21 PROCEDURE — 99214 OFFICE O/P EST MOD 30 MIN: CPT | Performed by: INTERNAL MEDICINE

## 2023-11-21 PROCEDURE — 85025 COMPLETE CBC W/AUTO DIFF WBC: CPT | Performed by: INTERNAL MEDICINE

## 2023-11-21 PROCEDURE — 84443 ASSAY THYROID STIM HORMONE: CPT | Performed by: INTERNAL MEDICINE

## 2023-11-21 PROCEDURE — 36415 COLL VENOUS BLD VENIPUNCTURE: CPT | Performed by: INTERNAL MEDICINE

## 2023-11-21 RX ORDER — LEVOTHYROXINE SODIUM 0.03 MG/1
12.5 TABLET ORAL DAILY
Qty: 45 TABLET | Refills: 1 | Status: SHIPPED | OUTPATIENT
Start: 2023-11-21

## 2023-11-21 NOTE — PROGRESS NOTES
Siria Hussain Garcia 67 y.o.  [facility]:Follow-up, Hypothyroidism, Hypertension, Vitamin D Deficiency, and Hyperglycemia    [place]: Follow-up, Hypothyroidism, Hypertension, Vitamin D Deficiency, and Hyperglycemia    Interim sepsis, kidney stones s/p lithotripsy   Bp is good overall  Is drinking more fluids  Energy fair   Is taking trimethoprim daily for suppression   Is taking vitamin D supplement     Allergies   Allergen Reactions    Bactrim [Sulfamethoxazole-Trimethoprim] Itching     Trimehtoprim is okay, raction when combined with sulfa     Terbinafine Itching       Current Outpatient Medications:     levothyroxine (SYNTHROID, LEVOTHROID) 25 MCG tablet, Take 0.5 tablets by mouth Daily., Disp: 45 tablet, Rfl: 1    acetaminophen (TYLENOL) 500 MG tablet, Take 2 tablets by mouth Every 6 (Six) Hours As Needed for Mild Pain., Disp: , Rfl:     Cholecalciferol (Vitamin D) 50 MCG (2000 UT) tablet, Take 1 tablet by mouth Daily., Disp: , Rfl:     CRANBERRY CONCENTRATE PO, Take 1 tablet/day by mouth 2 (Two) Times a Day., Disp: , Rfl:     exemestane (AROMASIN) 25 MG chemo tablet, Take 1 tablet by mouth Daily., Disp: , Rfl:     fluconazole (DIFLUCAN) 150 MG tablet, Take 1 tablet by mouth Daily As Needed (yeast symptoms)., Disp: 10 tablet, Rfl: 3    Glucos-Chond-Hyal Ac-Ca Fructo (MOVE FREE JOINT HEALTH ADVANCE PO), Take 1 tablet/day by mouth., Disp: , Rfl:     Histamine Dihydrochloride (Australian Dream Arthritis) 0.025 % cream, Apply  topically As Needed (joint pain). Apply topically TID, Disp: , Rfl:     HYDROcodone-acetaminophen (NORCO) 7.5-325 MG per tablet, Take 1 tablet by mouth Every 4 (Four) Hours As Needed for Moderate Pain (Pain)., Disp: 12 tablet, Rfl: 0    Loperamide HCl (IMODIUM PO), Take 1 tablet by mouth 2 (Two) Times a Day As Needed (diarrhea)., Disp: , Rfl:     loratadine (CLARITIN) 10 MG tablet, Take 1 tablet by mouth Daily., Disp: , Rfl:     Multiple Vitamins-Minerals (YARI MULTIVITAMIN FOR WOMEN PO), Take 1 tablet by  mouth Daily., Disp: , Rfl:     Omega-3 Fatty Acids (OMEGA-3 1450 PO), Take 1,000 mg by mouth Daily., Disp: , Rfl:     pantoprazole (PROTONIX) 40 MG EC tablet, Take 1 tablet by mouth Daily., Disp: , Rfl:     Pumpkin Seed-Soy Germ (AZO BLADDER CONTROL/GO-LESS PO), Take 1 tablet by mouth As Needed (bladder discomfort)., Disp: , Rfl:     sore muscle (ICY HOT EXTRA STRENGTH) 10-30 % cream cream, Apply 1 application  topically to the appropriate area as directed 2 (Two) Times a Day As Needed (joint pain). With lido, Disp: , Rfl:     trimethoprim (TRIMPEX) 100 MG tablet, Take 1 tablet by mouth Daily., Disp: 90 tablet, Rfl: 1  Patient Active Problem List    Diagnosis     Acute gastric ulcer with perforation [K25.1]     E coli bacteremia [R78.81, B96.20]     Perforated ulcer [K27.5]     K25.9, Pyloric ulcer perf. - S/P repair 08/28/23 [K25.9]     Chronic low back pain [M54.50, G89.29]     SUJATA (acute kidney injury) [N17.9]     UTI (urinary tract infection) [N39.0]     Sepsis [A41.9]     Peritonitis [K65.9]     Nephrolithiasis [N20.0]     Obstructive uropathy [N13.9]     Vitamin D deficiency [E55.9]     Hypercalcemia [E83.52]     Elevated glucose [R73.09]     Tubular adenoma [D36.9]     Malignant neoplasm of upper-outer quadrant of breast in female, estrogen receptor positive [C50.419, Z17.0]     Vaginitis and vulvovaginitis [N76.0]     Sore throat [J02.9]     Acute low back pain [M54.50]     Benign essential hypertension [I10]     Gastroesophageal reflux disease [K21.9]     Fatigue [R53.83]     Foot pain [M79.673]     Hypercholesterolemia [E78.00]     Hypothyroidism [E03.9]     Knee pain [M25.569]     Osteoarthritis of hand [M19.049]     Vaginal yeast infection [B37.31]      Review of Systems   Constitutional:  Negative for activity change, appetite change and unexpected weight change.   HENT:  Negative for congestion and rhinorrhea.    Eyes:  Negative for visual disturbance.   Respiratory:  Negative for cough and shortness  "of breath.    Cardiovascular:  Negative for palpitations and leg swelling.   Gastrointestinal:  Negative for constipation, diarrhea and nausea.   Genitourinary:  Negative for hematuria.   Musculoskeletal:  Negative for arthralgias, back pain, gait problem, joint swelling and myalgias.   Skin:  Negative for color change, rash and wound.   Allergic/Immunologic: Negative for environmental allergies, food allergies and immunocompromised state.   Neurological:  Negative for dizziness, weakness and light-headedness.   Psychiatric/Behavioral:  Negative for confusion, decreased concentration, dysphoric mood and sleep disturbance. The patient is not nervous/anxious.      Social History     Socioeconomic History    Marital status:    Tobacco Use    Smoking status: Former     Packs/day: 0.50     Years: 13.00     Additional pack years: 0.00     Total pack years: 6.50     Types: Cigarettes     Quit date:      Years since quittin.9    Smokeless tobacco: Never   Vaping Use    Vaping Use: Never used   Substance and Sexual Activity    Alcohol use: No    Drug use: No    Sexual activity: Not Currently     Family History   Problem Relation Age of Onset    Diabetes Father     Hypertension Father     Breast cancer Mother      /80   Pulse 72   Ht 160 cm (63\")   Wt 71.2 kg (157 lb)   SpO2 100%   BMI 27.81 kg/m²   Physical Exam  Vitals and nursing note reviewed.   Constitutional:       Appearance: Normal appearance. She is well-developed.   HENT:      Head: Normocephalic and atraumatic.   Eyes:      General: Lids are normal.      Extraocular Movements: Extraocular movements intact.      Conjunctiva/sclera: Conjunctivae normal.      Pupils: Pupils are equal, round, and reactive to light.   Neck:      Thyroid: No thyroid mass or thyromegaly.      Vascular: No carotid bruit.      Trachea: Trachea normal. No tracheal deviation.   Cardiovascular:      Rate and Rhythm: Normal rate and regular rhythm.      Heart sounds: " Normal heart sounds. No murmur heard.     No friction rub. No gallop.   Pulmonary:      Effort: Pulmonary effort is normal. No respiratory distress.      Breath sounds: Normal breath sounds. No wheezing.   Musculoskeletal:         General: No deformity. Normal range of motion.      Cervical back: Normal range of motion and neck supple.   Lymphadenopathy:      Cervical: No cervical adenopathy.   Skin:     General: Skin is warm and dry.      Findings: No erythema or rash.      Nails: There is no clubbing.   Neurological:      General: No focal deficit present.      Mental Status: She is alert and oriented to person, place, and time.      Cranial Nerves: No cranial nerve deficit.      Deep Tendon Reflexes: Reflexes are normal and symmetric. Reflexes normal.   Psychiatric:         Speech: Speech normal.         Behavior: Behavior normal.         Thought Content: Thought content normal.         Judgment: Judgment normal.       Results for orders placed or performed in visit on 11/21/23   POC Glycosylated Hemoglobin (Hb A1C)    Specimen: Blood   Result Value Ref Range    Hemoglobin A1C 5.3 4.5 - 5.7 %    Lot Number [id number]     Expiration Date 07/30/2025    POC Glucose, Blood    Specimen: Blood   Result Value Ref Range    Glucose 75 70 - 130 mg/dL    Lot Number [id number]     Expiration Date 05/23/2024      Diagnoses and all orders for this visit:    1. Elevated glucose (Primary)  Assessment & Plan:  Blood sugar and 90 day average sugar reviewed  Results for orders placed or performed in visit on 11/21/23   POC Glycosylated Hemoglobin (Hb A1C)    Specimen: Blood   Result Value Ref Range    Hemoglobin A1C 5.3 4.5 - 5.7 %    Lot Number [id number]     Expiration Date 07/30/2025    POC Glucose, Blood    Specimen: Blood   Result Value Ref Range    Glucose 75 70 - 130 mg/dL    Lot Number [id number]     Expiration Date 05/23/2024      Commended improvement with weight loss     Orders:  -     POC Glycosylated Hemoglobin (Hb  A1C)  -     POC Glucose, Blood    2. Benign essential hypertension  Assessment & Plan:  Weight loss  Bp controlled with current medication/monitoring     Orders:  -     CBC & Differential; Future  -     Comprehensive Metabolic Panel; Future  -     CBC & Differential  -     Comprehensive Metabolic Panel    3. Hypercholesterolemia  Assessment & Plan:  Is eating low fat diet   Check flp     Orders:  -     Lipid Panel; Future  -     Lipid Panel    4. Acquired hypothyroidism  Assessment & Plan:  Update tfts     Orders:  -     TSH; Future  -     T4, Free; Future  -     TSH  -     T4, Free    5. Encounter to establish care  -     Ambulatory Referral to Internal Medicine    6. Kidney stones  -     Vitamin D,25-Hydroxy; Future  -     PTH, Intact; Future  -     Calcium, Ionized; Future  -     Vitamin D,25-Hydroxy  -     PTH, Intact  -     Calcium, Ionized    7. Vitamin D deficiency  -     Vitamin D,25-Hydroxy; Future  -     Vitamin D,25-Hydroxy    8. Nephrolithiasis  Assessment & Plan:  Ca oxalate  Seeing urology - drinking more fluids and taking prophylactic antibiotic      Other orders  -     levothyroxine (SYNTHROID, LEVOTHROID) 25 MCG tablet; Take 0.5 tablets by mouth Daily.  Dispense: 45 tablet; Refill: 1    Return in about 6 months (around 5/21/2024) for Recheck.    Sofia Hong MD  Signed Sofia Hong MD

## 2023-11-21 NOTE — ASSESSMENT & PLAN NOTE
Blood sugar and 90 day average sugar reviewed  Results for orders placed or performed in visit on 11/21/23   POC Glycosylated Hemoglobin (Hb A1C)    Specimen: Blood   Result Value Ref Range    Hemoglobin A1C 5.3 4.5 - 5.7 %    Lot Number [id number]     Expiration Date 07/30/2025    POC Glucose, Blood    Specimen: Blood   Result Value Ref Range    Glucose 75 70 - 130 mg/dL    Lot Number [id number]     Expiration Date 05/23/2024      Commended improvement with weight loss    no

## 2023-11-22 LAB
25(OH)D3 SERPL-MCNC: 50.4 NG/ML (ref 30–100)
ALBUMIN SERPL-MCNC: 4.8 G/DL (ref 3.5–5.2)
ALBUMIN/GLOB SERPL: 1.5 G/DL
ALP SERPL-CCNC: 98 U/L (ref 39–117)
ALT SERPL W P-5'-P-CCNC: 12 U/L (ref 1–33)
ANION GAP SERPL CALCULATED.3IONS-SCNC: 14.8 MMOL/L (ref 5–15)
AST SERPL-CCNC: 24 U/L (ref 1–32)
BASOPHILS # BLD AUTO: 0.07 10*3/MM3 (ref 0–0.2)
BASOPHILS NFR BLD AUTO: 0.8 % (ref 0–1.5)
BILIRUB SERPL-MCNC: 0.3 MG/DL (ref 0–1.2)
BUN SERPL-MCNC: 11 MG/DL (ref 8–23)
BUN/CREAT SERPL: 10.1 (ref 7–25)
CA-I BLD-MCNC: 5.1 MG/DL (ref 4.6–5.4)
CA-I SERPL ISE-MCNC: 1.27 MMOL/L (ref 1.15–1.35)
CALCIUM SPEC-SCNC: 10 MG/DL (ref 8.6–10.5)
CHLORIDE SERPL-SCNC: 102 MMOL/L (ref 98–107)
CHOLEST SERPL-MCNC: 234 MG/DL (ref 0–200)
CO2 SERPL-SCNC: 24.2 MMOL/L (ref 22–29)
CREAT SERPL-MCNC: 1.09 MG/DL (ref 0.57–1)
DEPRECATED RDW RBC AUTO: 48.3 FL (ref 37–54)
EGFRCR SERPLBLD CKD-EPI 2021: 55.4 ML/MIN/1.73
EOSINOPHIL # BLD AUTO: 0.08 10*3/MM3 (ref 0–0.4)
EOSINOPHIL NFR BLD AUTO: 0.9 % (ref 0.3–6.2)
ERYTHROCYTE [DISTWIDTH] IN BLOOD BY AUTOMATED COUNT: 14.9 % (ref 12.3–15.4)
GLOBULIN UR ELPH-MCNC: 3.1 GM/DL
GLUCOSE SERPL-MCNC: 93 MG/DL (ref 65–99)
HCT VFR BLD AUTO: 39.9 % (ref 34–46.6)
HDLC SERPL-MCNC: 51 MG/DL (ref 40–60)
HGB BLD-MCNC: 12.9 G/DL (ref 12–15.9)
IMM GRANULOCYTES # BLD AUTO: 0.03 10*3/MM3 (ref 0–0.05)
IMM GRANULOCYTES NFR BLD AUTO: 0.3 % (ref 0–0.5)
LDLC SERPL CALC-MCNC: 145 MG/DL (ref 0–100)
LDLC/HDLC SERPL: 2.75 {RATIO}
LYMPHOCYTES # BLD AUTO: 3.4 10*3/MM3 (ref 0.7–3.1)
LYMPHOCYTES NFR BLD AUTO: 38.4 % (ref 19.6–45.3)
MCH RBC QN AUTO: 28.8 PG (ref 26.6–33)
MCHC RBC AUTO-ENTMCNC: 32.3 G/DL (ref 31.5–35.7)
MCV RBC AUTO: 89.1 FL (ref 79–97)
MONOCYTES # BLD AUTO: 0.8 10*3/MM3 (ref 0.1–0.9)
MONOCYTES NFR BLD AUTO: 9 % (ref 5–12)
NEUTROPHILS NFR BLD AUTO: 4.47 10*3/MM3 (ref 1.7–7)
NEUTROPHILS NFR BLD AUTO: 50.6 % (ref 42.7–76)
NRBC BLD AUTO-RTO: 0 /100 WBC (ref 0–0.2)
PLATELET # BLD AUTO: 366 10*3/MM3 (ref 140–450)
PMV BLD AUTO: 10.2 FL (ref 6–12)
POTASSIUM SERPL-SCNC: 4.2 MMOL/L (ref 3.5–5.2)
PROT SERPL-MCNC: 7.9 G/DL (ref 6–8.5)
PTH-INTACT SERPL-MCNC: 28.7 PG/ML (ref 15–65)
RBC # BLD AUTO: 4.48 10*6/MM3 (ref 3.77–5.28)
SODIUM SERPL-SCNC: 141 MMOL/L (ref 136–145)
T4 FREE SERPL-MCNC: 1.43 NG/DL (ref 0.93–1.7)
TRIGL SERPL-MCNC: 213 MG/DL (ref 0–150)
TSH SERPL DL<=0.05 MIU/L-ACNC: 0.54 UIU/ML (ref 0.27–4.2)
VLDLC SERPL-MCNC: 38 MG/DL (ref 5–40)
WBC NRBC COR # BLD AUTO: 8.85 10*3/MM3 (ref 3.4–10.8)

## 2023-12-19 ENCOUNTER — OFFICE VISIT (OUTPATIENT)
Age: 68
End: 2023-12-19
Payer: MEDICARE

## 2023-12-19 VITALS
OXYGEN SATURATION: 100 % | SYSTOLIC BLOOD PRESSURE: 126 MMHG | BODY MASS INDEX: 29.83 KG/M2 | DIASTOLIC BLOOD PRESSURE: 72 MMHG | HEART RATE: 91 BPM | HEIGHT: 61 IN | WEIGHT: 158 LBS

## 2023-12-19 DIAGNOSIS — M54.50 CHRONIC RIGHT-SIDED LOW BACK PAIN WITHOUT SCIATICA: Primary | ICD-10-CM

## 2023-12-19 DIAGNOSIS — M25.562 ACUTE PAIN OF BOTH KNEES: ICD-10-CM

## 2023-12-19 DIAGNOSIS — M25.561 ACUTE PAIN OF BOTH KNEES: ICD-10-CM

## 2023-12-19 DIAGNOSIS — G89.29 CHRONIC RIGHT-SIDED LOW BACK PAIN WITHOUT SCIATICA: Primary | ICD-10-CM

## 2023-12-19 PROCEDURE — 3078F DIAST BP <80 MM HG: CPT | Performed by: FAMILY MEDICINE

## 2023-12-19 PROCEDURE — 1160F RVW MEDS BY RX/DR IN RCRD: CPT | Performed by: FAMILY MEDICINE

## 2023-12-19 PROCEDURE — 3074F SYST BP LT 130 MM HG: CPT | Performed by: FAMILY MEDICINE

## 2023-12-19 PROCEDURE — 99213 OFFICE O/P EST LOW 20 MIN: CPT | Performed by: FAMILY MEDICINE

## 2023-12-19 PROCEDURE — 1159F MED LIST DOCD IN RCRD: CPT | Performed by: FAMILY MEDICINE
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
## 2023-12-28 ENCOUNTER — OFFICE VISIT (OUTPATIENT)
Age: 68
End: 2023-12-28
Payer: MEDICARE

## 2023-12-28 VITALS
TEMPERATURE: 98 F | HEART RATE: 93 BPM | SYSTOLIC BLOOD PRESSURE: 146 MMHG | OXYGEN SATURATION: 99 % | BODY MASS INDEX: 30.42 KG/M2 | DIASTOLIC BLOOD PRESSURE: 82 MMHG | WEIGHT: 161 LBS

## 2023-12-28 DIAGNOSIS — J02.9 SORE THROAT: ICD-10-CM

## 2023-12-28 DIAGNOSIS — J35.8 TONSILLOLITH: ICD-10-CM

## 2023-12-28 DIAGNOSIS — N20.0 NEPHROLITHIASIS: ICD-10-CM

## 2023-12-28 DIAGNOSIS — J30.9 ALLERGIC RHINITIS, UNSPECIFIED SEASONALITY, UNSPECIFIED TRIGGER: ICD-10-CM

## 2023-12-28 DIAGNOSIS — R30.0 DYSURIA: Primary | ICD-10-CM

## 2023-12-28 LAB
BILIRUB BLD-MCNC: NEGATIVE MG/DL
EXPIRATION DATE: NORMAL
FLUAV AG UPPER RESP QL IA.RAPID: NOT DETECTED
FLUBV AG UPPER RESP QL IA.RAPID: NOT DETECTED
GLUCOSE UR STRIP-MCNC: NEGATIVE MG/DL
INTERNAL CONTROL: NORMAL
INTERNAL CONTROL: NORMAL
KETONES UR QL: NEGATIVE
LEUKOCYTE EST, POC: NEGATIVE
Lab: NORMAL
NITRITE UR-MCNC: NEGATIVE MG/ML
PH UR: 6 [PH] (ref 5–8)
PROT UR STRIP-MCNC: NEGATIVE MG/DL
RBC # UR STRIP: NEGATIVE /UL
S PYO AG THROAT QL: NEGATIVE
SARS-COV-2 AG UPPER RESP QL IA.RAPID: NOT DETECTED
SP GR UR: 1.01 (ref 1–1.03)
UROBILINOGEN UR QL: NORMAL

## 2023-12-28 PROCEDURE — 87428 SARSCOV & INF VIR A&B AG IA: CPT | Performed by: STUDENT IN AN ORGANIZED HEALTH CARE EDUCATION/TRAINING PROGRAM

## 2023-12-28 PROCEDURE — 3079F DIAST BP 80-89 MM HG: CPT | Performed by: STUDENT IN AN ORGANIZED HEALTH CARE EDUCATION/TRAINING PROGRAM

## 2023-12-28 PROCEDURE — 87880 STREP A ASSAY W/OPTIC: CPT | Performed by: STUDENT IN AN ORGANIZED HEALTH CARE EDUCATION/TRAINING PROGRAM

## 2023-12-28 PROCEDURE — 1160F RVW MEDS BY RX/DR IN RCRD: CPT | Performed by: STUDENT IN AN ORGANIZED HEALTH CARE EDUCATION/TRAINING PROGRAM

## 2023-12-28 PROCEDURE — 81003 URINALYSIS AUTO W/O SCOPE: CPT | Performed by: STUDENT IN AN ORGANIZED HEALTH CARE EDUCATION/TRAINING PROGRAM

## 2023-12-28 PROCEDURE — 3077F SYST BP >= 140 MM HG: CPT | Performed by: STUDENT IN AN ORGANIZED HEALTH CARE EDUCATION/TRAINING PROGRAM

## 2023-12-28 PROCEDURE — 1159F MED LIST DOCD IN RCRD: CPT | Performed by: STUDENT IN AN ORGANIZED HEALTH CARE EDUCATION/TRAINING PROGRAM

## 2023-12-28 PROCEDURE — 99214 OFFICE O/P EST MOD 30 MIN: CPT | Performed by: STUDENT IN AN ORGANIZED HEALTH CARE EDUCATION/TRAINING PROGRAM

## 2023-12-28 RX ORDER — FLUTICASONE PROPIONATE 50 MCG
2 SPRAY, SUSPENSION (ML) NASAL DAILY
Start: 2023-12-28

## 2023-12-28 RX ORDER — GUAIFENESIN 600 MG/1
1200 TABLET, EXTENDED RELEASE ORAL 2 TIMES DAILY
Qty: 30 TABLET | Refills: 0 | Status: SHIPPED | OUTPATIENT
Start: 2023-12-28 | End: 2024-01-05

## 2023-12-28 NOTE — PROGRESS NOTES
Office Note     Name: Siria Garcia    : 1955     MRN: [id number]     Chief Complaint  Sore Throat (Last Saturday l side only), Nasal Congestion, Fatigue, and Urinary Tract Infection (Hx of kidney stones)    Subjective     History of Present Illness:  Siria Garcia is a 68 y.o. female who presents today for acute visit for groin pain and sore throat. She has a complicated urologic history with recent lithotripsy 2-3 months ago. The sore throat has been present for about 4-5 days and is associated with some congestion.  Denies current fever. Her groin/flank pain have been stable to improving since her lithotripsy    Past Medical History:   Past Medical History:   Diagnosis Date    Arthritis     Breast cancer     left , lumpectomy and radiation    Colon polyp 2018    Diverticulosis 2018    Environmental allergies     Gall bladder stones     GERD (gastroesophageal reflux disease)     History of goiter     Hypertension     managed with lifestyle changes    Low back pain     Plantar fasciitis     Tarsal tunnel syndrome of both lower extremities     Tendonitis     UTI (urinary tract infection)     Wears eyeglasses        Past Surgical History:   Past Surgical History:   Procedure Laterality Date    BREAST LUMPECTOMY  2017    CHOLECYSTECTOMY      COLONOSCOPY      COLONOSCOPY W/ BIOPSIES  2018    CYSTOSCOPY W/ URETERAL STENT PLACEMENT N/A 2023    Procedure: CYSTOSCOPY WITH LEFT URETERAL STENT PLACEMENT;  Surgeon: Murray Torres MD;  [facility];  Service: Urology;  Laterality: N/A;    EXPLORATORY LAPAROTOMY N/A 2023    Procedure: LAPAROTOMY EXPLORATORY;  Surgeon: Gabriel Del Toro MD;  [facility];  Service: General;  Laterality: N/A;    EXTRACORPOREAL SHOCKWAVE LITHOTRIPSY (ESWL), STENT INSERTION/REMOVAL Left 10/20/2023    Procedure: EXTRACORPOREAL SHOCKWAVE LITHOTRIPSY WITH STENT REMOVAL LEFT;  Surgeon: Murray Torres MD;  [facility];  Service: Urology;  Laterality: Left;    SKIN BIOPSY         Immunizations:   Immunization History   Administered Date(s) Administered    COVID-19 (PFIZER) BIVALENT 12+YRS 10/18/2022    COVID-19 (PFIZER) Purple Cap Monovalent 03/18/2021, 04/09/2021, 11/30/2021    COVID-19 F23 (PFIZER) 12YRS+ (COMIRNATY) 11/06/2023    DTaP 09/12/2019    Flu Vaccine Quad PF >36MO 10/07/2016    Fluad Quad 65+ 10/08/2020    Fluzone (or Fluarix & Flulaval for VFC) >6mos 09/12/2019    Fluzone High Dose =>65 Years ([employer] ONLY) 10/08/2020    Fluzone Quad >6mos (Multi-dose) 09/20/2018, 09/12/2019    Hepatitis A 09/20/2018, 03/20/2019    Influenza Injectable Mdck Pf Quad 12/05/2022, 11/21/2023    Influenza Seasonal Injectable 09/16/2021    Influenza TIV (IM) 11/20/2012, 11/26/2013, 10/23/2015    Shingrix 05/17/2018, 08/09/2018    Tdap 09/12/2019    Zostavax 11/10/2016        Medications:     Current Outpatient Medications:     acetaminophen (TYLENOL) 500 MG tablet, Take 2 tablets by mouth Every 6 (Six) Hours As Needed for Mild Pain., Disp: , Rfl:     Cholecalciferol (Vitamin D) 50 MCG (2000 UT) tablet, Take 1 tablet by mouth Daily., Disp: , Rfl:     CRANBERRY CONCENTRATE PO, Take 1 tablet/day by mouth 2 (Two) Times a Day., Disp: , Rfl:     exemestane (AROMASIN) 25 MG chemo tablet, Take 1 tablet by mouth Daily., Disp: , Rfl:     fluconazole (DIFLUCAN) 150 MG tablet, Take 1 tablet by mouth Daily As Needed (yeast symptoms)., Disp: 10 tablet, Rfl: 3    Glucos-Chond-Hyal Ac-Ca Fructo (MOVE FREE JOINT HEALTH ADVANCE PO), Take 1 tablet/day by mouth., Disp: , Rfl:     Histamine Dihydrochloride (Australian Dream Arthritis) 0.025 % cream, Apply  topically As Needed (joint pain). Apply topically TID, Disp: , Rfl:     levothyroxine (SYNTHROID, LEVOTHROID) 25 MCG tablet, Take 0.5 tablets by mouth Daily., Disp: 45 tablet, Rfl: 1    Loperamide HCl (IMODIUM PO), Take 1 tablet by mouth As Needed (urgency)., Disp: , Rfl:     loratadine (CLARITIN) 10 MG  tablet, Take 1 tablet by mouth Daily., Disp: , Rfl:     Multiple Vitamins-Minerals (YARI MULTIVITAMIN FOR WOMEN PO), Take 1 tablet by mouth Daily., Disp: , Rfl:     Omega-3 Fatty Acids (OMEGA-3 1450 PO), Take 1,000 mg by mouth Daily., Disp: , Rfl:     pantoprazole (PROTONIX) 40 MG EC tablet, Take 1 tablet by mouth Daily., Disp: , Rfl:     sore muscle (ICY HOT EXTRA STRENGTH) 10-30 % cream cream, Apply 1 application  topically to the appropriate area as directed 2 (Two) Times a Day As Needed (joint pain). With lido, Disp: , Rfl:     trimethoprim (TRIMPEX) 100 MG tablet, Take 1 tablet by mouth Daily., Disp: 90 tablet, Rfl: 1    fluticasone (FLONASE) 50 MCG/ACT nasal spray, 2 sprays into the nostril(s) as directed by provider Daily., Disp: , Rfl:     guaiFENesin (MUCINEX) 600 MG 12 hr tablet, Take 2 tablets by mouth 2 (Two) Times a Day for 8 days., Disp: 30 tablet, Rfl: 0    Allergies:   Allergies   Allergen Reactions    Other Other (See Comments)    Bactrim [Sulfamethoxazole-Trimethoprim] Itching     Trimehtoprim is okay, raction when combined with sulfa     Terbinafine Itching       Family History:   Family History   Problem Relation Age of Onset    Diabetes Father     Hypertension Father     Breast cancer Mother        Social History:   Social History     Socioeconomic History    Marital status:    Tobacco Use    Smoking status: Former     Packs/day: 0.50     Years: 13.00     Additional pack years: 0.00     Total pack years: 6.50     Types: Cigarettes     Quit date:      Years since quittin.0    Smokeless tobacco: Never   Vaping Use    Vaping Use: Never used   Substance and Sexual Activity    Alcohol use: No    Drug use: No    Sexual activity: Not Currently         Objective     Vital Signs  /82   Pulse 93   Temp 98 °F (36.7 °C)   Wt 73 kg (161 lb)   SpO2 99%   BMI 30.42 kg/m²   Estimated body mass index is 30.42 kg/m² as calculated from the following:    Height as of 23: 154.9 cm  "(61\").    Weight as of this encounter: 73 kg (161 lb).            Physical Exam  Constitutional:       General: She is not in acute distress.     Appearance: She is not toxic-appearing.   HENT:      Mouth/Throat:      Comments: Left tonsil is slightly erythematous with a tonsil stone visible  Pulmonary:      Effort: Pulmonary effort is normal. No respiratory distress.   Abdominal:      General: Abdomen is flat. There is no distension.   Skin:     General: Skin is warm and dry.   Neurological:      Mental Status: She is alert.   Psychiatric:         Mood and Affect: Mood normal.         Behavior: Behavior normal.          Assessment and Plan     1. Sore throat  See problem 3  - POCT SARS-CoV-2 + Flu Antigen MYNOR  - POC Rapid Strep A    2. Dysuria  See problem 4  - POC Urinalysis Dipstick, Automated    3. Tonsillolith  Suspect sore throat is from tonsolith which is visible on exam and correlates with area of pain  Does not look overtly infected at this time  -Counseled patient to gargle saltwater and try sucking on sour candies  - guaiFENesin (MUCINEX) 600 MG 12 hr tablet; Take 2 tablets by mouth 2 (Two) Times a Day for 8 days.  Dispense: 30 tablet; Refill: 0  -If no improvement with these remedies would recommend antibiotics such as augmentin    4. Nephrolithiasis  Some residual pain after lithotripsy, UA negative for blood or UTI  -Counseled her to discuss with her urologist, as I dont think a repeat CT is likely to show any obstruction or  at this time  -Counseled her to watch for fevers, severe worsening of flank pain    5. Allergic rhinitis, unspecified seasonality, unspecified trigger  Counseled her to restart claritin and try flonase  - fluticasone (FLONASE) 50 MCG/ACT nasal spray; 2 sprays into the nostril(s) as directed by provider Daily.       Counseling was given to patient for the following topics: instructions for management.    Follow Up  No follow-ups on file.    Jeramie Caputo MD  [facility] " [facility] [place]  [facility]  [address] [address]  [phone]

## 2024-03-12 ENCOUNTER — OFFICE VISIT (OUTPATIENT)
Age: 69
End: 2024-03-12
Payer: MEDICARE

## 2024-03-12 ENCOUNTER — TELEPHONE (OUTPATIENT)
Age: 69
End: 2024-03-12

## 2024-03-12 VITALS
HEIGHT: 61 IN | WEIGHT: 168.5 LBS | HEART RATE: 97 BPM | DIASTOLIC BLOOD PRESSURE: 90 MMHG | OXYGEN SATURATION: 99 % | BODY MASS INDEX: 31.81 KG/M2 | SYSTOLIC BLOOD PRESSURE: 134 MMHG

## 2024-03-12 DIAGNOSIS — J02.0 ACUTE STREPTOCOCCAL PHARYNGITIS: Primary | ICD-10-CM

## 2024-03-12 DIAGNOSIS — N20.0 NEPHROLITHIASIS: ICD-10-CM

## 2024-03-12 DIAGNOSIS — L98.9 SKIN LESION: ICD-10-CM

## 2024-03-12 DIAGNOSIS — J02.9 SORE THROAT: ICD-10-CM

## 2024-03-12 LAB
BILIRUB BLD-MCNC: NEGATIVE MG/DL
CLARITY, POC: CLEAR
COLOR UR: YELLOW
EXPIRATION DATE: ABNORMAL
EXPIRATION DATE: NORMAL
EXPIRATION DATE: NORMAL
FLUAV AG UPPER RESP QL IA.RAPID: NOT DETECTED
FLUBV AG UPPER RESP QL IA.RAPID: NOT DETECTED
GLUCOSE UR STRIP-MCNC: NEGATIVE MG/DL
INTERNAL CONTROL: ABNORMAL
INTERNAL CONTROL: NORMAL
KETONES UR QL: NEGATIVE
LEUKOCYTE EST, POC: NEGATIVE
Lab: ABNORMAL
Lab: NORMAL
Lab: NORMAL
NITRITE UR-MCNC: NEGATIVE MG/ML
PH UR: 6 [PH] (ref 5–8)
PROT UR STRIP-MCNC: NEGATIVE MG/DL
RBC # UR STRIP: NEGATIVE /UL
S PYO AG THROAT QL: POSITIVE
SARS-COV-2 AG UPPER RESP QL IA.RAPID: NOT DETECTED
SP GR UR: 1.02 (ref 1–1.03)
UROBILINOGEN UR QL: NORMAL

## 2024-03-12 PROCEDURE — 87428 SARSCOV & INF VIR A&B AG IA: CPT | Performed by: FAMILY MEDICINE

## 2024-03-12 PROCEDURE — 99214 OFFICE O/P EST MOD 30 MIN: CPT | Performed by: FAMILY MEDICINE

## 2024-03-12 PROCEDURE — 1160F RVW MEDS BY RX/DR IN RCRD: CPT | Performed by: FAMILY MEDICINE

## 2024-03-12 PROCEDURE — 81003 URINALYSIS AUTO W/O SCOPE: CPT | Performed by: FAMILY MEDICINE

## 2024-03-12 PROCEDURE — 1159F MED LIST DOCD IN RCRD: CPT | Performed by: FAMILY MEDICINE

## 2024-03-12 PROCEDURE — 3080F DIAST BP >= 90 MM HG: CPT | Performed by: FAMILY MEDICINE

## 2024-03-12 PROCEDURE — 3075F SYST BP GE 130 - 139MM HG: CPT | Performed by: FAMILY MEDICINE

## 2024-03-12 PROCEDURE — 87880 STREP A ASSAY W/OPTIC: CPT | Performed by: FAMILY MEDICINE

## 2024-03-12 RX ORDER — BIOTIN 1000 MCG
1000 TABLET,CHEWABLE ORAL DAILY
COMMUNITY

## 2024-03-12 RX ORDER — CEPHALEXIN 500 MG/1
500 TABLET ORAL 2 TIMES DAILY
Qty: 20 TABLET | Refills: 0 | Status: SHIPPED | OUTPATIENT
Start: 2024-03-12 | End: 2024-03-13

## 2024-03-12 RX ORDER — GUAIFENESIN 600 MG/1
1200 TABLET, EXTENDED RELEASE ORAL 2 TIMES DAILY
COMMUNITY

## 2024-03-12 NOTE — PROGRESS NOTES
"Chief Complaint  Cough, head fulliness, Fatigue, Sore Throat, and Urinary Tract Infection (History of kidney stones and says she still has some fragments but is not having any symptoms would like to be checked )    Subjective        Siria Garcia presents to [facility]  Cough  Associated symptoms include ear pain, postnasal drip, rhinorrhea and a sore throat. Pertinent negatives include no chills or fever.   Fatigue  Associated symptoms include coughing, fatigue and a sore throat. Pertinent negatives include no chills, fever, nausea or vomiting.   Sore Throat   Associated symptoms include coughing and ear pain. Pertinent negatives include no diarrhea or vomiting.   Urinary Tract Infection   Associated symptoms include flank pain. Pertinent negatives include no chills, hematuria, nausea or vomiting.   Review of Systems   Constitutional:  Positive for fatigue. Negative for chills and fever.   HENT:  Positive for ear pain, postnasal drip, rhinorrhea and sore throat.    Eyes:  Negative for discharge.   Respiratory:  Positive for cough.    Gastrointestinal:  Negative for diarrhea, nausea and vomiting.   Genitourinary:  Positive for flank pain. Negative for dysuria and hematuria.   Musculoskeletal:  Positive for back pain.     Hospital in August and she is more concerned about symptoms. She has seen infectious disease in February for follow-up on sepsis. She is susceptible to strep and UTIs. Anytime she gets sick she was recommended to get checked.     3/8/24 symptom onset. Started head feels full and ears. Little cough. Normal temperature 97.     Objective   Vital Signs:  /90   Pulse 97   Ht 154.9 cm (60.98\")   Wt 76.4 kg (168 lb 8 oz)   SpO2 99%   BMI 31.85 kg/m²   Estimated body mass index is 31.85 kg/m² as calculated from the following:    Height as of this encounter: 154.9 cm (60.98\").    Weight as of this encounter: 76.4 kg (168 lb 8 oz).               Physical " Exam  Vitals reviewed.   Constitutional:       General: She is not in acute distress.     Appearance: She is not ill-appearing, toxic-appearing or diaphoretic.   HENT:      Right Ear: Tympanic membrane and ear canal normal.      Left Ear: Tympanic membrane and ear canal normal.      Nose: Congestion (erythema) present.      Mouth/Throat:      Pharynx: Oropharyngeal exudate (clear PND) present. No pharyngeal swelling or posterior oropharyngeal erythema.      Tonsils: No tonsillar exudate or tonsillar abscesses. 1+ on the right. 1+ on the left.   Eyes:     Cardiovascular:      Rate and Rhythm: Normal rate and regular rhythm.   Pulmonary:      Effort: Pulmonary effort is normal.      Breath sounds: Normal breath sounds.   Lymphadenopathy:      Head:      Right side of head: No submandibular, tonsillar, preauricular or posterior auricular adenopathy.      Left side of head: No submandibular, tonsillar, preauricular or posterior auricular adenopathy.   Neurological:      Mental Status: She is alert.        Result Review :    The following data was reviewed by: Yvonne Carson MD on 03/12/2024:        Results for orders placed or performed in visit on 03/12/24   POC Urinalysis Dipstick, Automated    Specimen: Urine   Result Value Ref Range    Color Yellow Yellow, Straw, Dark Yellow, Lelsie    Clarity, UA Clear Clear    Specific Gravity  1.020 1.005 - 1.030    pH, Urine 6.0 5.0 - 8.0    Leukocytes Negative Negative    Nitrite, UA Negative Negative    Protein, POC Negative Negative mg/dL    Glucose, UA Negative Negative mg/dL    Ketones, UA Negative Negative    Urobilinogen, UA Normal Normal, 0.2 E.U./dL    Bilirubin Negative Negative    Blood, UA Negative Negative    Lot Number [id number]     Expiration Date 01/14/2025    POCT SARS-CoV-2 + Flu Antigen MYNOR    Specimen: Swab   Result Value Ref Range    SARS Antigen Not Detected Not Detected, Presumptive Negative    Influenza A Antigen MYNOR Not Detected Not Detected     Influenza B Antigen MYNOR Not Detected Not Detected    Internal Control Passed Passed    Lot Number [id number]     Expiration Date 01/17/2025    POC Rapid Strep A    Specimen: Swab   Result Value Ref Range    Rapid Strep A Screen Positive (A) Negative, VALID, INVALID, Not Performed    Internal Control Passed Passed    Lot Number [id number]     Expiration Date 06/10/2026                 Assessment and Plan     Diagnoses and all orders for this visit:    1. Acute streptococcal pharyngitis (Primary)  -     POCT SARS-CoV-2 + Flu Antigen MYNOR  New.  Recommend amoxicillin but she has not tolerated it well in the past and requested Keflex.  Use Tylenol as needed for discomfort.  Gargle with salt water.  Follow-up if not improving.  2. Nephrolithiasis  -     POC Urinalysis Dipstick, Automated  Analysis normal without hematuria or signs of infection.  3. Sore throat  -     POCT SARS-CoV-2 + Flu Antigen MYNOR  -     POC Rapid Strep A    4. Skin lesion  New.  Recommend consulting with dermatology and she will check with the provider she is seen in the past.             Follow Up     Return if symptoms worsen or fail to improve.  Patient was given instructions and counseling regarding her condition or for health maintenance advice. Please see specific information pulled into the AVS if appropriate.     Electronically signed by Yvonne Carson MD, 03/12/24, 4:27 PM EDT.

## 2024-03-12 NOTE — TELEPHONE ENCOUNTER
Pharmacy Name: [id number] - [facility] [address] - [phone]  - [phone] FX         Pharmacy representative phone number: [phone]    What medication are you calling in regards to: CEFELAXON    What question does the pharmacy have: THE MEDICATION WAS CALLED IN AS A TABLET BUT INSURANCE WILL ONLY CORE THE CAPSULE PHARMACY WOULD LIKE APPROVAL TO CHANGE     Who is the provider that prescribed the medication: DOCTOR JONATHAN

## 2024-03-13 RX ORDER — CEPHALEXIN 500 MG/1
500 CAPSULE ORAL 2 TIMES DAILY
Qty: 20 CAPSULE | Refills: 0 | Status: SHIPPED | OUTPATIENT
Start: 2024-03-13 | End: 2024-03-23

## 2024-04-12 ENCOUNTER — OFFICE VISIT (OUTPATIENT)
Age: 69
End: 2024-04-12
Payer: MEDICARE

## 2024-04-12 VITALS
BODY MASS INDEX: 32.36 KG/M2 | WEIGHT: 171.4 LBS | HEART RATE: 72 BPM | HEIGHT: 61 IN | OXYGEN SATURATION: 98 % | SYSTOLIC BLOOD PRESSURE: 126 MMHG | DIASTOLIC BLOOD PRESSURE: 78 MMHG

## 2024-04-12 DIAGNOSIS — N20.0 NEPHROLITHIASIS: ICD-10-CM

## 2024-04-12 DIAGNOSIS — M85.89 OSTEOPENIA OF MULTIPLE SITES: ICD-10-CM

## 2024-04-12 DIAGNOSIS — Z00.00 MEDICARE ANNUAL WELLNESS VISIT, SUBSEQUENT: Primary | ICD-10-CM

## 2024-04-12 DIAGNOSIS — J02.0 STREP THROAT: ICD-10-CM

## 2024-04-12 PROBLEM — K65.9 PERITONITIS: Status: RESOLVED | Noted: 2023-08-28 | Resolved: 2024-04-12

## 2024-04-12 PROBLEM — K27.5 PERFORATED ULCER: Status: RESOLVED | Noted: 2023-08-29 | Resolved: 2024-04-12

## 2024-04-12 PROBLEM — B96.20 E COLI BACTEREMIA: Status: RESOLVED | Noted: 2023-09-04 | Resolved: 2024-04-12

## 2024-04-12 PROBLEM — N17.9 AKI (ACUTE KIDNEY INJURY): Status: RESOLVED | Noted: 2023-08-28 | Resolved: 2024-04-12

## 2024-04-12 PROBLEM — N76.0 VAGINITIS AND VULVOVAGINITIS: Status: RESOLVED | Noted: 2017-05-12 | Resolved: 2024-04-12

## 2024-04-12 PROBLEM — K25.9 PYLORIC ULCER: Status: RESOLVED | Noted: 2023-08-28 | Resolved: 2024-04-12

## 2024-04-12 PROBLEM — K25.1 ACUTE GASTRIC ULCER WITH PERFORATION: Status: RESOLVED | Noted: 2023-11-21 | Resolved: 2024-04-12

## 2024-04-12 PROBLEM — R78.81 E COLI BACTEREMIA: Status: RESOLVED | Noted: 2023-09-04 | Resolved: 2024-04-12

## 2024-04-12 PROBLEM — A41.9 SEPSIS: Status: RESOLVED | Noted: 2023-08-28 | Resolved: 2024-04-12

## 2024-04-12 PROBLEM — N39.0 UTI (URINARY TRACT INFECTION): Status: RESOLVED | Noted: 2023-08-28 | Resolved: 2024-04-12

## 2024-04-12 LAB
BILIRUB BLD-MCNC: NEGATIVE MG/DL
CLARITY, POC: CLEAR
COLOR UR: YELLOW
EXPIRATION DATE: ABNORMAL
EXPIRATION DATE: NORMAL
GLUCOSE UR STRIP-MCNC: NEGATIVE MG/DL
INTERNAL CONTROL: ABNORMAL
KETONES UR QL: NEGATIVE
LEUKOCYTE EST, POC: NEGATIVE
Lab: ABNORMAL
Lab: NORMAL
NITRITE UR-MCNC: NEGATIVE MG/ML
PH UR: 6 [PH] (ref 5–8)
PROT UR STRIP-MCNC: NEGATIVE MG/DL
RBC # UR STRIP: NEGATIVE /UL
S PYO AG THROAT QL: POSITIVE
SP GR UR: 1.02 (ref 1–1.03)
UROBILINOGEN UR QL: NORMAL

## 2024-04-12 PROCEDURE — G0439 PPPS, SUBSEQ VISIT: HCPCS | Performed by: FAMILY MEDICINE

## 2024-04-12 PROCEDURE — 81003 URINALYSIS AUTO W/O SCOPE: CPT | Performed by: FAMILY MEDICINE

## 2024-04-12 RX ORDER — AMOXICILLIN AND CLAVULANATE POTASSIUM 875; 125 MG/1; MG/1
1 TABLET, FILM COATED ORAL 2 TIMES DAILY
Qty: 20 TABLET | Refills: 0 | Status: SHIPPED | OUTPATIENT
Start: 2024-04-12 | End: 2024-04-22

## 2024-04-12 NOTE — PROGRESS NOTES
The ABCs of the Annual Wellness Visit  Subsequent Medicare Wellness Visit    Subjective    Siria Garcia is a 68 y.o. female who presents for a Subsequent Medicare Wellness Visit.    The following portions of the patient's history were reviewed and   updated as appropriate: allergies, current medications, past family history, past medical history, past social history, past surgical history, and problem list.    Compared to one year ago, the patient feels her physical   health is worse.    Compared to one year ago, the patient feels her mental   health is the same.    Recent Hospitalizations:  This patient has had a [facility] admission record on file within the last 365 days.    Current Medical Providers:  Patient Care Team:  Yvonne Carson MD as PCP - General (Family Medicine)  Sofia Hong MD as Consulting Physician (Endocrinology)  Chichi Weiss MD as Consulting Physician (Obstetrics and Gynecology)  Murray Torres MD as Consulting Physician (Urology)  Gabriel Del Toro MD as Consulting Physician (General Surgery)  Matthew Manuel MD as Consulting Physician (Infectious Diseases)  Lizette Verma MD as Consulting Physician (Hematology and Oncology)  Bismark Millan MD as Surgeon (Orthopedic Surgery)    Outpatient Medications Prior to Visit   Medication Sig Dispense Refill    acetaminophen (TYLENOL) 500 MG tablet Take 2 tablets by mouth Every 6 (Six) Hours As Needed for Mild Pain.      Biotin 1000 MCG chewable tablet Chew 1,000 mcg Daily.      Cholecalciferol (Vitamin D) 50 MCG (2000 UT) tablet Take 1 tablet by mouth Daily.      CRANBERRY CONCENTRATE PO Take 1 tablet/day by mouth 2 (Two) Times a Day.      exemestane (AROMASIN) 25 MG chemo tablet Take 1 tablet by mouth Daily.      fluticasone (FLONASE) 50 MCG/ACT nasal spray 2 sprays into the nostril(s) as directed by provider Daily.      Glucos-Chond-Hyal Ac-Ca Fructo (MOVE FREE JOINT HEALTH ADVANCE PO) Take  1 tablet/day by mouth.      guaiFENesin (MUCINEX) 600 MG 12 hr tablet Take 2 tablets by mouth 2 (Two) Times a Day.      Histamine Dihydrochloride (Australian Dream Arthritis) 0.025 % cream Apply  topically As Needed (joint pain). Apply topically TID      levothyroxine (SYNTHROID, LEVOTHROID) 25 MCG tablet Take 0.5 tablets by mouth Daily. 45 tablet 1    Loperamide HCl (IMODIUM PO) Take 1 tablet by mouth As Needed (urgency).      loratadine (CLARITIN) 10 MG tablet Take 1 tablet by mouth Daily.      Multiple Vitamins-Minerals (YARI MULTIVITAMIN FOR WOMEN PO) Take 1 tablet by mouth Daily.      Omega-3 Fatty Acids (OMEGA-3 1450 PO) Take 1,000 mg by mouth Daily.      pantoprazole (PROTONIX) 40 MG EC tablet Take 1 tablet by mouth Daily.      sore muscle (ICY HOT EXTRA STRENGTH) 10-30 % cream cream Apply 1 Application topically to the appropriate area as directed 2 (Two) Times a Day As Needed (joint pain). With lido      trimethoprim (TRIMPEX) 100 MG tablet Take 1 tablet by mouth Daily. 90 tablet 1     No facility-administered medications prior to visit.       No opioid medication identified on active medication list. I have reviewed chart for other potential  high risk medication/s and harmful drug interactions in the elderly.        Aspirin is not on active medication list.  Aspirin use is not indicated based on review of current medical condition/s. Risk of harm outweighs potential benefits.  .    Patient Active Problem List   Diagnosis    Benign essential hypertension    Gastroesophageal reflux disease    Fatigue    Hypercholesterolemia    Hypothyroidism    Osteoarthritis of hand    Malignant neoplasm of upper-outer quadrant of breast in female, estrogen receptor positive    Tubular adenoma    Elevated glucose    Hypercalcemia    Vitamin D deficiency    Chronic low back pain    Nephrolithiasis    Obstructive uropathy    Osteopenia of multiple sites     Advance Care Planning   Advance Care Planning     Advance Directive  "is not on file.  ACP discussion was held with the patient during this visit. Patient does not have an advance directive, information provided.     Objective    Vitals:    24 1500   BP: 126/78   Pulse: 72   SpO2: 98%   Weight: 77.7 kg (171 lb 6.4 oz)   Height: 154.9 cm (60.98\")     Estimated body mass index is 32.4 kg/m² as calculated from the following:    Height as of this encounter: 154.9 cm (60.98\").    Weight as of this encounter: 77.7 kg (171 lb 6.4 oz).           Does the patient have evidence of cognitive impairment? No          HEALTH RISK ASSESSMENT    Smoking Status:  Social History     Tobacco Use   Smoking Status Former    Current packs/day: 0.00    Average packs/day: 0.5 packs/day for 13.0 years (6.5 ttl pk-yrs)    Types: Cigarettes    Start date:     Quit date:     Years since quittin.3   Smokeless Tobacco Never     Alcohol Consumption:  Social History     Substance and Sexual Activity   Alcohol Use No     Fall Risk Screen:    STEADI Fall Risk Assessment was completed, and patient is at LOW risk for falls.Assessment completed on:2024    Depression Screenin/12/2024     3:08 PM   PHQ-2/PHQ-9 Depression Screening   Little Interest or Pleasure in Doing Things 0-->not at all   Feeling Down, Depressed or Hopeless 0-->not at all   PHQ-9: Brief Depression Severity Measure Score 0       Health Habits and Functional and Cognitive Screenin/12/2024     3:00 PM   Functional & Cognitive Status   Do you have difficulty preparing food and eating? No   Do you have difficulty bathing yourself, getting dressed or grooming yourself? No   Do you have difficulty using the toilet? No   Do you have difficulty moving around from place to place? No   Do you have trouble with steps or getting out of a bed or a chair? No   Current Diet Well Balanced Diet   Dental Exam Up to date   Eye Exam Not up to date   Exercise (times per week) 3 times per week   Current Exercises Include Home " Exercise Program (TV, Computer, Etc.)   Do you need help using the phone?  No   Are you deaf or do you have serious difficulty hearing?  No   Do you need help to go to places out of walking distance? No   Do you need help shopping? No   Do you need help preparing meals?  No   Do you need help with housework?  No   Do you need help with laundry? No   Do you need help taking your medications? No   Do you need help managing money? No   Do you ever drive or ride in a car without wearing a seat belt? No   Have you felt unusual stress, anger or loneliness in the last month? No   Who do you live with? Alone   If you need help, do you have trouble finding someone available to you? No   Have you been bothered in the last four weeks by sexual problems? No   Do you have difficulty concentrating, remembering or making decisions? No       Age-appropriate Screening Schedule:  Refer to the list below for future screening recommendations based on patient's age, sex and/or medical conditions. Orders for these recommended tests are listed in the plan section. The patient has been provided with a written plan.    Health Maintenance   Topic Date Due    RSV Vaccine - Adults (1 - 1-dose 60+ series) Never done    Pneumococcal Vaccine 65+ (1 of 1 - PCV) Never done    PAP SMEAR  01/01/2020    BMI FOLLOWUP  05/24/2024    INFLUENZA VACCINE  08/01/2024    MAMMOGRAM  08/21/2024    LIPID PANEL  11/21/2024    ANNUAL WELLNESS VISIT  04/12/2025    DXA SCAN  03/05/2026    COLORECTAL CANCER SCREENING  11/08/2027    TDAP/TD VACCINES (2 - Td or Tdap) 09/12/2029    HEPATITIS C SCREENING  Completed    COVID-19 Vaccine  Completed    ZOSTER VACCINE  Completed                Immunization History   Administered Date(s) Administered    COVID-19 (PFIZER) BIVALENT 12+YRS 10/18/2022    COVID-19 (PFIZER) Purple Cap Monovalent 03/18/2021, 04/09/2021, 11/30/2021    COVID-19 F23 (PFIZER) 12YRS+ (COMIRNATY) 11/06/2023    DTaP 09/12/2019    Flu Vaccine Quad PF >36MO  10/07/2016    Fluad Quad 65+ 10/08/2020    Fluzone (or Fluarix & Flulaval for VFC) >6mos 09/12/2019    Fluzone High Dose =>65 Years ([employer] ONLY) 10/08/2020    Fluzone Quad >6mos (Multi-dose) 09/20/2018, 09/12/2019    Hepatitis A 09/20/2018, 03/20/2019    Influenza Injectable Mdck Pf Quad 12/05/2022, 11/21/2023    Influenza Seasonal Injectable 09/16/2021    Influenza TIV (IM) 11/20/2012, 11/26/2013, 10/23/2015    Shingrix 05/17/2018, 08/09/2018    Tdap 09/12/2019    Zostavax 11/10/2016         CMS Preventative Services Quick Reference  Risk Factors Identified During Encounter  Immunizations Discussed/Encouraged: Prevnar 20 (Pneumococcal 20-valent conjugate)  The above risks/problems have been discussed with the patient.  Pertinent information has been shared with the patient in the After Visit Summary.  An After Visit Summary and PPPS were made available to the patient.    Follow Up:   Next Medicare Wellness visit to be scheduled in 1 year.       Additional E&M Note during same encounter follows:  Patient has multiple medical problems which are significant and separately identifiable that require additional work above and beyond the Medicare Wellness Visit.      Chief Complaint  Medicare Wellness-subsequent (Would like to be tested for strep again today says she feels better but still has some symptoms ), Annual Exam, and Difficulty Urinating (History of kidney stones )    Subjective        HPI  Siria Garcia is also being seen today for physical      She has flank pain and sometimes some burning with urination. She has more burning if she hasn't drank enough water or has soap irritation.     She doesn't take high dose senior flu vaccine because she had a reaction.    She had bone density done with osteopenia. Oncology recommends starting medication but she is undecided. She is not lifting as much. After recovering from surgery she lost muscle mass.     She saw chiropractor for 28 years.     She continues to  "have a sore throat worse on the left.     She had worsening bilateral knee pain after taking keflex. Improved after Aspercreme and ice packs.              Objective   Vital Signs:  /78   Pulse 72   Ht 154.9 cm (60.98\")   Wt 77.7 kg (171 lb 6.4 oz)   SpO2 98%   BMI 32.40 kg/m²     Physical Exam  Constitutional:       General: She is not in acute distress.     Appearance: She is obese.   HENT:      Right Ear: Tympanic membrane and ear canal normal.      Left Ear: Tympanic membrane and ear canal normal.      Nose: No congestion or rhinorrhea.      Mouth/Throat:      Mouth: Mucous membranes are moist. No oral lesions.      Tongue: No lesions.      Pharynx: No pharyngeal swelling, oropharyngeal exudate or posterior oropharyngeal erythema.      Tonsils: No tonsillar abscesses.   Eyes:      General:         Right eye: No discharge.         Left eye: No discharge.      Conjunctiva/sclera: Conjunctivae normal.   Neck:      Thyroid: No thyromegaly.   Cardiovascular:      Rate and Rhythm: Normal rate and regular rhythm.   Pulmonary:      Effort: Pulmonary effort is normal.      Breath sounds: Normal breath sounds.   Abdominal:      Palpations: Abdomen is soft. There is no hepatomegaly.      Tenderness: There is no abdominal tenderness.      Comments: Surgical scar   Musculoskeletal:      Cervical back: Neck supple.   Lymphadenopathy:      Head:      Right side of head: No submandibular, tonsillar, preauricular or posterior auricular adenopathy.      Left side of head: No submandibular, tonsillar, preauricular or posterior auricular adenopathy.      Cervical: No cervical adenopathy.   Skin:     General: Skin is warm.   Neurological:      Mental Status: She is alert and oriented to person, place, and time.   Psychiatric:         Mood and Affect: Mood normal.         Behavior: Behavior normal.         Thought Content: Thought content normal.         Judgment: Judgment normal.          The following data was reviewed " by: Yvonne Carson MD on 04/12/2024:          Results for orders placed or performed in visit on 04/12/24   POC Rapid Strep A    Specimen: Swab   Result Value Ref Range    Rapid Strep A Screen Positive (A) Negative, VALID, INVALID, Not Performed    Internal Control Passed Passed    Lot Number [id number]     Expiration Date 10/27/2026    POC Urinalysis Dipstick, Automated    Specimen: Urine   Result Value Ref Range    Color Yellow Yellow, Straw, Dark Yellow, Leslie    Clarity, UA Clear Clear    Specific Gravity  1.020 1.005 - 1.030    pH, Urine 6.0 5.0 - 8.0    Leukocytes Negative Negative    Nitrite, UA Negative Negative    Protein, POC Negative Negative mg/dL    Glucose, UA Negative Negative mg/dL    Ketones, UA Negative Negative    Urobilinogen, UA Normal Normal, 0.2 E.U./dL    Bilirubin Negative Negative    Blood, UA Negative Negative    Lot Number [id number]     Expiration Date 01/14/2025             Assessment and Plan   Diagnoses and all orders for this visit:    1. Medicare annual wellness visit, subsequent (Primary)  Postpone Prevnar 20 pneumonia vaccine until acute illness has resolved.  Breast cancer screening mammogram up-to-date.  Colon cancer screening colonoscopy up-to-date.  Recommend RSV vaccine this fall along with seasonal flu vaccine.  2. Nephrolithiasis  -     POC Urinalysis Dipstick, Automated  Urinalysis today is normal.  She has chronic flank pain and will follow-up regarding imaging.  3. Strep throat  -     POC Rapid Strep A  -     amoxicillin-clavulanate (AUGMENTIN) 875-125 MG per tablet; Take 1 tablet by mouth 2 (Two) Times a Day for 10 days.  Dispense: 20 tablet; Refill: 0  Unresolved.  Prior treatment with Keflex ineffective.  Treat with Augmentin.  Recommend probiotics and yogurt.  If symptoms or not improved follow-up in the clinic for retest.  4. Osteopenia of multiple sites  Discussed with patient bisphosphonates including weekly alendronate benefits and risks.  She was also  recommended Zometa which I advised is an infusion.  She would like to consult with her endocrinologist about treatment options.         Follow Up   Return in about 1 year (around 4/13/2025) for MWV and fasting labs.  Patient was given instructions and counseling regarding her condition or for health maintenance advice. Please see specific information pulled into the AVS if appropriate.       Electronically signed by Yvonne Carson MD, 04/12/24, 3:56 PM EDT.

## 2024-04-12 NOTE — PATIENT INSTRUCTIONS
Advance Care Planning and Advance Directives     You make decisions on a daily basis - decisions about where you want to live, your career, your home, your life. Perhaps one of the most important decisions you face is your choice for future medical care. Take time to talk with your family and your healthcare team and start planning today.  Advance Care Planning is a process that can help you:  Understand possible future healthcare decisions in light of your own experiences  Reflect on those decision in light of your goals and values  Discuss your decisions with those closest to you and the healthcare professionals that care for you  Make a plan by creating a document that reflects your wishes    Surrogate Decision Maker  In the event of a medical emergency, which has left you unable to communicate or to make your own decisions, you would need someone to make decisions for you.  It is important to discuss your preferences for medical treatment with this person while you are in good health.     Qualities of a surrogate decision maker:  Willing to take on this role and responsibility  Knows what you want for future medical care  Willing to follow your wishes even if they don't agree with them  Able to make difficult medical decisions under stressful circumstances    Advance Directives  These are legal documents you can create that will guide your healthcare team and decision maker(s) when needed. These documents can be stored in the electronic medical record.    Living Will - a legal document to guide your care if you have a terminal condition or a serious illness and are unable to communicate. States vary by statute in document names/types, but most forms may include one or more of the following:        -  Directions regarding life-prolonging treatments        -  Directions regarding artificially provided nutrition/hydration        -  Choosing a healthcare decision maker        -  Direction regarding organ/tissue  donation    Durable Power of  for Healthcare - this document names an -in-fact to make medical decisions for you, but it may also allow this person to make personal and financial decisions for you. Please seek the advice of an  if you need this type of document.    **Advance Directives are not required and no one may discriminate against you if you do not sign one.    Medical Orders  Many states allow specific forms/orders signed by your physician to record your wishes for medical treatment in your current state of health. This form, signed in personal communication with your physician, addresses resuscitation and other medical interventions that you may or may not want.      For more information or to schedule a time with a [facility] Advance Care Planning Facilitator contact: [facility] or call [phone] and someone will contact you directly.    Medicare Wellness  Personal Prevention Plan of Service     Date of Office Visit:    Encounter Provider:  Yvonne Carson MD  Place of Service:  [facility]  Patient Name: Siria Garcia  :  1955    As part of the Medicare Wellness portion of your visit today, we are providing you with this personalized preventive plan of services (PPPS). This plan is based upon recommendations of the [place] Preventive Services Task Force (USPSTF) and the Advisory Committee on Immunization Practices (ACIP).    This lists the preventive care services that should be considered, and provides dates of when you are due. Items listed as completed are up-to-date and do not require any further intervention.    Health Maintenance   Topic Date Due    RSV Vaccine - Adults (1 - 1-dose 60+ series) Never done    Pneumococcal Vaccine 65+ (1 of 1 - PCV) Never done    PAP SMEAR  2020    BMI FOLLOWUP  2024    INFLUENZA VACCINE  2024    MAMMOGRAM  2024    LIPID PANEL  2024    ANNUAL  WELLNESS VISIT  04/12/2025    DXA SCAN  03/05/2026    COLORECTAL CANCER SCREENING  11/08/2027    TDAP/TD VACCINES (2 - Td or Tdap) 09/12/2029    HEPATITIS C SCREENING  Completed    COVID-19 Vaccine  Completed    ZOSTER VACCINE  Completed       Orders Placed This Encounter   Procedures    POC Rapid Strep A     Order Specific Question:   Release to patient     Answer:   Routine Release [[id number]]    POC Urinalysis Dipstick, Automated     Order Specific Question:   Release to patient     Answer:   Routine Release [[id number]]       Return in about 1 year (around 4/13/2025) for MWV and fasting labs.

## 2024-04-22 ENCOUNTER — OFFICE VISIT (OUTPATIENT)
Age: 69
End: 2024-04-22
Payer: MEDICARE

## 2024-04-22 ENCOUNTER — TELEPHONE (OUTPATIENT)
Age: 69
End: 2024-04-22
Payer: MEDICARE

## 2024-04-22 VITALS
WEIGHT: 170 LBS | SYSTOLIC BLOOD PRESSURE: 144 MMHG | HEART RATE: 97 BPM | BODY MASS INDEX: 32.14 KG/M2 | OXYGEN SATURATION: 98 % | TEMPERATURE: 98.7 F | DIASTOLIC BLOOD PRESSURE: 86 MMHG

## 2024-04-22 DIAGNOSIS — L30.4 INTERTRIGO: ICD-10-CM

## 2024-04-22 DIAGNOSIS — J02.0 ACUTE STREPTOCOCCAL PHARYNGITIS: Primary | ICD-10-CM

## 2024-04-22 PROCEDURE — 3079F DIAST BP 80-89 MM HG: CPT | Performed by: STUDENT IN AN ORGANIZED HEALTH CARE EDUCATION/TRAINING PROGRAM

## 2024-04-22 PROCEDURE — 1159F MED LIST DOCD IN RCRD: CPT | Performed by: STUDENT IN AN ORGANIZED HEALTH CARE EDUCATION/TRAINING PROGRAM

## 2024-04-22 PROCEDURE — 1160F RVW MEDS BY RX/DR IN RCRD: CPT | Performed by: STUDENT IN AN ORGANIZED HEALTH CARE EDUCATION/TRAINING PROGRAM

## 2024-04-22 PROCEDURE — 99214 OFFICE O/P EST MOD 30 MIN: CPT | Performed by: STUDENT IN AN ORGANIZED HEALTH CARE EDUCATION/TRAINING PROGRAM

## 2024-04-22 PROCEDURE — 3077F SYST BP >= 140 MM HG: CPT | Performed by: STUDENT IN AN ORGANIZED HEALTH CARE EDUCATION/TRAINING PROGRAM

## 2024-04-22 RX ORDER — NYSTATIN 100000 U/G
1 CREAM TOPICAL 2 TIMES DAILY
Qty: 30 G | Refills: 0 | Status: SHIPPED | OUTPATIENT
Start: 2024-04-22 | End: 2024-05-06

## 2024-04-22 RX ORDER — PREDNISONE 20 MG/1
40 TABLET ORAL DAILY
Qty: 10 TABLET | Refills: 0 | Status: SHIPPED | OUTPATIENT
Start: 2024-04-22 | End: 2024-04-27

## 2024-04-22 RX ORDER — AZITHROMYCIN 250 MG/1
TABLET, FILM COATED ORAL
Qty: 6 TABLET | Refills: 0 | Status: SHIPPED | OUTPATIENT
Start: 2024-04-22 | End: 2024-04-22

## 2024-04-22 RX ORDER — FLUCONAZOLE 150 MG/1
150 TABLET ORAL ONCE
Qty: 5 TABLET | Refills: 0 | Status: SHIPPED | OUTPATIENT
Start: 2024-04-22 | End: 2024-04-22

## 2024-04-22 NOTE — PROGRESS NOTES
Office Note     Name: Siria Garcia    : 1955     MRN: [id number]     Chief Complaint  Sore Throat (Finished last antibiotic this morning still having sore throat,), Rash (On bottom, wears panty liners for urinary symptoms, recently changed to a different brand , using diflucan and cortisone cream ), Diarrhea (Loose stool after antibiotics ), Dental Problem (Referral to dentist, hx osteopenia ), and [facility] kidney [facility]    Subjective     History of Present Illness:  Siria Garcia is a 68 y.o. female who presents today for acute visit for persistent sore throat.  She has completed 2 courses of antibiotics with only minimal benefit. She has tonsillar pain and pain with swallowing.  Also reports rash on her buttocks and groin region which has persisted on diflucan.  She has been applying copious amounts of hydrocortisone.  She declines to have the rash looked at today    Past Medical History:   Past Medical History:   Diagnosis Date    Acute gastric ulcer with perforation 2023    SUJATA (acute kidney injury) 2023    Arthritis     Breast cancer 2017    left , lumpectomy and radiation    Colon polyp 2018    Diverticulosis 2018    E coli bacteremia 2023    Environmental allergies     Gall bladder stones     GERD (gastroesophageal reflux disease)     History of goiter     Hypertension     managed with lifestyle changes    K25.9, Pyloric ulcer perf. - S/P repair 23    Low back pain     Perforated ulcer 2023    Peritonitis 2023    Plantar fasciitis     Sepsis 2023    Tarsal tunnel syndrome of both lower extremities     Tendonitis     UTI (urinary tract infection)     Wears eyeglasses        Past Surgical History:   Past Surgical History:   Procedure Laterality Date    BREAST LUMPECTOMY  2017    CHOLECYSTECTOMY      COLONOSCOPY      COLONOSCOPY W/ BIOPSIES  2018    CYSTOSCOPY W/ URETERAL STENT PLACEMENT N/A 2023     Procedure: CYSTOSCOPY WITH LEFT URETERAL STENT PLACEMENT;  Surgeon: Murray Torres MD;  Location:  [facility];  Service: Urology;  Laterality: N/A;    EXPLORATORY LAPAROTOMY N/A 08/28/2023    Procedure: LAPAROTOMY EXPLORATORY;  Surgeon: Gabriel Del Toro MD;  Location:  [facility];  Service: General;  Laterality: N/A;    EXTRACORPOREAL SHOCKWAVE LITHOTRIPSY (ESWL), STENT INSERTION/REMOVAL Left 10/20/2023    Procedure: EXTRACORPOREAL SHOCKWAVE LITHOTRIPSY WITH STENT REMOVAL LEFT;  Surgeon: Murray Torres MD;  Location:  [facility];  Service: Urology;  Laterality: Left;    SKIN BIOPSY         Immunizations:   Immunization History   Administered Date(s) Administered    COVID-19 (PFIZER) BIVALENT 12+YRS 10/18/2022    COVID-19 (PFIZER) Purple Cap Monovalent 03/18/2021, 04/09/2021, 11/30/2021    COVID-19 F23 (PFIZER) 12YRS+ (COMIRNATY) 11/06/2023    DTaP 09/12/2019    Flu Vaccine Quad PF >36MO 10/07/2016    Fluad Quad 65+ 10/08/2020    Fluzone (or Fluarix & Flulaval for VFC) >6mos 09/12/2019    Fluzone High Dose =>65 Years ([employer] ONLY) 10/08/2020    Fluzone Quad >6mos (Multi-dose) 09/20/2018, 09/12/2019    Hepatitis A 09/20/2018, 03/20/2019    Influenza Injectable Mdck Pf Quad 12/05/2022, 11/21/2023    Influenza Seasonal Injectable 09/16/2021    Influenza TIV (IM) 11/20/2012, 11/26/2013, 10/23/2015    Shingrix 05/17/2018, 08/09/2018    Tdap 09/12/2019    Zostavax 11/10/2016        Medications:     Current Outpatient Medications:     acetaminophen (TYLENOL) 500 MG tablet, Take 2 tablets by mouth Every 6 (Six) Hours As Needed for Mild Pain., Disp: , Rfl:     Biotin 1000 MCG chewable tablet, Chew 1,000 mcg Daily., Disp: , Rfl:     Cholecalciferol (Vitamin D) 50 MCG (2000 UT) tablet, Take 1 tablet by mouth Daily., Disp: , Rfl:     CRANBERRY CONCENTRATE PO, Take 1 tablet/day by mouth 2 (Two) Times a Day., Disp: , Rfl:     exemestane (AROMASIN) 25 MG chemo tablet, Take 1 tablet by mouth Daily., Disp: , Rfl:      fluticasone (FLONASE) 50 MCG/ACT nasal spray, 2 sprays into the nostril(s) as directed by provider Daily., Disp: , Rfl:     Glucos-Chond-Hyal Ac-Ca Fructo (MOVE FREE JOINT HEALTH ADVANCE PO), Take 1 tablet/day by mouth., Disp: , Rfl:     guaiFENesin (MUCINEX) 600 MG 12 hr tablet, Take 2 tablets by mouth 2 (Two) Times a Day., Disp: , Rfl:     Histamine Dihydrochloride (Australian Dream Arthritis) 0.025 % cream, Apply  topically As Needed (joint pain). Apply topically TID, Disp: , Rfl:     levothyroxine (SYNTHROID, LEVOTHROID) 25 MCG tablet, Take 0.5 tablets by mouth Daily., Disp: 45 tablet, Rfl: 1    Loperamide HCl (IMODIUM PO), Take 1 tablet by mouth As Needed (urgency)., Disp: , Rfl:     loratadine (CLARITIN) 10 MG tablet, Take 1 tablet by mouth Daily., Disp: , Rfl:     Multiple Vitamins-Minerals (YARI MULTIVITAMIN FOR WOMEN PO), Take 1 tablet by mouth Daily., Disp: , Rfl:     Omega-3 Fatty Acids (OMEGA-3 1450 PO), Take 1,000 mg by mouth Daily., Disp: , Rfl:     pantoprazole (PROTONIX) 40 MG EC tablet, Take 1 tablet by mouth Daily., Disp: , Rfl:     sore muscle (ICY HOT EXTRA STRENGTH) 10-30 % cream cream, Apply 1 Application topically to the appropriate area as directed 2 (Two) Times a Day As Needed (joint pain). With lido, Disp: , Rfl:     trimethoprim (TRIMPEX) 100 MG tablet, Take 1 tablet by mouth Daily., Disp: 90 tablet, Rfl: 1    amoxicillin-clavulanate (AUGMENTIN) 875-125 MG per tablet, Take 1 tablet by mouth 2 (Two) Times a Day for 10 days. (Patient not taking: Reported on 4/22/2024), Disp: 20 tablet, Rfl: 0    fluconazole (Diflucan) 150 MG tablet, Take 1 tablet by mouth 1 (One) Time for 1 dose. Repeat in 3 days if needed, Disp: 5 tablet, Rfl: 0    nystatin (MYCOSTATIN) 780551 UNIT/GM cream, Apply 1 Application topically to the appropriate area as directed 2 (Two) Times a Day for 14 days., Disp: 30 g, Rfl: 0    predniSONE (DELTASONE) 20 MG tablet, Take 2 tablets by mouth Daily for 5 days., Disp: 10 tablet,  "Rfl: 0    Allergies:   Allergies   Allergen Reactions    Other Other (See Comments)    Bactrim [Sulfamethoxazole-Trimethoprim] Itching     Trimehtoprim is okay, raction when combined with sulfa     Terbinafine Itching       Family History:   Family History   Problem Relation Age of Onset    Diabetes Father     Hypertension Father     Breast cancer Mother        Social History:   Social History     Socioeconomic History    Marital status:    Tobacco Use    Smoking status: Former     Current packs/day: 0.00     Average packs/day: 0.5 packs/day for 13.0 years (6.5 ttl pk-yrs)     Types: Cigarettes     Start date:      Quit date:      Years since quittin.3    Smokeless tobacco: Never   Vaping Use    Vaping status: Never Used   Substance and Sexual Activity    Alcohol use: No    Drug use: No    Sexual activity: Not Currently         Objective     Vital Signs  /86   Pulse 97   Temp 98.7 °F (37.1 °C)   Wt 77.1 kg (170 lb)   SpO2 98%   BMI 32.14 kg/m²   Estimated body mass index is 32.14 kg/m² as calculated from the following:    Height as of 24: 154.9 cm (60.98\").    Weight as of this encounter: 77.1 kg (170 lb).            Physical Exam  Constitutional:       General: She is not in acute distress.     Appearance: She is not toxic-appearing.   HENT:      Mouth/Throat:      Comments: Erythema and mild tonsillar exudate  Pulmonary:      Effort: Pulmonary effort is normal. No respiratory distress.   Abdominal:      General: Abdomen is flat. There is no distension.   Skin:     General: Skin is warm and dry.   Neurological:      Mental Status: She is alert.   Psychiatric:         Mood and Affect: Mood normal.         Behavior: Behavior normal.          Assessment and Plan     1. Acute streptococcal pharyngitis  S/p 2 rounds of appropriate treatment.  Will trial prednisone as she is having some diarrhea and skin yeast infection and has been adequately treated with abx.   -If no improvement, " consider z pack and ENT referral  - predniSONE (DELTASONE) 20 MG tablet; Take 2 tablets by mouth Daily for 5 days.  Dispense: 10 tablet; Refill: 0    2. Intertrigo  Uncontrolled  -Suspect this is being worsened by hydrocortisone use, counseled her to stop this and try nystatin and diflucan.    - nystatin (MYCOSTATIN) 818932 UNIT/GM cream; Apply 1 Application topically to the appropriate area as directed 2 (Two) Times a Day for 14 days.  Dispense: 30 g; Refill: 0  - fluconazole (Diflucan) 150 MG tablet; Take 1 tablet by mouth 1 (One) Time for 1 dose. Repeat in 3 days if needed  Dispense: 5 tablet; Refill: 0       Counseling was given to patient for the following topics: instructions for management.    Follow Up  No follow-ups on file.    Jeramie Caputo MD  [facility]  [address] [address]  [phone]

## 2024-04-22 NOTE — TELEPHONE ENCOUNTER
RELAY  Attempted to call pt lvm, pt was scheduled as a same day won Dr Piper schedule appt note says pt does not want to show rash to male provider called to make pt aware Dr Caputo is a male provider would pt like to reschedule?

## 2024-04-22 NOTE — TELEPHONE ENCOUNTER
Name: Siria Garcia    Relationship: Self    Best Callback Number: [phone]     HUB PROVIDED THE RELAY MESSAGE FROM THE OFFICE   PATIENT VOICED UNDERSTANDING AND HAS NO FURTHER QUESTIONS AT THIS TIME    ADDITIONAL INFORMATION: PATIENT WILL SEE DR ROPER TODAY.

## 2024-04-25 ENCOUNTER — HOSPITAL ENCOUNTER (OUTPATIENT)
Dept: GENERAL RADIOLOGY | Facility: HOSPITAL | Age: 69
Discharge: HOME | End: 2024-04-25
Admitting: UROLOGY
Payer: MEDICARE

## 2024-04-25 ENCOUNTER — TRANSCRIBE ORDERS (OUTPATIENT)
Dept: GENERAL RADIOLOGY | Facility: HOSPITAL | Age: 69
End: 2024-04-25
Payer: MEDICARE

## 2024-04-25 DIAGNOSIS — N20.0 CALCULUS, KIDNEY: Primary | ICD-10-CM

## 2024-04-25 DIAGNOSIS — N20.0 CALCULUS, KIDNEY: ICD-10-CM

## 2024-04-25 PROCEDURE — 74018 RADEX ABDOMEN 1 VIEW: CPT

## 2024-05-01 ENCOUNTER — TELEPHONE (OUTPATIENT)
Age: 69
End: 2024-05-01
Payer: MEDICARE

## 2024-05-01 DIAGNOSIS — L30.4 INTERTRIGO: ICD-10-CM

## 2024-05-01 RX ORDER — NYSTATIN 100000 U/G
1 CREAM TOPICAL 2 TIMES DAILY
Qty: 30 G | Refills: 0 | Status: SHIPPED | OUTPATIENT
Start: 2024-05-01 | End: 2024-05-15

## 2024-05-01 NOTE — TELEPHONE ENCOUNTER
Caller: Siria Garcia     Relationship:      Best call back number: [phone]    What is your medical concern?     DR ROPER WANTED PATIENT TO GIVE US AN UPDATE    PATIENT WAS IN OFFICE ON 4/22 AND DR ROPER PRESCRIBED PREDNISONE AND ZPAC AND TOLD PATIENT NOT TO START ZPAC UNTIL SHE FINISHED PREDNISONE    SHE TOOK PREDNISONE.  IT MAY HAVE ALLEVIATED THE SYMPTOMS A LITTLE BIT BUT NOT A LOT.  IT DID HELP HER KNEE PAIN THOUGH.     PATIENT STARTED ZPAC ON 4/27  SHE ALSO STARTED SUDAFED    SHE STILL HAS FULLNESS IN THROAT AND EARS    SHE IS GETTING MORE DRAINAGE    BETTER BUT %    SHE IS EATING TWO YOGURTS A DAY TO COUNTERACT THE YEAST INFECTION

## 2024-05-01 NOTE — TELEPHONE ENCOUNTER
Called and discussed symptoms. Sinus symptoms improving but rash is stable to worse.  Counseled to try flonase, azelastine, nasal saline.  Refilled nystatin, will have her call back in if she isnt doing better in a week.

## 2024-05-14 ENCOUNTER — OFFICE VISIT (OUTPATIENT)
Dept: ENDOCRINOLOGY | Facility: CLINIC | Age: 69
End: 2024-05-14
Payer: MEDICARE

## 2024-05-14 VITALS
BODY MASS INDEX: 32.44 KG/M2 | SYSTOLIC BLOOD PRESSURE: 152 MMHG | WEIGHT: 171.8 LBS | OXYGEN SATURATION: 99 % | HEART RATE: 90 BPM | DIASTOLIC BLOOD PRESSURE: 80 MMHG | HEIGHT: 61 IN

## 2024-05-14 DIAGNOSIS — M85.89 OSTEOPENIA OF MULTIPLE SITES: ICD-10-CM

## 2024-05-14 DIAGNOSIS — R73.09 ELEVATED GLUCOSE: Primary | ICD-10-CM

## 2024-05-14 DIAGNOSIS — I10 BENIGN ESSENTIAL HYPERTENSION: ICD-10-CM

## 2024-05-14 DIAGNOSIS — E78.00 HYPERCHOLESTEROLEMIA: ICD-10-CM

## 2024-05-14 DIAGNOSIS — E55.9 VITAMIN D DEFICIENCY: ICD-10-CM

## 2024-05-14 DIAGNOSIS — E03.9 ACQUIRED HYPOTHYROIDISM: ICD-10-CM

## 2024-05-14 LAB
EXPIRATION DATE: NORMAL
EXPIRATION DATE: NORMAL
GLUCOSE BLDC GLUCOMTR-MCNC: 88 MG/DL (ref 70–130)
HBA1C MFR BLD: 5.5 % (ref 4.5–5.7)
Lab: NORMAL
Lab: NORMAL

## 2024-05-14 PROCEDURE — 99214 OFFICE O/P EST MOD 30 MIN: CPT | Performed by: INTERNAL MEDICINE

## 2024-05-14 PROCEDURE — 80061 LIPID PANEL: CPT | Performed by: INTERNAL MEDICINE

## 2024-05-14 PROCEDURE — 3079F DIAST BP 80-89 MM HG: CPT | Performed by: INTERNAL MEDICINE

## 2024-05-14 PROCEDURE — 84443 ASSAY THYROID STIM HORMONE: CPT | Performed by: INTERNAL MEDICINE

## 2024-05-14 PROCEDURE — 1159F MED LIST DOCD IN RCRD: CPT | Performed by: INTERNAL MEDICINE

## 2024-05-14 PROCEDURE — 3044F HG A1C LEVEL LT 7.0%: CPT | Performed by: INTERNAL MEDICINE

## 2024-05-14 PROCEDURE — 83036 HEMOGLOBIN GLYCOSYLATED A1C: CPT | Performed by: INTERNAL MEDICINE

## 2024-05-14 PROCEDURE — 80053 COMPREHEN METABOLIC PANEL: CPT | Performed by: INTERNAL MEDICINE

## 2024-05-14 PROCEDURE — 3077F SYST BP >= 140 MM HG: CPT | Performed by: INTERNAL MEDICINE

## 2024-05-14 PROCEDURE — 84439 ASSAY OF FREE THYROXINE: CPT | Performed by: INTERNAL MEDICINE

## 2024-05-14 PROCEDURE — 1160F RVW MEDS BY RX/DR IN RCRD: CPT | Performed by: INTERNAL MEDICINE

## 2024-05-14 PROCEDURE — 82306 VITAMIN D 25 HYDROXY: CPT | Performed by: INTERNAL MEDICINE

## 2024-05-14 PROCEDURE — 82947 ASSAY GLUCOSE BLOOD QUANT: CPT | Performed by: INTERNAL MEDICINE

## 2024-05-14 NOTE — ASSESSMENT & PLAN NOTE
Average sugar is good- no change for now   Results for orders placed or performed in visit on 05/14/24   POC Glycosylated Hemoglobin (Hb A1C)    Specimen: Blood   Result Value Ref Range    Hemoglobin A1C 5.5 4.5 - 5.7 %    Lot Number [id number]     Expiration Date 01/03/2026    POC Glucose, Blood    Specimen: Blood   Result Value Ref Range    Glucose 88 70 - 130 mg/dL    Lot Number [id number]     Expiration Date 11/2/2024

## 2024-05-14 NOTE — LETTER
May 14, 2024       No Recipients    Patient: Siria Garcia   YOB: 1955   Date of Visit: 5/14/2024     Dear Yvonne Carson MD:       Thank you for referring Siria Garcia to me for evaluation. Below are the relevant portions of my assessment and plan of care.    If you have questions, please do not hesitate to call me. I look forward to following Siria along with you.         Sincerely,        Sofia Hong MD        CC:   No Recipients    Sofia Hong MD  05/14/24 1607  Incomplete  Siria Garcia 68 y.o.  CC: Follow-up, Hypothyroidism, Hypertension, Vitamin D Deficiency, and Hyperglycemia      [place]:Follow-up, Hypothyroidism, Hypertension, Vitamin D Deficiency, and Hyperglycemia    Blood sugar and 90 day average sugar reviewed  Results for orders placed or performed in visit on 05/14/24   POC Glycosylated Hemoglobin (Hb A1C)    Specimen: Blood   Result Value Ref Range    Hemoglobin A1C 5.5 4.5 - 5.7 %    Lot Number [id number]     Expiration Date 01/03/2026    POC Glucose, Blood    Specimen: Blood   Result Value Ref Range    Glucose 88 70 - 130 mg/dL    Lot Number [id number]     Expiration Date 11/2/2024      Average sugar is good  Low bone density reviewed- discussed treatment and recommended zometa through Dr Verma  Has had issues with gastritis/ recurrent stomach issues with perforated ulcer in the past that would make fosamax less desirable  Eating calcium at rda and taking vitamin D supplement  May need synthroid and diflucan refill   Strep 3/11 treated with keflex  Recurrent 4/12- pos strep - treated with augmentin   Further treatment steroids and z pack   Having rash over buttocks - got nystatin and diflucan   Was using topical hydrocortisone  Recheck 138/72    Allergies   Allergen Reactions   • Other Other (See Comments)   • Bactrim [Sulfamethoxazole-Trimethoprim] Itching     Trimehtoprim is okay, raction when combined with sulfa    • Terbinafine Itching       Current  Outpatient Medications:   •  acetaminophen (TYLENOL) 500 MG tablet, Take 2 tablets by mouth Every 6 (Six) Hours As Needed for Mild Pain., Disp: , Rfl:   •  Biotin 1000 MCG chewable tablet, Chew 1,000 mcg Daily., Disp: , Rfl:   •  Cholecalciferol (Vitamin D) 50 MCG (2000 UT) tablet, Take 1 tablet by mouth Daily., Disp: , Rfl:   •  CRANBERRY CONCENTRATE PO, Take 1 tablet/day by mouth 2 (Two) Times a Day., Disp: , Rfl:   •  exemestane (AROMASIN) 25 MG chemo tablet, Take 1 tablet by mouth Daily., Disp: , Rfl:   •  fluticasone (FLONASE) 50 MCG/ACT nasal spray, 2 sprays into the nostril(s) as directed by provider Daily., Disp: , Rfl:   •  Glucos-Chond-Hyal Ac-Ca Fructo (MOVE FREE JOINT HEALTH ADVANCE PO), Take 1 tablet/day by mouth., Disp: , Rfl:   •  guaiFENesin (MUCINEX) 600 MG 12 hr tablet, Take 2 tablets by mouth 2 (Two) Times a Day., Disp: , Rfl:   •  Histamine Dihydrochloride (Australian Dream Arthritis) 0.025 % cream, Apply  topically As Needed (joint pain). Apply topically TID, Disp: , Rfl:   •  levothyroxine (SYNTHROID, LEVOTHROID) 25 MCG tablet, Take 0.5 tablets by mouth Daily., Disp: 45 tablet, Rfl: 1  •  Loperamide HCl (IMODIUM PO), Take 1 tablet by mouth As Needed (urgency)., Disp: , Rfl:   •  loratadine (CLARITIN) 10 MG tablet, Take 1 tablet by mouth Daily., Disp: , Rfl:   •  Multiple Vitamins-Minerals (YARI MULTIVITAMIN FOR WOMEN PO), Take 1 tablet by mouth Daily., Disp: , Rfl:   •  nystatin (MYCOSTATIN) 506764 UNIT/GM cream, Apply 1 Application topically to the appropriate area as directed 2 (Two) Times a Day for 14 days., Disp: 30 g, Rfl: 0  •  Omega-3 Fatty Acids (OMEGA-3 1450 PO), Take 1,000 mg by mouth Daily., Disp: , Rfl:   •  pantoprazole (PROTONIX) 40 MG EC tablet, Take 1 tablet by mouth Daily., Disp: , Rfl:   •  sore muscle (ICY HOT EXTRA STRENGTH) 10-30 % cream cream, Apply 1 Application topically to the appropriate area as directed 2 (Two) Times a Day As Needed (joint pain). With lido, Disp: ,  Rfl:   •  trimethoprim (TRIMPEX) 100 MG tablet, Take 1 tablet by mouth Daily., Disp: 90 tablet, Rfl: 1  Patient Active Problem List    Diagnosis    • Osteopenia of multiple sites [M85.89]    • Chronic low back pain [M54.50, G89.29]    • Nephrolithiasis [N20.0]    • Obstructive uropathy [N13.9]    • Vitamin D deficiency [E55.9]    • Hypercalcemia [E83.52]    • Elevated glucose [R73.09]    • Tubular adenoma [D36.9]    • Malignant neoplasm of upper-outer quadrant of breast in female, estrogen receptor positive [C50.419, Z17.0]    • Benign essential hypertension [I10]    • Gastroesophageal reflux disease [K21.9]    • Fatigue [R53.83]    • Hypercholesterolemia [E78.00]    • Hypothyroidism [E03.9]    • Osteoarthritis of hand [M19.049]      Review of Systems   Constitutional:  Positive for activity change. Negative for appetite change and unexpected weight change.   HENT:  Negative for congestion and rhinorrhea.    Eyes:  Negative for visual disturbance.   Respiratory:  Negative for cough and shortness of breath.    Cardiovascular:  Negative for palpitations and leg swelling.   Gastrointestinal:  Negative for constipation, diarrhea and nausea.   Genitourinary:  Negative for hematuria.   Musculoskeletal:  Positive for arthralgias. Negative for back pain, gait problem, joint swelling and myalgias.   Skin:  Positive for rash. Negative for color change and wound.   Allergic/Immunologic: Negative for environmental allergies, food allergies and immunocompromised state.   Neurological:  Negative for dizziness, weakness and light-headedness.   Psychiatric/Behavioral:  Negative for confusion, decreased concentration, dysphoric mood and sleep disturbance. The patient is nervous/anxious.      Social History     Socioeconomic History   • Marital status:    Tobacco Use   • Smoking status: Former     Current packs/day: 0.00     Average packs/day: 0.5 packs/day for 13.0 years (6.5 ttl pk-yrs)     Types: Cigarettes     Start date:  "     Quit date:      Years since quittin.3   • Smokeless tobacco: Never   Vaping Use   • Vaping status: Never Used   Substance and Sexual Activity   • Alcohol use: No   • Drug use: No   • Sexual activity: Not Currently     Family History   Problem Relation Age of Onset   • Diabetes Father    • Hypertension Father    • Breast cancer Mother      /80   Pulse 90   Ht 154.9 cm (61\")   Wt 77.9 kg (171 lb 12.8 oz)   SpO2 99%   BMI 32.46 kg/m²   Physical Exam  Vitals and nursing note reviewed.   Constitutional:       Appearance: Normal appearance. She is well-developed.   HENT:      Head: Normocephalic and atraumatic.   Eyes:      General: Lids are normal.      Extraocular Movements: Extraocular movements intact.      Conjunctiva/sclera: Conjunctivae normal.      Pupils: Pupils are equal, round, and reactive to light.   Neck:      Thyroid: No thyroid mass or thyromegaly.      Vascular: No carotid bruit.      Trachea: Trachea normal. No tracheal deviation.   Cardiovascular:      Rate and Rhythm: Normal rate and regular rhythm.      Pulses: Normal pulses.      Heart sounds: Normal heart sounds. No murmur heard.     No friction rub. No gallop.   Pulmonary:      Effort: Pulmonary effort is normal. No respiratory distress.      Breath sounds: Normal breath sounds. No wheezing.   Musculoskeletal:         General: No deformity. Normal range of motion.      Cervical back: Normal range of motion and neck supple.   Lymphadenopathy:      Cervical: No cervical adenopathy.   Skin:     General: Skin is warm and dry.      Findings: No erythema or rash.      Nails: There is no clubbing.   Neurological:      General: No focal deficit present.      Mental Status: She is alert and oriented to person, place, and time.      Cranial Nerves: No cranial nerve deficit.      Deep Tendon Reflexes: Reflexes are normal and symmetric. Reflexes normal.   Psychiatric:         Speech: Speech normal.         Behavior: Behavior " normal.         Thought Content: Thought content normal.         Judgment: Judgment normal.       Results for orders placed or performed in visit on 05/14/24   POC Glycosylated Hemoglobin (Hb A1C)    Specimen: Blood   Result Value Ref Range    Hemoglobin A1C 5.5 4.5 - 5.7 %    Lot Number [id number]     Expiration Date 01/03/2026    POC Glucose, Blood    Specimen: Blood   Result Value Ref Range    Glucose 88 70 - 130 mg/dL    Lot Number [id number]     Expiration Date 11/2/2024      Diagnoses and all orders for this visit:    1. Elevated glucose (Primary)  Assessment & Plan:  Average sugar is good- no change for now   Results for orders placed or performed in visit on 05/14/24   POC Glycosylated Hemoglobin (Hb A1C)    Specimen: Blood   Result Value Ref Range    Hemoglobin A1C 5.5 4.5 - 5.7 %    Lot Number [id number]     Expiration Date 01/03/2026    POC Glucose, Blood    Specimen: Blood   Result Value Ref Range    Glucose 88 70 - 130 mg/dL    Lot Number [id number]     Expiration Date 11/2/2024          Orders:  -     POC Glycosylated Hemoglobin (Hb A1C)  -     POC Glucose, Blood    2. Benign essential hypertension  Assessment & Plan:  Recheck bp 138/70  Doing well overall     Orders:  -     Comprehensive Metabolic Panel; Future  -     Comprehensive Metabolic Panel    3. Acquired hypothyroidism  Assessment & Plan:  Taking levothyroxine 25 mcg daily  Check tfts     Orders:  -     TSH; Future  -     T4, Free; Future  -     TSH  -     T4, Free    4. Hypercholesterolemia  Assessment & Plan:  Watching diet, check flp     Orders:  -     Lipid Panel; Future  -     Lipid Panel    5. Vitamin D deficiency  -     Vitamin D,25-Hydroxy; Future  -     Vitamin D,25-Hydroxy    6. Osteopenia of multiple sites  Assessment & Plan:  With planned continued use of aromasin  Told her I would recommend zometa (h/o surgery for gastric perforation - would not use oral bisphosphonate)  Ok to get via Oncology office- repeat bone density in 2 years    Long discussion about risks and her concerns but overall benefit outweighs      Return in about 6 months (around 11/14/2024) for Recheck.    Electronically signed by: MD Gely Benjamin Kristina D, MD  05/14/24 1529  Sign when Signing Visit  Siria Garcia 68 y.o.  CC: Follow-up, Hypothyroidism, Hypertension, Vitamin D Deficiency, and Hyperglycemia      [place]:Follow-up, Hypothyroidism, Hypertension, Vitamin D Deficiency, and Hyperglycemia    Blood sugar and 90 day average sugar reviewed  Results for orders placed or performed in visit on 05/14/24   POC Glycosylated Hemoglobin (Hb A1C)    Specimen: Blood   Result Value Ref Range    Hemoglobin A1C 5.5 4.5 - 5.7 %    Lot Number [id number]     Expiration Date 01/03/2026    POC Glucose, Blood    Specimen: Blood   Result Value Ref Range    Glucose 88 70 - 130 mg/dL    Lot Number [id number]     Expiration Date 11/2/2024      Average sugar is good  Low bone density reviewed- discussed treatment and recommended zometa through Dr Verma  Has had issues with gastritis/ recurrent stomach issues with perforated ulcer in the past that would make fosamax less desirable  Eating calcium at rda and taking vitamin D supplement  May need synthroid and diflucan refill   Strep 3/11 treated with keflex  Recurrent 4/12- pos strep - treated with augmentin   Further treatment steroids and z pack   Having rash over buttocks - got nystatin and diflucan   Was using topical hydrocortisone  Recheck 138/72    Allergies   Allergen Reactions   • Other Other (See Comments)   • Bactrim [Sulfamethoxazole-Trimethoprim] Itching     Trimehtoprim is okay, raction when combined with sulfa    • Terbinafine Itching       Current Outpatient Medications:   •  acetaminophen (TYLENOL) 500 MG tablet, Take 2 tablets by mouth Every 6 (Six) Hours As Needed for Mild Pain., Disp: , Rfl:   •  Biotin 1000 MCG chewable tablet, Chew 1,000 mcg Daily., Disp: , Rfl:   •  Cholecalciferol (Vitamin  D) 50 MCG (2000 UT) tablet, Take 1 tablet by mouth Daily., Disp: , Rfl:   •  CRANBERRY CONCENTRATE PO, Take 1 tablet/day by mouth 2 (Two) Times a Day., Disp: , Rfl:   •  exemestane (AROMASIN) 25 MG chemo tablet, Take 1 tablet by mouth Daily., Disp: , Rfl:   •  fluticasone (FLONASE) 50 MCG/ACT nasal spray, 2 sprays into the nostril(s) as directed by provider Daily., Disp: , Rfl:   •  Glucos-Chond-Hyal Ac-Ca Fructo (MOVE FREE JOINT HEALTH ADVANCE PO), Take 1 tablet/day by mouth., Disp: , Rfl:   •  guaiFENesin (MUCINEX) 600 MG 12 hr tablet, Take 2 tablets by mouth 2 (Two) Times a Day., Disp: , Rfl:   •  Histamine Dihydrochloride (Australian Dream Arthritis) 0.025 % cream, Apply  topically As Needed (joint pain). Apply topically TID, Disp: , Rfl:   •  levothyroxine (SYNTHROID, LEVOTHROID) 25 MCG tablet, Take 0.5 tablets by mouth Daily., Disp: 45 tablet, Rfl: 1  •  Loperamide HCl (IMODIUM PO), Take 1 tablet by mouth As Needed (urgency)., Disp: , Rfl:   •  loratadine (CLARITIN) 10 MG tablet, Take 1 tablet by mouth Daily., Disp: , Rfl:   •  Multiple Vitamins-Minerals (YARI MULTIVITAMIN FOR WOMEN PO), Take 1 tablet by mouth Daily., Disp: , Rfl:   •  nystatin (MYCOSTATIN) 898689 UNIT/GM cream, Apply 1 Application topically to the appropriate area as directed 2 (Two) Times a Day for 14 days., Disp: 30 g, Rfl: 0  •  Omega-3 Fatty Acids (OMEGA-3 1450 PO), Take 1,000 mg by mouth Daily., Disp: , Rfl:   •  pantoprazole (PROTONIX) 40 MG EC tablet, Take 1 tablet by mouth Daily., Disp: , Rfl:   •  sore muscle (ICY HOT EXTRA STRENGTH) 10-30 % cream cream, Apply 1 Application topically to the appropriate area as directed 2 (Two) Times a Day As Needed (joint pain). With lido, Disp: , Rfl:   •  trimethoprim (TRIMPEX) 100 MG tablet, Take 1 tablet by mouth Daily., Disp: 90 tablet, Rfl: 1  Patient Active Problem List    Diagnosis    • Osteopenia of multiple sites [M85.89]    • Chronic low back pain [M54.50, G89.29]    • Nephrolithiasis  "[N20.0]    • Obstructive uropathy [N13.9]    • Vitamin D deficiency [E55.9]    • Hypercalcemia [E83.52]    • Elevated glucose [R73.09]    • Tubular adenoma [D36.9]    • Malignant neoplasm of upper-outer quadrant of breast in female, estrogen receptor positive [C50.419, Z17.0]    • Benign essential hypertension [I10]    • Gastroesophageal reflux disease [K21.9]    • Fatigue [R53.83]    • Hypercholesterolemia [E78.00]    • Hypothyroidism [E03.9]    • Osteoarthritis of hand [M19.049]      Review of Systems  Social History     Socioeconomic History   • Marital status:    Tobacco Use   • Smoking status: Former     Current packs/day: 0.00     Average packs/day: 0.5 packs/day for 13.0 years (6.5 ttl pk-yrs)     Types: Cigarettes     Start date:      Quit date:      Years since quittin.3   • Smokeless tobacco: Never   Vaping Use   • Vaping status: Never Used   Substance and Sexual Activity   • Alcohol use: No   • Drug use: No   • Sexual activity: Not Currently     Family History   Problem Relation Age of Onset   • Diabetes Father    • Hypertension Father    • Breast cancer Mother      /80   Pulse 90   Ht 154.9 cm (61\")   Wt 77.9 kg (171 lb 12.8 oz)   SpO2 99%   BMI 32.46 kg/m²   Physical Exam  Results for orders placed or performed in visit on 24   POC Rapid Strep A    Specimen: Swab   Result Value Ref Range    Rapid Strep A Screen Positive (A) Negative, VALID, INVALID, Not Performed    Internal Control Passed Passed    Lot Number [id number]     Expiration Date 10/27/2026    POC Urinalysis Dipstick, Automated    Specimen: Urine   Result Value Ref Range    Color Yellow Yellow, Straw, Dark Yellow, Leslie    Clarity, UA Clear Clear    Specific Gravity  1.020 1.005 - 1.030    pH, Urine 6.0 5.0 - 8.0    Leukocytes Negative Negative    Nitrite, UA Negative Negative    Protein, POC Negative Negative mg/dL    Glucose, UA Negative Negative mg/dL    Ketones, UA Negative Negative    Urobilinogen, UA " Normal Normal, 0.2 E.U./dL    Bilirubin Negative Negative    Blood, UA Negative Negative    Lot Number [id number]     Expiration Date 01/14/2025      Diagnoses and all orders for this visit:    1. Elevated glucose (Primary)  -     POC Glycosylated Hemoglobin (Hb A1C)  -     POC Glucose, Blood      Electronically signed by: Lashell Saucedo MA

## 2024-05-14 NOTE — ASSESSMENT & PLAN NOTE
With planned continued use of aromasin  Told her I would recommend zometa (h/o surgery for gastric perforation - would not use oral bisphosphonate)  Ok to get via Oncology office- repeat bone density in 2 years   Long discussion about risks and her concerns but overall benefit outweighs

## 2024-05-14 NOTE — PROGRESS NOTES
Sirianile Garcia 68 y.o.  CC: Follow-up, Hypothyroidism, Hypertension, Vitamin D Deficiency, and Hyperglycemia      [place]:Follow-up, Hypothyroidism, Hypertension, Vitamin D Deficiency, and Hyperglycemia    Blood sugar and 90 day average sugar reviewed  Results for orders placed or performed in visit on 05/14/24   POC Glycosylated Hemoglobin (Hb A1C)    Specimen: Blood   Result Value Ref Range    Hemoglobin A1C 5.5 4.5 - 5.7 %    Lot Number [id number]     Expiration Date 01/03/2026    POC Glucose, Blood    Specimen: Blood   Result Value Ref Range    Glucose 88 70 - 130 mg/dL    Lot Number [id number]     Expiration Date 11/2/2024      Average sugar is good  Low bone density reviewed- discussed treatment and recommended zometa through Dr Verma  Has had issues with gastritis/ recurrent stomach issues with perforated ulcer in the past that would make fosamax less desirable  Eating calcium at rda and taking vitamin D supplement  May need synthroid and diflucan refill   Strep 3/11 treated with keflex  Recurrent 4/12- pos strep - treated with augmentin   Further treatment steroids and z pack   Having rash over buttocks - got nystatin and diflucan   Was using topical hydrocortisone  Recheck 138/72    Allergies   Allergen Reactions    Other Other (See Comments)    Bactrim [Sulfamethoxazole-Trimethoprim] Itching     Trimehtoprim is okay, raction when combined with sulfa     Terbinafine Itching       Current Outpatient Medications:     acetaminophen (TYLENOL) 500 MG tablet, Take 2 tablets by mouth Every 6 (Six) Hours As Needed for Mild Pain., Disp: , Rfl:     Biotin 1000 MCG chewable tablet, Chew 1,000 mcg Daily., Disp: , Rfl:     Cholecalciferol (Vitamin D) 50 MCG (2000 UT) tablet, Take 1 tablet by mouth Daily., Disp: , Rfl:     CRANBERRY CONCENTRATE PO, Take 1 tablet/day by mouth 2 (Two) Times a Day., Disp: , Rfl:     exemestane (AROMASIN) 25 MG chemo tablet, Take 1 tablet by mouth Daily., Disp: , Rfl:     fluticasone  (FLONASE) 50 MCG/ACT nasal spray, 2 sprays into the nostril(s) as directed by provider Daily., Disp: , Rfl:     Glucos-Chond-Hyal Ac-Ca Fructo (MOVE FREE JOINT HEALTH ADVANCE PO), Take 1 tablet/day by mouth., Disp: , Rfl:     guaiFENesin (MUCINEX) 600 MG 12 hr tablet, Take 2 tablets by mouth 2 (Two) Times a Day., Disp: , Rfl:     Histamine Dihydrochloride (Australian Dream Arthritis) 0.025 % cream, Apply  topically As Needed (joint pain). Apply topically TID, Disp: , Rfl:     levothyroxine (SYNTHROID, LEVOTHROID) 25 MCG tablet, Take 0.5 tablets by mouth Daily., Disp: 45 tablet, Rfl: 1    Loperamide HCl (IMODIUM PO), Take 1 tablet by mouth As Needed (urgency)., Disp: , Rfl:     loratadine (CLARITIN) 10 MG tablet, Take 1 tablet by mouth Daily., Disp: , Rfl:     Multiple Vitamins-Minerals (YARI MULTIVITAMIN FOR WOMEN PO), Take 1 tablet by mouth Daily., Disp: , Rfl:     nystatin (MYCOSTATIN) 670315 UNIT/GM cream, Apply 1 Application topically to the appropriate area as directed 2 (Two) Times a Day for 14 days., Disp: 30 g, Rfl: 0    Omega-3 Fatty Acids (OMEGA-3 1450 PO), Take 1,000 mg by mouth Daily., Disp: , Rfl:     pantoprazole (PROTONIX) 40 MG EC tablet, Take 1 tablet by mouth Daily., Disp: , Rfl:     sore muscle (ICY HOT EXTRA STRENGTH) 10-30 % cream cream, Apply 1 Application topically to the appropriate area as directed 2 (Two) Times a Day As Needed (joint pain). With lido, Disp: , Rfl:     trimethoprim (TRIMPEX) 100 MG tablet, Take 1 tablet by mouth Daily., Disp: 90 tablet, Rfl: 1  Patient Active Problem List    Diagnosis     Osteopenia of multiple sites [M85.89]     Chronic low back pain [M54.50, G89.29]     Nephrolithiasis [N20.0]     Obstructive uropathy [N13.9]     Vitamin D deficiency [E55.9]     Hypercalcemia [E83.52]     Elevated glucose [R73.09]     Tubular adenoma [D36.9]     Malignant neoplasm of upper-outer quadrant of breast in female, estrogen receptor positive [C50.419, Z17.0]     Benign essential  "hypertension [I10]     Gastroesophageal reflux disease [K21.9]     Fatigue [R53.83]     Hypercholesterolemia [E78.00]     Hypothyroidism [E03.9]     Osteoarthritis of hand [M19.049]      Review of Systems   Constitutional:  Positive for activity change. Negative for appetite change and unexpected weight change.   HENT:  Negative for congestion and rhinorrhea.    Eyes:  Negative for visual disturbance.   Respiratory:  Negative for cough and shortness of breath.    Cardiovascular:  Negative for palpitations and leg swelling.   Gastrointestinal:  Negative for constipation, diarrhea and nausea.   Genitourinary:  Negative for hematuria.   Musculoskeletal:  Positive for arthralgias. Negative for back pain, gait problem, joint swelling and myalgias.   Skin:  Positive for rash. Negative for color change and wound.   Allergic/Immunologic: Negative for environmental allergies, food allergies and immunocompromised state.   Neurological:  Negative for dizziness, weakness and light-headedness.   Psychiatric/Behavioral:  Negative for confusion, decreased concentration, dysphoric mood and sleep disturbance. The patient is nervous/anxious.      Social History     Socioeconomic History    Marital status:    Tobacco Use    Smoking status: Former     Current packs/day: 0.00     Average packs/day: 0.5 packs/day for 13.0 years (6.5 ttl pk-yrs)     Types: Cigarettes     Start date:      Quit date:      Years since quittin.3    Smokeless tobacco: Never   Vaping Use    Vaping status: Never Used   Substance and Sexual Activity    Alcohol use: No    Drug use: No    Sexual activity: Not Currently     Family History   Problem Relation Age of Onset    Diabetes Father     Hypertension Father     Breast cancer Mother      /80   Pulse 90   Ht 154.9 cm (61\")   Wt 77.9 kg (171 lb 12.8 oz)   SpO2 99%   BMI 32.46 kg/m²   Physical Exam  Vitals and nursing note reviewed.   Constitutional:       Appearance: Normal " appearance. She is well-developed.   HENT:      Head: Normocephalic and atraumatic.   Eyes:      General: Lids are normal.      Extraocular Movements: Extraocular movements intact.      Conjunctiva/sclera: Conjunctivae normal.      Pupils: Pupils are equal, round, and reactive to light.   Neck:      Thyroid: No thyroid mass or thyromegaly.      Vascular: No carotid bruit.      Trachea: Trachea normal. No tracheal deviation.   Cardiovascular:      Rate and Rhythm: Normal rate and regular rhythm.      Pulses: Normal pulses.      Heart sounds: Normal heart sounds. No murmur heard.     No friction rub. No gallop.   Pulmonary:      Effort: Pulmonary effort is normal. No respiratory distress.      Breath sounds: Normal breath sounds. No wheezing.   Musculoskeletal:         General: No deformity. Normal range of motion.      Cervical back: Normal range of motion and neck supple.   Lymphadenopathy:      Cervical: No cervical adenopathy.   Skin:     General: Skin is warm and dry.      Findings: No erythema or rash.      Nails: There is no clubbing.   Neurological:      General: No focal deficit present.      Mental Status: She is alert and oriented to person, place, and time.      Cranial Nerves: No cranial nerve deficit.      Deep Tendon Reflexes: Reflexes are normal and symmetric. Reflexes normal.   Psychiatric:         Speech: Speech normal.         Behavior: Behavior normal.         Thought Content: Thought content normal.         Judgment: Judgment normal.       Results for orders placed or performed in visit on 05/14/24   POC Glycosylated Hemoglobin (Hb A1C)    Specimen: Blood   Result Value Ref Range    Hemoglobin A1C 5.5 4.5 - 5.7 %    Lot Number [id number]     Expiration Date 01/03/2026    POC Glucose, Blood    Specimen: Blood   Result Value Ref Range    Glucose 88 70 - 130 mg/dL    Lot Number [id number]     Expiration Date 11/2/2024      Diagnoses and all orders for this visit:    1. Elevated glucose  (Primary)  Assessment & Plan:  Average sugar is good- no change for now   Results for orders placed or performed in visit on 05/14/24   POC Glycosylated Hemoglobin (Hb A1C)    Specimen: Blood   Result Value Ref Range    Hemoglobin A1C 5.5 4.5 - 5.7 %    Lot Number [id number]     Expiration Date 01/03/2026    POC Glucose, Blood    Specimen: Blood   Result Value Ref Range    Glucose 88 70 - 130 mg/dL    Lot Number [id number]     Expiration Date 11/2/2024          Orders:  -     POC Glycosylated Hemoglobin (Hb A1C)  -     POC Glucose, Blood    2. Benign essential hypertension  Assessment & Plan:  Recheck bp 138/70  Doing well overall     Orders:  -     Comprehensive Metabolic Panel; Future  -     Comprehensive Metabolic Panel    3. Acquired hypothyroidism  Assessment & Plan:  Taking levothyroxine 25 mcg daily  Check tfts     Orders:  -     TSH; Future  -     T4, Free; Future  -     TSH  -     T4, Free    4. Hypercholesterolemia  Assessment & Plan:  Watching diet, check flp     Orders:  -     Lipid Panel; Future  -     Lipid Panel    5. Vitamin D deficiency  -     Vitamin D,25-Hydroxy; Future  -     Vitamin D,25-Hydroxy    6. Osteopenia of multiple sites  Assessment & Plan:  With planned continued use of aromasin  Told her I would recommend zometa (h/o surgery for gastric perforation - would not use oral bisphosphonate)  Ok to get via Oncology office- repeat bone density in 2 years   Long discussion about risks and her concerns but overall benefit outweighs      Return in about 6 months (around 11/14/2024) for Recheck.    Electronically signed by: Sofia Hong MD

## 2024-05-15 DIAGNOSIS — E78.00 HYPERCHOLESTEROLEMIA: Primary | ICD-10-CM

## 2024-05-15 LAB
25(OH)D3 SERPL-MCNC: 41.9 NG/ML (ref 30–100)
ALBUMIN SERPL-MCNC: 4.9 G/DL (ref 3.5–5.2)
ALBUMIN/GLOB SERPL: 1.8 G/DL
ALP SERPL-CCNC: 90 U/L (ref 39–117)
ALT SERPL W P-5'-P-CCNC: 24 U/L (ref 1–33)
ANION GAP SERPL CALCULATED.3IONS-SCNC: 14 MMOL/L (ref 5–15)
AST SERPL-CCNC: 25 U/L (ref 1–32)
BILIRUB SERPL-MCNC: 0.4 MG/DL (ref 0–1.2)
BUN SERPL-MCNC: 20 MG/DL (ref 8–23)
BUN/CREAT SERPL: 20.6 (ref 7–25)
CALCIUM SPEC-SCNC: 9.7 MG/DL (ref 8.6–10.5)
CHLORIDE SERPL-SCNC: 106 MMOL/L (ref 98–107)
CHOLEST SERPL-MCNC: 233 MG/DL (ref 0–200)
CO2 SERPL-SCNC: 23 MMOL/L (ref 22–29)
CREAT SERPL-MCNC: 0.97 MG/DL (ref 0.57–1)
EGFRCR SERPLBLD CKD-EPI 2021: 63.4 ML/MIN/1.73
GLOBULIN UR ELPH-MCNC: 2.8 GM/DL
GLUCOSE SERPL-MCNC: 88 MG/DL (ref 65–99)
HDLC SERPL-MCNC: 62 MG/DL (ref 40–60)
LDLC SERPL CALC-MCNC: 142 MG/DL (ref 0–100)
LDLC/HDLC SERPL: 2.24 {RATIO}
POTASSIUM SERPL-SCNC: 4.1 MMOL/L (ref 3.5–5.2)
PROT SERPL-MCNC: 7.7 G/DL (ref 6–8.5)
SODIUM SERPL-SCNC: 143 MMOL/L (ref 136–145)
T4 FREE SERPL-MCNC: 1.32 NG/DL (ref 0.93–1.7)
TRIGL SERPL-MCNC: 162 MG/DL (ref 0–150)
TSH SERPL DL<=0.05 MIU/L-ACNC: 0.36 UIU/ML (ref 0.27–4.2)
VLDLC SERPL-MCNC: 29 MG/DL (ref 5–40)

## 2024-05-15 RX ORDER — PRAVASTATIN SODIUM 20 MG
20 TABLET ORAL EVERY EVENING
Qty: 90 TABLET | Refills: 1 | Status: SHIPPED | OUTPATIENT
Start: 2024-05-15 | End: 2025-05-15

## 2024-05-20 RX ORDER — LEVOTHYROXINE SODIUM 0.03 MG/1
12.5 TABLET ORAL DAILY
Qty: 45 TABLET | Refills: 1 | Status: SHIPPED | OUTPATIENT
Start: 2024-05-20

## 2024-05-20 NOTE — TELEPHONE ENCOUNTER
Rx Refill Note    Requested Prescriptions     Pending Prescriptions Disp Refills    levothyroxine (SYNTHROID, LEVOTHROID) 25 MCG tablet [Pharmacy Med Name: LEVOTHYROXINE 0.025MG (25MCG) TAB] 45 tablet 1     Sig: TAKE 1/2 TABLET BY MOUTH DAILY        Last office visit with prescribing clinician: 5/14/2024     Next office visit with prescribing clinician: 11/26/2024   {    Lashell Saucedo MA  05/20/24, 11:32 EDT

## 2024-05-23 ENCOUNTER — TELEPHONE (OUTPATIENT)
Dept: ENDOCRINOLOGY | Facility: CLINIC | Age: 69
End: 2024-05-23
Payer: MEDICARE

## 2024-05-23 NOTE — TELEPHONE ENCOUNTER
Pt called was a little concern cholesterol was high on 05/14/24 and put was to take pravachol pt having a bone density scan done and was looking at side effects joint and muscle pain. Pt wanting to know if there is a diet she can do to try and lower cholesterol without taking medication.Pt states she has been eating a lot of cheese and eggs might have to change to egg whites. Pt also wants to know about the medication she is to take for her bone density test she was unsure if Dr Hong could order  or does Dr Verma have to order and also wants to know if Dr Hong would be able to result the bone density test.

## 2024-05-23 NOTE — TELEPHONE ENCOUNTER
Pt was advised Dr Hong is out of the office until Tuesday and I would send her questions to a covering provider, however the pt was insistent that the message wait until Dr Hong returns  She is primarily asking if pravastatin is really necessary and if she could try diet for 6 months instead because she will start the zometa and is concerned about starting 2 new meds at the same time    Please advise

## 2024-05-28 NOTE — TELEPHONE ENCOUNTER
Ok to try diet but ldl has been 145 in 11/23 and 142 5/24 (so for over a year).  Alternatively she can start one med and wait 1 month and start the next   Thanks,   Rajiv Hong MD

## 2024-05-30 NOTE — TELEPHONE ENCOUNTER
Caller: Siria Garcia    Relationship: Self    Best call back number: [phone]    Requested Prescriptions:   Requested Prescriptions     Pending Prescriptions Disp Refills    pantoprazole (PROTONIX) 40 MG EC tablet       Sig: Take 1 tablet by mouth Daily.        Pharmacy where request should be sent: [id number] [place] [address] - [phone]  - [phone] FX     Last office visit with prescribing clinician: 4/12/2024   Last telemedicine visit with prescribing clinician: Visit date not found   Next office visit with prescribing clinician: 4/18/2025     Additional details provided by patient: PATIENT WAS PREVIOUSLY BEING PRESCRIBED THIS BY HER GENERAL SURGEON AND NOW NEEDS IT TAKEN OVER BY HER PCP    Sho Zavala Rep   05/30/24 15:39 EDT

## 2024-05-30 NOTE — TELEPHONE ENCOUNTER
Rx Refill Note  Requested Prescriptions     Pending Prescriptions Disp Refills    pantoprazole (PROTONIX) 40 MG EC tablet 90 tablet 1     Sig: Take 1 tablet by mouth Daily.      Last office visit with prescribing clinician: 4/12/2024   Last telemedicine visit with prescribing clinician: Visit date not found   Next office visit with prescribing clinician: 4/18/2025       Luz Marina Torres MA  05/30/24, 16:04 EDT

## 2024-06-03 RX ORDER — PANTOPRAZOLE SODIUM 40 MG/1
40 TABLET, DELAYED RELEASE ORAL DAILY
Qty: 90 TABLET | Refills: 2 | Status: SHIPPED | OUTPATIENT
Start: 2024-06-03

## 2024-07-09 ENCOUNTER — OFFICE VISIT (OUTPATIENT)
Age: 69
End: 2024-07-09
Payer: MEDICARE

## 2024-07-09 VITALS
OXYGEN SATURATION: 95 % | RESPIRATION RATE: 18 BRPM | DIASTOLIC BLOOD PRESSURE: 94 MMHG | SYSTOLIC BLOOD PRESSURE: 168 MMHG | HEIGHT: 61 IN | BODY MASS INDEX: 33.23 KG/M2 | WEIGHT: 176 LBS | HEART RATE: 92 BPM

## 2024-07-09 DIAGNOSIS — R05.1 ACUTE COUGH: ICD-10-CM

## 2024-07-09 DIAGNOSIS — J30.89 ENVIRONMENTAL AND SEASONAL ALLERGIES: Primary | ICD-10-CM

## 2024-07-09 LAB
EXPIRATION DATE: NORMAL
EXPIRATION DATE: NORMAL
FLUAV RNA RESP QL NAA+PROBE: NEGATIVE
FLUBV RNA RESP QL NAA+PROBE: NEGATIVE
INTERNAL CONTROL: NORMAL
INTERNAL CONTROL: NORMAL
Lab: NORMAL
Lab: NORMAL
S PYO AG THROAT QL: NEGATIVE
SARS-COV-2 RNA RESP QL NAA+PROBE: NOT DETECTED

## 2024-07-09 PROCEDURE — 3080F DIAST BP >= 90 MM HG: CPT | Performed by: FAMILY MEDICINE

## 2024-07-09 PROCEDURE — 99213 OFFICE O/P EST LOW 20 MIN: CPT | Performed by: FAMILY MEDICINE

## 2024-07-09 PROCEDURE — 87636 SARSCOV2 & INF A&B AMP PRB: CPT | Performed by: FAMILY MEDICINE

## 2024-07-09 PROCEDURE — 1160F RVW MEDS BY RX/DR IN RCRD: CPT | Performed by: FAMILY MEDICINE

## 2024-07-09 PROCEDURE — 1125F AMNT PAIN NOTED PAIN PRSNT: CPT | Performed by: FAMILY MEDICINE

## 2024-07-09 PROCEDURE — 1159F MED LIST DOCD IN RCRD: CPT | Performed by: FAMILY MEDICINE

## 2024-07-09 PROCEDURE — 87880 STREP A ASSAY W/OPTIC: CPT | Performed by: FAMILY MEDICINE

## 2024-07-09 PROCEDURE — 3077F SYST BP >= 140 MM HG: CPT | Performed by: FAMILY MEDICINE

## 2024-07-09 RX ORDER — LEVOCETIRIZINE DIHYDROCHLORIDE 5 MG/1
5 TABLET, FILM COATED ORAL EVERY EVENING
Qty: 90 TABLET | Refills: 3 | Status: SHIPPED | OUTPATIENT
Start: 2024-07-09

## 2024-07-09 RX ORDER — FLUTICASONE PROPIONATE 50 MCG
2 SPRAY, SUSPENSION (ML) NASAL DAILY
Qty: 16 G | Refills: 11 | Status: SHIPPED | OUTPATIENT
Start: 2024-07-09

## 2024-07-09 NOTE — PROGRESS NOTES
"Chief Complaint  Sinus Problem (Pt states sinus problems along with sore throat)    Subjective        Siria Garcia presents to [facility]  Sinus Problem  Associated symptoms include coughing, ear pain (fullness) and a sore throat. Pertinent negatives include no congestion or shortness of breath.     Review of Systems   Constitutional:  Positive for fatigue. Negative for fever.   HENT:  Positive for ear pain (fullness), postnasal drip, rhinorrhea and sore throat. Negative for congestion.    Respiratory:  Positive for cough. Negative for chest tightness, shortness of breath and wheezing.        She was positive for strep in March and April. She continued to have nasal congestion until about 4-6 weeks ago. Then every thing loosened up and she has drainage. She swallows it down her throat. She no longer feels stuffy. Mouth and throat feels dry. This weekend she had family gathering and she was around many other people. She has been outdoors more and had increased sinus drainage. Today woke up feeling draggy like when she has an illness. Temp this morning 96 between Tylenol. She stopped loratadine due to dry mouth and then restarted it. She had allergy testing in the past reactive to trees and environmental allergies.     Home blood pressure was in range 113.       Objective   Vital Signs:  /94 (BP Location: Left arm, Patient Position: Sitting, Cuff Size: Adult)   Pulse 92   Resp 18   Ht 154.9 cm (61\")   Wt 79.8 kg (176 lb)   SpO2 95%   BMI 33.25 kg/m²   Estimated body mass index is 33.25 kg/m² as calculated from the following:    Height as of this encounter: 154.9 cm (61\").    Weight as of this encounter: 79.8 kg (176 lb).             Physical Exam  Vitals reviewed.   Constitutional:       General: She is not in acute distress.     Appearance: She is not ill-appearing, toxic-appearing or diaphoretic.   HENT:      Right Ear: Tympanic membrane and ear canal normal.      Left " Ear: Tympanic membrane and ear canal normal.      Nose: Rhinorrhea present. Rhinorrhea is purulent.      Right Turbinates: Not swollen.      Left Turbinates: Swollen.      Mouth/Throat:      Lips: No lesions.      Mouth: Mucous membranes are moist. No oral lesions.      Palate: No mass and lesions.      Pharynx: No pharyngeal swelling, oropharyngeal exudate, posterior oropharyngeal erythema or uvula swelling.      Tonsils: Tonsillar exudate present.   Cardiovascular:      Rate and Rhythm: Normal rate and regular rhythm.   Pulmonary:      Effort: Pulmonary effort is normal.      Breath sounds: Normal breath sounds.   Lymphadenopathy:      Head:      Right side of head: No submandibular, tonsillar, preauricular or posterior auricular adenopathy.      Left side of head: No submandibular, tonsillar, preauricular or posterior auricular adenopathy.   Neurological:      Mental Status: She is alert.   Psychiatric:         Mood and Affect: Mood normal.        Result Review :    The following data was reviewed by: Yvonne Carson MD on 07/09/2024:        Results for orders placed or performed in visit on 07/09/24   POCT rapid strep A    Specimen: Swab   Result Value Ref Range    Rapid Strep A Screen Negative Negative, VALID, INVALID, Not Performed    Internal Control Passed Passed    Lot Number [id number]     Expiration Date 10-30-26    Covid-19 + Flu A&B AG, Veritor    Specimen: Nasal Cavity; Swab   Result Value Ref Range    COVID19 Not Detected Not Detected - Ref. Range    POC Influenza A, Molecular Negative Negative / Not Detected    POC Influenza B, Molecular Negative Negative / Not Detected    Internal Control Passed Passed    Lot Number [id number]     Expiration Date 2-15-25               Assessment and Plan     Diagnoses and all orders for this visit:    1. Environmental and seasonal allergies (Primary)  -     POCT rapid strep A  -     Covid-19 + Flu A&B AG, Veritor  -     levocetirizine (XYZAL) 5 MG tablet; Take 1  tablet by mouth Every Evening.  Dispense: 90 tablet; Refill: 3  -     fluticasone (FLONASE) 50 MCG/ACT nasal spray; 2 sprays into the nostril(s) as directed by provider Daily.  Dispense: 16 g; Refill: 11    2. Acute cough  -     POCT rapid strep A  -     Covid-19 + Flu A&B AG, Veritor    Switch from loratadine to xyzal. Restart flonase.     Use biotene mouth rinse, hard candies to help dry mouth.     If not improved or worsening by next week, start prescription on hand of cephalexin 500mg.          Follow Up     Return if symptoms worsen or fail to improve.  Patient was given instructions and counseling regarding her condition or for health maintenance advice. Please see specific information pulled into the AVS if appropriate.     Electronically signed by Yvonne Carson MD, 07/09/24, 4:43 PM EDT.

## 2024-09-10 ENCOUNTER — PATIENT OUTREACH (OUTPATIENT)
Age: 69
End: 2024-09-10
Payer: MEDICARE

## 2024-09-10 NOTE — OUTREACH NOTE
SW attempted an outreach and left a voicemail with callback information. SW will attempt again in two days.  Nicole SANCHEZ -   Ambulatory Case Management    9/10/2024, 11:15 EDT

## 2024-09-19 ENCOUNTER — PATIENT OUTREACH (OUTPATIENT)
Age: 69
End: 2024-09-19
Payer: MEDICARE

## 2024-11-08 DIAGNOSIS — E78.00 HYPERCHOLESTEROLEMIA: ICD-10-CM

## 2024-11-08 NOTE — TELEPHONE ENCOUNTER
Rx Refill Note  Requested Prescriptions     Pending Prescriptions Disp Refills    levothyroxine (SYNTHROID, LEVOTHROID) 25 MCG tablet [Pharmacy Med Name: LEVOTHYROXINE 0.025MG (25MCG) TAB] 45 tablet 0     Sig: TAKE 1/2 TABLET BY MOUTH DAILY    pravastatin (PRAVACHOL) 20 MG tablet [Pharmacy Med Name: PRAVASTATIN 20MG TABLETS] 90 tablet 0     Sig: TAKE 1 TABLET BY MOUTH EVERY EVENING          Last office visit with prescribing clinician: 5/14/2024     Next office visit with prescribing clinician: 11/26/2024   }    Popeye Kenny MA  11/08/24, 13:27 EST

## 2024-11-11 RX ORDER — PRAVASTATIN SODIUM 20 MG
20 TABLET ORAL EVERY EVENING
Qty: 90 TABLET | Refills: 0 | Status: SHIPPED | OUTPATIENT
Start: 2024-11-11 | End: 2025-11-11

## 2024-11-11 RX ORDER — LEVOTHYROXINE SODIUM 25 UG/1
12.5 TABLET ORAL DAILY
Qty: 45 TABLET | Refills: 0 | Status: SHIPPED | OUTPATIENT
Start: 2024-11-11

## 2024-11-20 ENCOUNTER — TRANSCRIBE ORDERS (OUTPATIENT)
Dept: GENERAL RADIOLOGY | Facility: HOSPITAL | Age: 69
End: 2024-11-20
Payer: MEDICARE

## 2024-11-20 ENCOUNTER — HOSPITAL ENCOUNTER (OUTPATIENT)
Dept: GENERAL RADIOLOGY | Facility: HOSPITAL | Age: 69
Discharge: HOME | End: 2024-11-20
Admitting: UROLOGY
Payer: MEDICARE

## 2024-11-20 DIAGNOSIS — N20.0 CALCULUS OF KIDNEY: Primary | ICD-10-CM

## 2024-11-20 DIAGNOSIS — N20.0 CALCULUS OF KIDNEY: ICD-10-CM

## 2024-11-20 PROCEDURE — 74018 RADEX ABDOMEN 1 VIEW: CPT

## 2024-11-26 ENCOUNTER — OFFICE VISIT (OUTPATIENT)
Dept: ENDOCRINOLOGY | Facility: CLINIC | Age: 69
End: 2024-11-26
Payer: MEDICARE

## 2024-11-26 VITALS
HEART RATE: 97 BPM | OXYGEN SATURATION: 97 % | HEIGHT: 61 IN | WEIGHT: 172.4 LBS | BODY MASS INDEX: 32.55 KG/M2 | SYSTOLIC BLOOD PRESSURE: 154 MMHG | DIASTOLIC BLOOD PRESSURE: 86 MMHG

## 2024-11-26 DIAGNOSIS — E61.1 IRON DEFICIENCY: ICD-10-CM

## 2024-11-26 DIAGNOSIS — I10 BENIGN ESSENTIAL HYPERTENSION: ICD-10-CM

## 2024-11-26 DIAGNOSIS — R73.09 ELEVATED GLUCOSE: Primary | ICD-10-CM

## 2024-11-26 DIAGNOSIS — E03.9 ACQUIRED HYPOTHYROIDISM: ICD-10-CM

## 2024-11-26 DIAGNOSIS — E78.00 HYPERCHOLESTEROLEMIA: ICD-10-CM

## 2024-11-26 DIAGNOSIS — E55.9 VITAMIN D DEFICIENCY: ICD-10-CM

## 2024-11-26 LAB
EXPIRATION DATE: NORMAL
EXPIRATION DATE: NORMAL
GLUCOSE BLDC GLUCOMTR-MCNC: 109 MG/DL (ref 70–130)
HBA1C MFR BLD: 5.5 % (ref 4.5–5.7)
Lab: NORMAL
Lab: NORMAL

## 2024-11-26 PROCEDURE — 3079F DIAST BP 80-89 MM HG: CPT | Performed by: INTERNAL MEDICINE

## 2024-11-26 PROCEDURE — 3044F HG A1C LEVEL LT 7.0%: CPT | Performed by: INTERNAL MEDICINE

## 2024-11-26 PROCEDURE — 84439 ASSAY OF FREE THYROXINE: CPT | Performed by: INTERNAL MEDICINE

## 2024-11-26 PROCEDURE — 82947 ASSAY GLUCOSE BLOOD QUANT: CPT | Performed by: INTERNAL MEDICINE

## 2024-11-26 PROCEDURE — 83036 HEMOGLOBIN GLYCOSYLATED A1C: CPT | Performed by: INTERNAL MEDICINE

## 2024-11-26 PROCEDURE — 82306 VITAMIN D 25 HYDROXY: CPT | Performed by: INTERNAL MEDICINE

## 2024-11-26 PROCEDURE — 80061 LIPID PANEL: CPT | Performed by: INTERNAL MEDICINE

## 2024-11-26 PROCEDURE — 1160F RVW MEDS BY RX/DR IN RCRD: CPT | Performed by: INTERNAL MEDICINE

## 2024-11-26 PROCEDURE — 83540 ASSAY OF IRON: CPT | Performed by: INTERNAL MEDICINE

## 2024-11-26 PROCEDURE — 36415 COLL VENOUS BLD VENIPUNCTURE: CPT | Performed by: INTERNAL MEDICINE

## 2024-11-26 PROCEDURE — 3077F SYST BP >= 140 MM HG: CPT | Performed by: INTERNAL MEDICINE

## 2024-11-26 PROCEDURE — G2211 COMPLEX E/M VISIT ADD ON: HCPCS | Performed by: INTERNAL MEDICINE

## 2024-11-26 PROCEDURE — 1159F MED LIST DOCD IN RCRD: CPT | Performed by: INTERNAL MEDICINE

## 2024-11-26 PROCEDURE — 99214 OFFICE O/P EST MOD 30 MIN: CPT | Performed by: INTERNAL MEDICINE

## 2024-11-26 PROCEDURE — 80053 COMPREHEN METABOLIC PANEL: CPT | Performed by: INTERNAL MEDICINE

## 2024-11-26 PROCEDURE — 84443 ASSAY THYROID STIM HORMONE: CPT | Performed by: INTERNAL MEDICINE

## 2024-11-26 NOTE — PROGRESS NOTES
Siria Garcia 68 y.o.  CC: follow up hypothyroid,hyperlipidemia    [place]: follow up hypothyroid, hyperlipidemia    Bp is high today - recheck   Is taking synthroid 12.5 mg daily   Is eating low fat diet and taking pravachol 20 mg daily   Reviewed questions  Ok to take minoxidil  Having skin cancer f/u forehead  Is taking zometa for bone loss- first treatment 8/24  Recheck 134/82    Results for orders placed or performed in visit on 11/26/24   POC Glucose, Blood    Collection Time: 11/26/24  3:40 PM    Specimen: Blood   Result Value Ref Range    Glucose 109 70 - 130 mg/dL    Lot Number [id number]     Expiration Date 7/18/25    POC Glycosylated Hemoglobin (Hb A1C)    Collection Time: 11/26/24  3:46 PM    Specimen: Blood   Result Value Ref Range    Hemoglobin A1C 5.5 4.5 - 5.7 %    Lot Number [id number]     Expiration Date 8/1/26    T4, Free    Collection Time: 11/26/24  4:02 PM    Specimen: Blood   Result Value Ref Range    Free T4 1.56 0.92 - 1.68 ng/dL   TSH    Collection Time: 11/26/24  4:02 PM    Specimen: Blood   Result Value Ref Range    TSH 0.391 0.270 - 4.200 uIU/mL   Comprehensive Metabolic Panel    Collection Time: 11/26/24  4:02 PM    Specimen: Blood   Result Value Ref Range    Glucose 97 65 - 99 mg/dL    BUN 14 8 - 23 mg/dL    Creatinine 1.01 (H) 0.57 - 1.00 mg/dL    Sodium 141 136 - 145 mmol/L    Potassium 4.3 3.5 - 5.2 mmol/L    Chloride 104 98 - 107 mmol/L    CO2 23.4 22.0 - 29.0 mmol/L    Calcium 9.7 8.6 - 10.5 mg/dL    Total Protein 7.9 6.0 - 8.5 g/dL    Albumin 4.9 3.5 - 5.2 g/dL    ALT (SGPT) 29 1 - 33 U/L    AST (SGOT) 31 1 - 32 U/L    Alkaline Phosphatase 60 39 - 117 U/L    Total Bilirubin 0.3 0.0 - 1.2 mg/dL    Globulin 3.0 gm/dL    A/G Ratio 1.6 g/dL    BUN/Creatinine Ratio 13.9 7.0 - 25.0    Anion Gap 13.6 5.0 - 15.0 mmol/L    eGFR 60.4 >60.0 mL/min/1.73   Lipid Panel    Collection Time: 11/26/24  4:02 PM    Specimen: Blood   Result Value Ref Range    Total Cholesterol 151 0 - 200 mg/dL     Triglycerides 104 0 - 150 mg/dL    HDL Cholesterol 65 (H) 40 - 60 mg/dL    LDL Cholesterol  67 0 - 100 mg/dL    VLDL Cholesterol 19 5 - 40 mg/dL    LDL/HDL Ratio 1.00    Vitamin D,25-Hydroxy    Collection Time: 11/26/24  4:02 PM    Specimen: Arm, Left; Blood   Result Value Ref Range    25 Hydroxy, Vitamin D 49.9 30.0 - 100.0 ng/ml   Iron    Collection Time: 11/26/24  4:02 PM    Specimen: Blood   Result Value Ref Range    Iron 71 37 - 145 mcg/dL   Blood sugar and 90 day average sugar discussed and are normal    Allergies   Allergen Reactions    Other Other (See Comments)    Bactrim [Sulfamethoxazole-Trimethoprim] Itching     Trimehtoprim is okay, raction when combined with sulfa     Terbinafine Itching       Current Outpatient Medications:     acetaminophen (TYLENOL) 500 MG tablet, Take 2 tablets by mouth Every 6 (Six) Hours As Needed for Mild Pain., Disp: , Rfl:     Biotin 1000 MCG chewable tablet, Chew 1,000 mcg Daily., Disp: , Rfl:     Cholecalciferol (Vitamin D) 50 MCG (2000 UT) tablet, Take 1 tablet by mouth Daily., Disp: , Rfl:     CRANBERRY CONCENTRATE PO, Take 1 tablet/day by mouth 2 (Two) Times a Day., Disp: , Rfl:     exemestane (AROMASIN) 25 MG chemo tablet, Take 1 tablet by mouth Daily., Disp: , Rfl:     fluticasone (FLONASE) 50 MCG/ACT nasal spray, 2 sprays into the nostril(s) as directed by provider Daily. (Patient taking differently: Administer 2 sprays into the nostril(s) as directed by provider As Needed.), Disp: 16 g, Rfl: 11    Glucos-Chond-Hyal Ac-Ca Fructo (MOVE FREE JOINT HEALTH ADVANCE PO), Take 1 tablet/day by mouth. (Patient taking differently: Take 1 tablet/day by mouth As Needed.), Disp: , Rfl:     guaiFENesin (MUCINEX) 600 MG 12 hr tablet, Take 2 tablets by mouth 2 (Two) Times a Day. (Patient taking differently: Take 2 tablets by mouth As Needed.), Disp: , Rfl:     Histamine Dihydrochloride (Australian Dream Arthritis) 0.025 % cream, Apply  topically As Needed (joint pain). Apply topically  TID, Disp: , Rfl:     levocetirizine (XYZAL) 5 MG tablet, Take 1 tablet by mouth Every Evening. (Patient taking differently: Take 1 tablet by mouth As Needed.), Disp: 90 tablet, Rfl: 3    levothyroxine (SYNTHROID, LEVOTHROID) 25 MCG tablet, TAKE 1/2 TABLET BY MOUTH DAILY, Disp: 45 tablet, Rfl: 0    Loperamide HCl (IMODIUM PO), Take 1 tablet by mouth As Needed (urgency)., Disp: , Rfl:     Multiple Minerals (JOINT HEALTH MINERAL PO), Take 1 capsule by mouth Daily., Disp: , Rfl:     Multiple Vitamins-Minerals (YARI MULTIVITAMIN FOR WOMEN PO), Take 1 tablet by mouth Daily., Disp: , Rfl:     Omega-3 Fatty Acids (OMEGA-3 1450 PO), Take 1,000 mg by mouth Daily., Disp: , Rfl:     pantoprazole (PROTONIX) 40 MG EC tablet, Take 1 tablet by mouth Daily., Disp: 90 tablet, Rfl: 2    pravastatin (PRAVACHOL) 20 MG tablet, TAKE 1 TABLET BY MOUTH EVERY EVENING, Disp: 90 tablet, Rfl: 0    sore muscle (ICY HOT EXTRA STRENGTH) 10-30 % cream cream, Apply 1 Application topically to the appropriate area as directed 2 (Two) Times a Day As Needed (joint pain). With lido, Disp: , Rfl:     trimethoprim (TRIMPEX) 100 MG tablet, Take 1 tablet by mouth Daily., Disp: 90 tablet, Rfl: 1    Zoledronic Acid (ZOMETA IV), Infuse  into a venous catheter Every 6 (Six) Months., Disp: , Rfl:     Patient Active Problem List    Diagnosis     Iron deficiency [E61.1]     Environmental and seasonal allergies [J30.89]     Osteopenia of multiple sites [M85.89]     Chronic low back pain [M54.50, G89.29]     Nephrolithiasis [N20.0]     Obstructive uropathy [N13.9]     Vitamin D deficiency [E55.9]     Hypercalcemia [E83.52]     Elevated glucose [R73.09]     Tubular adenoma [D36.9]     Malignant neoplasm of upper-outer quadrant of breast in female, estrogen receptor positive [C50.419, Z17.0]     Benign essential hypertension [I10]     Gastroesophageal reflux disease [K21.9]     Fatigue [R53.83]     Hypercholesterolemia [E78.00]     Hypothyroidism [E03.9]      "Osteoarthritis of hand [M19.049]      Review of Systems   Constitutional:  Negative for activity change, appetite change and unexpected weight change.   HENT:  Negative for congestion and rhinorrhea.    Eyes:  Negative for visual disturbance.   Respiratory:  Negative for cough and shortness of breath.    Cardiovascular:  Negative for palpitations and leg swelling.   Gastrointestinal:  Negative for constipation, diarrhea and nausea.   Genitourinary:  Negative for hematuria.   Musculoskeletal:  Negative for arthralgias, back pain, gait problem, joint swelling and myalgias.   Skin:  Negative for color change, rash and wound.   Allergic/Immunologic: Negative for environmental allergies, food allergies and immunocompromised state.   Neurological:  Negative for dizziness, weakness and light-headedness.   Psychiatric/Behavioral:  Negative for confusion, decreased concentration, dysphoric mood and sleep disturbance. The patient is not nervous/anxious.        Social History     Socioeconomic History    Marital status:    Tobacco Use    Smoking status: Former     Current packs/day: 0.00     Average packs/day: 0.5 packs/day for 13.0 years (6.5 ttl pk-yrs)     Types: Cigarettes     Start date:      Quit date:      Years since quittin.9     Passive exposure: Past    Smokeless tobacco: Never   Vaping Use    Vaping status: Never Used   Substance and Sexual Activity    Alcohol use: No    Drug use: No    Sexual activity: Not Currently     Family History   Problem Relation Age of Onset    Diabetes Father     Hypertension Father     Breast cancer Mother      /86 (BP Location: Left arm, Patient Position: Sitting, Cuff Size: Adult)   Pulse 97   Ht 154.9 cm (61\")   Wt 78.2 kg (172 lb 6.4 oz)   SpO2 97%   BMI 32.57 kg/m²     Physical Exam  Vitals and nursing note reviewed.   Constitutional:       Appearance: Normal appearance. She is well-developed.   HENT:      Head: Normocephalic and atraumatic.   Eyes: "      General: Lids are normal.      Extraocular Movements: Extraocular movements intact.      Conjunctiva/sclera: Conjunctivae normal.      Pupils: Pupils are equal, round, and reactive to light.   Neck:      Thyroid: No thyroid mass or thyromegaly.      Vascular: No carotid bruit.      Trachea: Trachea normal. No tracheal deviation.   Cardiovascular:      Rate and Rhythm: Normal rate and regular rhythm.      Pulses: Normal pulses.      Heart sounds: Normal heart sounds. No murmur heard.     No friction rub. No gallop.   Pulmonary:      Effort: Pulmonary effort is normal. No respiratory distress.      Breath sounds: Normal breath sounds. No wheezing.   Musculoskeletal:         General: No deformity. Normal range of motion.      Cervical back: Normal range of motion and neck supple.   Lymphadenopathy:      Cervical: No cervical adenopathy.   Skin:     General: Skin is warm and dry.      Findings: No erythema or rash.      Nails: There is no clubbing.   Neurological:      General: No focal deficit present.      Mental Status: She is alert and oriented to person, place, and time.      Cranial Nerves: No cranial nerve deficit.      Deep Tendon Reflexes: Reflexes are normal and symmetric. Reflexes normal.   Psychiatric:         Mood and Affect: Mood normal.         Speech: Speech normal.         Behavior: Behavior normal.         Thought Content: Thought content normal.         Judgment: Judgment normal.       Results for orders placed or performed in visit on 11/26/24   POC Glucose, Blood    Collection Time: 11/26/24  3:40 PM    Specimen: Blood   Result Value Ref Range    Glucose 109 70 - 130 mg/dL    Lot Number [id number]     Expiration Date 7/18/25    POC Glycosylated Hemoglobin (Hb A1C)    Collection Time: 11/26/24  3:46 PM    Specimen: Blood   Result Value Ref Range    Hemoglobin A1C 5.5 4.5 - 5.7 %    Lot Number [id number]     Expiration Date 8/1/26    T4, Free    Collection Time: 11/26/24  4:02 PM    Specimen:  Blood   Result Value Ref Range    Free T4 1.56 0.92 - 1.68 ng/dL   TSH    Collection Time: 11/26/24  4:02 PM    Specimen: Blood   Result Value Ref Range    TSH 0.391 0.270 - 4.200 uIU/mL   Comprehensive Metabolic Panel    Collection Time: 11/26/24  4:02 PM    Specimen: Blood   Result Value Ref Range    Glucose 97 65 - 99 mg/dL    BUN 14 8 - 23 mg/dL    Creatinine 1.01 (H) 0.57 - 1.00 mg/dL    Sodium 141 136 - 145 mmol/L    Potassium 4.3 3.5 - 5.2 mmol/L    Chloride 104 98 - 107 mmol/L    CO2 23.4 22.0 - 29.0 mmol/L    Calcium 9.7 8.6 - 10.5 mg/dL    Total Protein 7.9 6.0 - 8.5 g/dL    Albumin 4.9 3.5 - 5.2 g/dL    ALT (SGPT) 29 1 - 33 U/L    AST (SGOT) 31 1 - 32 U/L    Alkaline Phosphatase 60 39 - 117 U/L    Total Bilirubin 0.3 0.0 - 1.2 mg/dL    Globulin 3.0 gm/dL    A/G Ratio 1.6 g/dL    BUN/Creatinine Ratio 13.9 7.0 - 25.0    Anion Gap 13.6 5.0 - 15.0 mmol/L    eGFR 60.4 >60.0 mL/min/1.73   Lipid Panel    Collection Time: 11/26/24  4:02 PM    Specimen: Blood   Result Value Ref Range    Total Cholesterol 151 0 - 200 mg/dL    Triglycerides 104 0 - 150 mg/dL    HDL Cholesterol 65 (H) 40 - 60 mg/dL    LDL Cholesterol  67 0 - 100 mg/dL    VLDL Cholesterol 19 5 - 40 mg/dL    LDL/HDL Ratio 1.00    Vitamin D,25-Hydroxy    Collection Time: 11/26/24  4:02 PM    Specimen: Arm, Left; Blood   Result Value Ref Range    25 Hydroxy, Vitamin D 49.9 30.0 - 100.0 ng/ml   Iron    Collection Time: 11/26/24  4:02 PM    Specimen: Blood   Result Value Ref Range    Iron 71 37 - 145 mcg/dL     Diagnoses and all orders for this visit:    1. Elevated glucose (Primary)  Assessment & Plan:  With normal blood sugar and 90 day average sugar-   Results for orders placed or performed in visit on 11/26/24   POC Glucose, Blood    Collection Time: 11/26/24  3:40 PM    Specimen: Blood   Result Value Ref Range    Glucose 109 70 - 130 mg/dL    Lot Number [id number]     Expiration Date 7/18/25    POC Glycosylated Hemoglobin (Hb A1C)    Collection Time:  11/26/24  3:46 PM    Specimen: Blood   Result Value Ref Range    Hemoglobin A1C 5.5 4.5 - 5.7 %    Lot Number [id number]     Expiration Date 8/1/26    T4, Free    Collection Time: 11/26/24  4:02 PM    Specimen: Blood   Result Value Ref Range    Free T4 1.56 0.92 - 1.68 ng/dL   TSH    Collection Time: 11/26/24  4:02 PM    Specimen: Blood   Result Value Ref Range    TSH 0.391 0.270 - 4.200 uIU/mL   Comprehensive Metabolic Panel    Collection Time: 11/26/24  4:02 PM    Specimen: Blood   Result Value Ref Range    Glucose 97 65 - 99 mg/dL    BUN 14 8 - 23 mg/dL    Creatinine 1.01 (H) 0.57 - 1.00 mg/dL    Sodium 141 136 - 145 mmol/L    Potassium 4.3 3.5 - 5.2 mmol/L    Chloride 104 98 - 107 mmol/L    CO2 23.4 22.0 - 29.0 mmol/L    Calcium 9.7 8.6 - 10.5 mg/dL    Total Protein 7.9 6.0 - 8.5 g/dL    Albumin 4.9 3.5 - 5.2 g/dL    ALT (SGPT) 29 1 - 33 U/L    AST (SGOT) 31 1 - 32 U/L    Alkaline Phosphatase 60 39 - 117 U/L    Total Bilirubin 0.3 0.0 - 1.2 mg/dL    Globulin 3.0 gm/dL    A/G Ratio 1.6 g/dL    BUN/Creatinine Ratio 13.9 7.0 - 25.0    Anion Gap 13.6 5.0 - 15.0 mmol/L    eGFR 60.4 >60.0 mL/min/1.73   Lipid Panel    Collection Time: 11/26/24  4:02 PM    Specimen: Blood   Result Value Ref Range    Total Cholesterol 151 0 - 200 mg/dL    Triglycerides 104 0 - 150 mg/dL    HDL Cholesterol 65 (H) 40 - 60 mg/dL    LDL Cholesterol  67 0 - 100 mg/dL    VLDL Cholesterol 19 5 - 40 mg/dL    LDL/HDL Ratio 1.00    Vitamin D,25-Hydroxy    Collection Time: 11/26/24  4:02 PM    Specimen: Arm, Left; Blood   Result Value Ref Range    25 Hydroxy, Vitamin D 49.9 30.0 - 100.0 ng/ml   Iron    Collection Time: 11/26/24  4:02 PM    Specimen: Blood   Result Value Ref Range    Iron 71 37 - 145 mcg/dL     Continue diet, monitoring     Orders:  -     POC Glycosylated Hemoglobin (Hb A1C)  -     POC Glucose, Blood    2. Hypercholesterolemia  Assessment & Plan:  Is eating low fat diet- check flp   Taking omega 3 fa and pravachol     Orders:  -      Lipid Panel; Future  -     Lipid Panel    3. Benign essential hypertension  Assessment & Plan:  BP is high- recheck normal  Recommended continued monitoring and let us know if over 135/85 at home   Monitor 2-3 times weekly     Orders:  -     Comprehensive Metabolic Panel; Future  -     Comprehensive Metabolic Panel    4. Vitamin D deficiency  Assessment & Plan:  Taking supplement, low bone density treated with zometa- check levels     Orders:  -     Vitamin D,25-Hydroxy; Future  -     Iron; Future  -     Vitamin D,25-Hydroxy  -     Iron    5. Acquired hypothyroidism  Assessment & Plan:  Taking levothyroxine 12.5 mcg daily   Check tfts     Orders:  -     T4, Free; Future  -     TSH; Future  -     T4, Free  -     TSH    6. Iron deficiency  -     Iron; Future  -     Iron    Return in about 6 months (around 5/26/2025) for Recheck.    Electronically signed by: Sofia Hong MD

## 2024-11-27 LAB
25(OH)D3 SERPL-MCNC: 49.9 NG/ML (ref 30–100)
ALBUMIN SERPL-MCNC: 4.9 G/DL (ref 3.5–5.2)
ALBUMIN/GLOB SERPL: 1.6 G/DL
ALP SERPL-CCNC: 60 U/L (ref 39–117)
ALT SERPL W P-5'-P-CCNC: 29 U/L (ref 1–33)
ANION GAP SERPL CALCULATED.3IONS-SCNC: 13.6 MMOL/L (ref 5–15)
AST SERPL-CCNC: 31 U/L (ref 1–32)
BILIRUB SERPL-MCNC: 0.3 MG/DL (ref 0–1.2)
BUN SERPL-MCNC: 14 MG/DL (ref 8–23)
BUN/CREAT SERPL: 13.9 (ref 7–25)
CALCIUM SPEC-SCNC: 9.7 MG/DL (ref 8.6–10.5)
CHLORIDE SERPL-SCNC: 104 MMOL/L (ref 98–107)
CHOLEST SERPL-MCNC: 151 MG/DL (ref 0–200)
CO2 SERPL-SCNC: 23.4 MMOL/L (ref 22–29)
CREAT SERPL-MCNC: 1.01 MG/DL (ref 0.57–1)
EGFRCR SERPLBLD CKD-EPI 2021: 60.4 ML/MIN/1.73
GLOBULIN UR ELPH-MCNC: 3 GM/DL
GLUCOSE SERPL-MCNC: 97 MG/DL (ref 65–99)
HDLC SERPL-MCNC: 65 MG/DL (ref 40–60)
IRON 24H UR-MRATE: 71 MCG/DL (ref 37–145)
LDLC SERPL CALC-MCNC: 67 MG/DL (ref 0–100)
LDLC/HDLC SERPL: 1 {RATIO}
POTASSIUM SERPL-SCNC: 4.3 MMOL/L (ref 3.5–5.2)
PROT SERPL-MCNC: 7.9 G/DL (ref 6–8.5)
SODIUM SERPL-SCNC: 141 MMOL/L (ref 136–145)
T4 FREE SERPL-MCNC: 1.56 NG/DL (ref 0.92–1.68)
TRIGL SERPL-MCNC: 104 MG/DL (ref 0–150)
TSH SERPL DL<=0.05 MIU/L-ACNC: 0.39 UIU/ML (ref 0.27–4.2)
VLDLC SERPL-MCNC: 19 MG/DL (ref 5–40)

## 2024-12-02 PROBLEM — E61.1 IRON DEFICIENCY: Status: ACTIVE | Noted: 2024-12-02

## 2024-12-02 NOTE — ASSESSMENT & PLAN NOTE
With normal blood sugar and 90 day average sugar-   Results for orders placed or performed in visit on 11/26/24   POC Glucose, Blood    Collection Time: 11/26/24  3:40 PM    Specimen: Blood   Result Value Ref Range    Glucose 109 70 - 130 mg/dL    Lot Number [id number]     Expiration Date 7/18/25    POC Glycosylated Hemoglobin (Hb A1C)    Collection Time: 11/26/24  3:46 PM    Specimen: Blood   Result Value Ref Range    Hemoglobin A1C 5.5 4.5 - 5.7 %    Lot Number [id number]     Expiration Date 8/1/26    T4, Free    Collection Time: 11/26/24  4:02 PM    Specimen: Blood   Result Value Ref Range    Free T4 1.56 0.92 - 1.68 ng/dL   TSH    Collection Time: 11/26/24  4:02 PM    Specimen: Blood   Result Value Ref Range    TSH 0.391 0.270 - 4.200 uIU/mL   Comprehensive Metabolic Panel    Collection Time: 11/26/24  4:02 PM    Specimen: Blood   Result Value Ref Range    Glucose 97 65 - 99 mg/dL    BUN 14 8 - 23 mg/dL    Creatinine 1.01 (H) 0.57 - 1.00 mg/dL    Sodium 141 136 - 145 mmol/L    Potassium 4.3 3.5 - 5.2 mmol/L    Chloride 104 98 - 107 mmol/L    CO2 23.4 22.0 - 29.0 mmol/L    Calcium 9.7 8.6 - 10.5 mg/dL    Total Protein 7.9 6.0 - 8.5 g/dL    Albumin 4.9 3.5 - 5.2 g/dL    ALT (SGPT) 29 1 - 33 U/L    AST (SGOT) 31 1 - 32 U/L    Alkaline Phosphatase 60 39 - 117 U/L    Total Bilirubin 0.3 0.0 - 1.2 mg/dL    Globulin 3.0 gm/dL    A/G Ratio 1.6 g/dL    BUN/Creatinine Ratio 13.9 7.0 - 25.0    Anion Gap 13.6 5.0 - 15.0 mmol/L    eGFR 60.4 >60.0 mL/min/1.73   Lipid Panel    Collection Time: 11/26/24  4:02 PM    Specimen: Blood   Result Value Ref Range    Total Cholesterol 151 0 - 200 mg/dL    Triglycerides 104 0 - 150 mg/dL    HDL Cholesterol 65 (H) 40 - 60 mg/dL    LDL Cholesterol  67 0 - 100 mg/dL    VLDL Cholesterol 19 5 - 40 mg/dL    LDL/HDL Ratio 1.00    Vitamin D,25-Hydroxy    Collection Time: 11/26/24  4:02 PM    Specimen: Arm, Left; Blood   Result Value Ref Range    25 Hydroxy, Vitamin D 49.9 30.0 - 100.0 ng/ml    Iron    Collection Time: 11/26/24  4:02 PM    Specimen: Blood   Result Value Ref Range    Iron 71 37 - 145 mcg/dL     Continue diet, monitoring

## 2024-12-02 NOTE — ASSESSMENT & PLAN NOTE
BP is high- recheck normal  Recommended continued monitoring and let us know if over 135/85 at home   Monitor 2-3 times weekly

## 2025-01-24 ENCOUNTER — TELEPHONE (OUTPATIENT)
Age: 70
End: 2025-01-24
Payer: MEDICARE

## 2025-01-24 DIAGNOSIS — E78.00 HYPERCHOLESTEROLEMIA: ICD-10-CM

## 2025-01-24 DIAGNOSIS — L65.9 HAIR LOSS: Primary | ICD-10-CM

## 2025-01-24 RX ORDER — PRAVASTATIN SODIUM 20 MG
20 TABLET ORAL EVERY EVENING
Qty: 90 TABLET | Refills: 0 | Status: SHIPPED | OUTPATIENT
Start: 2025-01-24 | End: 2026-01-24

## 2025-01-24 NOTE — TELEPHONE ENCOUNTER
Caller: EDUARDO NGO     Best call back number:      [phone] (Mobile)     Who are you requesting to speak with (clinical staff, provider,  specific staff member):CLINICAL    What was the call regarding:  LAST SPRING PATIENT HAD PLACES UNDER LEFT EYE AND SHE SAW A DERMATOLOGY IN OCTOBER   THE PLACES WERE FINE AND THERE WAS PRECANCERS PLACE ON HER  FOREHEAD   WHILE SHE WAS THERE SHE ASK THE DERMATOLOGIST ABOUT HER HAIR LOSS   FROM A SURGERY SHE HAD 3 BACTERIAL INFECTIONS,  KIDNEYS STONES AND 3 ANESTHESIA AND SEPSIS   AUG TO OCT 2023 SHE LOST SOME HAIR AND  WITH ALL THAT SHE HAD A LOT OF MATTING WITH HAIR     PATIENT MENTIONED THIS TO HER DERMATOLOGIST AND SHE PRESCRIBED   MINOXIDIL 2.5 MG TAKE 1/2 TABLET A DAY   PATIENT DIDN'T FEEL REAL CONFIDENT ABOUT THE APPOINTMENT WITH THE DERMATOLOGIST     SHE WAS CONCERNED ABOUT THIS MEDICATION WOULD LOWER HER BLOOD PRESSURE   SHE WANTS TO KNOW WHAT DR BLUE THINKS ABOUT THIS MEDICATION  PROS OR CONS ABOUT THIS     SHE WANTS TO KNOW IF SHE NEEDS TO SEE ANOTHER DERMATOLOGY THAT WOULD BE SPEND MORE TIME WITH HER AND GET A REFERRAL   OR SHOULD SHE SEE A    SHE SAW THIS ON TV REVISED [facility]   AND PATIENT WANTS TO KNOW IF SHE DOESN'T GO BACK TO THE DERMATOLOGIST WOULD DR BLUE REFILL THIS MEDICATION FOR HER IN THE FUTURE     ALSO DOES SHE THINK THE NIACIN PRODUCTS WOULD WORK FOR HER     ALSO 11-26-24 PATIENT'S THYROID DR MATTHEWS RAN BLOOD WORK  EDUARDO MENTIONED TO HER ABOUT THE MEDICATION AND SHE THOUGHT SINCE IT WAS SUCH A LOW DOSE SHE THOUGHT IT WOULD BE OK      PLEASE CALL AND ADVISE

## 2025-01-24 NOTE — TELEPHONE ENCOUNTER
I have not prescribed MINOXIDIL 2.5 MG.  I refer my patients to dermatology for evaluation of hair loss.  I will place a new dermatology referral in your chart in order to seek a second opinion

## 2025-01-24 NOTE — TELEPHONE ENCOUNTER
CALLED AND SPOKE WITH PATIENT. ADVISED PATIENT ON SPECIFIC REFERRAL TO [facility]. PT VERBALIZED UNDERSTANDING.

## 2025-01-24 NOTE — TELEPHONE ENCOUNTER
Rx Refill Note  Requested Prescriptions     Pending Prescriptions Disp Refills    pravastatin (PRAVACHOL) 20 MG tablet [Pharmacy Med Name: PRAVASTATIN 20MG TABLETS] 90 tablet 0     Sig: TAKE 1 TABLET BY MOUTH EVERY EVENING      Last office visit with prescribing clinician: 11/26/2024     Next office visit with prescribing clinician: 5/13/2025     Ragini Hardy MA  01/24/25, 12:40 EST

## 2025-02-17 RX ORDER — LEVOTHYROXINE SODIUM 25 UG/1
12.5 TABLET ORAL DAILY
Qty: 45 TABLET | Refills: 0 | Status: SHIPPED | OUTPATIENT
Start: 2025-02-17

## 2025-02-17 NOTE — TELEPHONE ENCOUNTER
Rx Refill Note  Requested Prescriptions     Pending Prescriptions Disp Refills    levothyroxine (SYNTHROID, LEVOTHROID) 25 MCG tablet [Pharmacy Med Name: LEVOTHYROXINE 0.025MG (25MCG) TAB] 45 tablet 0     Sig: TAKE 1/2 TABLET BY MOUTH DAILY      Last office visit with prescribing clinician: 11/26/2024     Next office visit with prescribing clinician: 5/13/2025   {Estuardo Thayer MA  02/17/25, 16:31 EST

## 2025-02-20 RX ORDER — PANTOPRAZOLE SODIUM 40 MG/1
40 TABLET, DELAYED RELEASE ORAL DAILY
Qty: 90 TABLET | Refills: 2 | Status: SHIPPED | OUTPATIENT
Start: 2025-02-20

## 2025-04-18 ENCOUNTER — OFFICE VISIT (OUTPATIENT)
Age: 70
End: 2025-04-18
Payer: MEDICARE

## 2025-04-18 VITALS
HEIGHT: 61 IN | SYSTOLIC BLOOD PRESSURE: 126 MMHG | BODY MASS INDEX: 33.42 KG/M2 | OXYGEN SATURATION: 98 % | WEIGHT: 177 LBS | HEART RATE: 83 BPM | DIASTOLIC BLOOD PRESSURE: 80 MMHG

## 2025-04-18 DIAGNOSIS — K21.9 GASTROESOPHAGEAL REFLUX DISEASE, UNSPECIFIED WHETHER ESOPHAGITIS PRESENT: ICD-10-CM

## 2025-04-18 DIAGNOSIS — Z87.11 H/O GASTRIC ULCER: ICD-10-CM

## 2025-04-18 DIAGNOSIS — D12.6 TUBULAR ADENOMA OF COLON: ICD-10-CM

## 2025-04-18 DIAGNOSIS — J30.89 ENVIRONMENTAL AND SEASONAL ALLERGIES: ICD-10-CM

## 2025-04-18 DIAGNOSIS — Z12.11 ENCOUNTER FOR SCREENING FOR MALIGNANT NEOPLASM OF COLON: ICD-10-CM

## 2025-04-18 DIAGNOSIS — J30.0 VASOMOTOR RHINITIS: ICD-10-CM

## 2025-04-18 DIAGNOSIS — Z00.00 MEDICARE ANNUAL WELLNESS VISIT, SUBSEQUENT: Primary | ICD-10-CM

## 2025-04-18 DIAGNOSIS — Z23 NEED FOR VACCINATION: ICD-10-CM

## 2025-04-18 RX ORDER — PANTOPRAZOLE SODIUM 40 MG/1
40 TABLET, DELAYED RELEASE ORAL DAILY
Qty: 90 TABLET | Refills: 3 | Status: SHIPPED | OUTPATIENT
Start: 2025-04-18

## 2025-04-18 RX ORDER — ACETAMINOPHEN 650 MG/1
650 SUPPOSITORY RECTAL
COMMUNITY

## 2025-04-18 RX ORDER — MINOXIDIL 2.5 MG/1
0.5 TABLET ORAL DAILY
COMMUNITY
Start: 2025-01-25

## 2025-04-18 RX ORDER — AZELASTINE 1 MG/ML
2 SPRAY, METERED NASAL 2 TIMES DAILY
Qty: 30 ML | Refills: 11 | Status: SHIPPED | OUTPATIENT
Start: 2025-04-18

## 2025-04-18 NOTE — LETTER
[facility]  Vaccine Consent Form    Patient Name:  Siria Garcia  Patient :  1955     Vaccine(s) Ordered    Pneumococcal Conjugate Vaccine 20-Valent All        Screening Checklist  The following questions should be completed prior to vaccination. If you answer “yes” to any question, it does not necessarily mean you should not be vaccinated. It just means we may need to clarify or ask more questions. If a question is unclear, please ask your healthcare provider to explain it.    Yes No   Any fever or moderate to severe illness today (mild illness and/or antibiotic treatment are not contraindications)?     Do you have a history of a serious reaction to any previous vaccinations, such as anaphylaxis, encephalopathy within 7 days, Guillain-[place] syndrome within 6 weeks, seizure?     Have you received any live vaccine(s) (e.g MMR, HATTIE) or any other vaccines in the last month (to ensure duplicate doses aren't given)?     Do you have an anaphylactic allergy to latex (DTaP, DTaP-IPV, Hep A, Hep B, MenB, RV, Td, Tdap), baker’s yeast (Hep B, HPV), polysorbates (RSV, nirsevimab, PCV 20, Rotavirrus, Tdap, Shingrix), or gelatin (HATTIE, MMR)?     Do you have an anaphylactic allergy to neomycin (Rabies, HATTIE, MMR, IPV, Hep A), polymyxin B (IPV), or streptomycin (IPV)?      Any cancer, leukemia, AIDS, or other immune system disorder? (HATTIE, MMR, RV)     Do you have a parent, brother, or sister with an immune system problem (if immune competence of vaccine recipient clinically verified, can proceed)? (MMR, HATTIE)     Any recent steroid treatments for >2 weeks, chemotherapy, or radiation treatment? (HATTIE, MMR)     Have you received antibody-containing blood transfusions or IVIG in the past 11 months (recommended interval is dependent on product)? (MMR, HATTIE)     Have you taken antiviral drugs (acyclovir, famciclovir, valacyclovir for HATTIE) in the last 24 or 48 hours, respectively?      Are you pregnant or planning to become  "pregnant within 1 month? (HATTIE, MMR, HPV, IPV, MenB, Abrexvy; For Hep B- refer to Engerix-B; For RSV - Abrysvo is indicated for 32-36 weeks of pregnancy from September to January)     For infants, have you ever been told your child has had intussusception or a medical emergency involving obstruction of the intestine (Rotavirus)? If not for an infant, can skip this question.         *Ordering Physicians/APC should be consulted if \"yes\" is checked by the patient or guardian above.  I have received, read, and understand the Vaccine Information Statement (VIS) for each vaccine ordered.  I have considered my or my child's health status as well as the health status of my close contacts.  I have taken the opportunity to discuss my vaccine questions with my or my child's health care provider.   I have requested that the ordered vaccine(s) be given to me or my child.  I understand the benefits and risks of the vaccines.  I understand that I should remain in the clinic for 15 minutes after receiving the vaccine(s).  _________________________________________________________  Signature of Patient or Parent/Legal Guardian ____________________  Date     "

## 2025-04-18 NOTE — PROGRESS NOTES
Subjective   The ABCs of the Annual Wellness Visit  Medicare Wellness Visit      Siria Garcia is a 69 y.o. patient who presents for a Medicare Wellness Visit.    The following portions of the patient's history were reviewed and   updated as appropriate: allergies, current medications, past family history, past medical history, past social history, past surgical history, and problem list.    Compared to one year ago, the patient's physical   health is the same.  Compared to one year ago, the patient's mental   health is the same.    Recent Hospitalizations:  She was not admitted to the hospital during the last year.     Current Medical Providers:  Patient Care Team:  Yvonne Carson MD as PCP - General (Family Medicine)  Sofia Hogn MD as Consulting Physician (Endocrinology)  Chichi Weiss MD as Consulting Physician (Obstetrics and Gynecology)  Murray Torres MD as Consulting Physician (Urology)  Gabriel Del Toro MD as Consulting Physician (General Surgery)  Matthew Manuel MD as Consulting Physician (Infectious Diseases)  Lizette Verma MD as Consulting Physician (Hematology and Oncology)  Bismark Millan MD as Surgeon (Orthopedic Surgery)  Estela Paz PA (Physician Assistant)    Outpatient Medications Prior to Visit   Medication Sig Dispense Refill    Biotin 1000 MCG chewable tablet Chew 1,000 mcg Daily.      Cholecalciferol (Vitamin D) 50 MCG (2000 UT) tablet Take 1 tablet by mouth Daily.      CRANBERRY CONCENTRATE PO Take 1 tablet/day by mouth 2 (Two) Times a Day.      exemestane (AROMASIN) 25 MG chemo tablet Take 1 tablet by mouth Daily.      Glucos-Chond-Hyal Ac-Ca Fructo (MOVE FREE JOINT HEALTH ADVANCE PO) Take 1 tablet/day by mouth. (Patient taking differently: Take 1 tablet/day by mouth As Needed.)      Histamine Dihydrochloride (Australian Dream Arthritis) 0.025 % cream Apply  topically As Needed (joint pain). Apply topically TID       levothyroxine (SYNTHROID, LEVOTHROID) 25 MCG tablet TAKE 1/2 TABLET BY MOUTH DAILY 45 tablet 0    Loperamide HCl (IMODIUM PO) Take 1 tablet by mouth As Needed (urgency).      minoxidil (LONITEN) 2.5 MG tablet Take 0.5 tablets by mouth Daily.      Multiple Minerals (JOINT HEALTH MINERAL PO) Take 1 capsule by mouth Daily.      Multiple Vitamins-Minerals (YARI MULTIVITAMIN FOR WOMEN PO) Take 1 tablet by mouth Daily.      Omega-3 Fatty Acids (OMEGA-3 1450 PO) Take 1,000 mg by mouth Daily.      pravastatin (PRAVACHOL) 20 MG tablet TAKE 1 TABLET BY MOUTH EVERY EVENING 90 tablet 0    sore muscle (ICY HOT EXTRA STRENGTH) 10-30 % cream cream Apply 1 Application topically to the appropriate area as directed 2 (Two) Times a Day As Needed (joint pain). With lido      trimethoprim (TRIMPEX) 100 MG tablet Take 1 tablet by mouth Daily. 90 tablet 1    Zoledronic Acid (ZOMETA IV) Infuse  into a venous catheter Every 6 (Six) Months.      pantoprazole (PROTONIX) 40 MG EC tablet TAKE 1 TABLET BY MOUTH DAILY 90 tablet 2    acetaminophen (TYLENOL) 650 MG suppository Insert 1 suppository into the rectum.      acetaminophen (TYLENOL) 500 MG tablet Take 2 tablets by mouth Every 6 (Six) Hours As Needed for Mild Pain. (Patient not taking: Reported on 4/18/2025)      fluticasone (FLONASE) 50 MCG/ACT nasal spray 2 sprays into the nostril(s) as directed by provider Daily. (Patient not taking: Reported on 4/18/2025) 16 g 11    guaiFENesin (MUCINEX) 600 MG 12 hr tablet Take 2 tablets by mouth 2 (Two) Times a Day. (Patient not taking: Reported on 4/18/2025)      levocetirizine (XYZAL) 5 MG tablet Take 1 tablet by mouth Every Evening. (Patient not taking: Reported on 4/18/2025) 90 tablet 3     No facility-administered medications prior to visit.     No opioid medication identified on active medication list. I have reviewed chart for other potential  high risk medication/s and harmful drug interactions in the elderly.      Aspirin is not on active  "medication list.  Aspirin use is not indicated based on review of current medical condition/s. Risk of harm outweighs potential benefits.  .    Patient Active Problem List   Diagnosis    Benign essential hypertension    Gastroesophageal reflux disease    Fatigue    Hypercholesterolemia    Hypothyroidism    Osteoarthritis of hand    Malignant neoplasm of upper-outer quadrant of breast in female, estrogen receptor positive    Tubular adenoma    Elevated glucose    Hypercalcemia    Vitamin D deficiency    Chronic low back pain    Nephrolithiasis    Obstructive uropathy    Osteopenia of multiple sites    Environmental and seasonal allergies    Iron deficiency    H/O gastric ulcer     Advance Care Planning Advance Directive is not on file.  ACP discussion was held with the patient during this visit. Patient does not have an advance directive, information provided.            Objective   Vitals:    25 1502   BP: 126/80   BP Location: Right arm   Patient Position: Sitting   Cuff Size: Adult   Pulse: 83   SpO2: 98%   Weight: 80.3 kg (177 lb)   Height: 154.9 cm (61\")   PainSc: 8    PainLoc: Back       Estimated body mass index is 33.44 kg/m² as calculated from the following:    Height as of this encounter: 154.9 cm (61\").    Weight as of this encounter: 80.3 kg (177 lb).    BMI is >= 30 and <35. (Class 1 Obesity). The following options were offered after discussion;: information on healthy weight added to patient's after visit summary            Does the patient have evidence of cognitive impairment? No                                                                                                Health  Risk Assessment    Smoking Status:  Social History     Tobacco Use   Smoking Status Former    Current packs/day: 0.00    Average packs/day: 0.5 packs/day for 13.0 years (6.5 ttl pk-yrs)    Types: Cigarettes    Start date:     Quit date:     Years since quittin.3    Passive exposure: Past   Smokeless " Tobacco Never     Alcohol Consumption:  Social History     Substance and Sexual Activity   Alcohol Use No       Fall Risk Screen  STEADI Fall Risk Assessment was completed, and patient is at LOW risk for falls.Assessment completed on:2025    Depression Screening   Little interest or pleasure in doing things? Not at all   Feeling down, depressed, or hopeless? Not at all   PHQ-2 Total Score 0      Health Habits and Functional and Cognitive Screenin/18/2025     3:09 PM   Functional & Cognitive Status   Do you have difficulty preparing food and eating? No   Do you have difficulty bathing yourself, getting dressed or grooming yourself? No   Do you have difficulty using the toilet? No   Do you have difficulty moving around from place to place? No   Do you have trouble with steps or getting out of a bed or a chair? No   Current Diet Well Balanced Diet   Dental Exam Up to date   Eye Exam Up to date   Exercise (times per week) 3 times per week   Current Exercises Include --        Exercise Comment just different excerices at home   Do you need help using the phone?  No   Are you deaf or do you have serious difficulty hearing?  No   Do you need help to go to places out of walking distance? No   Do you need help shopping? No   Do you need help preparing meals?  No   Do you need help with housework?  No   Do you need help with laundry? No   Do you need help taking your medications? No   Do you need help managing money? No   Do you ever drive or ride in a car without wearing a seat belt? No   Have you felt unusual stress, anger or loneliness in the last month? No   Who do you live with? Alone   If you need help, do you have trouble finding someone available to you? No   Have you been bothered in the last four weeks by sexual problems? No   Do you have difficulty concentrating, remembering or making decisions? No           Age-appropriate Screening Schedule:  Refer to the list below for future screening  recommendations based on patient's age, sex and/or medical conditions. Orders for these recommended tests are listed in the plan section. The patient has been provided with a written plan.    Health Maintenance List  Health Maintenance   Topic Date Due    COLORECTAL CANCER SCREENING  11/08/2022    COVID-19 Vaccine (7 - 2024-25 season) 03/18/2025    INFLUENZA VACCINE  07/01/2025    MAMMOGRAM  08/26/2025    LIPID PANEL  11/26/2025    DXA SCAN  03/05/2026    ANNUAL WELLNESS VISIT  04/18/2026    TDAP/TD VACCINES (2 - Td or Tdap) 09/12/2029    HEPATITIS C SCREENING  Completed    Pneumococcal Vaccine 50+  Completed    ZOSTER VACCINE  Completed                                                                                                                                              Immunization History   Administered Date(s) Administered    COVID-19 (PFIZER) 12YRS+ (COMIRNATY) 11/06/2023, 09/18/2024    COVID-19 (PFIZER) BIVALENT 12+YRS 10/18/2022    COVID-19 (PFIZER) Purple Cap Monovalent 03/18/2021, 04/09/2021, 11/30/2021    DTaP 09/12/2019    Flu Vaccine Quad PF >36MO 10/07/2016    Fluad Quad 65+ 10/08/2020    Fluzone  >6mos 11/08/2024    Fluzone (or Fluarix & Flulaval for VFC) >6mos 09/12/2019    Fluzone High-Dose 65+YRS 10/08/2020    Fluzone Quad >6mos (Multi-dose) 09/20/2018, 09/12/2019    Hepatitis A 09/20/2018, 03/20/2019    Influenza Injectable Mdck Pf Quad 12/05/2022, 11/21/2023    Influenza Seasonal Injectable 09/16/2021    Influenza TIV (IM) 11/20/2012, 11/26/2013, 10/23/2015    Pneumococcal Conjugate 20-Valent (PCV20) 04/18/2025    Shingrix 05/17/2018, 08/09/2018    Tdap 09/12/2019    Zostavax 11/10/2016       CMS Preventative Services Quick Reference  Risk Factors Identified During Encounter  Chronic Pain:  Continue with chiropractor  Immunizations Discussed/Encouraged: Prevnar 20 (Pneumococcal 20-valent conjugate)    The above risks/problems have been discussed with the patient.  Pertinent information has  been shared with the patient in the After Visit Summary.  An After Visit Summary and PPPS were made available to the patient.    Follow Up:   Next Medicare Wellness visit to be scheduled in 1 year.         Additional E&M Note during same encounter follows:  Patient has additional, significant, and separately identifiable condition(s)/problem(s) that require work above and beyond the Medicare Wellness Visit     Chief Complaint  Medicare Wellness-subsequent and sinus drainage     Subjective    HPI  EDUARDO is also being seen today for additional medical problem/s.       The patient is a 69-year-old female presenting for a Medicare wellness visit and sinus problems.    She reports that her physical and mental health status remains unchanged compared to the previous year. Hospitalization has not been required within the past year. Aspirin is not taken. A living will has not been created, though it has been considered. Preventative care includes a mammogram completed in 08/2024 and a bone density test in 03/2024. A colonoscopy in 2018 revealed a benign polyp. Approximately 2 to 3 years ago, a gastroenterologist advised another colonoscopy, which she plans to schedule later this year. Both COVID-19 and influenza vaccines have been received, with typical side effects from the influenza vaccine not occurring last year.    GERD symptoms are experienced on days of overeating or late-night eating, generally well-managed with pantoprazole. This medication was initially prescribed by a surgeon following emergency surgery for a perforated ulcer, with the expectation of lifelong use. Management of this medication has since been taken over by her family physician. Concern about the potential recurrence of a ruptured ulcer is expressed.    Sinus issues have persisted for several months, with slight improvement during the winter season. Constant drainage is reported, more pronounced when consuming hot beverages such as decaffeinated tea,  "green tea, and hot chocolate. Symptoms worsen on certain days, particularly when driving or visiting the chiropractor. All allergy and sinus medications were discontinued prior to receiving Zometa in 02/2025. Post-treatment, Claritin has been taken to mitigate side effects, though its effectiveness on sinus drainage is uncertain. Dry mouth is attributed to medication and swallowing sinus drainage. Resumption of saline spray has provided some relief. During the last wellness visit a year ago, strep throat was diagnosed, requiring three visits, including two with me and one with Dr. Bolton. Despite various allergy medications, constant drainage continues.    An appointment with a specialist in 05/2025 is scheduled for back pain. Chiropractic care has been ongoing for 25 years.    PAST SURGICAL HISTORY:  Emergency surgery for a perforated ulcer.            Objective   Vital Signs:  /80 (BP Location: Right arm, Patient Position: Sitting, Cuff Size: Adult)   Pulse 83   Ht 154.9 cm (61\")   Wt 80.3 kg (177 lb)   SpO2 98%   BMI 33.44 kg/m²   Physical Exam  Constitutional:       General: She is not in acute distress.     Appearance: She is obese.   HENT:      Right Ear: Tympanic membrane and ear canal normal.      Left Ear: Tympanic membrane and ear canal normal.      Nose: Congestion present. No rhinorrhea.      Right Turbinates: Swollen and pale.      Left Turbinates: Swollen and pale.      Mouth/Throat:      Mouth: Mucous membranes are moist. No oral lesions.      Pharynx: No pharyngeal swelling, oropharyngeal exudate, posterior oropharyngeal erythema or uvula swelling.   Eyes:      General:         Right eye: No discharge.         Left eye: No discharge.      Conjunctiva/sclera: Conjunctivae normal.   Neck:      Thyroid: No thyromegaly.   Cardiovascular:      Rate and Rhythm: Normal rate and regular rhythm.   Pulmonary:      Effort: Pulmonary effort is normal.      Breath sounds: Normal breath sounds. "   Abdominal:      Palpations: Abdomen is soft.      Tenderness: There is no abdominal tenderness.   Musculoskeletal:      Cervical back: Neck supple.      Comments: Back pain with moving from sitting to laying   Lymphadenopathy:      Head:      Right side of head: No submandibular, preauricular or posterior auricular adenopathy.      Left side of head: No submandibular, preauricular or posterior auricular adenopathy.      Cervical: No cervical adenopathy.   Skin:     General: Skin is warm.   Neurological:      Mental Status: She is alert and oriented to person, place, and time.   Psychiatric:         Attention and Perception: Attention normal.         Mood and Affect: Mood is anxious.         Behavior: Behavior is cooperative.         Thought Content: Thought content normal.         Cognition and Memory: Cognition normal.         Judgment: Judgment normal.                 The following data was reviewed by: Yvonne Carson MD on 04/18/2025:        Results  Diagnostic Testing   - Colonoscopy: 2017, Tubular adenoma colon polyp   SCANNED - COLONOSCOPY (11/08/2017)   SCANNED PATHOLOGY (01/25/2018)       Assessment and Plan   Diagnoses and all orders for this visit:    1. Medicare annual wellness visit, subsequent (Primary)    2. Encounter for screening for malignant neoplasm of colon    3. Tubular adenoma of colon    4. Gastroesophageal reflux disease, unspecified whether esophagitis present  -     pantoprazole (PROTONIX) 40 MG EC tablet; Take 1 tablet by mouth Daily.  Dispense: 90 tablet; Refill: 3    5. H/O gastric ulcer    6. Environmental and seasonal allergies  -     azelastine (ASTELIN) 0.1 % nasal spray; Administer 2 sprays into the nostril(s) as directed by provider 2 (Two) Times a Day.  Dispense: 30 mL; Refill: 11    7. Vasomotor rhinitis  -     azelastine (ASTELIN) 0.1 % nasal spray; Administer 2 sprays into the nostril(s) as directed by provider 2 (Two) Times a Day.  Dispense: 30 mL; Refill: 11    8. Need  for vaccination  -     Pneumococcal Conjugate Vaccine 20-Valent All           1. Medicare wellness visit.  - Her last colonoscopy was conducted in 2017, revealing a tubular adenoma colon polyp, which is precancerous and increases her risk for colon cancer.  - It has been over 5 years since her last colonoscopy. A colonoscopy is recommended this year to monitor for any new polyp growth.  She declined colonoscopy referral today.  - She will schedule her colonoscopy with Dr. Bolton later this year.  - Information regarding advanced directives and living davila will be provided.  - She will receive the pneumonia vaccine today, with a 15-minute observation period post-administration..  - Informed consent and vaccine information will be provided prior to administration.    2. Gastroesophageal reflux disease (GERD).  - Pantoprazole effectively controls her acid reflux symptoms when she avoids overeating or eating too much at night.  - She reports increased GERD symptoms on days she overeats or eats late at night.  - A year's worth of refills for pantoprazole will be sent to [facility].  - Continued use of pantoprazole is recommended to prevent recurrence of peptic ulcer disease.    3.  Rhinitis  Uncontrolled  - She experiences constant sinus drainage, which worsens with hot drinks and possibly after eating.  -Probable combination of both environmental, seasonal, and vasomotor changes causing rhinitis  - Physical exam reveals the left side of her nose is swollen, pale, and purplish, suggesting an allergic reaction rather than an infection.  - Azelastine nasal spray is recommended, to be used twice daily with 1 or 2 sprays on each side.  - A prescription for azelastine nasal spray will be sent to [facility].             Follow Up   Return in about 1 year (around 4/19/2026) for MWV.  Patient was given instructions and counseling regarding her condition or for health maintenance advice. Please see  specific information pulled into the AVS if appropriate.  Patient or patient representative verbalized consent for the use of Ambient Listening during the visit with  Yvonne Carson MD for chart documentation. 4/18/2025  15:50 EDT    Electronically signed by Yvonne Carson MD, 04/18/25, 3:50 PM EDT.

## 2025-04-18 NOTE — PATIENT INSTRUCTIONS
Advance Care Planning and Advance Directives     You make decisions on a daily basis - decisions about where you want to live, your career, your home, your life. Perhaps one of the most important decisions you face is your choice for future medical care. Take time to talk with your family and your healthcare team and start planning today.  Advance Care Planning is a process that can help you:  Understand possible future healthcare decisions in light of your own experiences  Reflect on those decision in light of your goals and values  Discuss your decisions with those closest to you and the healthcare professionals that care for you  Make a plan by creating a document that reflects your wishes    Surrogate Decision Maker  In the event of a medical emergency, which has left you unable to communicate or to make your own decisions, you would need someone to make decisions for you.  It is important to discuss your preferences for medical treatment with this person while you are in good health.     Qualities of a surrogate decision maker:  Willing to take on this role and responsibility  Knows what you want for future medical care  Willing to follow your wishes even if they don't agree with them  Able to make difficult medical decisions under stressful circumstances    Advance Directives  These are legal documents you can create that will guide your healthcare team and decision maker(s) when needed. These documents can be stored in the electronic medical record.    Living Will - a legal document to guide your care if you have a terminal condition or a serious illness and are unable to communicate. States vary by statute in document names/types, but most forms may include one or more of the following:        -  Directions regarding life-prolonging treatments        -  Directions regarding artificially provided nutrition/hydration        -  Choosing a healthcare decision maker        -  Direction regarding organ/tissue  donation    Durable Power of  for Healthcare - this document names an -in-fact to make medical decisions for you, but it may also allow this person to make personal and financial decisions for you. Please seek the advice of an  if you need this type of document.    **Advance Directives are not required and no one may discriminate against you if you do not sign one.    Medical Orders  Many states allow specific forms/orders signed by your physician to record your wishes for medical treatment in your current state of health. This form, signed in personal communication with your physician, addresses resuscitation and other medical interventions that you may or may not want.      For more information or to schedule a time with a [facility] Advance Care Planning Facilitator contact: [facility] [place] or call [phone] and someone will contact you directly.  You are due for a Covid 19 vaccination. (provides protection against Covid 19 Viral Infection) Please  talk to your pharmacist and get the immunization at your local pharmacy at your earliest convenience. Please click on the link for more information about this vaccine.   https://www.cdc.gov/coronavirus/2019-ncov/vaccines/stay-up-to-date.html        Medicare Wellness  Personal Prevention Plan of Service     Date of Office Visit:    Encounter Provider:  Yvonne Carson MD  Place of Service:  [facility]  Patient Name: Siria Garcia  :  1955    As part of the Medicare Wellness portion of your visit today, we are providing you with this personalized preventive plan of services (PPPS). This plan is based upon recommendations of the [place] Preventive Services Task Force (USPSTF) and the Advisory Committee on Immunization Practices (ACIP).    This lists the preventive care services that should be considered, and provides dates of when you are due. Items listed as completed are up-to-date  and do not require any further intervention.    Health Maintenance   Topic Date Due    COLORECTAL CANCER SCREENING  11/08/2022    COVID-19 Vaccine (7 - 2024-25 season) 03/18/2025    INFLUENZA VACCINE  07/01/2025    MAMMOGRAM  08/26/2025    LIPID PANEL  11/26/2025    DXA SCAN  03/05/2026    ANNUAL WELLNESS VISIT  04/18/2026    TDAP/TD VACCINES (2 - Td or Tdap) 09/12/2029    HEPATITIS C SCREENING  Completed    Pneumococcal Vaccine 50+  Completed    ZOSTER VACCINE  Completed       Orders Placed This Encounter   Procedures    Pneumococcal Conjugate Vaccine 20-Valent All       Return in about 1 year (around 4/19/2026) for MWV.

## 2025-05-13 ENCOUNTER — OFFICE VISIT (OUTPATIENT)
Dept: ENDOCRINOLOGY | Facility: CLINIC | Age: 70
End: 2025-05-13
Payer: MEDICARE

## 2025-05-13 VITALS
WEIGHT: 170 LBS | SYSTOLIC BLOOD PRESSURE: 128 MMHG | BODY MASS INDEX: 32.1 KG/M2 | DIASTOLIC BLOOD PRESSURE: 78 MMHG | HEIGHT: 61 IN | HEART RATE: 72 BPM

## 2025-05-13 DIAGNOSIS — E03.9 ACQUIRED HYPOTHYROIDISM: Primary | ICD-10-CM

## 2025-05-13 DIAGNOSIS — R73.09 ELEVATED GLUCOSE: ICD-10-CM

## 2025-05-13 DIAGNOSIS — I10 BENIGN ESSENTIAL HYPERTENSION: ICD-10-CM

## 2025-05-13 DIAGNOSIS — E78.00 HYPERCHOLESTEROLEMIA: ICD-10-CM

## 2025-05-13 LAB — HBA1C MFR BLD: 5.8 % (ref 4.8–5.6)

## 2025-05-13 PROCEDURE — 84443 ASSAY THYROID STIM HORMONE: CPT | Performed by: INTERNAL MEDICINE

## 2025-05-13 PROCEDURE — 83036 HEMOGLOBIN GLYCOSYLATED A1C: CPT | Performed by: INTERNAL MEDICINE

## 2025-05-13 PROCEDURE — 80061 LIPID PANEL: CPT | Performed by: INTERNAL MEDICINE

## 2025-05-13 NOTE — PROGRESS NOTES
Siria Garcia 69 y.o.  [facility]: follow up Hypothyroid, Hyperlipidemia and vitamin D deficiency     [place]: follow up Hypothyroid, hyperlipidemia and vitamin D deficiency    BP is good  Is taking synthroid 12.5 mcg daily   Is eating low fat diet and taking pravachol 20 mg daily   Reviewed lab work for [facility]- normal CBC, CMP and lfts  Stable right tendonitis- ice pack in place  Energy fair     Allergies   Allergen Reactions    Other Other (See Comments)    Bactrim [Sulfamethoxazole-Trimethoprim] Itching     Trimehtoprim is okay, raction when combined with sulfa     Terbinafine Itching       Current Outpatient Medications:     acetaminophen (TYLENOL) 650 MG suppository, Insert 1 suppository into the rectum. (Patient taking differently: 1 suppository Every 4 (Four) Hours As Needed.), Disp: , Rfl:     Biotin 1000 MCG chewable tablet, Chew 1,000 mcg Daily., Disp: , Rfl:     Cholecalciferol (Vitamin D) 50 MCG (2000 UT) tablet, Take 1 tablet by mouth Daily., Disp: , Rfl:     CRANBERRY CONCENTRATE PO, Take 1 tablet/day by mouth 2 (Two) Times a Day., Disp: , Rfl:     exemestane (AROMASIN) 25 MG chemo tablet, Take 1 tablet by mouth Daily., Disp: , Rfl:     Glucos-Chond-Hyal Ac-Ca Fructo (MOVE FREE JOINT HEALTH ADVANCE PO), Take 1 tablet/day by mouth. (Patient taking differently: Take 1 tablet/day by mouth As Needed.), Disp: , Rfl:     Histamine Dihydrochloride (Australian Dream Arthritis) 0.025 % cream, Apply  topically As Needed (joint pain). Apply topically TID, Disp: , Rfl:     levothyroxine (SYNTHROID, LEVOTHROID) 25 MCG tablet, TAKE 1/2 TABLET BY MOUTH DAILY, Disp: 45 tablet, Rfl: 0    Loperamide HCl (IMODIUM PO), Take 1 tablet by mouth As Needed (urgency)., Disp: , Rfl:     minoxidil (LONITEN) 2.5 MG tablet, Take 0.5 tablets by mouth Daily., Disp: , Rfl:     Multiple Minerals (JOINT HEALTH MINERAL PO), Take 1 capsule by mouth Daily., Disp: , Rfl:     Multiple Vitamins-Minerals (YARI MULTIVITAMIN FOR WOMEN PO), Take 1 tablet  by mouth Daily., Disp: , Rfl:     Omega-3 Fatty Acids (OMEGA-3 1450 PO), Take 1,000 mg by mouth Daily., Disp: , Rfl:     pantoprazole (PROTONIX) 40 MG EC tablet, Take 1 tablet by mouth Daily., Disp: 90 tablet, Rfl: 3    pravastatin (PRAVACHOL) 20 MG tablet, TAKE 1 TABLET BY MOUTH EVERY EVENING, Disp: 90 tablet, Rfl: 0    sore muscle (ICY HOT EXTRA STRENGTH) 10-30 % cream cream, Apply 1 Application topically to the appropriate area as directed 2 (Two) Times a Day As Needed (joint pain). With lido, Disp: , Rfl:     trimethoprim (TRIMPEX) 100 MG tablet, Take 1 tablet by mouth Daily., Disp: 90 tablet, Rfl: 1    Zoledronic Acid (ZOMETA IV), Infuse  into a venous catheter Every 6 (Six) Months., Disp: , Rfl:     azelastine (ASTELIN) 0.1 % nasal spray, Administer 2 sprays into the nostril(s) as directed by provider 2 (Two) Times a Day. (Patient not taking: Reported on 5/13/2025), Disp: 30 mL, Rfl: 11    Patient Active Problem List    Diagnosis     H/O gastric ulcer [Z87.11]     Iron deficiency [E61.1]     Environmental and seasonal allergies [J30.89]     Osteopenia of multiple sites [M85.89]     Chronic low back pain [M54.50, G89.29]     Nephrolithiasis [N20.0]     Obstructive uropathy [N13.9]     Vitamin D deficiency [E55.9]     Hypercalcemia [E83.52]     Elevated glucose [R73.09]     Tubular adenoma [D36.9]     Malignant neoplasm of upper-outer quadrant of breast in female, estrogen receptor positive [C50.419, Z17.0]     Benign essential hypertension [I10]     Gastroesophageal reflux disease [K21.9]     Fatigue [R53.83]     Hypercholesterolemia [E78.00]     Hypothyroidism [E03.9]     Osteoarthritis of hand [M19.049]      Review of Systems   Constitutional:  Negative for activity change, appetite change and unexpected weight change.   HENT:  Negative for congestion and rhinorrhea.    Eyes:  Negative for visual disturbance.   Respiratory:  Negative for cough and shortness of breath.    Cardiovascular:  Negative for  "palpitations and leg swelling.   Gastrointestinal:  Negative for constipation, diarrhea and nausea.   Genitourinary:  Negative for hematuria.   Musculoskeletal:  Negative for arthralgias, back pain, gait problem, joint swelling and myalgias.   Skin:  Negative for color change, rash and wound.   Allergic/Immunologic: Negative for environmental allergies, food allergies and immunocompromised state.   Neurological:  Negative for dizziness, weakness and light-headedness.   Psychiatric/Behavioral:  Negative for confusion, decreased concentration, dysphoric mood and sleep disturbance. The patient is not nervous/anxious.      Social History     Socioeconomic History    Marital status:    Tobacco Use    Smoking status: Former     Current packs/day: 0.00     Average packs/day: 0.5 packs/day for 13.0 years (6.5 ttl pk-yrs)     Types: Cigarettes     Start date:      Quit date:      Years since quittin.3     Passive exposure: Past    Smokeless tobacco: Never   Vaping Use    Vaping status: Never Used   Substance and Sexual Activity    Alcohol use: No    Drug use: No    Sexual activity: Not Currently     Family History   Problem Relation Age of Onset    Diabetes Father     Hypertension Father     Breast cancer Mother      /78   Pulse 72   Ht 154.9 cm (60.98\")   Wt 77.1 kg (170 lb)   BMI 32.14 kg/m²     Physical Exam  Vitals and nursing note reviewed.   Constitutional:       Appearance: Normal appearance. She is well-developed.   HENT:      Head: Normocephalic and atraumatic.   Eyes:      General: Lids are normal.      Extraocular Movements: Extraocular movements intact.      Conjunctiva/sclera: Conjunctivae normal.      Pupils: Pupils are equal, round, and reactive to light.   Neck:      Thyroid: No thyroid mass or thyromegaly.      Vascular: No carotid bruit.      Trachea: Trachea normal. No tracheal deviation.   Cardiovascular:      Rate and Rhythm: Normal rate and regular rhythm.      Pulses: " Normal pulses.      Heart sounds: Normal heart sounds. No murmur heard.     No friction rub. No gallop.   Pulmonary:      Effort: Pulmonary effort is normal. No respiratory distress.      Breath sounds: Normal breath sounds. No wheezing.   Musculoskeletal:         General: No deformity. Normal range of motion.      Cervical back: Normal range of motion and neck supple.   Lymphadenopathy:      Cervical: No cervical adenopathy.   Skin:     General: Skin is warm and dry.      Findings: No erythema or rash.      Nails: There is no clubbing.   Neurological:      General: No focal deficit present.      Mental Status: She is alert and oriented to person, place, and time.      Cranial Nerves: No cranial nerve deficit.      Deep Tendon Reflexes: Reflexes are normal and symmetric. Reflexes normal.   Psychiatric:         Mood and Affect: Mood normal.         Speech: Speech normal.         Behavior: Behavior normal.         Thought Content: Thought content normal.         Judgment: Judgment normal.       Results for orders placed or performed in visit on 11/26/24   POC Glucose, Blood    Collection Time: 11/26/24  3:40 PM    Specimen: Blood   Result Value Ref Range    Glucose 109 70 - 130 mg/dL    Lot Number [id number]     Expiration Date 7/18/25    POC Glycosylated Hemoglobin (Hb A1C)    Collection Time: 11/26/24  3:46 PM    Specimen: Blood   Result Value Ref Range    Hemoglobin A1C 5.5 4.5 - 5.7 %    Lot Number [id number]     Expiration Date 8/1/26    T4, Free    Collection Time: 11/26/24  4:02 PM    Specimen: Blood   Result Value Ref Range    Free T4 1.56 0.92 - 1.68 ng/dL   TSH    Collection Time: 11/26/24  4:02 PM    Specimen: Blood   Result Value Ref Range    TSH 0.391 0.270 - 4.200 uIU/mL   Comprehensive Metabolic Panel    Collection Time: 11/26/24  4:02 PM    Specimen: Blood   Result Value Ref Range    Glucose 97 65 - 99 mg/dL    BUN 14 8 - 23 mg/dL    Creatinine 1.01 (H) 0.57 - 1.00 mg/dL    Sodium 141 136 - 145 mmol/L     Potassium 4.3 3.5 - 5.2 mmol/L    Chloride 104 98 - 107 mmol/L    CO2 23.4 22.0 - 29.0 mmol/L    Calcium 9.7 8.6 - 10.5 mg/dL    Total Protein 7.9 6.0 - 8.5 g/dL    Albumin 4.9 3.5 - 5.2 g/dL    ALT (SGPT) 29 1 - 33 U/L    AST (SGOT) 31 1 - 32 U/L    Alkaline Phosphatase 60 39 - 117 U/L    Total Bilirubin 0.3 0.0 - 1.2 mg/dL    Globulin 3.0 gm/dL    A/G Ratio 1.6 g/dL    BUN/Creatinine Ratio 13.9 7.0 - 25.0    Anion Gap 13.6 5.0 - 15.0 mmol/L    eGFR 60.4 >60.0 mL/min/1.73   Lipid Panel    Collection Time: 11/26/24  4:02 PM    Specimen: Blood   Result Value Ref Range    Total Cholesterol 151 0 - 200 mg/dL    Triglycerides 104 0 - 150 mg/dL    HDL Cholesterol 65 (H) 40 - 60 mg/dL    LDL Cholesterol  67 0 - 100 mg/dL    VLDL Cholesterol 19 5 - 40 mg/dL    LDL/HDL Ratio 1.00    Vitamin D,25-Hydroxy    Collection Time: 11/26/24  4:02 PM    Specimen: Arm, Left; Blood   Result Value Ref Range    25 Hydroxy, Vitamin D 49.9 30.0 - 100.0 ng/ml   Iron    Collection Time: 11/26/24  4:02 PM    Specimen: Blood   Result Value Ref Range    Iron 71 37 - 145 mcg/dL     Diagnoses and all orders for this visit:    1. Acquired hypothyroidism (Primary)  Assessment & Plan:  Taking 12.5 mg daily levothyroxine- update tsh   Stable mng     Orders:  -     TSH; Future  -     TSH    2. Hypercholesterolemia  Assessment & Plan:   Continue low fat diet, pravachol   Update flp     Orders:  -     Lipid Panel; Future  -     Lipid Panel    3. Elevated glucose  Assessment & Plan:  Blood sugar and 90 day average sugar have been normal   Update today     Orders:  -     Hemoglobin A1c; Future  -     Hemoglobin A1c    4. Benign essential hypertension  Assessment & Plan:  BP is good   Continue monitoring and medication       Return in about 6 months (around 11/13/2025) for Recheck.    Electronically signed by: Sofia Hong MD         Prophylactic measure

## 2025-05-14 ENCOUNTER — RESULTS FOLLOW-UP (OUTPATIENT)
Dept: ENDOCRINOLOGY | Facility: CLINIC | Age: 70
End: 2025-05-14
Payer: MEDICARE

## 2025-05-14 LAB
CHOLEST SERPL-MCNC: 145 MG/DL (ref 0–200)
HDLC SERPL-MCNC: 55 MG/DL (ref 40–60)
LDLC SERPL CALC-MCNC: 64 MG/DL (ref 0–100)
LDLC/HDLC SERPL: 1.08 {RATIO}
TRIGL SERPL-MCNC: 153 MG/DL (ref 0–150)
TSH SERPL DL<=0.05 MIU/L-ACNC: 0.29 UIU/ML (ref 0.27–4.2)
VLDLC SERPL-MCNC: 26 MG/DL (ref 5–40)

## 2025-05-14 NOTE — LETTER
Siria Garcia  [address]  [place] [address]    May 14, 2025     Dear Ms. Garcia:    Below are the results from your recent visit:    Resulted Orders   Lipid Panel   Result Value Ref Range    Total Cholesterol 145 0 - 200 mg/dL    Triglycerides 153 (H) 0 - 150 mg/dL    HDL Cholesterol 55 40 - 60 mg/dL    LDL Cholesterol  64 0 - 100 mg/dL    VLDL Cholesterol 26 5 - 40 mg/dL    LDL/HDL Ratio 1.08    TSH   Result Value Ref Range    TSH 0.295 0.270 - 4.200 uIU/mL   Hemoglobin A1c   Result Value Ref Range    Hemoglobin A1C 5.80 (H) 4.80 - 5.60 %     90 day average sugar is mildly elevated (in the prediabetic range).  I would avoid sweets, sweet drinks, junk food and fast food and try to get 30 min of exercise daily   Other tests look good - no other changes recommended (continue low fat diet)    If you have any questions or concerns, please don't hesitate to call.         Sincerely,        Sofia Hong MD

## 2025-05-20 DIAGNOSIS — E78.00 HYPERCHOLESTEROLEMIA: ICD-10-CM

## 2025-05-20 RX ORDER — PRAVASTATIN SODIUM 20 MG
20 TABLET ORAL EVERY EVENING
Qty: 90 TABLET | Refills: 1 | Status: SHIPPED | OUTPATIENT
Start: 2025-05-20 | End: 2026-05-20

## 2025-05-20 NOTE — TELEPHONE ENCOUNTER
Rx Refill Note  Requested Prescriptions     Pending Prescriptions Disp Refills    pravastatin (PRAVACHOL) 20 MG tablet [Pharmacy Med Name: PRAVASTATIN 20MG TABLETS] 90 tablet 0     Sig: TAKE 1 TABLET BY MOUTH EVERY EVENING      Last office visit with prescribing clinician: 5/13/2025    Next office visit with prescribing clinician: 11/25/2025

## 2025-05-23 RX ORDER — LEVOTHYROXINE SODIUM 25 UG/1
12.5 TABLET ORAL DAILY
Qty: 45 TABLET | Refills: 1 | Status: SHIPPED | OUTPATIENT
Start: 2025-05-23

## 2025-05-23 NOTE — TELEPHONE ENCOUNTER
Rx Refill Note  Requested Prescriptions     Pending Prescriptions Disp Refills    levothyroxine (SYNTHROID, LEVOTHROID) 25 MCG tablet [Pharmacy Med Name: LEVOTHYROXINE 0.025MG (25MCG) TAB] 45 tablet 0     Sig: TAKE 1/2 TABLET BY MOUTH DAILY      Last office visit with prescribing clinician: 5/13/2025     Next office visit with prescribing clinician: 11/25/2025

## 2025-08-18 ENCOUNTER — OFFICE VISIT (OUTPATIENT)
Age: 70
End: 2025-08-18
Payer: MEDICARE

## 2025-08-18 VITALS
HEIGHT: 61 IN | WEIGHT: 174 LBS | HEART RATE: 95 BPM | TEMPERATURE: 98.2 F | OXYGEN SATURATION: 98 % | DIASTOLIC BLOOD PRESSURE: 66 MMHG | SYSTOLIC BLOOD PRESSURE: 130 MMHG | BODY MASS INDEX: 32.85 KG/M2

## 2025-08-18 DIAGNOSIS — R68.83 CHILLS: Primary | ICD-10-CM

## 2025-08-18 DIAGNOSIS — R53.83 FATIGUE, UNSPECIFIED TYPE: ICD-10-CM

## 2025-08-18 DIAGNOSIS — J02.0 ACUTE STREPTOCOCCAL PHARYNGITIS: ICD-10-CM

## 2025-08-18 DIAGNOSIS — R30.0 DYSURIA: ICD-10-CM

## 2025-08-18 DIAGNOSIS — B37.9 YEAST INFECTION: ICD-10-CM

## 2025-08-18 LAB
BILIRUB BLD-MCNC: ABNORMAL MG/DL
CLARITY, POC: ABNORMAL
COLOR UR: YELLOW
EXPIRATION DATE: ABNORMAL
EXPIRATION DATE: ABNORMAL
EXPIRATION DATE: NORMAL
FLUAV AG UPPER RESP QL IA.RAPID: NOT DETECTED
FLUBV AG UPPER RESP QL IA.RAPID: NOT DETECTED
GLUCOSE UR STRIP-MCNC: NEGATIVE MG/DL
INTERNAL CONTROL: ABNORMAL
INTERNAL CONTROL: NORMAL
KETONES UR QL: NEGATIVE
LEUKOCYTE EST, POC: NEGATIVE
Lab: ABNORMAL
Lab: ABNORMAL
Lab: NORMAL
NITRITE UR-MCNC: NEGATIVE MG/ML
PH UR: 5.5 [PH] (ref 5–8)
PROT UR STRIP-MCNC: NEGATIVE MG/DL
RBC # UR STRIP: NEGATIVE /UL
S PYO AG THROAT QL: POSITIVE
SARS-COV-2 AG UPPER RESP QL IA.RAPID: NOT DETECTED
SP GR UR: 1.02 (ref 1–1.03)
UROBILINOGEN UR QL: ABNORMAL

## 2025-08-18 PROCEDURE — 87428 SARSCOV & INF VIR A&B AG IA: CPT | Performed by: STUDENT IN AN ORGANIZED HEALTH CARE EDUCATION/TRAINING PROGRAM

## 2025-08-18 PROCEDURE — 87880 STREP A ASSAY W/OPTIC: CPT | Performed by: STUDENT IN AN ORGANIZED HEALTH CARE EDUCATION/TRAINING PROGRAM

## 2025-08-18 PROCEDURE — 1125F AMNT PAIN NOTED PAIN PRSNT: CPT | Performed by: STUDENT IN AN ORGANIZED HEALTH CARE EDUCATION/TRAINING PROGRAM

## 2025-08-18 PROCEDURE — 87086 URINE CULTURE/COLONY COUNT: CPT | Performed by: STUDENT IN AN ORGANIZED HEALTH CARE EDUCATION/TRAINING PROGRAM

## 2025-08-18 PROCEDURE — 99214 OFFICE O/P EST MOD 30 MIN: CPT | Performed by: STUDENT IN AN ORGANIZED HEALTH CARE EDUCATION/TRAINING PROGRAM

## 2025-08-18 PROCEDURE — 81003 URINALYSIS AUTO W/O SCOPE: CPT | Performed by: STUDENT IN AN ORGANIZED HEALTH CARE EDUCATION/TRAINING PROGRAM

## 2025-08-18 PROCEDURE — 3078F DIAST BP <80 MM HG: CPT | Performed by: STUDENT IN AN ORGANIZED HEALTH CARE EDUCATION/TRAINING PROGRAM

## 2025-08-18 PROCEDURE — 3075F SYST BP GE 130 - 139MM HG: CPT | Performed by: STUDENT IN AN ORGANIZED HEALTH CARE EDUCATION/TRAINING PROGRAM

## 2025-08-18 RX ORDER — CEPHALEXIN 500 MG/1
500 CAPSULE ORAL 2 TIMES DAILY
Qty: 14 CAPSULE | Refills: 0 | Status: SHIPPED | OUTPATIENT
Start: 2025-08-18 | End: 2025-08-25

## 2025-08-18 RX ORDER — FLUCONAZOLE 150 MG/1
150 TABLET ORAL
Qty: 2 TABLET | Refills: 2 | Status: SHIPPED | OUTPATIENT
Start: 2025-08-18

## 2025-08-18 RX ORDER — CEPHALEXIN 500 MG/1
500 CAPSULE ORAL 2 TIMES DAILY
Qty: 14 CAPSULE | Refills: 0 | Status: SHIPPED | OUTPATIENT
Start: 2025-08-18 | End: 2025-08-18

## 2025-08-20 LAB — BACTERIA SPEC AEROBE CULT: NO GROWTH

## 2025-08-21 ENCOUNTER — RESULTS FOLLOW-UP (OUTPATIENT)
Age: 70
End: 2025-08-21
Payer: MEDICARE

## 2025-08-27 ENCOUNTER — OFFICE VISIT (OUTPATIENT)
Age: 70
End: 2025-08-27
Payer: MEDICARE

## 2025-08-27 VITALS
BODY MASS INDEX: 33.44 KG/M2 | WEIGHT: 177.1 LBS | HEART RATE: 98 BPM | DIASTOLIC BLOOD PRESSURE: 82 MMHG | HEIGHT: 61 IN | OXYGEN SATURATION: 95 % | SYSTOLIC BLOOD PRESSURE: 140 MMHG

## 2025-08-27 DIAGNOSIS — J02.9 PHARYNGITIS, UNSPECIFIED ETIOLOGY: Primary | ICD-10-CM

## 2025-08-27 DIAGNOSIS — R19.7 DIARRHEA, UNSPECIFIED TYPE: ICD-10-CM

## 2025-08-27 LAB
EXPIRATION DATE: NORMAL
EXPIRATION DATE: NORMAL
FLUAV AG UPPER RESP QL IA.RAPID: NOT DETECTED
FLUBV AG UPPER RESP QL IA.RAPID: NOT DETECTED
INTERNAL CONTROL: NORMAL
INTERNAL CONTROL: NORMAL
Lab: NORMAL
Lab: NORMAL
S PYO AG THROAT QL: NEGATIVE
SARS-COV-2 AG UPPER RESP QL IA.RAPID: NOT DETECTED

## (undated) DEVICE — Device

## (undated) DEVICE — JACKSON-PRATT 100CC BULB RESERVOIR: Brand: CARDINAL HEALTH

## (undated) DEVICE — PK CYSTO-TUR BASIC 10

## (undated) DEVICE — TBG PENCL TELESCP MEGADYNE SMOKE EVAC 10FT

## (undated) DEVICE — NITINOL WIRE WITH HYDROPHILIC TIP: Brand: SENSOR

## (undated) DEVICE — GLV SURG SENSICARE PI MIC PF SZ7.5 LF STRL

## (undated) DEVICE — DRAIN JACKSON PRATT ROUND 15FR: Brand: CARDINAL HEALTH

## (undated) DEVICE — SAFESECURE,SECUREMENT,FOLEY CATH,STERILE: Brand: MEDLINE

## (undated) DEVICE — GLV SURG SENSICARE W/ALOE PF LF 7.5 STRL

## (undated) DEVICE — BLANKT WARM UPPR/BDY ARM/OUT 57X196CM

## (undated) DEVICE — SUT VIC 2/0 SH CR8 27IN VCP785D

## (undated) DEVICE — CLTH CLENS READYCLEANSE PERI CARE PK/5

## (undated) DEVICE — GLV SURG SENSICARE PI ORTHO SZ7.5 LF STRL

## (undated) DEVICE — SUT SILK 2/0 TIES 18IN A185H

## (undated) DEVICE — TOTAL TRAY, 16FR 10ML SIL FOLEY, URN: Brand: MEDLINE

## (undated) DEVICE — SUT SILK 3/0 TIES 18IN A184H

## (undated) DEVICE — CULT ANAERB

## (undated) DEVICE — TRAP FLD MINIVAC MEGADYNE 100ML

## (undated) DEVICE — LEX GENERAL ABDOMINAL SPLIT: Brand: MEDLINE INDUSTRIES, INC.

## (undated) DEVICE — CUFF SCD HEMOFORCE SEQ CALF STD MD

## (undated) DEVICE — CATH URETRL FLXITP POLLACK STD 5F 70CM